# Patient Record
Sex: MALE | Race: WHITE | Employment: OTHER | URBAN - NONMETROPOLITAN AREA
[De-identification: names, ages, dates, MRNs, and addresses within clinical notes are randomized per-mention and may not be internally consistent; named-entity substitution may affect disease eponyms.]

---

## 2017-01-18 ENCOUNTER — OFFICE VISIT (OUTPATIENT)
Dept: PRIMARY CARE CLINIC | Age: 57
End: 2017-01-18
Payer: MEDICAID

## 2017-01-18 VITALS
BODY MASS INDEX: 22.66 KG/M2 | HEART RATE: 68 BPM | DIASTOLIC BLOOD PRESSURE: 80 MMHG | SYSTOLIC BLOOD PRESSURE: 130 MMHG | WEIGHT: 153 LBS | RESPIRATION RATE: 18 BRPM | OXYGEN SATURATION: 98 % | HEIGHT: 69 IN

## 2017-01-18 DIAGNOSIS — Z94.2 STATUS POST LUNG TRANSPLANTATION (HCC): ICD-10-CM

## 2017-01-18 DIAGNOSIS — J43.9 PULMONARY EMPHYSEMA, UNSPECIFIED EMPHYSEMA TYPE (HCC): Primary | ICD-10-CM

## 2017-01-18 DIAGNOSIS — M54.50 CHRONIC LOW BACK PAIN WITHOUT SCIATICA, UNSPECIFIED BACK PAIN LATERALITY: ICD-10-CM

## 2017-01-18 DIAGNOSIS — G89.29 CHRONIC LOW BACK PAIN WITHOUT SCIATICA, UNSPECIFIED BACK PAIN LATERALITY: ICD-10-CM

## 2017-01-18 DIAGNOSIS — E11.8 TYPE 2 DIABETES MELLITUS WITH COMPLICATION, WITHOUT LONG-TERM CURRENT USE OF INSULIN (HCC): ICD-10-CM

## 2017-01-18 DIAGNOSIS — I10 ESSENTIAL HYPERTENSION: ICD-10-CM

## 2017-01-18 LAB
AMPHETAMINE SCREEN, URINE: NORMAL
BARBITURATE SCREEN, URINE: NORMAL
BENZODIAZEPINE SCREEN, URINE: NORMAL
COCAINE METABOLITE SCREEN URINE: NORMAL
MDMA URINE: NORMAL
METHADONE SCREEN, URINE: NORMAL
METHAMPHETAMINE, URINE: NORMAL
OPIATE SCREEN URINE: NORMAL
OXYCODONE SCREEN URINE: NORMAL
PHENCYCLIDINE SCREEN URINE: NORMAL
PROPOXYPHENE SCREEN, URINE: NORMAL
THC: POSITIVE
TRICYCLIC ANTIDEPRESSANTS, UR: NORMAL

## 2017-01-18 PROCEDURE — 80305 DRUG TEST PRSMV DIR OPT OBS: CPT | Performed by: FAMILY MEDICINE

## 2017-01-18 PROCEDURE — 99214 OFFICE O/P EST MOD 30 MIN: CPT | Performed by: FAMILY MEDICINE

## 2017-01-18 RX ORDER — HYDROCODONE BITARTRATE AND ACETAMINOPHEN 5; 325 MG/1; MG/1
TABLET ORAL
Refills: 0 | COMMUNITY
Start: 2017-01-06 | End: 2018-01-22

## 2017-01-18 RX ORDER — DEXTROMETHORPHAN HYDROBROMIDE AND PROMETHAZINE HYDROCHLORIDE 15; 6.25 MG/5ML; MG/5ML
SYRUP ORAL
Qty: 120 ML | Refills: 1 | Status: SHIPPED | OUTPATIENT
Start: 2017-01-18 | End: 2017-04-18

## 2017-01-18 RX ORDER — DEXTROMETHORPHAN HYDROBROMIDE AND PROMETHAZINE HYDROCHLORIDE 15; 6.25 MG/5ML; MG/5ML
SYRUP ORAL
Refills: 1 | COMMUNITY
Start: 2017-01-09 | End: 2017-01-18 | Stop reason: SDUPTHER

## 2017-01-18 ASSESSMENT — ENCOUNTER SYMPTOMS
WHEEZING: 1
COUGH: 0
COLOR CHANGE: 0
SHORTNESS OF BREATH: 1

## 2017-03-22 ENCOUNTER — APPOINTMENT (OUTPATIENT)
Dept: ULTRASOUND IMAGING | Facility: HOSPITAL | Age: 57
End: 2017-03-22

## 2017-03-22 ENCOUNTER — HOSPITAL ENCOUNTER (EMERGENCY)
Facility: HOSPITAL | Age: 57
Discharge: HOME | End: 2017-03-22
Admitting: EMERGENCY MEDICINE

## 2017-03-22 ENCOUNTER — APPOINTMENT (OUTPATIENT)
Dept: CT IMAGING | Facility: HOSPITAL | Age: 57
End: 2017-03-22

## 2017-03-22 ENCOUNTER — APPOINTMENT (OUTPATIENT)
Dept: GENERAL RADIOLOGY | Facility: HOSPITAL | Age: 57
End: 2017-03-22

## 2017-03-22 VITALS
BODY MASS INDEX: 21.33 KG/M2 | WEIGHT: 144 LBS | TEMPERATURE: 99.3 F | SYSTOLIC BLOOD PRESSURE: 117 MMHG | OXYGEN SATURATION: 95 % | RESPIRATION RATE: 20 BRPM | HEIGHT: 69 IN | DIASTOLIC BLOOD PRESSURE: 61 MMHG | HEART RATE: 87 BPM

## 2017-03-22 DIAGNOSIS — J20.8 ACUTE BACTERIAL BRONCHITIS: Primary | ICD-10-CM

## 2017-03-22 DIAGNOSIS — B96.89 ACUTE BACTERIAL BRONCHITIS: Primary | ICD-10-CM

## 2017-03-22 DIAGNOSIS — R93.89 ABNORMAL CT SCAN, CHEST: ICD-10-CM

## 2017-03-22 LAB
ALBUMIN SERPL-MCNC: 4.1 G/DL (ref 3.5–5)
ALBUMIN/GLOB SERPL: 1.5 G/DL (ref 1.1–2.5)
ALP SERPL-CCNC: 80 U/L (ref 24–120)
ALT SERPL W P-5'-P-CCNC: <15 U/L (ref 0–54)
ANION GAP SERPL CALCULATED.3IONS-SCNC: 12 MMOL/L (ref 4–13)
ARTERIAL PATENCY WRIST A: ABNORMAL
AST SERPL-CCNC: 18 U/L (ref 7–45)
ATMOSPHERIC PRESS: ABNORMAL MMHG
BASE EXCESS BLDA CALC-SCNC: 5.8 MMOL/L (ref -2–2)
BASOPHILS # BLD AUTO: 0.03 10*3/MM3 (ref 0–0.2)
BASOPHILS NFR BLD AUTO: 0.2 % (ref 0–2)
BDY SITE: ABNORMAL
BILIRUB SERPL-MCNC: 1.1 MG/DL (ref 0.1–1)
BUN BLD-MCNC: 24 MG/DL (ref 5–21)
BUN/CREAT SERPL: 18.9 (ref 7–25)
CALCIUM SPEC-SCNC: 9.5 MG/DL (ref 8.4–10.4)
CHLORIDE SERPL-SCNC: 97 MMOL/L (ref 98–110)
CO2 SERPL-SCNC: 33 MMOL/L (ref 24–31)
CREAT BLD-MCNC: 1.27 MG/DL (ref 0.5–1.4)
DEPRECATED RDW RBC AUTO: 41.2 FL (ref 40–54)
EOSINOPHIL # BLD AUTO: 0.06 10*3/MM3 (ref 0–0.7)
EOSINOPHIL NFR BLD AUTO: 0.3 % (ref 0–4)
ERYTHROCYTE [DISTWIDTH] IN BLOOD BY AUTOMATED COUNT: 13 % (ref 12–15)
FLUAV AG NPH QL: NEGATIVE
FLUBV AG NPH QL IA: NEGATIVE
GFR SERPL CREATININE-BSD FRML MDRD: 58 ML/MIN/1.73
GLOBULIN UR ELPH-MCNC: 2.8 GM/DL
GLUCOSE BLD-MCNC: 101 MG/DL (ref 70–100)
HCO3 BLDA-SCNC: 30.3 MMOL/L (ref 22–26)
HCT VFR BLD AUTO: 40.6 % (ref 40–52)
HGB BLD-MCNC: 13.6 G/DL (ref 14–18)
IMM GRANULOCYTES # BLD: 0.16 10*3/MM3 (ref 0–0.03)
IMM GRANULOCYTES NFR BLD: 0.9 % (ref 0–5)
LYMPHOCYTES # BLD AUTO: 0.83 10*3/MM3 (ref 0.72–4.86)
LYMPHOCYTES NFR BLD AUTO: 4.6 % (ref 15–45)
MCH RBC QN AUTO: 29.6 PG (ref 28–32)
MCHC RBC AUTO-ENTMCNC: 33.5 G/DL (ref 33–36)
MCV RBC AUTO: 88.5 FL (ref 82–95)
MODALITY: ABNORMAL
MONOCYTES # BLD AUTO: 1.35 10*3/MM3 (ref 0.19–1.3)
MONOCYTES NFR BLD AUTO: 7.5 % (ref 4–12)
NEUTROPHILS # BLD AUTO: 15.68 10*3/MM3 (ref 1.87–8.4)
NEUTROPHILS NFR BLD AUTO: 86.5 % (ref 39–78)
NT-PROBNP SERPL-MCNC: 571 PG/ML (ref 0–900)
PCO2 BLDA: 43.5 MM HG (ref 35–45)
PH BLDA: 7.46 PH UNITS (ref 7.35–7.45)
PLATELET # BLD AUTO: 244 10*3/MM3 (ref 130–400)
PMV BLD AUTO: 11.4 FL (ref 6–12)
PO2 BLDA: 63.7 MM HG (ref 80–100)
POTASSIUM BLD-SCNC: 4.1 MMOL/L (ref 3.5–5.3)
PROT SERPL-MCNC: 6.9 G/DL (ref 6.3–8.7)
RBC # BLD AUTO: 4.59 10*6/MM3 (ref 4.8–5.9)
SAO2 % BLDCOA: 93.4 % (ref 94–100)
SAO2 % BLDCOA: 93.4 % (ref 94–100)
SODIUM BLD-SCNC: 142 MMOL/L (ref 135–145)
TROPONIN I SERPL-MCNC: 0 NG/ML (ref 0–0.07)
WBC NRBC COR # BLD: 18.11 10*3/MM3 (ref 4.8–10.8)

## 2017-03-22 PROCEDURE — 83880 ASSAY OF NATRIURETIC PEPTIDE: CPT | Performed by: PHYSICIAN ASSISTANT

## 2017-03-22 PROCEDURE — 25010000002 ONDANSETRON PER 1 MG: Performed by: EMERGENCY MEDICINE

## 2017-03-22 PROCEDURE — 82803 BLOOD GASES ANY COMBINATION: CPT

## 2017-03-22 PROCEDURE — 0 IOPAMIDOL PER 1 ML: Performed by: PHYSICIAN ASSISTANT

## 2017-03-22 PROCEDURE — 96367 TX/PROPH/DG ADDL SEQ IV INF: CPT

## 2017-03-22 PROCEDURE — 94799 UNLISTED PULMONARY SVC/PX: CPT

## 2017-03-22 PROCEDURE — 71275 CT ANGIOGRAPHY CHEST: CPT

## 2017-03-22 PROCEDURE — 71010 HC CHEST PA OR AP: CPT

## 2017-03-22 PROCEDURE — 36415 COLL VENOUS BLD VENIPUNCTURE: CPT | Performed by: PHYSICIAN ASSISTANT

## 2017-03-22 PROCEDURE — 99284 EMERGENCY DEPT VISIT MOD MDM: CPT

## 2017-03-22 PROCEDURE — 25010000002 MORPHINE PER 10 MG: Performed by: EMERGENCY MEDICINE

## 2017-03-22 PROCEDURE — 36600 WITHDRAWAL OF ARTERIAL BLOOD: CPT

## 2017-03-22 PROCEDURE — 85025 COMPLETE CBC W/AUTO DIFF WBC: CPT | Performed by: PHYSICIAN ASSISTANT

## 2017-03-22 PROCEDURE — 25010000002 PIPERACILLIN-TAZOBACTAM: Performed by: PHYSICIAN ASSISTANT

## 2017-03-22 PROCEDURE — 96365 THER/PROPH/DIAG IV INF INIT: CPT

## 2017-03-22 PROCEDURE — 96375 TX/PRO/DX INJ NEW DRUG ADDON: CPT

## 2017-03-22 PROCEDURE — 80053 COMPREHEN METABOLIC PANEL: CPT | Performed by: PHYSICIAN ASSISTANT

## 2017-03-22 PROCEDURE — 93010 ELECTROCARDIOGRAM REPORT: CPT | Performed by: INTERNAL MEDICINE

## 2017-03-22 PROCEDURE — 25010000002 CEFEPIME: Performed by: PHYSICIAN ASSISTANT

## 2017-03-22 PROCEDURE — 93005 ELECTROCARDIOGRAM TRACING: CPT | Performed by: PHYSICIAN ASSISTANT

## 2017-03-22 PROCEDURE — 87040 BLOOD CULTURE FOR BACTERIA: CPT | Performed by: PHYSICIAN ASSISTANT

## 2017-03-22 PROCEDURE — 84484 ASSAY OF TROPONIN QUANT: CPT

## 2017-03-22 PROCEDURE — 94640 AIRWAY INHALATION TREATMENT: CPT

## 2017-03-22 PROCEDURE — 96361 HYDRATE IV INFUSION ADD-ON: CPT

## 2017-03-22 PROCEDURE — 87804 INFLUENZA ASSAY W/OPTIC: CPT | Performed by: PHYSICIAN ASSISTANT

## 2017-03-22 PROCEDURE — 93970 EXTREMITY STUDY: CPT

## 2017-03-22 PROCEDURE — 93970 EXTREMITY STUDY: CPT | Performed by: SURGERY

## 2017-03-22 RX ORDER — AMLODIPINE BESYLATE 5 MG/1
5 TABLET ORAL EVERY MORNING
COMMUNITY
End: 2022-01-01

## 2017-03-22 RX ORDER — BUDESONIDE AND FORMOTEROL FUMARATE DIHYDRATE 160; 4.5 UG/1; UG/1
2 AEROSOL RESPIRATORY (INHALATION) EVERY MORNING
COMMUNITY
End: 2018-12-13 | Stop reason: SDUPTHER

## 2017-03-22 RX ORDER — TACROLIMUS 1 MG/1
1 CAPSULE ORAL 2 TIMES DAILY
COMMUNITY

## 2017-03-22 RX ORDER — ALBUTEROL SULFATE 90 UG/1
2 AEROSOL, METERED RESPIRATORY (INHALATION) EVERY 4 HOURS PRN
COMMUNITY
End: 2019-10-24 | Stop reason: SDUPTHER

## 2017-03-22 RX ORDER — MORPHINE SULFATE 4 MG/ML
4 INJECTION, SOLUTION INTRAMUSCULAR; INTRAVENOUS ONCE
Status: COMPLETED | OUTPATIENT
Start: 2017-03-22 | End: 2017-03-22

## 2017-03-22 RX ORDER — CHOLECALCIFEROL (VITAMIN D3) 125 MCG
500 CAPSULE ORAL EVERY MORNING
COMMUNITY

## 2017-03-22 RX ORDER — OMEPRAZOLE 20 MG/1
20 CAPSULE, DELAYED RELEASE ORAL 2 TIMES DAILY
COMMUNITY

## 2017-03-22 RX ORDER — METOPROLOL TARTRATE 100 MG/1
50 TABLET ORAL 2 TIMES DAILY
COMMUNITY

## 2017-03-22 RX ORDER — HYDROCODONE BITARTRATE AND ACETAMINOPHEN 7.5; 325 MG/1; MG/1
1 TABLET ORAL EVERY 8 HOURS PRN
COMMUNITY

## 2017-03-22 RX ORDER — PREGABALIN 150 MG/1
150 CAPSULE ORAL 3 TIMES DAILY PRN
COMMUNITY

## 2017-03-22 RX ORDER — ATORVASTATIN CALCIUM 20 MG/1
20 TABLET, FILM COATED ORAL NIGHTLY
COMMUNITY

## 2017-03-22 RX ORDER — SODIUM CHLORIDE 9 MG/ML
125 INJECTION, SOLUTION INTRAVENOUS CONTINUOUS
Status: DISCONTINUED | OUTPATIENT
Start: 2017-03-22 | End: 2017-03-22 | Stop reason: HOSPADM

## 2017-03-22 RX ORDER — TACROLIMUS 0.5 MG/1
0.5 CAPSULE ORAL NIGHTLY
Status: ON HOLD | COMMUNITY
End: 2019-03-11

## 2017-03-22 RX ORDER — FOLIC ACID 1 MG/1
1 TABLET ORAL EVERY MORNING
COMMUNITY

## 2017-03-22 RX ORDER — FUROSEMIDE 20 MG/1
40 TABLET ORAL DAILY
COMMUNITY

## 2017-03-22 RX ORDER — HYDROCHLOROTHIAZIDE 50 MG/1
50 TABLET ORAL EVERY MORNING
COMMUNITY

## 2017-03-22 RX ORDER — ONDANSETRON 2 MG/ML
4 INJECTION INTRAMUSCULAR; INTRAVENOUS ONCE
Status: COMPLETED | OUTPATIENT
Start: 2017-03-22 | End: 2017-03-22

## 2017-03-22 RX ORDER — MAGNESIUM OXIDE 400 MG/1
400 TABLET ORAL 2 TIMES DAILY
COMMUNITY

## 2017-03-22 RX ORDER — CEFDINIR 300 MG/1
300 CAPSULE ORAL 2 TIMES DAILY
Qty: 20 CAPSULE | Refills: 0 | Status: SHIPPED | OUTPATIENT
Start: 2017-03-22 | End: 2017-10-10

## 2017-03-22 RX ORDER — ALBUTEROL SULFATE 2.5 MG/3ML
2.5 SOLUTION RESPIRATORY (INHALATION)
Status: COMPLETED | OUTPATIENT
Start: 2017-03-22 | End: 2017-03-22

## 2017-03-22 RX ADMIN — MORPHINE SULFATE 4 MG: 4 INJECTION, SOLUTION INTRAMUSCULAR; INTRAVENOUS at 14:11

## 2017-03-22 RX ADMIN — ONDANSETRON HYDROCHLORIDE 4 MG: 2 SOLUTION INTRAMUSCULAR; INTRAVENOUS at 14:11

## 2017-03-22 RX ADMIN — ALBUTEROL SULFATE 2.5 MG: 2.5 SOLUTION RESPIRATORY (INHALATION) at 12:01

## 2017-03-22 RX ADMIN — TAZOBACTAM SODIUM AND PIPERACILLIN SODIUM 4.5 G: .5; 4 INJECTION, POWDER, LYOPHILIZED, FOR SOLUTION INTRAVENOUS at 12:36

## 2017-03-22 RX ADMIN — SODIUM CHLORIDE 125 ML/HR: 9 INJECTION, SOLUTION INTRAVENOUS at 12:10

## 2017-03-22 RX ADMIN — CEFEPIME 2 G: 2 INJECTION, POWDER, FOR SOLUTION INTRAVENOUS at 13:45

## 2017-03-22 RX ADMIN — IOPAMIDOL 98 ML: 755 INJECTION, SOLUTION INTRAVENOUS at 13:10

## 2017-03-22 RX ADMIN — ALBUTEROL SULFATE 2.5 MG: 2.5 SOLUTION RESPIRATORY (INHALATION) at 11:55

## 2017-03-22 NOTE — ED PROVIDER NOTES
"Subjective   Patient is a 56 y.o. male presenting with shortness of breath.   Shortness of Breath   Associated symptoms: chest pain, cough and vomiting    Associated symptoms: no wheezing      Patient is a 56-year-old male with chief complaint of shortness of breath and cough.  The patient reports this been occurring approximately 3 days ago where he had a bit of white, yellow, and \"a smidge of green productive phlegm.  He did state at the beginning when he coughed so heavily, he felt \"something ripthrough me and then a bit of blood came out (in my phlegm).\"  The patient does complain of a  fever -temperature maximum 100.4 last night.  He taken Tylenol last night.  He does complain of nausea and vomiting as well where he had been vomiting at least 3 times in past 24-hour period.  He does have a history chronic intermittent diarrhea where he is experiencing diarrhea presently.  He does complain of body aches and chills.  He has been experiencing left-sided chest pain that is chronic after he had his lung transplant surgery back in 2006.  He has had different type of discomfort in the right side of his chest as well.  He believes the last time he had a stress test was back in 2006 prior to his lung transplant clearance.  The patient denies being on any antibiotics presently.    The patient reports a history of left-sided lung transplant back in 2016 secondary to COPD and emphysema.  The patient states he quit smoking.  He is being followed closely by his lung transplant physician at  in [place], [place].  The patient reports that he is showing signs of rejection as of one year ago.  He continues to be on antirejection medications.  However, he states that he is not on the lung transplant list due to \"missing too many appointments.\"    Review of Systems   Constitutional: Positive for activity change, appetite change, chills and fatigue.   HENT: Negative.    Eyes: Negative.    Respiratory: Positive for cough, " chest tightness and shortness of breath. Negative for choking, wheezing and stridor.    Cardiovascular: Positive for chest pain and leg swelling. Negative for palpitations.   Gastrointestinal: Positive for diarrhea, nausea and vomiting. Negative for blood in stool.   Genitourinary: Negative.    Musculoskeletal: Negative.    Neurological: Positive for weakness.       Past Medical History:   Diagnosis Date   • Cancer     Skin   • Chronic back pain    • COPD (chronic obstructive pulmonary disease)    • Diabetes mellitus    • Emphysema lung    • Hypertension    • Lung transplanted        Allergies   Allergen Reactions   • Clindamycin/Lincomycin Diarrhea   • Moxifloxacin Rash     Sweating        Past Surgical History:   Procedure Laterality Date   • APPENDECTOMY     • CHOLECYSTECTOMY     • COLONOSCOPY     • ENDOSCOPY     • LUNG REMOVAL, TOTAL      right upper lobe   • LUNG TRANSPLANT      2006       Family History   Problem Relation Age of Onset   • Heart disease Father    • Heart attack Brother        Social History     Social History   • Marital status:      Spouse name: N/A   • Number of children: N/A   • Years of education: N/A     Social History Main Topics   • Smoking status: Former Smoker   • Smokeless tobacco: None      Comment: quit 9 years ago   • Alcohol use Yes      Comment: 2 per week   • Drug use: Yes     Special: Marijuana      Comment: when in severe pain    • Sexual activity: Defer     Other Topics Concern   • None     Social History Narrative   • None       Prior to Admission medications    Not on File       Medications   sodium chloride 0.9 % infusion (125 mL/hr Intravenous New Bag 3/22/17 1210)   albuterol (PROVENTIL) nebulizer solution 0.083% 2.5 mg/3mL (2.5 mg Nebulization Given 3/22/17 1201)   piperacillin-tazobactam (ZOSYN) 4.5 g/100 mL 0.9% NS IVPB (mbp) (0 g Intravenous Stopped 3/22/17 1343)   cefepime (MAXIPIME) 2 g/100 mL 0.9% NS (mbp) (2 g Intravenous New Bag 3/22/17 1345)   iopamidol  "(ISOVUE-370) 76 % injection 150 mL (98 mL Intravenous Given 3/22/17 1310)       /54  Pulse 87  Temp 99.7 °F (37.6 °C) (Oral)   Resp 13  Ht 69\" (175.3 cm)  Wt 144 lb (65.3 kg)  SpO2 90%  BMI 21.27 kg/m2      Objective   Physical Exam   Constitutional: He is oriented to person, place, and time. He appears well-developed and well-nourished.  Non-toxic appearance. No distress.   HENT:   Head: Normocephalic and atraumatic.   Nose: Nose normal.   Mouth/Throat: Uvula is midline, oropharynx is clear and moist and mucous membranes are normal.   Eyes: Conjunctivae, EOM and lids are normal. Pupils are equal, round, and reactive to light. Lids are everted and swept, no foreign bodies found.   Neck: Trachea normal, normal range of motion, full passive range of motion without pain and phonation normal. Neck supple. Normal carotid pulses and no JVD present. Carotid bruit is not present. No rigidity.   Cardiovascular: Normal rate, regular rhythm, normal heart sounds, intact distal pulses and normal pulses.    Pulmonary/Chest: Effort normal. No stridor. No apnea and no tachypnea. No respiratory distress. He has rales.   Abdominal: Soft. Normal appearance, normal aorta and bowel sounds are normal. There is no hepatosplenomegaly. No hernia.   Musculoskeletal: Normal range of motion. He exhibits edema and tenderness.   1+ pitting edema to BLE with bilateral rizwana's sign.        Vascular Status -  His exam exhibits right foot vasculature normal. His exam exhibits no right foot edema. His exam exhibits left foot vasculature normal. His exam exhibits no left foot edema.  Neurological: He is alert and oriented to person, place, and time. He has normal strength. He displays normal reflexes. No cranial nerve deficit or sensory deficit. Gait normal. GCS eye subscore is 4. GCS verbal subscore is 5. GCS motor subscore is 6.   Skin: Skin is warm, dry and intact. He is not diaphoretic. No pallor.   Psychiatric: He has a normal mood " and affect. His speech is normal and behavior is normal.   Nursing note and vitals reviewed.      Procedures         Lab Results (last 24 hours)     Procedure Component Value Units Date/Time    Blood Gas, Arterial [[id number]]  (Abnormal) Collected:  03/22/17 1150    Specimen:  Arterial Blood Updated:  03/22/17 1152     Site Arterial: right radial     Nikolas's Test --      Documented in Rapid Comm        pH, Arterial 7.461 (H) pH units      pCO2, Arterial 43.5 mm Hg      pO2, Arterial 63.7 (L) mm Hg      HCO3, Arterial 30.3 (H) mmol/L      Base Excess, Arterial 5.8 (H) mmol/L      O2 Saturation, Arterial 93.4 (L) %      O2 Saturation Calculated 93.4 (L) %      Barometric Pressure for Blood Gas -- mmHg       Component not reported at this site.        Modality Room air    Narrative:       Serial Number: [id number]    : [id number]    CBC & Differential [[id number]] Collected:  03/22/17 1159    Specimen:  Blood Updated:  03/22/17 1214    Narrative:       The following orders were created for panel order CBC & Differential.  Procedure                               Abnormality         Status                     ---------                               -----------         ------                     CBC Auto Differential[[id number]]         Abnormal            Final result                 Please view results for these tests on the individual orders.    Comprehensive Metabolic Panel [[id number]]  (Abnormal) Collected:  03/22/17 1159    Specimen:  Blood Updated:  03/22/17 1223     Glucose 101 (H) mg/dL      BUN 24 (H) mg/dL      Creatinine 1.27 mg/dL      Sodium 142 mmol/L      Potassium 4.1 mmol/L      Chloride 97 (L) mmol/L      CO2 33.0 (H) mmol/L      Calcium 9.5 mg/dL      Total Protein 6.9 g/dL      Albumin 4.10 g/dL      ALT (SGPT) <15 U/L      AST (SGOT) 18 U/L      Alkaline Phosphatase 80 U/L      Total Bilirubin 1.1 (H) mg/dL      eGFR Non African Amer 58 (L) mL/min/1.73      Globulin 2.8 gm/dL      A/G Ratio 1.5 g/dL       BUN/Creatinine Ratio 18.9     Anion Gap 12.0 mmol/L     BNP [[id number]]  (Normal) Collected:  03/22/17 1159    Specimen:  Blood Updated:  03/22/17 1232     proBNP 571.0 pg/mL     Blood Culture [[id number]] Collected:  03/22/17 1159    Specimen:  Blood from Wrist, Left Updated:  03/22/17 1226    CBC Auto Differential [[id number]]  (Abnormal) Collected:  03/22/17 1159    Specimen:  Blood Updated:  03/22/17 1214     WBC 18.11 (H) 10*3/mm3      RBC 4.59 (L) 10*6/mm3      Hemoglobin 13.6 (L) g/dL      Hematocrit 40.6 %      MCV 88.5 fL      MCH 29.6 pg      MCHC 33.5 g/dL      RDW 13.0 %      RDW-SD 41.2 fl      MPV 11.4 fL      Platelets 244 10*3/mm3      Neutrophil % 86.5 (H) %      Lymphocyte % 4.6 (L) %      Monocyte % 7.5 %      Eosinophil % 0.3 %      Basophil % 0.2 %      Immature Grans % 0.9 %      Neutrophils, Absolute 15.68 (H) 10*3/mm3      Lymphocytes, Absolute 0.83 10*3/mm3      Monocytes, Absolute 1.35 (H) 10*3/mm3      Eosinophils, Absolute 0.06 10*3/mm3      Basophils, Absolute 0.03 10*3/mm3      Immature Grans, Absolute 0.16 (H) 10*3/mm3     Influenza Antigen [[id number]]  (Normal) Collected:  03/22/17 1159    Specimen:  Swab from Nasopharynx Updated:  03/22/17 1245     Influenza A Ag, EIA Negative     Influenza B Ag, EIA Negative    Narrative:         Recommend confirmation of negative results by viral culture or molecular assay.    POC Troponin, Rapid [[id number]]  (Normal) Collected:  03/22/17 1207    Specimen:  Blood Updated:  03/22/17 1220     Troponin I 0.00 ng/mL       Serial Number: [id number]    : [id number]       Blood Culture [[id number]] Collected:  03/22/17 1210    Specimen:  Blood from Arm, Left Updated:  03/22/17 1226          Xr Chest 1 View    Result Date: 3/22/2017  Narrative: XR CHEST 1 VW- 3/22/2017 11:42 AM CDT  HISTORY: Chest pain and shortness of breath   COMPARISON: 06/12/2015.  FINDINGS: The RIGHT lung remains hyperlucent. No focal consolidation is seen. Increased markings are again noted  in the LEFT lung. These are unchanged since the previous exam. The cardiomediastinal silhouette and pulmonary vascularity are unchanged.  The osseous structures and surrounding soft tissues demonstrate no acute abnormality.      Impression: 1. Hyperexpanded RIGHT lung is unchanged in appearance.   This report was finalized on 03/22/2017 12:33 by Dr. Jim Rascon MD.    Ct Angiogram Chest With Contrast    Result Date: 3/22/2017  Narrative: CT ANGIOGRAM CHEST W CONTRAST- 3/22/2017 1:07 PM CDT  HISTORY: Chest pain and shortness of breath. History of lung transplant.   COMPARISON: 08/01/2014.  DOSE LENGTH PRODUCT: 411 mGy cm. Automated exposure control was also utilized to decrease patient radiation dose.  TECHNIQUE: Helical tomographic images of the chest were obtained after the administration of intravenous contrast following angiogram protocol. Additionally, 3D and multiplanar reformatted images were provided.    FINDINGS:  Pulmonary arteries: There is adequate enhancement of the pulmonary arteries to evaluate for central and segmental pulmonary emboli. There are no filling defects within the main, lobar, segmental or visualized subsegmental pulmonary arteries. The LEFT pulmonary artery is markedly enlarged, consistent with pulmonary arterial hypertension. There is mild stenosis in the proximal segment of the pulmonary artery. This has not changed since the previous exam and is likely due to the anastomosis from transplant.  Aorta and great vessels: The aorta is well opacified and demonstrates no dissection or aneurysm. The great vessels are normal in appearance.  Visualized neck base: The imaged portion of the base of the neck appears unremarkable.  Lungs: The native RIGHT lung is hyperexpanded. Nodules are seen in the anterior medial aspect of the RIGHT pleura. Chronic scarring is noted in the apex. The transplanted lung is significantly smaller in volume than the RIGHT lung. Nodules are seen within the lingula.  The largest is solid and measures 1 cm in diameter (axial image 43). There is no pleural effusion.  Heart: The heart is normal in size. There is no pericardial effusion.  Mediastinum and lymph nodes: A prominent RIGHT hilar lymph node measures approximately 1.7 cm in diameter. Calcified lymph nodes are seen in the mediastinum.  Skeletal and soft tissues: The osseous structures of the thorax and surrounding soft tissues demonstrate no acute process.  Upper abdomen: The imaged portion of the upper abdomen demonstrates no acute process.      Impression: 1. No evidence of pulmonary embolus. 2. No evidence of acute pneumonia. 3. Nodules are now seen in the lingula of the transplanted LEFT lung. The largest measures 1 cm in diameter. These are concerning for a neoplastic process. Post transplant lymphoproliferative disease could also cause this appearance.  This report was finalized on 03/22/2017 13:33 by Dr. Jim Rascon MD.      ED Course  ED Course     Venous ultrasound BLE - no thrombus visualized.    I Reviewed this case with Dr. Gillette who assessed the patient. HE has offered admission or transfer but the patient refuses both. He wishes to be treated as an outpatient basis and followup with his physicians.          MDM    Final diagnoses:   Acute bacterial bronchitis   Abnormal CT scan, chest          ThuDONNIE Rosa  03/22/17 8750

## 2017-03-22 NOTE — DISCHARGE INSTRUCTIONS
Take medications as directed. followup with PCP and lung transplant physician. Wear oxygen continuously. Return to the ED if any acute worsening s/sx.

## 2017-03-27 LAB
BACTERIA SPEC AEROBE CULT: NORMAL
BACTERIA SPEC AEROBE CULT: NORMAL

## 2017-04-18 ENCOUNTER — OFFICE VISIT (OUTPATIENT)
Dept: PRIMARY CARE CLINIC | Age: 57
End: 2017-04-18
Payer: MEDICAID

## 2017-04-18 VITALS
SYSTOLIC BLOOD PRESSURE: 124 MMHG | HEIGHT: 69 IN | DIASTOLIC BLOOD PRESSURE: 84 MMHG | WEIGHT: 155.6 LBS | BODY MASS INDEX: 23.05 KG/M2 | HEART RATE: 68 BPM | OXYGEN SATURATION: 97 %

## 2017-04-18 DIAGNOSIS — J43.9 PULMONARY EMPHYSEMA, UNSPECIFIED EMPHYSEMA TYPE (HCC): ICD-10-CM

## 2017-04-18 DIAGNOSIS — Z94.2 STATUS POST LUNG TRANSPLANTATION (HCC): ICD-10-CM

## 2017-04-18 DIAGNOSIS — M54.16 LUMBAR RADICULOPATHY: ICD-10-CM

## 2017-04-18 DIAGNOSIS — I10 ESSENTIAL HYPERTENSION: Primary | ICD-10-CM

## 2017-04-18 PROCEDURE — 99214 OFFICE O/P EST MOD 30 MIN: CPT | Performed by: FAMILY MEDICINE

## 2017-04-18 RX ORDER — PREGABALIN 150 MG/1
CAPSULE ORAL
Qty: 60 CAPSULE | Refills: 2 | Status: SHIPPED | OUTPATIENT
Start: 2017-04-18 | End: 2017-06-08 | Stop reason: SDUPTHER

## 2017-04-18 ASSESSMENT — ENCOUNTER SYMPTOMS
SHORTNESS OF BREATH: 1
COLOR CHANGE: 0
COUGH: 0
WHEEZING: 1

## 2017-05-17 ENCOUNTER — HOSPITAL ENCOUNTER (OUTPATIENT)
Dept: GENERAL RADIOLOGY | Facility: HOSPITAL | Age: 57
Discharge: HOME | End: 2017-05-17
Admitting: NURSE PRACTITIONER

## 2017-05-17 ENCOUNTER — TRANSCRIBE ORDERS (OUTPATIENT)
Dept: ADMINISTRATIVE | Facility: HOSPITAL | Age: 57
End: 2017-05-17

## 2017-05-17 DIAGNOSIS — Z94.2 LUNG REPLACED BY TRANSPLANT (HCC): ICD-10-CM

## 2017-05-17 DIAGNOSIS — Z94.2 LUNG REPLACED BY TRANSPLANT (HCC): Primary | ICD-10-CM

## 2017-05-17 PROCEDURE — 71020 HC CHEST PA AND LATERAL: CPT

## 2017-06-08 DIAGNOSIS — M54.16 LUMBAR RADICULOPATHY: ICD-10-CM

## 2017-07-07 RX ORDER — ATORVASTATIN CALCIUM 20 MG/1
TABLET, FILM COATED ORAL
Qty: 30 TABLET | Refills: 10 | Status: SHIPPED | OUTPATIENT
Start: 2017-07-07 | End: 2017-07-10 | Stop reason: SDUPTHER

## 2017-07-10 ENCOUNTER — OFFICE VISIT (OUTPATIENT)
Dept: PRIMARY CARE CLINIC | Age: 57
End: 2017-07-10
Payer: MEDICAID

## 2017-07-10 VITALS
HEIGHT: 69 IN | BODY MASS INDEX: 22.45 KG/M2 | SYSTOLIC BLOOD PRESSURE: 122 MMHG | OXYGEN SATURATION: 94 % | WEIGHT: 151.6 LBS | DIASTOLIC BLOOD PRESSURE: 70 MMHG | TEMPERATURE: 97.8 F | HEART RATE: 56 BPM

## 2017-07-10 DIAGNOSIS — I10 ESSENTIAL HYPERTENSION: Primary | ICD-10-CM

## 2017-07-10 DIAGNOSIS — J43.9 PULMONARY EMPHYSEMA, UNSPECIFIED EMPHYSEMA TYPE (HCC): ICD-10-CM

## 2017-07-10 DIAGNOSIS — K64.9 HEMORRHOIDS, UNSPECIFIED HEMORRHOID TYPE: ICD-10-CM

## 2017-07-10 DIAGNOSIS — Z94.2 STATUS POST LUNG TRANSPLANTATION (HCC): ICD-10-CM

## 2017-07-10 DIAGNOSIS — E78.5 HYPERLIPIDEMIA, UNSPECIFIED HYPERLIPIDEMIA TYPE: ICD-10-CM

## 2017-07-10 PROCEDURE — 99214 OFFICE O/P EST MOD 30 MIN: CPT | Performed by: FAMILY MEDICINE

## 2017-07-10 RX ORDER — HYDROCORTISONE ACETATE 25 MG/1
25 SUPPOSITORY RECTAL 2 TIMES DAILY
Qty: 12 SUPPOSITORY | Refills: 0 | Status: SHIPPED | OUTPATIENT
Start: 2017-07-10 | End: 2018-01-22

## 2017-07-10 RX ORDER — PREGABALIN 150 MG/1
150 CAPSULE ORAL
COMMUNITY
End: 2017-09-18 | Stop reason: SDUPTHER

## 2017-07-10 RX ORDER — ATORVASTATIN CALCIUM 20 MG/1
TABLET, FILM COATED ORAL
Qty: 30 TABLET | Refills: 10 | Status: SHIPPED | OUTPATIENT
Start: 2017-07-10 | End: 2018-07-21 | Stop reason: SDUPTHER

## 2017-07-10 ASSESSMENT — ENCOUNTER SYMPTOMS
COUGH: 0
SHORTNESS OF BREATH: 0
COLOR CHANGE: 0

## 2017-08-02 ENCOUNTER — TRANSCRIBE ORDERS (OUTPATIENT)
Dept: ADMINISTRATIVE | Facility: HOSPITAL | Age: 57
End: 2017-08-02

## 2017-08-02 DIAGNOSIS — N18.30 CHRONIC KIDNEY DISEASE, STAGE III (MODERATE) (HCC): Primary | ICD-10-CM

## 2017-08-03 ENCOUNTER — LAB (OUTPATIENT)
Dept: LAB | Facility: HOSPITAL | Age: 57
End: 2017-08-03
Attending: INTERNAL MEDICINE

## 2017-08-03 DIAGNOSIS — N18.30 CHRONIC KIDNEY DISEASE, STAGE III (MODERATE) (HCC): ICD-10-CM

## 2017-08-03 LAB
ANION GAP SERPL CALCULATED.3IONS-SCNC: 11 MMOL/L (ref 4–13)
BUN BLD-MCNC: 26 MG/DL (ref 5–21)
BUN/CREAT SERPL: 16.6 (ref 7–25)
CALCIUM SPEC-SCNC: 9.2 MG/DL (ref 8.4–10.4)
CHLORIDE SERPL-SCNC: 99 MMOL/L (ref 98–110)
CO2 SERPL-SCNC: 31 MMOL/L (ref 24–31)
CREAT BLD-MCNC: 1.57 MG/DL (ref 0.5–1.4)
DEPRECATED RDW RBC AUTO: 42.4 FL (ref 40–54)
ERYTHROCYTE [DISTWIDTH] IN BLOOD BY AUTOMATED COUNT: 13.4 % (ref 12–15)
GFR SERPL CREATININE-BSD FRML MDRD: 46 ML/MIN/1.73
GLUCOSE BLD-MCNC: 102 MG/DL (ref 70–100)
HCT VFR BLD AUTO: 41.7 % (ref 40–52)
HGB BLD-MCNC: 13.8 G/DL (ref 14–18)
MCH RBC QN AUTO: 28.9 PG (ref 28–32)
MCHC RBC AUTO-ENTMCNC: 33.1 G/DL (ref 33–36)
MCV RBC AUTO: 87.2 FL (ref 82–95)
PLATELET # BLD AUTO: 231 10*3/MM3 (ref 130–400)
PMV BLD AUTO: 11.8 FL (ref 6–12)
POTASSIUM BLD-SCNC: 3.8 MMOL/L (ref 3.5–5.3)
RBC # BLD AUTO: 4.78 10*6/MM3 (ref 4.8–5.9)
SODIUM BLD-SCNC: 141 MMOL/L (ref 135–145)
WBC NRBC COR # BLD: 9.9 10*3/MM3 (ref 4.8–10.8)

## 2017-08-03 PROCEDURE — 85027 COMPLETE CBC AUTOMATED: CPT | Performed by: INTERNAL MEDICINE

## 2017-08-03 PROCEDURE — 82043 UR ALBUMIN QUANTITATIVE: CPT | Performed by: INTERNAL MEDICINE

## 2017-08-03 PROCEDURE — 82570 ASSAY OF URINE CREATININE: CPT | Performed by: INTERNAL MEDICINE

## 2017-08-03 PROCEDURE — 36415 COLL VENOUS BLD VENIPUNCTURE: CPT

## 2017-08-03 PROCEDURE — 80048 BASIC METABOLIC PNL TOTAL CA: CPT | Performed by: INTERNAL MEDICINE

## 2017-08-04 LAB
CREAT 24H UR-MCNC: 56.7 MG/DL
MICROALB/CRT. RATIO UR: 15.9 MG/G CREAT (ref 0–30)
MICROALBUMIN UR-MCNC: 9 UG/ML

## 2017-08-27 RX ORDER — FOLIC ACID 1 MG/1
TABLET ORAL
Qty: 30 TABLET | Refills: 11 | Status: SHIPPED | OUTPATIENT
Start: 2017-08-27 | End: 2018-07-26 | Stop reason: SDUPTHER

## 2017-09-19 RX ORDER — TIOTROPIUM BROMIDE 18 UG/1
CAPSULE ORAL; RESPIRATORY (INHALATION)
Qty: 30 CAPSULE | Refills: 11 | Status: SHIPPED | OUTPATIENT
Start: 2017-09-19 | End: 2018-10-26 | Stop reason: SDUPTHER

## 2017-09-22 ENCOUNTER — TELEPHONE (OUTPATIENT)
Dept: PRIMARY CARE CLINIC | Age: 57
End: 2017-09-22

## 2017-09-22 DIAGNOSIS — N28.89 RENAL MASS: Primary | ICD-10-CM

## 2017-09-26 ENCOUNTER — TELEPHONE (OUTPATIENT)
Dept: PRIMARY CARE CLINIC | Age: 57
End: 2017-09-26

## 2017-10-03 ENCOUNTER — OFFICE VISIT (OUTPATIENT)
Dept: UROLOGY | Facility: CLINIC | Age: 57
End: 2017-10-03

## 2017-10-03 VITALS — WEIGHT: 145 LBS | HEIGHT: 69 IN | TEMPERATURE: 98.4 F | BODY MASS INDEX: 21.48 KG/M2

## 2017-10-03 DIAGNOSIS — N28.89 RENAL MASS: Primary | ICD-10-CM

## 2017-10-03 LAB
BILIRUB BLD-MCNC: NEGATIVE MG/DL
CLARITY, POC: CLEAR
COLOR UR: YELLOW
GLUCOSE UR STRIP-MCNC: NEGATIVE MG/DL
KETONES UR QL: NEGATIVE
LEUKOCYTE EST, POC: NEGATIVE
NITRITE UR-MCNC: NEGATIVE MG/ML
PH UR: 5.5 [PH] (ref 5–8)
PROT UR STRIP-MCNC: NEGATIVE MG/DL
RBC # UR STRIP: NEGATIVE /UL
SP GR UR: 1.02 (ref 1–1.03)
UROBILINOGEN UR QL: NORMAL

## 2017-10-03 PROCEDURE — 99214 OFFICE O/P EST MOD 30 MIN: CPT | Performed by: UROLOGY

## 2017-10-03 NOTE — PROGRESS NOTES
Mr. Rodriguez is 56 y.o. male    Chief Complaint   Patient presents with   • renal mass       History of Present Illness  Renal Mass  Patient here for evaluation of renal mass.  The renal mass was found incidentally on CT scan.  The location of the mass is the left kidney.  The mass has been present forUnknown months.  The mass is described as solid.  The size of the mass is 4 cm. The mass was found on evaluation of rejection of lung transplant.  Associated symptoms include none.    The following portions of the patient's history were reviewed and updated as appropriate: allergies, current medications, past family history, past medical history, past social history, past surgical history and problem list.    Review of Systems   Constitutional: Negative for chills and fever.   Gastrointestinal: Negative for abdominal pain and blood in stool.   Genitourinary: Positive for flank pain. Negative for hematuria.         Current Outpatient Prescriptions:   •  albuterol (PROVENTIL HFA;VENTOLIN HFA) 108 (90 BASE) MCG/ACT inhaler, Inhale 2 puffs Every 4 (Four) Hours As Needed for Wheezing or Shortness of Air., Disp: , Rfl:   •  amLODIPine (NORVASC) 5 MG tablet, Take 5 mg by mouth Every Morning., Disp: , Rfl:   •  atorvastatin (LIPITOR) 20 MG tablet, Take 20 mg by mouth Every Night., Disp: , Rfl:   •  budesonide-formoterol (SYMBICORT) 160-4.5 MCG/ACT inhaler, Inhale 2 puffs Every Morning., Disp: , Rfl:   •  cefdinir (OMNICEF) 300 MG capsule, Take 1 capsule by mouth 2 (Two) Times a Day., Disp: 20 capsule, Rfl: 0  •  folic acid (FOLVITE) 1 MG tablet, Take 1 mg by mouth Every Morning., Disp: , Rfl:   •  furosemide (LASIX) 20 MG tablet, Take 20 mg by mouth Every Morning., Disp: , Rfl:   •  hydrochlorothiazide (HYDRODIURIL) 50 MG tablet, Take 50 mg by mouth Every Morning., Disp: , Rfl:   •  HYDROcodone-acetaminophen (NORCO) 7.5-325 MG per tablet, Take 1 tablet by mouth Every 4 (Four) Hours As Needed for Moderate Pain (4-6)., Disp: ,  Rfl:   •  magnesium oxide (MAG-OX) 400 MG tablet, Take 400 mg by mouth 2 (Two) Times a Day., Disp: , Rfl:   •  metoprolol tartrate (LOPRESSOR) 100 MG tablet, Take 100 mg by mouth 2 (Two) Times a Day., Disp: , Rfl:   •  Mycophenolate Sodium (MYCOPHENOLIC ACID PO), Take 540 mg by mouth 2 (Two) Times a Day., Disp: , Rfl:   •  omeprazole (priLOSEC) 20 MG capsule, Take 20 mg by mouth 2 (Two) Times a Day., Disp: , Rfl:   •  pregabalin (LYRICA) 150 MG capsule, Take 150 mg by mouth 2 (Two) Times a Day., Disp: , Rfl:   •  tacrolimus (PROGRAF) 0.5 MG capsule, Take 0.5 mg by mouth Every Night., Disp: , Rfl:   •  tacrolimus (PROGRAF) 1 MG capsule, Take 1 mg by mouth Every Morning., Disp: , Rfl:   •  tiotropium (SPIRIVA) 18 MCG per inhalation capsule, Place 1 capsule into inhaler and inhale Every Morning., Disp: , Rfl:   •  vitamin B-12 (CYANOCOBALAMIN) 500 MCG tablet, Take 500 mcg by mouth Every Morning., Disp: , Rfl:     Past Medical History:   Diagnosis Date   • Cancer     Skin   • Chronic back pain    • COPD (chronic obstructive pulmonary disease)    • Diabetes mellitus    • Emphysema lung    • Hypertension    • Lung transplanted        Past Surgical History:   Procedure Laterality Date   • APPENDECTOMY     • CHOLECYSTECTOMY     • COLONOSCOPY     • ENDOSCOPY     • LUNG REMOVAL, TOTAL      right upper lobe   • LUNG TRANSPLANT      2006       Social History     Social History   • Marital status:      Spouse name: N/A   • Number of children: N/A   • Years of education: N/A     Social History Main Topics   • Smoking status: Former Smoker   • Smokeless tobacco: None      Comment: quit 9 years ago   • Alcohol use Yes      Comment: 2 per week   • Drug use: Yes     Special: Marijuana      Comment: when in severe pain    • Sexual activity: Defer     Other Topics Concern   • None     Social History Narrative       Family History   Problem Relation Age of Onset   • Heart disease Father    • Heart attack Brother   "      Objective    Temp 98.4 °F (36.9 °C)  Ht 69\" (175.3 cm)  Wt 145 lb (65.8 kg)  BMI 21.41 kg/m2    Physical Exam    Lab on 08/03/2017   Component Date Value Ref Range Status   • Glucose 08/03/2017 102* 70 - 100 mg/dL Final   • BUN 08/03/2017 26* 5 - 21 mg/dL Final   • Creatinine 08/03/2017 1.57* 0.50 - 1.40 mg/dL Final   • Sodium 08/03/2017 141  135 - 145 mmol/L Final   • Potassium 08/03/2017 3.8  3.5 - 5.3 mmol/L Final   • Chloride 08/03/2017 99  98 - 110 mmol/L Final   • CO2 08/03/2017 31.0  24.0 - 31.0 mmol/L Final   • Calcium 08/03/2017 9.2  8.4 - 10.4 mg/dL Final   • eGFR Non  Amer 08/03/2017 46* >60 mL/min/1.73 Final   • BUN/Creatinine Ratio 08/03/2017 16.6  7.0 - 25.0 Final   • Anion Gap 08/03/2017 11.0  4.0 - 13.0 mmol/L Final       Results for orders placed or performed in visit on 10/03/17   POC Urinalysis Dipstick, Automated   Result Value Ref Range    Color Yellow Yellow, Straw, Dark Yellow, Daylin    Clarity, UA Clear Clear    Glucose, UA Negative Negative, 1000 mg/dL (3+) mg/dL    Bilirubin Negative Negative    Ketones, UA Negative Negative    Specific Gravity  1.020 1.005 - 1.030    Blood, UA Negative Negative    pH, Urine 5.5 5.0 - 8.0    Protein, POC Negative Negative mg/dL    Urobilinogen, UA Normal Normal    Leukocytes Negative Negative    Nitrite, UA Negative Negative     Assessment and Plan    Bayron was seen today for renal mass.    Diagnoses and all orders for this visit:    Renal mass  -     POC Urinalysis Dipstick, Automated  -     MRI abdomen w contrast; Future    I reviewed the CT scan of the chest done at the [facility].  On this CT scan, it did have a few views of the kidney.  It does appear that there may be a solid mass in the left kidney.  Unfortunately this was not a renal mass protocol.  He does need further evaluation of this.  His creatinine is 1.57 with a GFR of 46.  This will preclude him from having a contrasted CT scan.  I will get an MRI with contrast " of the abdomen to further evaluate this renal mass.  I did have a quite gemini discussion with him today stating that if this is a solid appearing renal mass, my concern would be for a cancer.  He expressed an understanding of this.  I also discussed with him that if this is a kidney cancer, he is much too complex for make medical standpoint for me to be able to treat him appropriately here.  He does have a history of a long transplant, and this is apparently per patient is in the midst of rejection.  He will need just be seen back at the [facility] for possible renal biopsy and intervention if this is even reasonable from a pulmonary standpoint.

## 2017-10-09 ENCOUNTER — HOSPITAL ENCOUNTER (OUTPATIENT)
Dept: MRI IMAGING | Facility: HOSPITAL | Age: 57
Discharge: HOME | End: 2017-10-09
Attending: UROLOGY | Admitting: UROLOGY

## 2017-10-09 DIAGNOSIS — N28.89 RENAL MASS: ICD-10-CM

## 2017-10-09 LAB — CREAT BLDA-MCNC: 1.6 MG/DL (ref 0.6–1.3)

## 2017-10-09 PROCEDURE — 0 GADOBENATE DIMEGLUMINE 529 MG/ML SOLUTION: Performed by: UROLOGY

## 2017-10-09 PROCEDURE — A9577 INJ MULTIHANCE: HCPCS | Performed by: UROLOGY

## 2017-10-09 PROCEDURE — 82565 ASSAY OF CREATININE: CPT

## 2017-10-09 PROCEDURE — 74183 MRI ABD W/O CNTR FLWD CNTR: CPT

## 2017-10-09 RX ADMIN — GADOBENATE DIMEGLUMINE 10 ML: 529 INJECTION, SOLUTION INTRAVENOUS at 09:45

## 2017-10-10 ENCOUNTER — OFFICE VISIT (OUTPATIENT)
Dept: UROLOGY | Facility: CLINIC | Age: 57
End: 2017-10-10

## 2017-10-10 VITALS — HEIGHT: 69 IN | BODY MASS INDEX: 21.45 KG/M2 | TEMPERATURE: 98.4 F | WEIGHT: 144.8 LBS

## 2017-10-10 DIAGNOSIS — N28.89 RENAL MASS: Primary | ICD-10-CM

## 2017-10-10 LAB
BILIRUB BLD-MCNC: NEGATIVE MG/DL
CLARITY, POC: CLEAR
COLOR UR: YELLOW
GLUCOSE UR STRIP-MCNC: NEGATIVE MG/DL
KETONES UR QL: NEGATIVE
LEUKOCYTE EST, POC: NEGATIVE
NITRITE UR-MCNC: NEGATIVE MG/ML
PH UR: 5.5 [PH] (ref 5–8)
PROT UR STRIP-MCNC: NEGATIVE MG/DL
RBC # UR STRIP: NEGATIVE /UL
SP GR UR: 1.01 (ref 1–1.03)
UROBILINOGEN UR QL: NORMAL

## 2017-10-10 PROCEDURE — 99213 OFFICE O/P EST LOW 20 MIN: CPT | Performed by: UROLOGY

## 2017-10-10 NOTE — PROGRESS NOTES
Mr. Rodriguez is 56 y.o. male    Chief Complaint   Patient presents with   • renal mass       History of Present Illness  Renal Mass  Patient here for evaluation of renal mass.  The renal mass was found on CT.  The location of the mass is the left kidney.  The mass has been present forunknown months.  The mass is described as cystic.  The size of the mass is  4 cm. The mass was found on evaluation of failure of lung transplant.  Associated symptoms include none.    The following portions of the patient's history were reviewed and updated as appropriate: allergies, current medications, past family history, past medical history, past social history, past surgical history and problem list.    Review of Systems   Constitutional: Negative for chills and fever.   Gastrointestinal: Negative for abdominal pain and blood in stool.   Genitourinary: Negative for flank pain and hematuria.         Current Outpatient Prescriptions:   •  albuterol (PROVENTIL HFA;VENTOLIN HFA) 108 (90 BASE) MCG/ACT inhaler, Inhale 2 puffs Every 4 (Four) Hours As Needed for Wheezing or Shortness of Air., Disp: , Rfl:   •  amLODIPine (NORVASC) 5 MG tablet, Take 5 mg by mouth Every Morning., Disp: , Rfl:   •  atorvastatin (LIPITOR) 20 MG tablet, Take 20 mg by mouth Every Night., Disp: , Rfl:   •  budesonide-formoterol (SYMBICORT) 160-4.5 MCG/ACT inhaler, Inhale 2 puffs Every Morning., Disp: , Rfl:   •  folic acid (FOLVITE) 1 MG tablet, Take 1 mg by mouth Every Morning., Disp: , Rfl:   •  furosemide (LASIX) 20 MG tablet, Take 20 mg by mouth Every Morning., Disp: , Rfl:   •  hydrochlorothiazide (HYDRODIURIL) 50 MG tablet, Take 50 mg by mouth Every Morning., Disp: , Rfl:   •  HYDROcodone-acetaminophen (NORCO) 7.5-325 MG per tablet, Take 1 tablet by mouth Every 4 (Four) Hours As Needed for Moderate Pain (4-6)., Disp: , Rfl:   •  magnesium oxide (MAG-OX) 400 MG tablet, Take 400 mg by mouth 2 (Two) Times a Day., Disp: , Rfl:   •  metoprolol tartrate  "(LOPRESSOR) 100 MG tablet, Take 100 mg by mouth 2 (Two) Times a Day., Disp: , Rfl:   •  Mycophenolate Sodium (MYCOPHENOLIC ACID PO), Take 540 mg by mouth 2 (Two) Times a Day., Disp: , Rfl:   •  omeprazole (priLOSEC) 20 MG capsule, Take 20 mg by mouth 2 (Two) Times a Day., Disp: , Rfl:   •  pregabalin (LYRICA) 150 MG capsule, Take 150 mg by mouth 2 (Two) Times a Day., Disp: , Rfl:   •  tacrolimus (PROGRAF) 0.5 MG capsule, Take 0.5 mg by mouth Every Night., Disp: , Rfl:   •  tacrolimus (PROGRAF) 1 MG capsule, Take 1 mg by mouth Every Morning., Disp: , Rfl:   •  tiotropium (SPIRIVA) 18 MCG per inhalation capsule, Place 1 capsule into inhaler and inhale Every Morning., Disp: , Rfl:   •  vitamin B-12 (CYANOCOBALAMIN) 500 MCG tablet, Take 500 mcg by mouth Every Morning., Disp: , Rfl:     Past Medical History:   Diagnosis Date   • Cancer     Skin   • Chronic back pain    • COPD (chronic obstructive pulmonary disease)    • Diabetes mellitus    • Emphysema lung    • Hypertension    • Lung transplanted        Past Surgical History:   Procedure Laterality Date   • APPENDECTOMY     • CHOLECYSTECTOMY     • COLONOSCOPY     • ENDOSCOPY     • LUNG REMOVAL, TOTAL      right upper lobe   • LUNG TRANSPLANT      2006       Social History     Social History   • Marital status:      Spouse name: N/A   • Number of children: N/A   • Years of education: N/A     Social History Main Topics   • Smoking status: Former Smoker   • Smokeless tobacco: None      Comment: quit 9 years ago   • Alcohol use Yes      Comment: 2 per week   • Drug use: Yes     Special: Marijuana      Comment: when in severe pain    • Sexual activity: Defer     Other Topics Concern   • None     Social History Narrative       Family History   Problem Relation Age of Onset   • Heart disease Father    • Heart attack Brother        Objective    Temp 98.4 °F (36.9 °C)  Ht 69\" (175.3 cm)  Wt 144 lb 12.8 oz (65.7 kg)  BMI 21.38 kg/m2    Physical Exam    Hospital " Outpatient Visit on 10/09/2017   Component Date Value Ref Range Status   • Creatinine 10/09/2017 1.60* 0.60 - 1.30 mg/dL Final    Serial Number: [id number]:  [id number]       Results for orders placed or performed in visit on 10/10/17   POC Urinalysis Dipstick, Automated   Result Value Ref Range    Color Yellow Yellow, Straw, Dark Yellow, Daylin    Clarity, UA Clear Clear    Glucose, UA Negative Negative, 1000 mg/dL (3+) mg/dL    Bilirubin Negative Negative    Ketones, UA Negative Negative    Specific Gravity  1.010 1.005 - 1.030    Blood, UA Negative Negative    pH, Urine 5.5 5.0 - 8.0    Protein, POC Negative Negative mg/dL    Urobilinogen, UA Normal Normal    Leukocytes Negative Negative    Nitrite, UA Negative Negative     Assessment and Plan    Bayron was seen today for renal mass.    Diagnoses and all orders for this visit:    Renal mass  -     POC Urinalysis Dipstick, Automated    I did independently reviewed the MRI today.  There is a lesion in the left pole of the kidney that on MRI, does not appear to be solid.  This appears to be cystic and differential includes a hemorrhagic or proteinaceous cyst versus a cystic renal cell carcinoma.  I do believe on MRI this appears more likely a benign hemorrhagic or proteinaceous cyst, but based on imaging alone and I could not 100% guarantee that this is not a neoplasm.  He does see his transplant team next week.  If a more definitive diagnosis is needed, I do think he needs a referral to a tertiary urology center for further evaluation.

## 2017-10-12 RX ORDER — FUROSEMIDE 20 MG/1
TABLET ORAL
Qty: 30 TABLET | Refills: 10 | Status: SHIPPED | OUTPATIENT
Start: 2017-10-12 | End: 2018-08-13 | Stop reason: SDUPTHER

## 2017-12-04 ENCOUNTER — TRANSCRIBE ORDERS (OUTPATIENT)
Dept: ADMINISTRATIVE | Facility: HOSPITAL | Age: 57
End: 2017-12-04

## 2017-12-04 ENCOUNTER — HOSPITAL ENCOUNTER (OUTPATIENT)
Dept: GENERAL RADIOLOGY | Facility: HOSPITAL | Age: 57
Discharge: HOME | End: 2017-12-04
Admitting: NURSE PRACTITIONER

## 2017-12-04 DIAGNOSIS — J44.9 OBSTRUCTIVE CHRONIC BRONCHITIS WITHOUT EXACERBATION (HCC): Primary | ICD-10-CM

## 2017-12-04 DIAGNOSIS — Z94.2 LUNG REPLACED BY TRANSPLANT (HCC): ICD-10-CM

## 2017-12-04 PROCEDURE — 71020 HC CHEST PA AND LATERAL: CPT

## 2017-12-13 RX ORDER — METOPROLOL TARTRATE 100 MG/1
TABLET ORAL
Qty: 60 TABLET | Refills: 4 | Status: SHIPPED | OUTPATIENT
Start: 2017-12-13 | End: 2018-04-26 | Stop reason: SDUPTHER

## 2017-12-22 RX ORDER — PREGABALIN 150 MG/1
CAPSULE ORAL
Qty: 60 CAPSULE | Refills: 2 | Status: SHIPPED | OUTPATIENT
Start: 2017-12-22 | End: 2018-03-27 | Stop reason: SDUPTHER

## 2017-12-22 NOTE — TELEPHONE ENCOUNTER
LIZZY was reviewed today per office protocol. Report shows No discrepancies. Fill pattern is consistent from single provider(s) at single pharmacy(s). Prescription escribed.  Patient is aware to check within the pharmacy

## 2017-12-22 NOTE — TELEPHONE ENCOUNTER
patient called left message with request for refill on lyrica. Last office visit 7-10 with next scheduled appointment 1-22  Dose verified.    Last UDS  1-2017    Last fill per fill 9-20 with 2

## 2018-01-22 ENCOUNTER — HOSPITAL ENCOUNTER (OUTPATIENT)
Dept: GENERAL RADIOLOGY | Age: 58
Discharge: HOME | End: 2018-01-22
Payer: MEDICAID

## 2018-01-22 ENCOUNTER — TELEPHONE (OUTPATIENT)
Dept: PRIMARY CARE CLINIC | Age: 58
End: 2018-01-22

## 2018-01-22 ENCOUNTER — OFFICE VISIT (OUTPATIENT)
Dept: PRIMARY CARE CLINIC | Age: 58
End: 2018-01-22
Payer: MEDICAID

## 2018-01-22 VITALS
TEMPERATURE: 98.9 F | HEART RATE: 78 BPM | DIASTOLIC BLOOD PRESSURE: 68 MMHG | WEIGHT: 138.8 LBS | SYSTOLIC BLOOD PRESSURE: 122 MMHG | HEIGHT: 69 IN | BODY MASS INDEX: 20.56 KG/M2 | OXYGEN SATURATION: 95 %

## 2018-01-22 DIAGNOSIS — M25.552 LEFT HIP PAIN: Primary | ICD-10-CM

## 2018-01-22 DIAGNOSIS — I10 ESSENTIAL HYPERTENSION: ICD-10-CM

## 2018-01-22 DIAGNOSIS — M54.50 CHRONIC LOW BACK PAIN WITHOUT SCIATICA, UNSPECIFIED BACK PAIN LATERALITY: ICD-10-CM

## 2018-01-22 DIAGNOSIS — Z94.2 STATUS POST LUNG TRANSPLANTATION (HCC): ICD-10-CM

## 2018-01-22 DIAGNOSIS — G89.29 CHRONIC LOW BACK PAIN WITHOUT SCIATICA, UNSPECIFIED BACK PAIN LATERALITY: ICD-10-CM

## 2018-01-22 DIAGNOSIS — M25.552 LEFT HIP PAIN: ICD-10-CM

## 2018-01-22 DIAGNOSIS — J44.9 CHRONIC OBSTRUCTIVE PULMONARY DISEASE, UNSPECIFIED COPD TYPE (HCC): ICD-10-CM

## 2018-01-22 PROCEDURE — G8427 DOCREV CUR MEDS BY ELIG CLIN: HCPCS | Performed by: FAMILY MEDICINE

## 2018-01-22 PROCEDURE — G8484 FLU IMMUNIZE NO ADMIN: HCPCS | Performed by: FAMILY MEDICINE

## 2018-01-22 PROCEDURE — 3023F SPIROM DOC REV: CPT | Performed by: FAMILY MEDICINE

## 2018-01-22 PROCEDURE — G8420 CALC BMI NORM PARAMETERS: HCPCS | Performed by: FAMILY MEDICINE

## 2018-01-22 PROCEDURE — 73502 X-RAY EXAM HIP UNI 2-3 VIEWS: CPT

## 2018-01-22 PROCEDURE — 3017F COLORECTAL CA SCREEN DOC REV: CPT | Performed by: FAMILY MEDICINE

## 2018-01-22 PROCEDURE — 99214 OFFICE O/P EST MOD 30 MIN: CPT | Performed by: FAMILY MEDICINE

## 2018-01-22 PROCEDURE — 1036F TOBACCO NON-USER: CPT | Performed by: FAMILY MEDICINE

## 2018-01-22 PROCEDURE — G8926 SPIRO NO PERF OR DOC: HCPCS | Performed by: FAMILY MEDICINE

## 2018-01-22 RX ORDER — HYDROCODONE BITARTRATE AND ACETAMINOPHEN 7.5; 325 MG/1; MG/1
1 TABLET ORAL
COMMUNITY

## 2018-01-22 ASSESSMENT — PATIENT HEALTH QUESTIONNAIRE - PHQ9
2. FEELING DOWN, DEPRESSED OR HOPELESS: 0
1. LITTLE INTEREST OR PLEASURE IN DOING THINGS: 0
SUM OF ALL RESPONSES TO PHQ9 QUESTIONS 1 & 2: 0
SUM OF ALL RESPONSES TO PHQ QUESTIONS 1-9: 0

## 2018-01-22 ASSESSMENT — ENCOUNTER SYMPTOMS
SHORTNESS OF BREATH: 0
COUGH: 0
COLOR CHANGE: 0

## 2018-01-22 NOTE — PROGRESS NOTES
Cristina De Oliveira is a 59 y.o. male    Chief Complaint   Patient presents with    Follow-up     6 month    Hip Pain       HPI  Cristina De Oliveira presents to the office for follow up of multiple medical problems. Patient has history of emphysema. Patient has previously underwent pneumonectomy and lung transplant in the past. Patient states his lung function is decreasing. Patient is currently having problems with rejection. Patient was recently taken off the lung transplant list while his primary transplant specialist was out for back surgery. Hypertension:  Home blood pressure monitoring: No.  He is adherent to a low sodium diet. Patient denies chest pain and headache. Antihypertensive medication side effects: no medication side effects noted. Use of agents associated with hypertension: none. Sodium (mmol/L)   Date Value   12/02/2016 140    BUN (mg/dL)   Date Value   12/02/2016 23 (H)    Glucose (mg/dL)   Date Value   12/02/2016 159 (H)      Potassium (mmol/L)   Date Value   12/02/2016 4.5    CREATININE (mg/dL)   Date Value   12/02/2016 1.6 (H)           Patient complains of left hip pain. Patient states this started several months ago and has been progressing. Patient denies any recent injury. Patient states bearing weight makes it worse, but he is able to walk on it. Review of Systems   Constitutional: Negative for chills and fever. Respiratory: Negative for cough and shortness of breath. Cardiovascular: Negative for chest pain and leg swelling. Skin: Negative for color change and rash. Psychiatric/Behavioral: Negative for dysphoric mood. Prior to Admission medications    Medication Sig Start Date End Date Taking? Authorizing Provider   HYDROcodone-acetaminophen (NORCO) 7.5-325 MG per tablet Take 1 tablet by mouth. Yes Historical Provider, MD   pregabalin (LYRICA) 150 MG capsule TAKE ONE CAPSULE BY MOUTH TWICE A DAY.  12/22/17  Yes Ralph Hicks MD 12) 250 MCG LOZG Take  by mouth. Yes Historical Provider, MD   hydrochlorothiazide (HYDRODIURIL) 50 MG tablet Take 50 mg by mouth daily. Yes Historical Provider, MD   budesonide-formoterol (SYMBICORT) 160-4.5 MCG/ACT AERO Inhale 2 puffs into the lungs 2 times daily. Yes Historical Provider, MD       Past Medical History:   Diagnosis Date    [facility] obstructive [facility]) [facility] [address] [facility]     Emphysema of lung ([facility] [address]     Hypertension     Pneumonia     in left lung    Skin cancer     Wears glasses        Past Surgical History:   Procedure Laterality Date    APPENDECTOMY      CHOLECYSTECTOMY  9/26/14    COLONOSCOPY  12/29/2008    Dr. Sneha Love, PARTIAL  2002    right upper    LUNG TRANSPLANT, SINGLE  2006    left    SKIN CANCER DESTRUCTION      UPPER GASTROINTESTINAL ENDOSCOPY  12/30/2008     Dr. Isael Dixon       Social History     Social History    Marital status: Single     Spouse name: N/A    Number of children: N/A    Years of education: N/A     Social History Main Topics    Smoking status: Former Smoker     Quit date: 1/10/2008    Smokeless tobacco: Never Used      Comment: Pt used to smoke quit 9.5 years ago    Alcohol use No    Drug use: Yes     Frequency: 2.0 times per week     Types: Marijuana      Comment: marijuana in brownies when in extreme pain    Sexual activity: Not Asked     Other Topics Concern    None     Social History Narrative    None       Physical Exam   Constitutional: He is oriented to person, place, and time. He appears well-developed and well-nourished. No distress. HENT:   Head: Normocephalic and atraumatic. Right Ear: Hearing normal.   Left Ear: Hearing normal.   Nose: Right sinus exhibits no maxillary sinus tenderness and no frontal sinus tenderness. Left sinus exhibits no maxillary sinus tenderness and no frontal sinus tenderness. Eyes: Conjunctivae are normal. No scleral icterus. Neck: Normal range of motion. Cardiovascular: Normal rate, regular rhythm, S1 normal and S2 normal.  Exam reveals distant heart sounds. Pulses:       Radial pulses are 2+ on the right side, and 2+ on the left side. Pulmonary/Chest: Effort normal. No respiratory distress. He has no decreased breath sounds. He has no wheezes. He has rhonchi. Musculoskeletal: He exhibits no edema. Left hip: He exhibits decreased range of motion, tenderness and bony tenderness. He exhibits normal strength. Neurological: He is alert and oriented to person, place, and time. Skin: Skin is warm and dry. No rash noted. Psychiatric: He has a normal mood and affect. His behavior is normal.   Nursing note and vitals reviewed. Assessment    ICD-10-CM ICD-9-CM    1. Left hip pain M25.552 719.45 Will obtain left hip xray. 2. Essential hypertension I10 401.9 The current medical regimen is effective;  continue present plan and medications. BP Readings from Last 3 Encounters:   01/22/18 122/68   07/10/17 122/70   04/18/17 124/84       3. Chronic obstructive pulmonary disease, unspecified COPD type ([facility] [address] J44.9 496 Stable. Patient has previous history of lung transplant. Sees Transplant center at [facility]. 4. Status post lung transplantation ([facility] Z94.2 V42.6    5. Chronic low back pain without sciatica, unspecified back pain laterality M54.5 724.2 Chronic in nature He is currently on chronic opiates. G89.29 338.29        Plan    No orders of the defined types were placed in this encounter. No orders of the defined types were placed in this encounter. No Follow-up on file. There are no Patient Instructions on file for this visit.

## 2018-01-22 NOTE — TELEPHONE ENCOUNTER
----- Message from Andrés Dunn MD sent at 1/22/2018  4:49 PM CST -----  Please notify patient of normal results.   Normal left hip xray

## 2018-01-29 ENCOUNTER — APPOINTMENT (OUTPATIENT)
Dept: LAB | Facility: HOSPITAL | Age: 58
End: 2018-01-29
Attending: INTERNAL MEDICINE

## 2018-01-29 ENCOUNTER — TRANSCRIBE ORDERS (OUTPATIENT)
Dept: ADMINISTRATIVE | Facility: HOSPITAL | Age: 58
End: 2018-01-29

## 2018-01-29 DIAGNOSIS — N18.30 CHRONIC KIDNEY DISEASE, STAGE III (MODERATE) (HCC): Primary | ICD-10-CM

## 2018-01-29 DIAGNOSIS — E55.9 VITAMIN D DEFICIENCY: ICD-10-CM

## 2018-01-29 LAB
25(OH)D3 SERPL-MCNC: 47.6 NG/ML (ref 30–100)
ALBUMIN SERPL-MCNC: 4.5 G/DL (ref 3.5–5)
ALBUMIN/GLOB SERPL: 1.7 G/DL (ref 1.1–2.5)
ALP SERPL-CCNC: 45 U/L (ref 24–120)
ALT SERPL W P-5'-P-CCNC: 30 U/L (ref 0–54)
ANION GAP SERPL CALCULATED.3IONS-SCNC: 11 MMOL/L (ref 4–13)
AST SERPL-CCNC: 18 U/L (ref 7–45)
BILIRUB SERPL-MCNC: 0.8 MG/DL (ref 0.1–1)
BILIRUB UR QL STRIP: NEGATIVE
BUN BLD-MCNC: 28 MG/DL (ref 5–21)
BUN/CREAT SERPL: 20.1 (ref 7–25)
CALCIUM SPEC-SCNC: 10 MG/DL (ref 8.4–10.4)
CHLORIDE SERPL-SCNC: 95 MMOL/L (ref 98–110)
CLARITY UR: CLEAR
CO2 SERPL-SCNC: 36 MMOL/L (ref 24–31)
COLOR UR: YELLOW
CREAT BLD-MCNC: 1.39 MG/DL (ref 0.5–1.4)
DEPRECATED RDW RBC AUTO: 40.2 FL (ref 40–54)
ERYTHROCYTE [DISTWIDTH] IN BLOOD BY AUTOMATED COUNT: 12.8 % (ref 12–15)
GFR SERPL CREATININE-BSD FRML MDRD: 52 ML/MIN/1.73
GLOBULIN UR ELPH-MCNC: 2.6 GM/DL
GLUCOSE BLD-MCNC: 98 MG/DL (ref 70–100)
GLUCOSE UR STRIP-MCNC: NEGATIVE MG/DL
HCT VFR BLD AUTO: 39.4 % (ref 40–52)
HGB BLD-MCNC: 13 G/DL (ref 14–18)
HGB UR QL STRIP.AUTO: NEGATIVE
KETONES UR QL STRIP: NEGATIVE
LEUKOCYTE ESTERASE UR QL STRIP.AUTO: NEGATIVE
MCH RBC QN AUTO: 28.8 PG (ref 28–32)
MCHC RBC AUTO-ENTMCNC: 33 G/DL (ref 33–36)
MCV RBC AUTO: 87.2 FL (ref 82–95)
NITRITE UR QL STRIP: NEGATIVE
PH UR STRIP.AUTO: 6 [PH] (ref 5–8)
PHOSPHATE SERPL-MCNC: 3.2 MG/DL (ref 2.5–4.5)
PLATELET # BLD AUTO: 256 10*3/MM3 (ref 130–400)
PMV BLD AUTO: 11.7 FL (ref 6–12)
POTASSIUM BLD-SCNC: 3.9 MMOL/L (ref 3.5–5.3)
PROT SERPL-MCNC: 7.1 G/DL (ref 6.3–8.7)
PROT UR QL STRIP: NEGATIVE
PTH-INTACT SERPL-MCNC: 75.5 PG/ML (ref 7.5–53.5)
RBC # BLD AUTO: 4.52 10*6/MM3 (ref 4.8–5.9)
SODIUM BLD-SCNC: 142 MMOL/L (ref 135–145)
SP GR UR STRIP: 1.01 (ref 1–1.03)
URATE SERPL-MCNC: 5.9 MG/DL (ref 3.5–8.5)
UROBILINOGEN UR QL STRIP: NORMAL
WBC NRBC COR # BLD: 9.17 10*3/MM3 (ref 4.8–10.8)

## 2018-01-29 PROCEDURE — 36415 COLL VENOUS BLD VENIPUNCTURE: CPT

## 2018-01-29 PROCEDURE — 83970 ASSAY OF PARATHORMONE: CPT | Performed by: INTERNAL MEDICINE

## 2018-01-29 PROCEDURE — 80053 COMPREHEN METABOLIC PANEL: CPT | Performed by: INTERNAL MEDICINE

## 2018-01-29 PROCEDURE — 82306 VITAMIN D 25 HYDROXY: CPT | Performed by: INTERNAL MEDICINE

## 2018-01-29 PROCEDURE — 85027 COMPLETE CBC AUTOMATED: CPT | Performed by: INTERNAL MEDICINE

## 2018-01-29 PROCEDURE — 82570 ASSAY OF URINE CREATININE: CPT | Performed by: INTERNAL MEDICINE

## 2018-01-29 PROCEDURE — 84100 ASSAY OF PHOSPHORUS: CPT | Performed by: INTERNAL MEDICINE

## 2018-01-29 PROCEDURE — 84550 ASSAY OF BLOOD/URIC ACID: CPT | Performed by: INTERNAL MEDICINE

## 2018-01-29 PROCEDURE — 81003 URINALYSIS AUTO W/O SCOPE: CPT | Performed by: INTERNAL MEDICINE

## 2018-01-29 PROCEDURE — 82043 UR ALBUMIN QUANTITATIVE: CPT | Performed by: INTERNAL MEDICINE

## 2018-01-30 LAB
CREAT 24H UR-MCNC: 25.1 MG/DL
MICROALBUMIN UR-MCNC: <3 UG/ML
MICROALBUMIN/CREAT UR: <12 MG/G CREAT (ref 0–30)

## 2018-04-26 RX ORDER — METOPROLOL TARTRATE 100 MG/1
TABLET ORAL
Qty: 60 TABLET | Refills: 4 | Status: SHIPPED | OUTPATIENT
Start: 2018-04-26 | End: 2018-09-09 | Stop reason: SDUPTHER

## 2018-05-18 ENCOUNTER — HOSPITAL ENCOUNTER (OUTPATIENT)
Dept: GENERAL RADIOLOGY | Facility: HOSPITAL | Age: 58
Discharge: HOME | End: 2018-05-18
Admitting: NURSE PRACTITIONER

## 2018-05-18 ENCOUNTER — TRANSCRIBE ORDERS (OUTPATIENT)
Dept: ADMINISTRATIVE | Facility: HOSPITAL | Age: 58
End: 2018-05-18

## 2018-05-18 DIAGNOSIS — J44.9 OBSTRUCTIVE CHRONIC BRONCHITIS WITHOUT EXACERBATION (HCC): Primary | ICD-10-CM

## 2018-05-18 PROCEDURE — 71046 X-RAY EXAM CHEST 2 VIEWS: CPT

## 2018-06-08 ENCOUNTER — OFFICE VISIT (OUTPATIENT)
Dept: PRIMARY CARE CLINIC | Age: 58
End: 2018-06-08
Payer: MEDICAID

## 2018-06-08 VITALS
WEIGHT: 140 LBS | TEMPERATURE: 97.3 F | OXYGEN SATURATION: 98 % | BODY MASS INDEX: 20.73 KG/M2 | HEIGHT: 69 IN | SYSTOLIC BLOOD PRESSURE: 134 MMHG | RESPIRATION RATE: 22 BRPM | HEART RATE: 78 BPM | DIASTOLIC BLOOD PRESSURE: 70 MMHG

## 2018-06-08 DIAGNOSIS — J96.11 CHRONIC RESPIRATORY FAILURE WITH HYPOXIA, ON HOME O2 THERAPY (HCC): ICD-10-CM

## 2018-06-08 DIAGNOSIS — J43.9 PULMONARY EMPHYSEMA, UNSPECIFIED EMPHYSEMA TYPE (HCC): ICD-10-CM

## 2018-06-08 DIAGNOSIS — Z94.2 STATUS POST LUNG TRANSPLANTATION (HCC): Primary | ICD-10-CM

## 2018-06-08 DIAGNOSIS — Z99.81 CHRONIC RESPIRATORY FAILURE WITH HYPOXIA, ON HOME O2 THERAPY (HCC): ICD-10-CM

## 2018-06-08 PROCEDURE — 1036F TOBACCO NON-USER: CPT | Performed by: FAMILY MEDICINE

## 2018-06-08 PROCEDURE — 3017F COLORECTAL CA SCREEN DOC REV: CPT | Performed by: FAMILY MEDICINE

## 2018-06-08 PROCEDURE — 94618 PULMONARY STRESS TESTING: CPT | Performed by: FAMILY MEDICINE

## 2018-06-08 PROCEDURE — G8926 SPIRO NO PERF OR DOC: HCPCS | Performed by: FAMILY MEDICINE

## 2018-06-08 PROCEDURE — 3023F SPIROM DOC REV: CPT | Performed by: FAMILY MEDICINE

## 2018-06-08 PROCEDURE — G8420 CALC BMI NORM PARAMETERS: HCPCS | Performed by: FAMILY MEDICINE

## 2018-06-08 PROCEDURE — 99214 OFFICE O/P EST MOD 30 MIN: CPT | Performed by: FAMILY MEDICINE

## 2018-06-08 PROCEDURE — G8427 DOCREV CUR MEDS BY ELIG CLIN: HCPCS | Performed by: FAMILY MEDICINE

## 2018-06-08 RX ORDER — PREDNISONE 20 MG/1
40 TABLET ORAL DAILY
Qty: 10 TABLET | Refills: 0 | Status: SHIPPED | OUTPATIENT
Start: 2018-06-08 | End: 2018-06-13

## 2018-06-08 RX ORDER — LEVOFLOXACIN 750 MG/1
750 TABLET ORAL DAILY
Qty: 5 TABLET | Refills: 0 | Status: SHIPPED | OUTPATIENT
Start: 2018-06-08 | End: 2018-06-13

## 2018-06-08 ASSESSMENT — ENCOUNTER SYMPTOMS
CHEST TIGHTNESS: 0
CONSTIPATION: 0
WHEEZING: 0
NAUSEA: 0
SHORTNESS OF BREATH: 1
ABDOMINAL PAIN: 0
COUGH: 1
VOMITING: 0
DIARRHEA: 0

## 2018-06-14 ENCOUNTER — TELEPHONE (OUTPATIENT)
Dept: PRIMARY CARE CLINIC | Age: 58
End: 2018-06-14

## 2018-06-20 ENCOUNTER — TELEPHONE (OUTPATIENT)
Dept: PRIMARY CARE CLINIC | Age: 58
End: 2018-06-20

## 2018-06-29 DIAGNOSIS — G62.9 NEUROPATHY: Primary | ICD-10-CM

## 2018-07-01 ENCOUNTER — HOSPITAL ENCOUNTER (EMERGENCY)
Facility: HOSPITAL | Age: 58
Discharge: HOME | End: 2018-07-01
Admitting: EMERGENCY MEDICINE

## 2018-07-01 VITALS
WEIGHT: 142 LBS | HEIGHT: 69 IN | BODY MASS INDEX: 21.03 KG/M2 | DIASTOLIC BLOOD PRESSURE: 75 MMHG | OXYGEN SATURATION: 100 % | SYSTOLIC BLOOD PRESSURE: 138 MMHG | TEMPERATURE: 98.2 F | RESPIRATION RATE: 18 BRPM | HEART RATE: 72 BPM

## 2018-07-01 DIAGNOSIS — S05.01XA ABRASION OF RIGHT CORNEA, INITIAL ENCOUNTER: ICD-10-CM

## 2018-07-01 DIAGNOSIS — T15.91XA ACUTE FOREIGN BODY OF RIGHT EYE, INITIAL ENCOUNTER: Primary | ICD-10-CM

## 2018-07-01 PROCEDURE — 99283 EMERGENCY DEPT VISIT LOW MDM: CPT

## 2018-07-01 RX ORDER — OXYCODONE HYDROCHLORIDE AND ACETAMINOPHEN 5; 325 MG/1; MG/1
1 TABLET ORAL EVERY 4 HOURS PRN
Qty: 12 TABLET | Refills: 0 | Status: ON HOLD | OUTPATIENT
Start: 2018-07-01 | End: 2019-03-11

## 2018-07-01 RX ORDER — TOBRAMYCIN 3 MG/ML
2 SOLUTION/ DROPS OPHTHALMIC
Qty: 5 ML | Refills: 0 | Status: ON HOLD | OUTPATIENT
Start: 2018-07-01 | End: 2019-03-11

## 2018-07-01 RX ORDER — AZITHROMYCIN 250 MG/1
250 TABLET, FILM COATED ORAL
COMMUNITY
End: 2020-10-14

## 2018-07-01 RX ORDER — SULFAMETHOXAZOLE AND TRIMETHOPRIM 400; 80 MG/1; MG/1
1 TABLET ORAL
COMMUNITY
End: 2019-03-13 | Stop reason: HOSPADM

## 2018-07-01 RX ORDER — OXYCODONE AND ACETAMINOPHEN 7.5; 325 MG/1; MG/1
1 TABLET ORAL ONCE
Status: COMPLETED | OUTPATIENT
Start: 2018-07-01 | End: 2018-07-01

## 2018-07-01 RX ORDER — OXYCODONE HYDROCHLORIDE AND ACETAMINOPHEN 5; 325 MG/1; MG/1
1 TABLET ORAL EVERY 8 HOURS PRN
Status: ON HOLD | COMMUNITY
End: 2019-03-11

## 2018-07-01 RX ADMIN — Medication 1 DROP: at 08:24

## 2018-07-01 RX ADMIN — OXYCODONE HYDROCHLORIDE AND ACETAMINOPHEN 1 TABLET: 7.5; 325 TABLET ORAL at 09:20

## 2018-07-01 NOTE — ED PROVIDER NOTES
Subjective   Patient is a 57-year-old white male presents with right eye pain for the past 4 days.  He states that he was grinding on a lawnmower blade and felt something go into his eye at that time.  He states the pain is much worse today.  He has been having some clear drainage from the eyes well.  He denies any other injury.he states that he has been using otc visine drops without relief of symptoms.         History provided by:  Patient   used: No        Review of Systems   Constitutional: Negative.    HENT: Negative.    Eyes:        Patient is a 57-year-old white male presents with right eye pain for the past 4 days.  He states that he was grinding on a lawnmower blade and felt something go into his eye at that time.  He states the pain is much worse today.  He has been having some clear drainage from the eyes well.  He denies any other injury.he states that he has been using otc visine drops without relief of symptoms.      Respiratory: Negative.    Cardiovascular: Negative.    Gastrointestinal: Negative.    Endocrine: Negative.    Genitourinary: Negative.    Musculoskeletal: Negative.    Skin: Negative.    Allergic/Immunologic: Negative.    Neurological: Negative.    Hematological: Negative.    Psychiatric/Behavioral: Negative.    All other systems reviewed and are negative.      Past Medical History:   Diagnosis Date   • Cancer     Skin   • Chronic back pain    • COPD (chronic obstructive pulmonary disease)    • Diabetes mellitus    • Emphysema lung    • Hypertension    • Lung transplanted        Allergies   Allergen Reactions   • Clindamycin/Lincomycin Diarrhea   • Moxifloxacin Rash     Sweating        Past Surgical History:   Procedure Laterality Date   • APPENDECTOMY     • CHOLECYSTECTOMY     • COLONOSCOPY     • ENDOSCOPY     • LUNG REMOVAL, TOTAL      right upper lobe   • LUNG TRANSPLANT      2006       Family History   Problem Relation Age of Onset   • Heart disease Father    •  Heart attack Brother        Social History     Social History   • Marital status:      Social History Main Topics   • Smoking status: Former Smoker      Comment: quit 9 years ago   • Alcohol use No   • Drug use: No      Comment: when in severe pain    • Sexual activity: Defer     Other Topics Concern   • Not on file       Prior to Admission medications    Medication Sig Start Date End Date Taking? Authorizing Provider   albuterol (PROVENTIL HFA;VENTOLIN HFA) 108 (90 BASE) MCG/ACT inhaler Inhale 2 puffs Every 4 (Four) Hours As Needed for Wheezing or Shortness of Air.   Yes Historical Provider, MD   amLODIPine (NORVASC) 5 MG tablet Take 5 mg by mouth Every Morning.   Yes Historical Provider, MD   atorvastatin (LIPITOR) 20 MG tablet Take 20 mg by mouth Every Night.   Yes Historical Provider, MD   azithromycin (ZITHROMAX) 250 MG tablet Take 250 mg by mouth. Monday, Wednesday and Friday.   Yes Historical Provider, MD   budesonide-formoterol (SYMBICORT) 160-4.5 MCG/ACT inhaler Inhale 2 puffs Every Morning.   Yes Historical Provider, MD   folic acid (FOLVITE) 1 MG tablet Take 1 mg by mouth Every Morning.   Yes Historical Provider, MD   furosemide (LASIX) 20 MG tablet Take 20 mg by mouth Every Morning.   Yes Historical Provider, MD   hydrochlorothiazide (HYDRODIURIL) 50 MG tablet Take 50 mg by mouth Every Morning.   Yes Historical Provider, MD   magnesium oxide (MAG-OX) 400 MG tablet Take 400 mg by mouth 2 (Two) Times a Day.   Yes Historical Provider, MD   metoprolol tartrate (LOPRESSOR) 100 MG tablet Take 100 mg by mouth 2 (Two) Times a Day.   Yes Historical Provider, MD   Mycophenolate Sodium (MYCOPHENOLIC ACID PO) Take 540 mg by mouth 2 (Two) Times a Day.   Yes Historical Provider, MD   omeprazole (priLOSEC) 20 MG capsule Take 20 mg by mouth 2 (Two) Times a Day.   Yes Historical Provider, MD   oxyCODONE-acetaminophen (PERCOCET) 5-325 MG per tablet Take 1 tablet by mouth Every 8 (Eight) Hours As Needed.   Yes  "Historical Provider, MD   pregabalin (LYRICA) 150 MG capsule Take 150 mg by mouth 2 (Two) Times a Day.   Yes Historical Provider, MD   sulfamethoxazole-trimethoprim (BACTRIM,SEPTRA) 400-80 MG tablet Take 1 tablet by mouth. Monday, Wednesday and friday   Yes Historical Provider, MD   tacrolimus (PROGRAF) 1 MG capsule Take 1 mg by mouth Every Morning.   Yes Historical Provider, MD   tiotropium (SPIRIVA) 18 MCG per inhalation capsule Place 1 capsule into inhaler and inhale Every Morning.   Yes Historical Provider, MD   vitamin B-12 (CYANOCOBALAMIN) 500 MCG tablet Take 500 mcg by mouth Every Morning.   Yes Historical Provider, MD   HYDROcodone-acetaminophen (NORCO) 7.5-325 MG per tablet Take 1 tablet by mouth Every 4 (Four) Hours As Needed for Moderate Pain (4-6).    Historical Provider, MD   tacrolimus (PROGRAF) 0.5 MG capsule Take 0.5 mg by mouth Every Night.    Historical Provider, MD       /75   Pulse 72   Temp 98.2 °F (36.8 °C) (Temporal Artery )   Resp 18   Ht 175.3 cm (69\")   Wt 64.4 kg (142 lb)   SpO2 100%   BMI 20.97 kg/m²     Objective   Physical Exam   Constitutional: He is oriented to person, place, and time. He appears well-developed and well-nourished.   Appears to be in pain    HENT:   Head: Normocephalic and atraumatic.   Eyes: EOM are normal. Pupils are equal, round, and reactive to light. Right conjunctiva is injected.   Slit lamp exam:       The right eye shows corneal abrasion, foreign body and fluorescein uptake.       Neck: Normal range of motion. Neck supple.   Cardiovascular: Normal rate, regular rhythm, normal heart sounds and intact distal pulses.    Pulmonary/Chest: Effort normal and breath sounds normal.   Abdominal: Soft. Bowel sounds are normal.   Musculoskeletal: Normal range of motion.   Neurological: He is alert and oriented to person, place, and time. He has normal reflexes.   Skin: Skin is warm and dry.   Psychiatric: He has a normal mood and affect. His behavior is " normal. Judgment and thought content normal.   Nursing note and vitals reviewed.      Foreign Body Removal - Ocular  Date/Time: 7/1/2018 8:35 AM  Performed by: SANDY RAE  Authorized by: SANDY RAE     Consent:     Consent obtained:  Verbal and written    Consent given by:  Patient    Risks discussed:  Corneal damage, damage to surrounding structures, bleeding, globe perforation, incomplete removal, infection, pain, visual impairment and worsening of condition    Alternatives discussed:  No treatment, delayed treatment and referral  Location:     Location:  R corneal  Pre-procedure details:     Imaging:  None    Fluorescein exam: yes      Fluorescein uptake: yes      Corneal abrasion description:  Stellate superficial corneal abrasion noted across pupil    Corneal abrasion location:  Central  Anesthesia (see MAR for exact dosages):     Local anesthetic:  Proparacaine drops  Procedure details:     Localization method:  Direct visualization    Removal mechanism:  25 G needle    Foreign bodies recovered: part. still has small piece of metal left     Intact foreign body removal: no      Residual rust ring observed: yes      Residual rust ring removed: no    Post-procedure details:     Confirmation:  No additional foreign bodies on visualization    Patient tolerance of procedure:  Tolerated well, no immediate complications             Lab Results (last 24 hours)     ** No results found for the last 24 hours. **          No orders to display       ED Course  ED Course as of Jul 01 1507   Sun Jul 01, 2018   0[facility] report completed #[id number]. No signs of suspicious activity noted. Pt does see pain management monthly but felt that due to acute injury, would prescribe small amt of acute pain   [CW]      ED Course User Index  [CW] ENRICO Barros          MDM  Number of Diagnoses or Management Options  Abrasion of right cornea, initial encounter: minor  Acute foreign body of right eye,  initial encounter: minor  Patient Progress  Patient progress: stable      Final diagnoses:   Acute foreign body of right eye, initial encounter   Abrasion of right cornea, initial encounter          Radha Castanon, APRN  07/01/18 1503

## 2018-07-01 NOTE — DISCHARGE INSTRUCTIONS
Corneal Abrasion  A corneal abrasion is a scratch or injury to the clear covering over the front of your eye (cornea). This can be painful. It is important to get treatment for a corneal abrasion. If this problem is not treated, it can affect your eyesight (vision).  Follow these instructions at home:  Medicines  · Use eye drops or ointments as told by your doctor.  · If you were prescribed antibiotic drops or ointment, use them as told by your doctor. Do not stop using the antibiotic even if you start to feel better.  · Take over-the-counter and prescription medicines only as told by your doctor.  · Do not drive or use heavy machinery while taking prescription pain medicine.  General instructions  · If you have an eye patch, wear it as told by your doctor.  ? Do not drive or use machinery while wearing an eye patch.  ? Follow instructions from your doctor about when to take off the patch.  · Ask your doctor if you can use a cold, wet cloth (compress) on your eye to help with pain.  · Do not rub or touch your eye. Do not wash out your eye.  · Do not wear contact lenses until your doctor says that this is okay.  · Avoid bright light.  · Avoid straining your eyes.  · Keep all follow-up visits as told by your doctor. Doing this can help to prevent infection and loss of eyesight.  Contact a doctor if:  · You continue to have eye pain and other symptoms for more than 2 days.  · You get new symptoms, such as:  ? Redness.  ? Watery eyes (tearing).  ? Discharge.  · You have discharge that makes your eyelids stick together in the morning.  · Symptoms come back after your eye heals.  Get help right away if:  · You have very bad eye pain that does not get better with medicine.  · You lose eyesight.  Summary  · A corneal abrasion is a scratch or injury to the clear covering over the front of your eye (cornea).  · It is important to get treatment for a corneal abrasion. If this problem is not treated, it can affect your eyesight  "(vision).  · Use eye drops or ointments as told by your doctor.  · If you have an eye patch, do not drive or use machinery while wearing it.  This information is not intended to replace advice given to you by your health care provider. Make sure you discuss any questions you have with your health care provider.  Document Released: 06/05/2009 Document Revised: 12/02/2017 Document Reviewed: 12/02/2017  [employer] Interactive Patient Education © 2017 [employer]    Eye Foreign Body  A foreign body refers to any object on the surface of the eye or in the eyeball that should not be there. A foreign body may be a small speck of dirt or dust, a hair or eyelash, a splinter, or any other object.  What are the signs or symptoms?  Symptoms depend on what the foreign body is and where it is in the eye. The most common locations are:  · On the inner surface of the upper or lower eyelids or on the covering of the white part of the eye (conjunctiva). Symptoms in this location are:  ? Pain and irritation, especially when blinking.  ? The feeling that something is in the eye.  · On the surface of the clear covering on the front of the eye (cornea). Symptoms in this location include:  ? Pain and irritation.  ? Small \"rust rings\" around a metallic foreign body.  ? The feeling that something is in the eye.  · Inside the eyeball. Foreign bodies inside the eye may cause:  ? Great pain.  ? Immediate loss of vision.  ? Distortion of the pupil.    How is this diagnosed?  Foreign bodies are found during an exam by an eye specialist. Those on the eyelids, conjunctiva, or cornea are usually (but not always) easily found. When a foreign body is inside the eyeball, a cloudiness of the lens (cataract) may form almost right away. This makes it hard for an eye specialist to find the foreign body. Tests may be needed, including ultrasound testing, X-rays, and CT scans.  How is this treated?  · Foreign bodies on the eyelids, conjunctiva, or cornea " are often removed easily and painlessly.  · Rust in the cornea may require the use of a drill-like instrument to remove the rust.  · If the foreign body has caused a scratch or a rubbing or scraping (abrasion) of the cornea, this may be treated with antibiotic drops or ointment. A pressure patch may be put over your eye.  · If the foreign body is inside your eyeball, surgery is needed right away. This is a medical emergency. Foreign bodies inside the eye threaten vision. A person may even lose his or her eye.  Follow these instructions at home:  · Take medicines only as directed by your health care provider. Use eye drops or ointment as directed.  · If no eye patch was applied:  ? Keep your eye closed as much as possible.  ? Do not rub your eye.  ? Wear dark glasses as needed to protect your eyes from bright light.  ? Do not wear contact lenses until your eye feels normal again, or as instructed by your health care provider.  ? Wear a protective eye covering if there is a risk of eye injury. This is important when working with high-speed tools.  · If your eye is patched:  ? Follow your health care provider's instructions for when to remove the patch.  ? Do not drive or operate machinery if your eye is patched. Your ability to  distances is impaired.  · Keep all follow-up visits as directed by your health care provider. This is important.  Contact a health care provider if:  · You have increased pain in your eye.  · Your vision gets worse.  · You have problems with your eye patch.  · You have fluid (discharge) coming from your injured eye.  · You have redness and swelling around your affected eye.  This information is not intended to replace advice given to you by your health care provider. Make sure you discuss any questions you have with your health care provider.  Document Released: 12/18/2006 Document Revised: 05/25/2017 Document Reviewed: 05/15/2014  [employer] Interactive Patient Education © 2017 [employer]

## 2018-07-02 NOTE — TELEPHONE ENCOUNTER
Luis sent request for refill on lyrica-.  Last office visit 6-8 with next scheduled appointment 7-10    Last UDS 1-2018  Last fill 6-3  [facility] Saint scanned to [facility]    Amphetamine Screen, Urine 01/18/2017 12:00 AM Unknown   neg    Comment:   MOP, positive   Barbiturate Screen, Urine 01/18/2017 12:00 AM Unknown   neg    Benzodiazepine Screen, Urine 01/18/2017 12:00 AM Unknown   neg    COCAINE METABOLITE SCREEN URINE 01/18/2017 12:00 AM Unknown   neg    THC 01/18/2017 12:00 AM Unknown   positive    MDMA URINE 01/18/2017 12:00 AM Unknown   neg    Methadone Screen, Urine 01/18/2017 12:00 AM Unknown   neg    Opiate Scrn, Ur 01/18/2017 12:00 AM Unknown   neg    Oxycodone Screen, Ur 01/18/2017 12:00 AM Unknown   neg    PCP Scrn, Ur 01/18/2017 12:00 AM Unknown   neg    Propoxyphene Screen, Urine 01/18/2017 12:00 AM Unknown   neg    Tricyclic Antidepressants, Ur 01/18/2017 12:00 AM Unknown   neg    Methamphetamine, Urine 01/18/2017 12:00 AM Unknown   neg    Lab and Collection     POCT Rapid Drug Screen on 1/18/2017

## 2018-07-26 RX ORDER — OMEPRAZOLE 20 MG/1
CAPSULE, DELAYED RELEASE ORAL
Qty: 60 CAPSULE | Refills: 11 | Status: SHIPPED | OUTPATIENT
Start: 2018-07-26 | End: 2019-06-25 | Stop reason: SDUPTHER

## 2018-07-26 RX ORDER — FOLIC ACID 1 MG/1
TABLET ORAL
Qty: 30 TABLET | Refills: 11 | Status: SHIPPED | OUTPATIENT
Start: 2018-07-26 | End: 2019-06-25 | Stop reason: SDUPTHER

## 2018-08-01 ENCOUNTER — TRANSCRIBE ORDERS (OUTPATIENT)
Dept: ADMINISTRATIVE | Facility: HOSPITAL | Age: 58
End: 2018-08-01

## 2018-08-01 ENCOUNTER — APPOINTMENT (OUTPATIENT)
Dept: LAB | Facility: HOSPITAL | Age: 58
End: 2018-08-01
Attending: INTERNAL MEDICINE

## 2018-08-01 DIAGNOSIS — N18.30 CHRONIC KIDNEY DISEASE, STAGE III (MODERATE) (HCC): Primary | ICD-10-CM

## 2018-08-01 LAB
ALBUMIN SERPL-MCNC: 4.4 G/DL (ref 3.5–5)
ALBUMIN/GLOB SERPL: 1.8 G/DL (ref 1.1–2.5)
ALP SERPL-CCNC: 41 U/L (ref 24–120)
ALT SERPL W P-5'-P-CCNC: 22 U/L (ref 0–54)
ANION GAP SERPL CALCULATED.3IONS-SCNC: 10 MMOL/L (ref 4–13)
AST SERPL-CCNC: 17 U/L (ref 7–45)
BILIRUB SERPL-MCNC: 0.7 MG/DL (ref 0.1–1)
BUN BLD-MCNC: 24 MG/DL (ref 5–21)
BUN/CREAT SERPL: 18.8 (ref 7–25)
CALCIUM SPEC-SCNC: 9.5 MG/DL (ref 8.4–10.4)
CHLORIDE SERPL-SCNC: 97 MMOL/L (ref 98–110)
CO2 SERPL-SCNC: 36 MMOL/L (ref 24–31)
CREAT BLD-MCNC: 1.28 MG/DL (ref 0.5–1.4)
CREATININE URINE: NORMAL MG/DL
DEPRECATED RDW RBC AUTO: 40.5 FL (ref 40–54)
ERYTHROCYTE [DISTWIDTH] IN BLOOD BY AUTOMATED COUNT: 12.9 % (ref 12–15)
GFR SERPL CREATININE-BSD FRML MDRD: 58 ML/MIN/1.73
GLOBULIN UR ELPH-MCNC: 2.4 GM/DL
GLUCOSE BLD-MCNC: 115 MG/DL (ref 70–100)
HCT VFR BLD AUTO: 39.3 % (ref 40–52)
HGB BLD-MCNC: 12.8 G/DL (ref 14–18)
MCH RBC QN AUTO: 28.4 PG (ref 28–32)
MCHC RBC AUTO-ENTMCNC: 32.6 G/DL (ref 33–36)
MCV RBC AUTO: 87.3 FL (ref 82–95)
MICROALBUMIN/CREAT 24H UR: 27.6 MG/G{CREAT}
PLATELET # BLD AUTO: 202 10*3/MM3 (ref 130–400)
PMV BLD AUTO: 11.4 FL (ref 6–12)
POTASSIUM BLD-SCNC: 3.8 MMOL/L (ref 3.5–5.3)
PROT SERPL-MCNC: 6.8 G/DL (ref 6.3–8.7)
RBC # BLD AUTO: 4.5 10*6/MM3 (ref 4.8–5.9)
SODIUM BLD-SCNC: 143 MMOL/L (ref 135–145)
WBC NRBC COR # BLD: 9.23 10*3/MM3 (ref 4.8–10.8)

## 2018-08-01 PROCEDURE — 82570 ASSAY OF URINE CREATININE: CPT | Performed by: INTERNAL MEDICINE

## 2018-08-01 PROCEDURE — 82043 UR ALBUMIN QUANTITATIVE: CPT | Performed by: INTERNAL MEDICINE

## 2018-08-01 PROCEDURE — 85027 COMPLETE CBC AUTOMATED: CPT | Performed by: INTERNAL MEDICINE

## 2018-08-01 PROCEDURE — 80053 COMPREHEN METABOLIC PANEL: CPT | Performed by: INTERNAL MEDICINE

## 2018-08-01 PROCEDURE — 36415 COLL VENOUS BLD VENIPUNCTURE: CPT

## 2018-08-02 LAB
CREAT 24H UR-MCNC: 81.8 MG/DL
MICROALBUMIN UR-MCNC: 27.6 UG/ML
MICROALBUMIN/CREAT UR: 33.7 MG/G CREAT (ref 0–30)

## 2018-08-13 ENCOUNTER — TRANSCRIBE ORDERS (OUTPATIENT)
Dept: LAB | Facility: HOSPITAL | Age: 58
End: 2018-08-13

## 2018-08-13 ENCOUNTER — APPOINTMENT (OUTPATIENT)
Dept: LAB | Facility: HOSPITAL | Age: 58
End: 2018-08-13

## 2018-08-13 DIAGNOSIS — N28.9 ABNORMAL RENAL FUNCTION: Primary | ICD-10-CM

## 2018-08-13 LAB
ALBUMIN SERPL-MCNC: 4.2 G/DL (ref 3.5–5)
ANION GAP SERPL CALCULATED.3IONS-SCNC: 10 MMOL/L (ref 4–13)
BILIRUB UR QL STRIP: NEGATIVE
BUN BLD-MCNC: 30 MG/DL (ref 5–21)
BUN/CREAT SERPL: 22.4 (ref 7–25)
CALCIUM SPEC-SCNC: 9.3 MG/DL (ref 8.4–10.4)
CHLORIDE SERPL-SCNC: 96 MMOL/L (ref 98–110)
CLARITY UR: CLEAR
CO2 SERPL-SCNC: 33 MMOL/L (ref 24–31)
COLOR UR: YELLOW
CREAT BLD-MCNC: 1.34 MG/DL (ref 0.5–1.4)
CREAT UR-MCNC: 48.6 MG/DL
DEPRECATED RDW RBC AUTO: 42 FL (ref 40–54)
ERYTHROCYTE [DISTWIDTH] IN BLOOD BY AUTOMATED COUNT: 13.1 % (ref 12–15)
GFR SERPL CREATININE-BSD FRML MDRD: 55 ML/MIN/1.73
GLUCOSE BLD-MCNC: 109 MG/DL (ref 70–100)
GLUCOSE UR STRIP-MCNC: NEGATIVE MG/DL
HCT VFR BLD AUTO: 41.7 % (ref 40–52)
HGB BLD-MCNC: 13.6 G/DL (ref 14–18)
HGB UR QL STRIP.AUTO: NEGATIVE
KETONES UR QL STRIP: NEGATIVE
LEUKOCYTE ESTERASE UR QL STRIP.AUTO: NEGATIVE
MCH RBC QN AUTO: 28.8 PG (ref 28–32)
MCHC RBC AUTO-ENTMCNC: 32.6 G/DL (ref 33–36)
MCV RBC AUTO: 88.2 FL (ref 82–95)
NITRITE UR QL STRIP: NEGATIVE
PH UR STRIP.AUTO: 5.5 [PH] (ref 5–8)
PHOSPHATE SERPL-MCNC: 3.3 MG/DL (ref 2.5–4.5)
PLATELET # BLD AUTO: 237 10*3/MM3 (ref 130–400)
PMV BLD AUTO: 11.3 FL (ref 6–12)
POTASSIUM BLD-SCNC: 4.1 MMOL/L (ref 3.5–5.3)
PROT UR QL STRIP: NEGATIVE
PROT UR-MCNC: 9 MG/DL (ref 0–13.5)
RBC # BLD AUTO: 4.73 10*6/MM3 (ref 4.8–5.9)
SODIUM BLD-SCNC: 139 MMOL/L (ref 135–145)
SP GR UR STRIP: 1.01 (ref 1–1.03)
UROBILINOGEN UR QL STRIP: NORMAL
WBC NRBC COR # BLD: 11.6 10*3/MM3 (ref 4.8–10.8)

## 2018-08-13 PROCEDURE — 84156 ASSAY OF PROTEIN URINE: CPT | Performed by: INTERNAL MEDICINE

## 2018-08-13 PROCEDURE — 82570 ASSAY OF URINE CREATININE: CPT | Performed by: INTERNAL MEDICINE

## 2018-08-13 PROCEDURE — 36415 COLL VENOUS BLD VENIPUNCTURE: CPT

## 2018-08-13 PROCEDURE — 80069 RENAL FUNCTION PANEL: CPT | Performed by: INTERNAL MEDICINE

## 2018-08-13 PROCEDURE — 85027 COMPLETE CBC AUTOMATED: CPT | Performed by: INTERNAL MEDICINE

## 2018-08-13 PROCEDURE — 81003 URINALYSIS AUTO W/O SCOPE: CPT | Performed by: INTERNAL MEDICINE

## 2018-08-13 RX ORDER — FUROSEMIDE 20 MG/1
TABLET ORAL
Qty: 30 TABLET | Refills: 10 | Status: SHIPPED | OUTPATIENT
Start: 2018-08-13

## 2018-08-31 ENCOUNTER — HOSPITAL ENCOUNTER (OUTPATIENT)
Dept: MRI IMAGING | Age: 58
Discharge: HOME | End: 2018-08-31
Payer: MEDICAID

## 2018-08-31 DIAGNOSIS — N28.89 KIDNEY MASS: ICD-10-CM

## 2018-08-31 PROCEDURE — 74183 MRI ABD W/O CNTR FLWD CNTR: CPT

## 2018-08-31 PROCEDURE — 6360000004 HC RX CONTRAST MEDICATION: Performed by: INTERNAL MEDICINE

## 2018-08-31 PROCEDURE — A9577 INJ MULTIHANCE: HCPCS | Performed by: INTERNAL MEDICINE

## 2018-08-31 RX ADMIN — GADOBENATE DIMEGLUMINE 15 ML: 529 INJECTION, SOLUTION INTRAVENOUS at 08:42

## 2018-09-10 ENCOUNTER — OFFICE VISIT (OUTPATIENT)
Dept: PRIMARY CARE CLINIC | Age: 58
End: 2018-09-10
Payer: MEDICAID

## 2018-09-10 VITALS
OXYGEN SATURATION: 97 % | DIASTOLIC BLOOD PRESSURE: 82 MMHG | SYSTOLIC BLOOD PRESSURE: 138 MMHG | BODY MASS INDEX: 21.03 KG/M2 | HEART RATE: 68 BPM | HEIGHT: 69 IN | TEMPERATURE: 98 F | WEIGHT: 142 LBS | RESPIRATION RATE: 20 BRPM

## 2018-09-10 DIAGNOSIS — J44.9 CHRONIC OBSTRUCTIVE PULMONARY DISEASE, UNSPECIFIED COPD TYPE (HCC): ICD-10-CM

## 2018-09-10 DIAGNOSIS — J01.00 ACUTE NON-RECURRENT MAXILLARY SINUSITIS: ICD-10-CM

## 2018-09-10 DIAGNOSIS — Z00.00 ROUTINE GENERAL MEDICAL EXAMINATION AT A HEALTH CARE FACILITY: Primary | ICD-10-CM

## 2018-09-10 DIAGNOSIS — N18.30 CKD (CHRONIC KIDNEY DISEASE) STAGE 3, GFR 30-59 ML/MIN (HCC): ICD-10-CM

## 2018-09-10 DIAGNOSIS — Z23 NEED FOR PROPHYLACTIC VACCINATION AND INOCULATION AGAINST VARICELLA: ICD-10-CM

## 2018-09-10 DIAGNOSIS — J43.9 PULMONARY EMPHYSEMA, UNSPECIFIED EMPHYSEMA TYPE (HCC): ICD-10-CM

## 2018-09-10 DIAGNOSIS — Z94.2 STATUS POST LUNG TRANSPLANTATION (HCC): ICD-10-CM

## 2018-09-10 DIAGNOSIS — Z23 NEED FOR PROPHYLACTIC VACCINATION AGAINST STREPTOCOCCUS PNEUMONIAE (PNEUMOCOCCUS): ICD-10-CM

## 2018-09-10 PROCEDURE — 90670 PCV13 VACCINE IM: CPT | Performed by: FAMILY MEDICINE

## 2018-09-10 PROCEDURE — G8427 DOCREV CUR MEDS BY ELIG CLIN: HCPCS | Performed by: FAMILY MEDICINE

## 2018-09-10 PROCEDURE — 1036F TOBACCO NON-USER: CPT | Performed by: FAMILY MEDICINE

## 2018-09-10 PROCEDURE — 90662 IIV NO PRSV INCREASED AG IM: CPT | Performed by: FAMILY MEDICINE

## 2018-09-10 PROCEDURE — G8420 CALC BMI NORM PARAMETERS: HCPCS | Performed by: FAMILY MEDICINE

## 2018-09-10 PROCEDURE — 3023F SPIROM DOC REV: CPT | Performed by: FAMILY MEDICINE

## 2018-09-10 PROCEDURE — 90472 IMMUNIZATION ADMIN EACH ADD: CPT | Performed by: FAMILY MEDICINE

## 2018-09-10 PROCEDURE — 90471 IMMUNIZATION ADMIN: CPT | Performed by: FAMILY MEDICINE

## 2018-09-10 PROCEDURE — 99214 OFFICE O/P EST MOD 30 MIN: CPT | Performed by: FAMILY MEDICINE

## 2018-09-10 PROCEDURE — 99396 PREV VISIT EST AGE 40-64: CPT | Performed by: FAMILY MEDICINE

## 2018-09-10 PROCEDURE — G8926 SPIRO NO PERF OR DOC: HCPCS | Performed by: FAMILY MEDICINE

## 2018-09-10 PROCEDURE — 3017F COLORECTAL CA SCREEN DOC REV: CPT | Performed by: FAMILY MEDICINE

## 2018-09-10 RX ORDER — DOXYCYCLINE 100 MG/1
100 CAPSULE ORAL 2 TIMES DAILY
Qty: 20 CAPSULE | Refills: 0 | Status: SHIPPED | OUTPATIENT
Start: 2018-09-10 | End: 2018-09-20

## 2018-09-10 RX ORDER — METOPROLOL TARTRATE 100 MG/1
TABLET ORAL
Qty: 60 TABLET | Refills: 4 | Status: SHIPPED | OUTPATIENT
Start: 2018-09-10 | End: 2019-01-23 | Stop reason: SDUPTHER

## 2018-09-10 RX ORDER — LEVOCETIRIZINE DIHYDROCHLORIDE 5 MG/1
5 TABLET, FILM COATED ORAL NIGHTLY
Qty: 90 TABLET | Refills: 3 | Status: SHIPPED | OUTPATIENT
Start: 2018-09-10 | End: 2019-07-26 | Stop reason: SDUPTHER

## 2018-09-10 ASSESSMENT — ENCOUNTER SYMPTOMS
SORE THROAT: 1
VOMITING: 0
RHINORRHEA: 1
NAUSEA: 0
WHEEZING: 0
SHORTNESS OF BREATH: 0
CHEST TIGHTNESS: 0
DIARRHEA: 0
ABDOMINAL PAIN: 0
COUGH: 1
CONSTIPATION: 0

## 2018-09-10 NOTE — PROGRESS NOTES
After obtaining consent, and per orders of Dr. Chinyere Gerber , injection of CV 13  given Right deltoid by Jennifer Natarajan. After obtaining consent, and per orders of Dr. Chinyere Gerber , injection of High dose Flu given in Left deltoid by Jennifer Natarajan. Patient instructed to remain in clinic for 20 minutes afterwards, and to report any adverse reaction to me immediately.

## 2018-09-10 NOTE — PROGRESS NOTES
Norma Mariano is a 59 y.o. male who presents today for   Chief Complaint   Patient presents with    3 Month Follow-Up    COPD       HPI  Patient is here for 2 separate visits: annual wellness visit as well as acute and chronic issues. The below review of systems and physical exam applies to both encounters. Annual HPI:  Here for annual exam.  He is due for flu and pneumo shots today. He is due for colon cancer screening. He has a lung transplant and continues to see his transplant team.      Acute/Chronic HPI:  Pt also has acute and chronic issues including postnasal drip w/ low fever. Has h/o lung transplant. He has ckd and sees dr Leola Sanchez and is due for labs. He also notes that he continues to see his transplant team and is breathing well other than allergies. He has tried and failed multiple allergy meds otc including decongestants. No change in PMH, family, social, or surgical history unless mentioned above. Review of Systems   Constitutional: Positive for fatigue. Negative for chills and fever. HENT: Positive for congestion, rhinorrhea, sneezing and sore throat. Respiratory: Positive for cough. Negative for chest tightness, shortness of breath and wheezing. Cardiovascular: Negative for chest pain, palpitations and leg swelling. Gastrointestinal: Negative for abdominal pain, constipation, diarrhea, nausea and vomiting. Genitourinary: Negative for difficulty urinating, dysuria and frequency. Past Medical History:   Diagnosis Date    [facility] obstructive pulmonary disease) [facility] [address]     Emphysema of lung ([facility] [address]     Hypertension     Pneumonia     in left lung    Skin cancer     Wears glasses        Current Outpatient Prescriptions   Medication Sig Dispense Refill    zoster recombinant adjuvanted vaccine (SHINGRIX) 50 MCG SUSR injection Inject 0.5 mLs into the muscle once for 1 dose 50 MCG IM then repeat 2-6 months.  1 each 1    levocetirizine (XYZAL) 5 MG tablet Take 1 conditions  Start doxy due to transplant status and low grade fever w/ sinusitis. Start xyzal due to high risk for allergies impacting lung health. PA may be needed due to failing otc zyrtec, claritin, and allegra. Will f/u kidney labs for adjusting meds for ckd. Orders Placed This Encounter   Procedures    Pneumococcal conjugate vaccine 13-valent PCV13    INFLUENZA, HIGH DOSE, 65 YRS +, IM, PF, PREFILL SYR, 0.5ML (FLUZONE HD)    Hemoglobin A1c     Standing Status:   Future     Standing Expiration Date:   9/10/2019    Comprehensive Metabolic Panel     Every 11 months     Standing Status:   Standing     Number of Occurrences:   15     Standing Expiration Date:   8/23/2029    CBC     Standing Status:   Standing     Number of Occurrences:   15     Standing Expiration Date:   8/23/2029    Lipid Panel     Standing Status:   Standing     Number of Occurrences:   15     Standing Expiration Date:   8/23/2029     Order Specific Question:   Is Patient Fasting?/# of Hours     Answer:   yes/8 hrs    TSH 3RD GENERATION     Standing Status:   Standing     Number of Occurrences:   15     Standing Expiration Date:   8/23/2029     Orders Placed This Encounter   Medications    zoster recombinant adjuvanted vaccine (SHINGRIX) 50 MCG SUSR injection     Sig: Inject 0.5 mLs into the muscle once for 1 dose 50 MCG IM then repeat 2-6 months. Dispense:  1 each     Refill:  1    levocetirizine (XYZAL) 5 MG tablet     Sig: Take 1 tablet by mouth nightly     Dispense:  90 tablet     Refill:  3    doxycycline monohydrate (MONODOX) 100 MG capsule     Sig: Take 1 capsule by mouth 2 times daily for 10 days     Dispense:  20 capsule     Refill:  0     There are no discontinued medications. There are no Patient Instructions on file for this visit. Patient given educational handouts and has had all questions answered. Patient voices understanding and agrees to plans along with risks and benefits of plan.  Patient is

## 2018-10-28 RX ORDER — TIOTROPIUM BROMIDE 18 UG/1
CAPSULE ORAL; RESPIRATORY (INHALATION)
Qty: 30 CAPSULE | Refills: 5 | Status: SHIPPED | OUTPATIENT
Start: 2018-10-28

## 2018-11-29 PROBLEM — J44.9 CHRONIC OBSTRUCTIVE PULMONARY DISEASE (HCC): Status: ACTIVE | Noted: 2018-11-29

## 2018-12-04 PROBLEM — J96.11 CHRONIC RESPIRATORY FAILURE WITH HYPOXIA (HCC): Status: ACTIVE | Noted: 2018-12-04

## 2018-12-04 PROBLEM — K21.9 GASTROESOPHAGEAL REFLUX DISEASE WITHOUT ESOPHAGITIS: Status: ACTIVE | Noted: 2018-12-04

## 2018-12-04 PROBLEM — Z94.2 LUNG TRANSPLANT STATUS (HCC): Status: ACTIVE | Noted: 2018-12-04

## 2018-12-04 PROBLEM — G89.4 CHRONIC PAIN SYNDROME: Status: ACTIVE | Noted: 2018-12-04

## 2018-12-04 PROBLEM — Z87.891 PERSONAL HISTORY OF NICOTINE DEPENDENCE: Status: ACTIVE | Noted: 2018-12-04

## 2018-12-04 PROBLEM — I10 ESSENTIAL HYPERTENSION: Status: ACTIVE | Noted: 2018-12-04
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
## 2018-12-05 ENCOUNTER — OFFICE VISIT (OUTPATIENT)
Dept: PULMONOLOGY | Facility: CLINIC | Age: 58
End: 2018-12-05

## 2018-12-05 VITALS
HEIGHT: 69 IN | BODY MASS INDEX: 22.22 KG/M2 | SYSTOLIC BLOOD PRESSURE: 122 MMHG | WEIGHT: 150 LBS | DIASTOLIC BLOOD PRESSURE: 80 MMHG | OXYGEN SATURATION: 98 % | HEART RATE: 74 BPM

## 2018-12-05 DIAGNOSIS — Z94.2 LUNG TRANSPLANT STATUS (HCC): ICD-10-CM

## 2018-12-05 DIAGNOSIS — I10 ESSENTIAL HYPERTENSION: ICD-10-CM

## 2018-12-05 DIAGNOSIS — Z87.891 PERSONAL HISTORY OF NICOTINE DEPENDENCE: ICD-10-CM

## 2018-12-05 DIAGNOSIS — J96.11 CHRONIC RESPIRATORY FAILURE WITH HYPOXIA (HCC): ICD-10-CM

## 2018-12-05 DIAGNOSIS — J44.9 COPD, GROUP D, BY GOLD 2017 CLASSIFICATION (HCC): Primary | ICD-10-CM

## 2018-12-05 DIAGNOSIS — G89.4 CHRONIC PAIN SYNDROME: ICD-10-CM

## 2018-12-05 DIAGNOSIS — K21.9 GASTROESOPHAGEAL REFLUX DISEASE WITHOUT ESOPHAGITIS: ICD-10-CM

## 2018-12-05 LAB
FEV1/FVC: NORMAL %
FEV1: NORMAL LITERS
FVC VOL RESPIRATORY: NORMAL LITERS

## 2018-12-05 PROCEDURE — 94010 BREATHING CAPACITY TEST: CPT | Performed by: NURSE PRACTITIONER

## 2018-12-05 PROCEDURE — 99214 OFFICE O/P EST MOD 30 MIN: CPT | Performed by: NURSE PRACTITIONER

## 2018-12-05 RX ORDER — PREDNISONE 10 MG/1
TABLET ORAL
Qty: 31 TABLET | Refills: 0 | Status: ON HOLD | OUTPATIENT
Start: 2018-12-05 | End: 2019-03-11

## 2018-12-05 RX ORDER — CEFDINIR 300 MG/1
300 CAPSULE ORAL 2 TIMES DAILY
Qty: 20 CAPSULE | Refills: 0 | Status: SHIPPED | OUTPATIENT
Start: 2018-12-05 | End: 2018-12-15

## 2018-12-05 RX ORDER — PREDNISONE 10 MG/1
10 TABLET ORAL DAILY
Qty: 90 TABLET | Refills: 3 | Status: SHIPPED | OUTPATIENT
Start: 2018-12-05 | End: 2019-03-05

## 2018-12-05 RX ORDER — TIZANIDINE 4 MG/1
TABLET ORAL
Refills: 2 | Status: ON HOLD | COMMUNITY
Start: 2018-11-26 | End: 2019-03-11

## 2018-12-12 ENCOUNTER — OFFICE VISIT (OUTPATIENT)
Dept: PRIMARY CARE CLINIC | Age: 58
End: 2018-12-12
Payer: MEDICAID

## 2018-12-12 VITALS
TEMPERATURE: 98.4 F | DIASTOLIC BLOOD PRESSURE: 62 MMHG | HEART RATE: 68 BPM | OXYGEN SATURATION: 96 % | WEIGHT: 151.8 LBS | BODY MASS INDEX: 22.48 KG/M2 | HEIGHT: 69 IN | SYSTOLIC BLOOD PRESSURE: 118 MMHG

## 2018-12-12 DIAGNOSIS — Z99.81 CHRONIC RESPIRATORY FAILURE WITH HYPOXIA, ON HOME O2 THERAPY (HCC): ICD-10-CM

## 2018-12-12 DIAGNOSIS — J44.9 CHRONIC OBSTRUCTIVE PULMONARY DISEASE, UNSPECIFIED COPD TYPE (HCC): ICD-10-CM

## 2018-12-12 DIAGNOSIS — J96.11 CHRONIC RESPIRATORY FAILURE WITH HYPOXIA, ON HOME O2 THERAPY (HCC): ICD-10-CM

## 2018-12-12 DIAGNOSIS — Z94.2 STATUS POST LUNG TRANSPLANTATION (HCC): Primary | ICD-10-CM

## 2018-12-12 PROCEDURE — 1036F TOBACCO NON-USER: CPT | Performed by: FAMILY MEDICINE

## 2018-12-12 PROCEDURE — G8427 DOCREV CUR MEDS BY ELIG CLIN: HCPCS | Performed by: FAMILY MEDICINE

## 2018-12-12 PROCEDURE — 3023F SPIROM DOC REV: CPT | Performed by: FAMILY MEDICINE

## 2018-12-12 PROCEDURE — G8926 SPIRO NO PERF OR DOC: HCPCS | Performed by: FAMILY MEDICINE

## 2018-12-12 PROCEDURE — G8482 FLU IMMUNIZE ORDER/ADMIN: HCPCS | Performed by: FAMILY MEDICINE

## 2018-12-12 PROCEDURE — G8420 CALC BMI NORM PARAMETERS: HCPCS | Performed by: FAMILY MEDICINE

## 2018-12-12 PROCEDURE — 3017F COLORECTAL CA SCREEN DOC REV: CPT | Performed by: FAMILY MEDICINE

## 2018-12-12 PROCEDURE — 99214 OFFICE O/P EST MOD 30 MIN: CPT | Performed by: FAMILY MEDICINE

## 2018-12-12 RX ORDER — AMOXICILLIN AND CLAVULANATE POTASSIUM 875; 125 MG/1; MG/1
1 TABLET, FILM COATED ORAL 2 TIMES DAILY
Qty: 20 TABLET | Refills: 0 | Status: SHIPPED | OUTPATIENT
Start: 2018-12-12 | End: 2018-12-22

## 2018-12-12 RX ORDER — LEVOFLOXACIN 750 MG/1
750 TABLET ORAL DAILY
Qty: 5 TABLET | Refills: 0 | Status: SHIPPED | OUTPATIENT
Start: 2018-12-12 | End: 2018-12-17

## 2018-12-12 RX ORDER — PREDNISONE 20 MG/1
40 TABLET ORAL DAILY
Qty: 10 TABLET | Refills: 0 | Status: SHIPPED | OUTPATIENT
Start: 2018-12-12 | End: 2018-12-17

## 2018-12-12 RX ORDER — ALBUTEROL SULFATE 90 UG/1
AEROSOL, METERED RESPIRATORY (INHALATION)
Qty: 18 INHALER | Refills: 12 | Status: SHIPPED | OUTPATIENT
Start: 2018-12-12

## 2018-12-12 RX ORDER — PREDNISONE 20 MG/1
TABLET ORAL
Qty: 20 TABLET | Refills: 0 | Status: SHIPPED | OUTPATIENT
Start: 2018-12-12 | End: 2019-04-03

## 2018-12-12 ASSESSMENT — ENCOUNTER SYMPTOMS
CHEST TIGHTNESS: 0
CONSTIPATION: 0
VOMITING: 0
ABDOMINAL PAIN: 0
NAUSEA: 0
SHORTNESS OF BREATH: 1
WHEEZING: 1
DIARRHEA: 0
COUGH: 0

## 2018-12-12 NOTE — PROGRESS NOTES
itchy, and pass out.  Clindamycin/Lincomycin Diarrhea     Told by Pulmonologist at [facility] not to take       Past Surgical History:   Procedure Laterality Date    APPENDECTOMY      CHOLECYSTECTOMY  9/26/14    COLONOSCOPY  12/29/2008    Dr. Dominique Parsons, PARTIAL  2002    right upper    LUNG TRANSPLANT, SINGLE  2006    left    SKIN CANCER DESTRUCTION      UPPER GASTROINTESTINAL ENDOSCOPY  12/30/2008     Dr. Poonam Valadez       Social History   Substance Use Topics    Smoking status: Former Smoker     Packs/day: 3.00     Years: 15.00     Types: Cigarettes     Start date: 1/1/1972     Quit date: 1/10/2002    Smokeless tobacco: Never Used      Comment: Pt used to smoke quit 9.5 years ago    Alcohol use No       Family History   Problem Relation Age of Onset    Cancer Mother     Heart Disease Father        /62   Pulse 68   Temp 98.4 °F (36.9 °C) (Temporal)   Ht 5' 9\" (1.753 m)   Wt 151 lb 12.8 oz (68.9 kg)   SpO2 96%   BMI 22.42 kg/m²     Physical Exam   Constitutional: He is oriented to person, place, and time. He appears well-developed and well-nourished. Non-toxic appearance. He appears ill. No distress. HENT:   Head: Normocephalic and atraumatic. Neck: No tracheal deviation present. Cardiovascular: Normal rate, regular rhythm, normal heart sounds and intact distal pulses. Exam reveals no gallop and no friction rub. No murmur heard. Pulmonary/Chest: Effort normal. No stridor. No respiratory distress. He has wheezes. He has no rales. He exhibits no tenderness. Abdominal: Soft. Bowel sounds are normal. He exhibits no distension and no mass. There is no tenderness. There is no rebound and no guarding. Musculoskeletal:        Right lower leg: He exhibits no edema. Left lower leg: He exhibits no edema. Neurological: He is alert and oriented to person, place, and time. Coordination and gait normal.   Skin: Skin is warm and dry. He is not diaphoretic. No cyanosis. Call office   5. Oxygen change (use it continuous until seen)              1. Seek medical attention   2. Continue your yellow zone treatment           until seeing a provider. Patient given educational handouts and has had all questions answered. Patient voices understanding and agrees to plans along with risks and benefits of plan. Patient isinstructed to continue prior meds, diet, and exercise plans unless instructed otherwise. Patient agrees to follow up as instructed and sooner if needed. Patient agrees to go to ER if condition becomes emergent. Notesmay be completed with dictation device and spelling errors may occur. Return for f/u copd exacerbation in lung transplant.

## 2018-12-13 DIAGNOSIS — J44.9 COPD, GROUP D, BY GOLD 2017 CLASSIFICATION (HCC): Primary | ICD-10-CM

## 2018-12-13 RX ORDER — BUDESONIDE AND FORMOTEROL FUMARATE DIHYDRATE 160; 4.5 UG/1; UG/1
2 AEROSOL RESPIRATORY (INHALATION)
Qty: 1 INHALER | Refills: 11 | Status: SHIPPED | OUTPATIENT
Start: 2018-12-13 | End: 2019-09-11 | Stop reason: ALTCHOICE

## 2018-12-17 ENCOUNTER — OFFICE VISIT (OUTPATIENT)
Dept: PRIMARY CARE CLINIC | Age: 58
End: 2018-12-17
Payer: MEDICAID

## 2018-12-17 VITALS
DIASTOLIC BLOOD PRESSURE: 76 MMHG | SYSTOLIC BLOOD PRESSURE: 136 MMHG | WEIGHT: 154 LBS | BODY MASS INDEX: 22.81 KG/M2 | HEIGHT: 69 IN | HEART RATE: 88 BPM | TEMPERATURE: 98 F | RESPIRATION RATE: 20 BRPM

## 2018-12-17 DIAGNOSIS — F51.01 PRIMARY INSOMNIA: ICD-10-CM

## 2018-12-17 DIAGNOSIS — J44.1 COPD EXACERBATION (HCC): Primary | ICD-10-CM

## 2018-12-17 DIAGNOSIS — Z23 NEED FOR PROPHYLACTIC VACCINATION AGAINST STREPTOCOCCUS PNEUMONIAE (PNEUMOCOCCUS): ICD-10-CM

## 2018-12-17 DIAGNOSIS — Z23 NEED FOR PROPHYLACTIC VACCINATION AND INOCULATION AGAINST VARICELLA: ICD-10-CM

## 2018-12-17 PROCEDURE — G8427 DOCREV CUR MEDS BY ELIG CLIN: HCPCS | Performed by: FAMILY MEDICINE

## 2018-12-17 PROCEDURE — G8482 FLU IMMUNIZE ORDER/ADMIN: HCPCS | Performed by: FAMILY MEDICINE

## 2018-12-17 PROCEDURE — 90732 PPSV23 VACC 2 YRS+ SUBQ/IM: CPT | Performed by: FAMILY MEDICINE

## 2018-12-17 PROCEDURE — G8420 CALC BMI NORM PARAMETERS: HCPCS | Performed by: FAMILY MEDICINE

## 2018-12-17 PROCEDURE — 90471 IMMUNIZATION ADMIN: CPT | Performed by: FAMILY MEDICINE

## 2018-12-17 PROCEDURE — 99214 OFFICE O/P EST MOD 30 MIN: CPT | Performed by: FAMILY MEDICINE

## 2018-12-17 PROCEDURE — 3017F COLORECTAL CA SCREEN DOC REV: CPT | Performed by: FAMILY MEDICINE

## 2018-12-17 PROCEDURE — 3023F SPIROM DOC REV: CPT | Performed by: FAMILY MEDICINE

## 2018-12-17 PROCEDURE — G8926 SPIRO NO PERF OR DOC: HCPCS | Performed by: FAMILY MEDICINE

## 2018-12-17 PROCEDURE — 1036F TOBACCO NON-USER: CPT | Performed by: FAMILY MEDICINE

## 2018-12-17 RX ORDER — TRAZODONE HYDROCHLORIDE 50 MG/1
TABLET ORAL
Qty: 60 TABLET | Refills: 1 | Status: SHIPPED | OUTPATIENT
Start: 2018-12-17 | End: 2019-02-09 | Stop reason: SDUPTHER

## 2018-12-17 ASSESSMENT — ENCOUNTER SYMPTOMS
SHORTNESS OF BREATH: 1
ABDOMINAL PAIN: 0
CHEST TIGHTNESS: 0
VOMITING: 0
CONSTIPATION: 0
COUGH: 0
DIARRHEA: 0
NAUSEA: 0
WHEEZING: 1

## 2018-12-17 NOTE — PROGRESS NOTES
tablet 0    levofloxacin (LEVAQUIN) 750 MG tablet Take 1 tablet by mouth daily for 5 days (YELLOW ZONE) 5 tablet 0    predniSONE (DELTASONE) 20 MG tablet Take 2 tablets by mouth daily for 5 days Yellow zone COPD 10 tablet 0    SPIRIVA HANDIHALER 18 MCG inhalation capsule INHALE THE CONTENTS OF 1 CAPSULE BY MOUTH EVERYD AY 30 capsule 5    metoprolol (LOPRESSOR) 100 MG tablet TAKE 1 TABLET BY MOUTH TWICE A DAY 60 tablet 4    levocetirizine (XYZAL) 5 MG tablet Take 1 tablet by mouth nightly 90 tablet 3    furosemide (LASIX) 20 MG tablet TAKE 1 TABLET BY MOUTH DAILY 30 tablet 10    omeprazole (PRILOSEC) 20 MG delayed release capsule TAKE ONE CAPSULE BY MOUTH TWICE A DAY 60 capsule 11    folic acid (FOLVITE) 1 MG tablet TAKE 1 TABLET BY MOUTH EVERY DAY 30 tablet 11    atorvastatin (LIPITOR) 20 MG tablet TAKE 1 TABLET BY MOUTH AT BEDTIME 30 tablet 11    HYDROcodone-acetaminophen (NORCO) 7.5-325 MG per tablet Take 1 tablet by mouth.  OXYGEN Inhale into the lungs 3-4 L      Mycophenolate Sodium 360 MG TBEC Take 360 mg by mouth daily      Mycophenolate Sodium (MYCOPHENOLIC ACID) 759 MG TBEC 180 mg 2 times daily       Fexofenadine HCl (MUCINEX ALLERGY PO) Take by mouth      Blood Glucose Monitoring Suppl (ACCU-CHEK JOANNE PLUS) W/DEVICE KIT   0    ACCU-CHEK JOANNE PLUS strip   3    Accu-Chek Softclix Lancets MISC   3    albuterol (PROVENTIL) (2.5 MG/3ML) 0.083% nebulizer solution       amLODIPine (NORVASC) 5 MG tablet Take 5 mg by mouth daily   11    magnesium oxide (MAG-OX) 400 (241.3 MG) MG TABS tablet 400 mg 2 times daily   11    tacrolimus (PROGRAF) 1 MG capsule Take 1 mg by mouth 2 times daily Take one tablet in the am and 1/2 tablet in the afternoon      Cyanocobalamin (VITAMIN B 12) 250 MCG LOZG Take  by mouth.  hydrochlorothiazide (HYDRODIURIL) 50 MG tablet Take 50 mg by mouth daily.         budesonide-formoterol (SYMBICORT) 160-4.5 MCG/ACT AERO Inhale 2 puffs into the lungs 2 times daily.        LYRICA 150 MG capsule TAKE ONE CAPSULE BY MOUTH TWICE A DAY. 60 capsule 5     No current facility-administered medications for this visit. Allergies   Allergen Reactions    Avelox [Moxifloxacin Hydrochloride] Other (See Comments)     Pt states this medicine will make him sweat, turn red, itchy, and pass out.  Clindamycin/Lincomycin Diarrhea     Told by Pulmonologist at [facility] not to take       Past Surgical History:   Procedure Laterality Date    APPENDECTOMY      CHOLECYSTECTOMY  9/26/14    COLONOSCOPY  12/29/2008    Dr. Claudia Castellanos, PARTIAL  2002    right upper    LUNG TRANSPLANT, SINGLE  2006    left    SKIN CANCER DESTRUCTION      UPPER GASTROINTESTINAL ENDOSCOPY  12/30/2008     Dr. Lindajo Heimlich       Social History   Substance Use Topics    Smoking status: Former Smoker     Packs/day: 3.00     Years: 15.00     Types: Cigarettes     Start date: 1/1/1972     Quit date: 1/10/2002    Smokeless tobacco: Never Used      Comment: Pt used to smoke quit 9.5 years ago    Alcohol use No       Family History   Problem Relation Age of Onset    Cancer Mother     Heart Disease Father        /76   Pulse 88   Temp 98 °F (36.7 °C)   Resp 20   Ht 5' 9\" (1.753 m)   Wt 154 lb (69.9 kg)   BMI 22.74 kg/m²     Physical Exam   Constitutional: He is oriented to person, place, and time. He appears well-developed and well-nourished. Non-toxic appearance. No distress. HENT:   Head: Normocephalic and atraumatic. Cardiovascular: Normal rate, regular rhythm, normal heart sounds and intact distal pulses. Exam reveals no gallop and no friction rub. No murmur heard. Pulmonary/Chest: Effort normal. No respiratory distress. He has decreased breath sounds. He has no wheezes. He has no rhonchi. He has rales (significantly improved) in the left lower field. He exhibits no tenderness. Abdominal: Soft. Bowel sounds are normal. He exhibits no distension and no mass.  There is no

## 2019-01-23 RX ORDER — METOPROLOL TARTRATE 100 MG/1
TABLET ORAL
Qty: 60 TABLET | Refills: 4 | Status: SHIPPED | OUTPATIENT
Start: 2019-01-23 | End: 2019-06-04 | Stop reason: SDUPTHER

## 2019-02-08 ENCOUNTER — APPOINTMENT (OUTPATIENT)
Dept: LAB | Facility: HOSPITAL | Age: 59
End: 2019-02-08

## 2019-02-08 ENCOUNTER — TRANSCRIBE ORDERS (OUTPATIENT)
Dept: ADMINISTRATIVE | Facility: HOSPITAL | Age: 59
End: 2019-02-08

## 2019-02-08 DIAGNOSIS — N18.30 CHRONIC KIDNEY DISEASE, STAGE III (MODERATE) (HCC): Primary | ICD-10-CM

## 2019-02-08 LAB
ALBUMIN SERPL-MCNC: 4.4 G/DL (ref 3.5–5)
ALBUMIN/GLOB SERPL: 1.8 G/DL (ref 1.1–2.5)
ALP SERPL-CCNC: 50 U/L (ref 24–120)
ALT SERPL W P-5'-P-CCNC: <15 U/L (ref 0–54)
ANION GAP SERPL CALCULATED.3IONS-SCNC: 8 MMOL/L (ref 4–13)
AST SERPL-CCNC: 18 U/L (ref 7–45)
BILIRUB SERPL-MCNC: 0.6 MG/DL (ref 0.1–1)
BUN BLD-MCNC: 32 MG/DL (ref 5–21)
BUN/CREAT SERPL: 21.3 (ref 7–25)
CALCIUM SPEC-SCNC: 9.1 MG/DL (ref 8.4–10.4)
CHLORIDE SERPL-SCNC: 98 MMOL/L (ref 98–110)
CO2 SERPL-SCNC: 34 MMOL/L (ref 24–31)
CREAT BLD-MCNC: 1.5 MG/DL (ref 0.5–1.4)
DEPRECATED RDW RBC AUTO: 39.8 FL (ref 40–54)
ERYTHROCYTE [DISTWIDTH] IN BLOOD BY AUTOMATED COUNT: 12.7 % (ref 12–15)
GFR SERPL CREATININE-BSD FRML MDRD: 48 ML/MIN/1.73
GLOBULIN UR ELPH-MCNC: 2.4 GM/DL
GLUCOSE BLD-MCNC: 94 MG/DL (ref 70–100)
HCT VFR BLD AUTO: 39.7 % (ref 40–52)
HGB BLD-MCNC: 13.2 G/DL (ref 14–18)
MCH RBC QN AUTO: 28.8 PG (ref 28–32)
MCHC RBC AUTO-ENTMCNC: 33.2 G/DL (ref 33–36)
MCV RBC AUTO: 86.7 FL (ref 82–95)
PLATELET # BLD AUTO: 228 10*3/MM3 (ref 130–400)
PMV BLD AUTO: 11.4 FL (ref 6–12)
POTASSIUM BLD-SCNC: 3.8 MMOL/L (ref 3.5–5.3)
PROT SERPL-MCNC: 6.8 G/DL (ref 6.3–8.7)
PSA SERPL-MCNC: 1.16 NG/ML (ref 0–4)
RBC # BLD AUTO: 4.58 10*6/MM3 (ref 4.8–5.9)
SODIUM BLD-SCNC: 140 MMOL/L (ref 135–145)
WBC NRBC COR # BLD: 7.95 10*3/MM3 (ref 4.8–10.8)

## 2019-02-08 PROCEDURE — 82043 UR ALBUMIN QUANTITATIVE: CPT | Performed by: INTERNAL MEDICINE

## 2019-02-08 PROCEDURE — 36415 COLL VENOUS BLD VENIPUNCTURE: CPT

## 2019-02-08 PROCEDURE — 82570 ASSAY OF URINE CREATININE: CPT | Performed by: INTERNAL MEDICINE

## 2019-02-08 PROCEDURE — G0103 PSA SCREENING: HCPCS | Performed by: INTERNAL MEDICINE

## 2019-02-08 PROCEDURE — 85027 COMPLETE CBC AUTOMATED: CPT | Performed by: INTERNAL MEDICINE

## 2019-02-08 PROCEDURE — 80053 COMPREHEN METABOLIC PANEL: CPT | Performed by: INTERNAL MEDICINE

## 2019-02-09 LAB
CREAT 24H UR-MCNC: 66 MG/DL
MICROALBUMIN UR-MCNC: 15.2 UG/ML
MICROALBUMIN/CREAT UR: 23 MG/G CREAT (ref 0–30)

## 2019-03-04 ENCOUNTER — OFFICE VISIT (OUTPATIENT)
Dept: PULMONOLOGY | Facility: CLINIC | Age: 59
End: 2019-03-04

## 2019-03-04 VITALS
DIASTOLIC BLOOD PRESSURE: 70 MMHG | WEIGHT: 160 LBS | SYSTOLIC BLOOD PRESSURE: 122 MMHG | BODY MASS INDEX: 23.7 KG/M2 | OXYGEN SATURATION: 96 % | HEIGHT: 69 IN | HEART RATE: 72 BPM

## 2019-03-04 DIAGNOSIS — G89.4 CHRONIC PAIN SYNDROME: ICD-10-CM

## 2019-03-04 DIAGNOSIS — Z94.2 LUNG TRANSPLANT STATUS (HCC): ICD-10-CM

## 2019-03-04 DIAGNOSIS — J96.11 CHRONIC RESPIRATORY FAILURE WITH HYPOXIA (HCC): ICD-10-CM

## 2019-03-04 DIAGNOSIS — K21.9 GASTROESOPHAGEAL REFLUX DISEASE WITHOUT ESOPHAGITIS: ICD-10-CM

## 2019-03-04 DIAGNOSIS — J44.9 COPD, GROUP D, BY GOLD 2017 CLASSIFICATION (HCC): Primary | ICD-10-CM

## 2019-03-04 DIAGNOSIS — Z87.891 PERSONAL HISTORY OF NICOTINE DEPENDENCE: ICD-10-CM

## 2019-03-04 DIAGNOSIS — I10 ESSENTIAL HYPERTENSION: ICD-10-CM

## 2019-03-04 LAB
FEV1/FVC: NORMAL %
FEV1: NORMAL LITERS
FVC VOL RESPIRATORY: NORMAL LITERS

## 2019-03-04 PROCEDURE — 99214 OFFICE O/P EST MOD 30 MIN: CPT | Performed by: NURSE PRACTITIONER

## 2019-03-04 PROCEDURE — 94010 BREATHING CAPACITY TEST: CPT | Performed by: NURSE PRACTITIONER

## 2019-03-04 RX ORDER — ALBUTEROL SULFATE 2.5 MG/3ML
2.5 SOLUTION RESPIRATORY (INHALATION) 4 TIMES DAILY PRN
Qty: 25 VIAL | Refills: 5 | Status: SHIPPED | OUTPATIENT
Start: 2019-03-04 | End: 2020-01-08 | Stop reason: SDUPTHER

## 2019-03-04 RX ORDER — PREDNISONE 10 MG/1
TABLET ORAL
Qty: 31 TABLET | Refills: 0 | Status: ON HOLD | OUTPATIENT
Start: 2019-03-04 | End: 2019-03-11

## 2019-03-04 RX ORDER — CEFDINIR 300 MG/1
300 CAPSULE ORAL 2 TIMES DAILY
Qty: 20 CAPSULE | Refills: 0 | Status: SHIPPED | OUTPATIENT
Start: 2019-03-04 | End: 2019-03-13 | Stop reason: HOSPADM

## 2019-03-05 DIAGNOSIS — Z12.11 SCREENING FOR COLON CANCER: Primary | ICD-10-CM

## 2019-03-10 ENCOUNTER — HOSPITAL ENCOUNTER (INPATIENT)
Facility: HOSPITAL | Age: 59
LOS: 2 days | Discharge: HOME | End: 2019-03-13
Attending: EMERGENCY MEDICINE | Admitting: INTERNAL MEDICINE

## 2019-03-10 ENCOUNTER — APPOINTMENT (OUTPATIENT)
Dept: GENERAL RADIOLOGY | Facility: HOSPITAL | Age: 59
End: 2019-03-10

## 2019-03-10 DIAGNOSIS — J44.1 COPD EXACERBATION (HCC): Primary | ICD-10-CM

## 2019-03-10 LAB
ALBUMIN SERPL-MCNC: 4.5 G/DL (ref 3.5–5)
ALBUMIN/GLOB SERPL: 1.9 G/DL (ref 1.1–2.5)
ALP SERPL-CCNC: 68 U/L (ref 24–120)
ALT SERPL W P-5'-P-CCNC: 23 U/L (ref 0–54)
ANION GAP SERPL CALCULATED.3IONS-SCNC: 14 MMOL/L (ref 4–13)
ARTERIAL PATENCY WRIST A: POSITIVE
AST SERPL-CCNC: 20 U/L (ref 7–45)
ATMOSPHERIC PRESS: 757 MMHG
BACTERIA UR QL AUTO: ABNORMAL /HPF
BASE EXCESS BLDA CALC-SCNC: 5.4 MMOL/L (ref 0–2)
BASOPHILS # BLD AUTO: 0.07 10*3/MM3 (ref 0–0.2)
BASOPHILS NFR BLD AUTO: 0.5 % (ref 0–2)
BDY SITE: ABNORMAL
BILIRUB SERPL-MCNC: 0.9 MG/DL (ref 0.1–1)
BILIRUB UR QL STRIP: NEGATIVE
BODY TEMPERATURE: 37 C
BUN BLD-MCNC: 33 MG/DL (ref 5–21)
BUN/CREAT SERPL: 19.9 (ref 7–25)
CALCIUM SPEC-SCNC: 9.3 MG/DL (ref 8.4–10.4)
CHLORIDE SERPL-SCNC: 93 MMOL/L (ref 98–110)
CLARITY UR: CLEAR
CO2 SERPL-SCNC: 32 MMOL/L (ref 24–31)
COLOR UR: YELLOW
CREAT BLD-MCNC: 1.66 MG/DL (ref 0.5–1.4)
D-LACTATE SERPL-SCNC: 1.2 MMOL/L (ref 0.5–2)
DEPRECATED RDW RBC AUTO: 39.9 FL (ref 40–54)
EOSINOPHIL # BLD AUTO: 0.07 10*3/MM3 (ref 0–0.7)
EOSINOPHIL NFR BLD AUTO: 0.5 % (ref 0–4)
ERYTHROCYTE [DISTWIDTH] IN BLOOD BY AUTOMATED COUNT: 13.1 % (ref 12–15)
FLUAV AG NPH QL: NEGATIVE
FLUBV AG NPH QL IA: NEGATIVE
GFR SERPL CREATININE-BSD FRML MDRD: 42 ML/MIN/1.73
GLOBULIN UR ELPH-MCNC: 2.4 GM/DL
GLUCOSE BLD-MCNC: 139 MG/DL (ref 70–100)
GLUCOSE UR STRIP-MCNC: NEGATIVE MG/DL
HCO3 BLDA-SCNC: 30.1 MMOL/L (ref 20–26)
HCT VFR BLD AUTO: 44 % (ref 40–52)
HGB BLD-MCNC: 14.9 G/DL (ref 14–18)
HGB UR QL STRIP.AUTO: ABNORMAL
HOLD SPECIMEN: NORMAL
HOLD SPECIMEN: NORMAL
HYALINE CASTS UR QL AUTO: ABNORMAL /LPF
IMM GRANULOCYTES # BLD AUTO: 0.21 10*3/MM3 (ref 0–0.05)
IMM GRANULOCYTES NFR BLD AUTO: 1.6 % (ref 0–5)
KETONES UR QL STRIP: NEGATIVE
LEUKOCYTE ESTERASE UR QL STRIP.AUTO: NEGATIVE
LYMPHOCYTES # BLD AUTO: 1.34 10*3/MM3 (ref 0.72–4.86)
LYMPHOCYTES NFR BLD AUTO: 10.2 % (ref 15–45)
Lab: ABNORMAL
MCH RBC QN AUTO: 28.8 PG (ref 28–32)
MCHC RBC AUTO-ENTMCNC: 33.9 G/DL (ref 33–36)
MCV RBC AUTO: 85.1 FL (ref 82–95)
MODALITY: ABNORMAL
MONOCYTES # BLD AUTO: 1.29 10*3/MM3 (ref 0.19–1.3)
MONOCYTES NFR BLD AUTO: 9.8 % (ref 4–12)
NEUTROPHILS # BLD AUTO: 10.13 10*3/MM3 (ref 1.87–8.4)
NEUTROPHILS NFR BLD AUTO: 77.4 % (ref 39–78)
NITRITE UR QL STRIP: NEGATIVE
NRBC BLD AUTO-RTO: 0 /100 WBC (ref 0–0)
NT-PROBNP SERPL-MCNC: 276 PG/ML (ref 0–900)
PCO2 BLDA: 43 MM HG (ref 35–45)
PH BLDA: 7.45 PH UNITS (ref 7.35–7.45)
PH UR STRIP.AUTO: 6 [PH] (ref 5–8)
PLATELET # BLD AUTO: 214 10*3/MM3 (ref 130–400)
PMV BLD AUTO: 11.4 FL (ref 6–12)
PO2 BLDA: 60.8 MM HG (ref 83–108)
POTASSIUM BLD-SCNC: 3.3 MMOL/L (ref 3.5–5.3)
PROT SERPL-MCNC: 6.9 G/DL (ref 6.3–8.7)
PROT UR QL STRIP: ABNORMAL
RBC # BLD AUTO: 5.17 10*6/MM3 (ref 4.8–5.9)
RBC # UR: ABNORMAL /HPF
REF LAB TEST METHOD: ABNORMAL
SAO2 % BLDCOA: 90.8 % (ref 94–99)
SODIUM BLD-SCNC: 139 MMOL/L (ref 135–145)
SP GR UR STRIP: 1.01 (ref 1–1.03)
SQUAMOUS #/AREA URNS HPF: ABNORMAL /HPF
TROPONIN I SERPL-MCNC: <0.012 NG/ML (ref 0–0.03)
UROBILINOGEN UR QL STRIP: ABNORMAL
VENTILATOR MODE: ABNORMAL
WBC NRBC COR # BLD: 13.11 10*3/MM3 (ref 4.8–10.8)
WBC UR QL AUTO: ABNORMAL /HPF
WHOLE BLOOD HOLD SPECIMEN: NORMAL
WHOLE BLOOD HOLD SPECIMEN: NORMAL

## 2019-03-10 PROCEDURE — 71045 X-RAY EXAM CHEST 1 VIEW: CPT

## 2019-03-10 PROCEDURE — 94799 UNLISTED PULMONARY SVC/PX: CPT

## 2019-03-10 PROCEDURE — 81001 URINALYSIS AUTO W/SCOPE: CPT | Performed by: EMERGENCY MEDICINE

## 2019-03-10 PROCEDURE — 99285 EMERGENCY DEPT VISIT HI MDM: CPT

## 2019-03-10 PROCEDURE — 25010000002 CEFTRIAXONE PER 250 MG: Performed by: EMERGENCY MEDICINE

## 2019-03-10 PROCEDURE — 83605 ASSAY OF LACTIC ACID: CPT | Performed by: EMERGENCY MEDICINE

## 2019-03-10 PROCEDURE — 93005 ELECTROCARDIOGRAM TRACING: CPT | Performed by: EMERGENCY MEDICINE

## 2019-03-10 PROCEDURE — 87205 SMEAR GRAM STAIN: CPT | Performed by: EMERGENCY MEDICINE

## 2019-03-10 PROCEDURE — 80053 COMPREHEN METABOLIC PANEL: CPT | Performed by: EMERGENCY MEDICINE

## 2019-03-10 PROCEDURE — 80197 ASSAY OF TACROLIMUS: CPT | Performed by: EMERGENCY MEDICINE

## 2019-03-10 PROCEDURE — 94640 AIRWAY INHALATION TREATMENT: CPT

## 2019-03-10 PROCEDURE — 94660 CPAP INITIATION&MGMT: CPT

## 2019-03-10 PROCEDURE — 25010000002 LORAZEPAM PER 2 MG: Performed by: EMERGENCY MEDICINE

## 2019-03-10 PROCEDURE — 87186 SC STD MICRODIL/AGAR DIL: CPT | Performed by: EMERGENCY MEDICINE

## 2019-03-10 PROCEDURE — 82803 BLOOD GASES ANY COMBINATION: CPT

## 2019-03-10 PROCEDURE — 87077 CULTURE AEROBIC IDENTIFY: CPT | Performed by: EMERGENCY MEDICINE

## 2019-03-10 PROCEDURE — 93010 ELECTROCARDIOGRAM REPORT: CPT | Performed by: INTERNAL MEDICINE

## 2019-03-10 PROCEDURE — 85025 COMPLETE CBC W/AUTO DIFF WBC: CPT | Performed by: EMERGENCY MEDICINE

## 2019-03-10 PROCEDURE — 83880 ASSAY OF NATRIURETIC PEPTIDE: CPT | Performed by: EMERGENCY MEDICINE

## 2019-03-10 PROCEDURE — 87040 BLOOD CULTURE FOR BACTERIA: CPT | Performed by: EMERGENCY MEDICINE

## 2019-03-10 PROCEDURE — 25010000002 METHYLPREDNISOLONE PER 125 MG: Performed by: EMERGENCY MEDICINE

## 2019-03-10 PROCEDURE — 36600 WITHDRAWAL OF ARTERIAL BLOOD: CPT

## 2019-03-10 PROCEDURE — 36415 COLL VENOUS BLD VENIPUNCTURE: CPT | Performed by: EMERGENCY MEDICINE

## 2019-03-10 PROCEDURE — 87804 INFLUENZA ASSAY W/OPTIC: CPT | Performed by: EMERGENCY MEDICINE

## 2019-03-10 PROCEDURE — 87070 CULTURE OTHR SPECIMN AEROBIC: CPT | Performed by: EMERGENCY MEDICINE

## 2019-03-10 PROCEDURE — 84484 ASSAY OF TROPONIN QUANT: CPT | Performed by: EMERGENCY MEDICINE

## 2019-03-10 RX ORDER — SODIUM CHLORIDE 0.9 % (FLUSH) 0.9 %
10 SYRINGE (ML) INJECTION AS NEEDED
Status: DISCONTINUED | OUTPATIENT
Start: 2019-03-10 | End: 2019-03-13 | Stop reason: HOSPADM

## 2019-03-10 RX ORDER — ALBUTEROL SULFATE 2.5 MG/3ML
2.5 SOLUTION RESPIRATORY (INHALATION)
Status: COMPLETED | OUTPATIENT
Start: 2019-03-10 | End: 2019-03-10

## 2019-03-10 RX ORDER — METHYLPREDNISOLONE SODIUM SUCCINATE 125 MG/2ML
125 INJECTION, POWDER, LYOPHILIZED, FOR SOLUTION INTRAMUSCULAR; INTRAVENOUS ONCE
Status: COMPLETED | OUTPATIENT
Start: 2019-03-10 | End: 2019-03-10

## 2019-03-10 RX ORDER — LORAZEPAM 2 MG/ML
0.5 INJECTION INTRAMUSCULAR ONCE
Status: COMPLETED | OUTPATIENT
Start: 2019-03-10 | End: 2019-03-10

## 2019-03-10 RX ORDER — IPRATROPIUM BROMIDE AND ALBUTEROL SULFATE 2.5; .5 MG/3ML; MG/3ML
3 SOLUTION RESPIRATORY (INHALATION) ONCE
Status: COMPLETED | OUTPATIENT
Start: 2019-03-10 | End: 2019-03-10

## 2019-03-10 RX ADMIN — ALBUTEROL SULFATE 2.5 MG: 2.5 SOLUTION RESPIRATORY (INHALATION) at 19:43

## 2019-03-10 RX ADMIN — IPRATROPIUM BROMIDE AND ALBUTEROL SULFATE 3 ML: 2.5; .5 SOLUTION RESPIRATORY (INHALATION) at 19:43

## 2019-03-10 RX ADMIN — ALBUTEROL SULFATE 2.5 MG: 2.5 SOLUTION RESPIRATORY (INHALATION) at 19:42

## 2019-03-10 RX ADMIN — METHYLPREDNISOLONE SODIUM SUCCINATE 125 MG: 125 INJECTION, POWDER, FOR SOLUTION INTRAMUSCULAR; INTRAVENOUS at 19:37

## 2019-03-10 RX ADMIN — CEFTRIAXONE SODIUM 1 G: 1 INJECTION, POWDER, FOR SOLUTION INTRAMUSCULAR; INTRAVENOUS at 23:26

## 2019-03-10 RX ADMIN — LORAZEPAM 0.5 MG: 2 INJECTION INTRAMUSCULAR; INTRAVENOUS at 20:17

## 2019-03-10 RX ADMIN — SODIUM CHLORIDE 500 ML: 9 INJECTION, SOLUTION INTRAVENOUS at 19:42

## 2019-03-11 PROBLEM — J44.1 COPD EXACERBATION (HCC): Status: ACTIVE | Noted: 2019-03-11

## 2019-03-11 LAB
ALBUMIN SERPL-MCNC: 4 G/DL (ref 3.5–5)
ALBUMIN/GLOB SERPL: 2 G/DL (ref 1.1–2.5)
ALP SERPL-CCNC: 56 U/L (ref 24–120)
ALT SERPL W P-5'-P-CCNC: 27 U/L (ref 0–54)
ANION GAP SERPL CALCULATED.3IONS-SCNC: 16 MMOL/L (ref 4–13)
ARTERIAL PATENCY WRIST A: POSITIVE
AST SERPL-CCNC: 18 U/L (ref 7–45)
ATMOSPHERIC PRESS: 758 MMHG
BASE EXCESS BLDA CALC-SCNC: 3.6 MMOL/L (ref 0–2)
BASOPHILS # BLD AUTO: 0.03 10*3/MM3 (ref 0–0.2)
BASOPHILS NFR BLD AUTO: 0.2 % (ref 0–2)
BDY SITE: ABNORMAL
BILIRUB SERPL-MCNC: 0.9 MG/DL (ref 0.1–1)
BODY TEMPERATURE: 37 C
BUN BLD-MCNC: 31 MG/DL (ref 5–21)
BUN/CREAT SERPL: 24 (ref 7–25)
CALCIUM SPEC-SCNC: 8.9 MG/DL (ref 8.4–10.4)
CHLORIDE SERPL-SCNC: 94 MMOL/L (ref 98–110)
CO2 SERPL-SCNC: 26 MMOL/L (ref 24–31)
CREAT BLD-MCNC: 1.29 MG/DL (ref 0.5–1.4)
DEPRECATED RDW RBC AUTO: 40.7 FL (ref 40–54)
EOSINOPHIL # BLD AUTO: 0 10*3/MM3 (ref 0–0.7)
EOSINOPHIL NFR BLD AUTO: 0 % (ref 0–4)
ERYTHROCYTE [DISTWIDTH] IN BLOOD BY AUTOMATED COUNT: 13.1 % (ref 12–15)
GFR SERPL CREATININE-BSD FRML MDRD: 57 ML/MIN/1.73
GLOBULIN UR ELPH-MCNC: 2 GM/DL
GLUCOSE BLD-MCNC: 258 MG/DL (ref 70–100)
GLUCOSE BLDC GLUCOMTR-MCNC: 168 MG/DL (ref 70–130)
GLUCOSE BLDC GLUCOMTR-MCNC: 219 MG/DL (ref 70–130)
GLUCOSE BLDC GLUCOMTR-MCNC: 341 MG/DL (ref 70–130)
GLUCOSE BLDC GLUCOMTR-MCNC: 87 MG/DL (ref 70–130)
HCO3 BLDA-SCNC: 28.7 MMOL/L (ref 20–26)
HCT VFR BLD AUTO: 39.7 % (ref 40–52)
HGB BLD-MCNC: 13.3 G/DL (ref 14–18)
IMM GRANULOCYTES # BLD AUTO: 0.15 10*3/MM3 (ref 0–0.05)
IMM GRANULOCYTES NFR BLD AUTO: 1.1 % (ref 0–5)
L PNEUMO1 AG UR QL IA: NEGATIVE
LYMPHOCYTES # BLD AUTO: 0.18 10*3/MM3 (ref 0.72–4.86)
LYMPHOCYTES NFR BLD AUTO: 1.4 % (ref 15–45)
Lab: ABNORMAL
MCH RBC QN AUTO: 29 PG (ref 28–32)
MCHC RBC AUTO-ENTMCNC: 33.5 G/DL (ref 33–36)
MCV RBC AUTO: 86.5 FL (ref 82–95)
MODALITY: ABNORMAL
MONOCYTES # BLD AUTO: 0.23 10*3/MM3 (ref 0.19–1.3)
MONOCYTES NFR BLD AUTO: 1.8 % (ref 4–12)
NEUTROPHILS # BLD AUTO: 12.47 10*3/MM3 (ref 1.87–8.4)
NEUTROPHILS NFR BLD AUTO: 95.5 % (ref 39–78)
NRBC BLD AUTO-RTO: 0 /100 WBC (ref 0–0)
PCO2 BLDA: 44.3 MM HG (ref 35–45)
PH BLDA: 7.42 PH UNITS (ref 7.35–7.45)
PLATELET # BLD AUTO: 187 10*3/MM3 (ref 130–400)
PMV BLD AUTO: 11.6 FL (ref 6–12)
PO2 BLDA: 56.4 MM HG (ref 83–108)
POTASSIUM BLD-SCNC: 3.8 MMOL/L (ref 3.5–5.3)
PROT SERPL-MCNC: 6 G/DL (ref 6.3–8.7)
RBC # BLD AUTO: 4.59 10*6/MM3 (ref 4.8–5.9)
S PNEUM AG SPEC QL LA: NEGATIVE
SAO2 % BLDCOA: 88.8 % (ref 94–99)
SODIUM BLD-SCNC: 136 MMOL/L (ref 135–145)
VENTILATOR MODE: ABNORMAL
WBC NRBC COR # BLD: 13.06 10*3/MM3 (ref 4.8–10.8)

## 2019-03-11 PROCEDURE — 36600 WITHDRAWAL OF ARTERIAL BLOOD: CPT

## 2019-03-11 PROCEDURE — 94799 UNLISTED PULMONARY SVC/PX: CPT

## 2019-03-11 PROCEDURE — 87102 FUNGUS ISOLATION CULTURE: CPT | Performed by: NURSE PRACTITIONER

## 2019-03-11 PROCEDURE — 25010000002 METHYLPREDNISOLONE PER 40 MG: Performed by: INTERNAL MEDICINE

## 2019-03-11 PROCEDURE — 87385 HISTOPLASMA CAPSUL AG IA: CPT | Performed by: NURSE PRACTITIONER

## 2019-03-11 PROCEDURE — 80053 COMPREHEN METABOLIC PANEL: CPT | Performed by: INTERNAL MEDICINE

## 2019-03-11 PROCEDURE — 25010000002 PIPERACILLIN SOD-TAZOBACTAM PER 1 G: Performed by: INTERNAL MEDICINE

## 2019-03-11 PROCEDURE — 63710000001 TACROLIMUS PER 1 MG: Performed by: INTERNAL MEDICINE

## 2019-03-11 PROCEDURE — 82962 GLUCOSE BLOOD TEST: CPT

## 2019-03-11 PROCEDURE — 25010000002 AZITHROMYCIN PER 500 MG: Performed by: EMERGENCY MEDICINE

## 2019-03-11 PROCEDURE — 63710000001 INSULIN LISPRO (HUMAN) PER 5 UNITS: Performed by: INTERNAL MEDICINE

## 2019-03-11 PROCEDURE — 63710000001 MYCOPHENOLATE 360 MG TABLET DELAYED-RELEASE: Performed by: INTERNAL MEDICINE

## 2019-03-11 PROCEDURE — 87899 AGENT NOS ASSAY W/OPTIC: CPT | Performed by: INTERNAL MEDICINE

## 2019-03-11 PROCEDURE — 87449 NOS EACH ORGANISM AG IA: CPT | Performed by: NURSE PRACTITIONER

## 2019-03-11 PROCEDURE — 99255 IP/OBS CONSLTJ NEW/EST HI 80: CPT | Performed by: INTERNAL MEDICINE

## 2019-03-11 PROCEDURE — 25010000002 METHYLPREDNISOLONE PER 40 MG: Performed by: NURSE PRACTITIONER

## 2019-03-11 PROCEDURE — 94760 N-INVAS EAR/PLS OXIMETRY 1: CPT

## 2019-03-11 PROCEDURE — 87305 ASPERGILLUS AG IA: CPT | Performed by: NURSE PRACTITIONER

## 2019-03-11 PROCEDURE — 63710000001 MYCOPHENOLATE PER 180 MG: Performed by: INTERNAL MEDICINE

## 2019-03-11 PROCEDURE — 87070 CULTURE OTHR SPECIMN AEROBIC: CPT | Performed by: INTERNAL MEDICINE

## 2019-03-11 PROCEDURE — 85025 COMPLETE CBC W/AUTO DIFF WBC: CPT | Performed by: INTERNAL MEDICINE

## 2019-03-11 PROCEDURE — 25010000002 MORPHINE PER 10 MG: Performed by: EMERGENCY MEDICINE

## 2019-03-11 PROCEDURE — 82803 BLOOD GASES ANY COMBINATION: CPT

## 2019-03-11 RX ORDER — SODIUM CHLORIDE 0.9 % (FLUSH) 0.9 %
3 SYRINGE (ML) INJECTION EVERY 12 HOURS SCHEDULED
Status: DISCONTINUED | OUTPATIENT
Start: 2019-03-11 | End: 2019-03-13 | Stop reason: HOSPADM

## 2019-03-11 RX ORDER — TRAZODONE HYDROCHLORIDE 50 MG/1
50-100 TABLET ORAL NIGHTLY
COMMUNITY

## 2019-03-11 RX ORDER — PANTOPRAZOLE SODIUM 40 MG/1
40 TABLET, DELAYED RELEASE ORAL EVERY MORNING
Status: DISCONTINUED | OUTPATIENT
Start: 2019-03-11 | End: 2019-03-13 | Stop reason: HOSPADM

## 2019-03-11 RX ORDER — METHYLPREDNISOLONE SODIUM SUCCINATE 40 MG/ML
40 INJECTION, POWDER, LYOPHILIZED, FOR SOLUTION INTRAMUSCULAR; INTRAVENOUS EVERY 6 HOURS
Status: DISCONTINUED | OUTPATIENT
Start: 2019-03-11 | End: 2019-03-13

## 2019-03-11 RX ORDER — IPRATROPIUM BROMIDE AND ALBUTEROL SULFATE 2.5; .5 MG/3ML; MG/3ML
3 SOLUTION RESPIRATORY (INHALATION)
Status: DISCONTINUED | OUTPATIENT
Start: 2019-03-11 | End: 2019-03-11

## 2019-03-11 RX ORDER — ACETAMINOPHEN 325 MG/1
650 TABLET ORAL EVERY 4 HOURS PRN
Status: DISCONTINUED | OUTPATIENT
Start: 2019-03-11 | End: 2019-03-13 | Stop reason: HOSPADM

## 2019-03-11 RX ORDER — PREGABALIN 75 MG/1
150 CAPSULE ORAL EVERY 12 HOURS SCHEDULED
Status: DISCONTINUED | OUTPATIENT
Start: 2019-03-11 | End: 2019-03-13 | Stop reason: HOSPADM

## 2019-03-11 RX ORDER — LANOLIN ALCOHOL/MO/W.PET/CERES
3 CREAM (GRAM) TOPICAL NIGHTLY
Status: DISCONTINUED | OUTPATIENT
Start: 2019-03-11 | End: 2019-03-13 | Stop reason: HOSPADM

## 2019-03-11 RX ORDER — HYDROCODONE BITARTRATE AND ACETAMINOPHEN 10; 325 MG/1; MG/1
1 TABLET ORAL EVERY 6 HOURS PRN
Status: DISCONTINUED | OUTPATIENT
Start: 2019-03-11 | End: 2019-03-13 | Stop reason: HOSPADM

## 2019-03-11 RX ORDER — ATORVASTATIN CALCIUM 10 MG/1
20 TABLET, FILM COATED ORAL NIGHTLY
Status: DISCONTINUED | OUTPATIENT
Start: 2019-03-11 | End: 2019-03-13 | Stop reason: HOSPADM

## 2019-03-11 RX ORDER — TACROLIMUS 0.5 MG/1
0.5 CAPSULE ORAL NIGHTLY
Status: DISCONTINUED | OUTPATIENT
Start: 2019-03-11 | End: 2019-03-13 | Stop reason: HOSPADM

## 2019-03-11 RX ORDER — ACETYLCYSTEINE 200 MG/ML
1.5 SOLUTION ORAL; RESPIRATORY (INHALATION)
Status: DISCONTINUED | OUTPATIENT
Start: 2019-03-11 | End: 2019-03-13 | Stop reason: HOSPADM

## 2019-03-11 RX ORDER — SODIUM CHLORIDE 0.9 % (FLUSH) 0.9 %
3-10 SYRINGE (ML) INJECTION AS NEEDED
Status: DISCONTINUED | OUTPATIENT
Start: 2019-03-11 | End: 2019-03-13 | Stop reason: HOSPADM

## 2019-03-11 RX ORDER — SODIUM CHLORIDE 9 MG/ML
100 INJECTION, SOLUTION INTRAVENOUS CONTINUOUS
Status: DISCONTINUED | OUTPATIENT
Start: 2019-03-11 | End: 2019-03-11

## 2019-03-11 RX ORDER — AMLODIPINE BESYLATE 5 MG/1
5 TABLET ORAL EVERY MORNING
Status: DISCONTINUED | OUTPATIENT
Start: 2019-03-11 | End: 2019-03-13 | Stop reason: HOSPADM

## 2019-03-11 RX ORDER — TIZANIDINE 4 MG/1
4 TABLET ORAL EVERY 6 HOURS PRN
Status: DISCONTINUED | OUTPATIENT
Start: 2019-03-11 | End: 2019-03-13 | Stop reason: HOSPADM

## 2019-03-11 RX ORDER — GUAIFENESIN 600 MG/1
1200 TABLET, EXTENDED RELEASE ORAL EVERY 12 HOURS SCHEDULED
Status: DISCONTINUED | OUTPATIENT
Start: 2019-03-11 | End: 2019-03-13 | Stop reason: HOSPADM

## 2019-03-11 RX ORDER — IPRATROPIUM BROMIDE AND ALBUTEROL SULFATE 2.5; .5 MG/3ML; MG/3ML
3 SOLUTION RESPIRATORY (INHALATION)
Status: DISCONTINUED | OUTPATIENT
Start: 2019-03-11 | End: 2019-03-13 | Stop reason: HOSPADM

## 2019-03-11 RX ORDER — LEVOCETIRIZINE DIHYDROCHLORIDE 5 MG/1
5 TABLET, FILM COATED ORAL NIGHTLY
COMMUNITY

## 2019-03-11 RX ORDER — HYDROCODONE BITARTRATE AND ACETAMINOPHEN 7.5; 325 MG/1; MG/1
1 TABLET ORAL EVERY 4 HOURS PRN
Status: DISCONTINUED | OUTPATIENT
Start: 2019-03-11 | End: 2019-03-11

## 2019-03-11 RX ORDER — TIZANIDINE 4 MG/1
4 TABLET ORAL EVERY 8 HOURS PRN
COMMUNITY

## 2019-03-11 RX ORDER — FOLIC ACID 1 MG/1
1 TABLET ORAL EVERY MORNING
Status: DISCONTINUED | OUTPATIENT
Start: 2019-03-11 | End: 2019-03-13 | Stop reason: HOSPADM

## 2019-03-11 RX ORDER — CHOLECALCIFEROL (VITAMIN D3) 125 MCG
500 CAPSULE ORAL EVERY MORNING
Status: DISCONTINUED | OUTPATIENT
Start: 2019-03-11 | End: 2019-03-13 | Stop reason: HOSPADM

## 2019-03-11 RX ORDER — TACROLIMUS 1 MG/1
1 CAPSULE ORAL EVERY MORNING
Status: DISCONTINUED | OUTPATIENT
Start: 2019-03-11 | End: 2019-03-13 | Stop reason: HOSPADM

## 2019-03-11 RX ORDER — TOBRAMYCIN 3 MG/ML
2 SOLUTION/ DROPS OPHTHALMIC
Status: DISCONTINUED | OUTPATIENT
Start: 2019-03-11 | End: 2019-03-11

## 2019-03-11 RX ORDER — ACETYLCYSTEINE 200 MG/ML
1.5 SOLUTION ORAL; RESPIRATORY (INHALATION)
Status: DISCONTINUED | OUTPATIENT
Start: 2019-03-11 | End: 2019-03-11

## 2019-03-11 RX ORDER — METHYLPREDNISOLONE SODIUM SUCCINATE 40 MG/ML
20 INJECTION, POWDER, LYOPHILIZED, FOR SOLUTION INTRAMUSCULAR; INTRAVENOUS EVERY 12 HOURS
Status: DISCONTINUED | OUTPATIENT
Start: 2019-03-11 | End: 2019-03-11

## 2019-03-11 RX ORDER — ONDANSETRON 2 MG/ML
4 INJECTION INTRAMUSCULAR; INTRAVENOUS EVERY 6 HOURS PRN
Status: DISCONTINUED | OUTPATIENT
Start: 2019-03-11 | End: 2019-03-13 | Stop reason: HOSPADM

## 2019-03-11 RX ORDER — METOPROLOL TARTRATE 100 MG/1
100 TABLET ORAL EVERY 12 HOURS SCHEDULED
Status: DISCONTINUED | OUTPATIENT
Start: 2019-03-11 | End: 2019-03-13 | Stop reason: HOSPADM

## 2019-03-11 RX ADMIN — IPRATROPIUM BROMIDE AND ALBUTEROL SULFATE 3 ML: 2.5; .5 SOLUTION RESPIRATORY (INHALATION) at 10:47

## 2019-03-11 RX ADMIN — INSULIN LISPRO 3 UNITS: 100 INJECTION, SOLUTION INTRAVENOUS; SUBCUTANEOUS at 08:49

## 2019-03-11 RX ADMIN — ACETYLCYSTEINE 1.5 ML: 200 SOLUTION ORAL; RESPIRATORY (INHALATION) at 23:58

## 2019-03-11 RX ADMIN — IPRATROPIUM BROMIDE AND ALBUTEROL SULFATE 3 ML: 2.5; .5 SOLUTION RESPIRATORY (INHALATION) at 23:57

## 2019-03-11 RX ADMIN — INSULIN LISPRO 5 UNITS: 100 INJECTION, SOLUTION INTRAVENOUS; SUBCUTANEOUS at 17:03

## 2019-03-11 RX ADMIN — TAZOBACTAM SODIUM AND PIPERACILLIN SODIUM 3.38 G: 375; 3 INJECTION, SOLUTION INTRAVENOUS at 01:41

## 2019-03-11 RX ADMIN — HYDROCODONE BITARTRATE AND ACETAMINOPHEN 1 TABLET: 7.5; 325 TABLET ORAL at 05:55

## 2019-03-11 RX ADMIN — GUAIFENESIN 1200 MG: 600 TABLET, EXTENDED RELEASE ORAL at 20:19

## 2019-03-11 RX ADMIN — TACROLIMUS 0.5 MG: 0.5 CAPSULE, GELATIN COATED ORAL at 01:42

## 2019-03-11 RX ADMIN — Medication 3 MG: at 20:21

## 2019-03-11 RX ADMIN — MORPHINE SULFATE 4 MG: 4 INJECTION INTRAVENOUS at 00:00

## 2019-03-11 RX ADMIN — IPRATROPIUM BROMIDE AND ALBUTEROL SULFATE 3 ML: 2.5; .5 SOLUTION RESPIRATORY (INHALATION) at 19:04

## 2019-03-11 RX ADMIN — IPRATROPIUM BROMIDE AND ALBUTEROL SULFATE 3 ML: 2.5; .5 SOLUTION RESPIRATORY (INHALATION) at 09:08

## 2019-03-11 RX ADMIN — TAZOBACTAM SODIUM AND PIPERACILLIN SODIUM 3.38 G: 375; 3 INJECTION, SOLUTION INTRAVENOUS at 23:14

## 2019-03-11 RX ADMIN — Medication 500 MCG: at 08:06

## 2019-03-11 RX ADMIN — GUAIFENESIN 1200 MG: 600 TABLET, EXTENDED RELEASE ORAL at 08:06

## 2019-03-11 RX ADMIN — HYDROCODONE BITARTRATE AND ACETAMINOPHEN 1 TABLET: 7.5; 325 TABLET ORAL at 01:41

## 2019-03-11 RX ADMIN — ATORVASTATIN CALCIUM 20 MG: 10 TABLET, FILM COATED ORAL at 20:19

## 2019-03-11 RX ADMIN — AMLODIPINE BESYLATE 5 MG: 5 TABLET ORAL at 08:05

## 2019-03-11 RX ADMIN — HYDROCODONE BITARTRATE AND ACETAMINOPHEN 1 TABLET: 10; 325 TABLET ORAL at 20:30

## 2019-03-11 RX ADMIN — TAZOBACTAM SODIUM AND PIPERACILLIN SODIUM 3.38 G: 375; 3 INJECTION, SOLUTION INTRAVENOUS at 08:05

## 2019-03-11 RX ADMIN — TACROLIMUS 0.5 MG: 0.5 CAPSULE, GELATIN COATED ORAL at 20:20

## 2019-03-11 RX ADMIN — MYCOPHENOLIC ACID 540 MG: 360 TABLET, DELAYED RELEASE ORAL at 08:06

## 2019-03-11 RX ADMIN — DOXYCYCLINE 100 MG: 100 INJECTION, POWDER, LYOPHILIZED, FOR SOLUTION INTRAVENOUS at 01:41

## 2019-03-11 RX ADMIN — HYDROCODONE BITARTRATE AND ACETAMINOPHEN 1 TABLET: 10; 325 TABLET ORAL at 12:55

## 2019-03-11 RX ADMIN — PREGABALIN 150 MG: 75 CAPSULE ORAL at 01:41

## 2019-03-11 RX ADMIN — IPRATROPIUM BROMIDE AND ALBUTEROL SULFATE 3 ML: 2.5; .5 SOLUTION RESPIRATORY (INHALATION) at 14:57

## 2019-03-11 RX ADMIN — TACROLIMUS 1 MG: 1 CAPSULE, GELATIN COATED ORAL at 08:06

## 2019-03-11 RX ADMIN — SODIUM CHLORIDE 100 ML/HR: 9 INJECTION, SOLUTION INTRAVENOUS at 01:17

## 2019-03-11 RX ADMIN — METHYLPREDNISOLONE SODIUM SUCCINATE 40 MG: 40 INJECTION, POWDER, FOR SOLUTION INTRAMUSCULAR; INTRAVENOUS at 14:08

## 2019-03-11 RX ADMIN — PANTOPRAZOLE SODIUM 40 MG: 40 TABLET, DELAYED RELEASE ORAL at 08:06

## 2019-03-11 RX ADMIN — MYCOPHENOLIC ACID 540 MG: 360 TABLET, DELAYED RELEASE ORAL at 20:20

## 2019-03-11 RX ADMIN — PREGABALIN 150 MG: 75 CAPSULE ORAL at 08:05

## 2019-03-11 RX ADMIN — METOPROLOL TARTRATE 100 MG: 100 TABLET, FILM COATED ORAL at 01:41

## 2019-03-11 RX ADMIN — TAZOBACTAM SODIUM AND PIPERACILLIN SODIUM 3.38 G: 375; 3 INJECTION, SOLUTION INTRAVENOUS at 16:18

## 2019-03-11 RX ADMIN — DOXYCYCLINE 100 MG: 100 INJECTION, POWDER, LYOPHILIZED, FOR SOLUTION INTRAVENOUS at 12:55

## 2019-03-11 RX ADMIN — METOPROLOL TARTRATE 100 MG: 100 TABLET, FILM COATED ORAL at 08:05

## 2019-03-11 RX ADMIN — PREGABALIN 150 MG: 75 CAPSULE ORAL at 20:20

## 2019-03-11 RX ADMIN — METOPROLOL TARTRATE 100 MG: 100 TABLET, FILM COATED ORAL at 20:19

## 2019-03-11 RX ADMIN — METHYLPREDNISOLONE SODIUM SUCCINATE 40 MG: 40 INJECTION, POWDER, FOR SOLUTION INTRAMUSCULAR; INTRAVENOUS at 20:19

## 2019-03-11 RX ADMIN — GUAIFENESIN 1200 MG: 600 TABLET, EXTENDED RELEASE ORAL at 01:41

## 2019-03-11 RX ADMIN — AZITHROMYCIN MONOHYDRATE 500 MG: 500 INJECTION, POWDER, LYOPHILIZED, FOR SOLUTION INTRAVENOUS at 00:00

## 2019-03-11 RX ADMIN — FOLIC ACID 1 MG: 1 TABLET ORAL at 08:07

## 2019-03-11 RX ADMIN — METHYLPREDNISOLONE SODIUM SUCCINATE 20 MG: 40 INJECTION, POWDER, FOR SOLUTION INTRAMUSCULAR; INTRAVENOUS at 08:07

## 2019-03-11 RX ADMIN — INSULIN LISPRO 2 UNITS: 100 INJECTION, SOLUTION INTRAVENOUS; SUBCUTANEOUS at 20:30

## 2019-03-11 RX ADMIN — MYCOPHENOLIC ACID 540 MG: 360 TABLET, DELAYED RELEASE ORAL at 01:41

## 2019-03-11 RX ADMIN — ATORVASTATIN CALCIUM 20 MG: 10 TABLET, FILM COATED ORAL at 01:40

## 2019-03-11 RX ADMIN — SODIUM CHLORIDE, PRESERVATIVE FREE 3 ML: 5 INJECTION INTRAVENOUS at 20:20

## 2019-03-11 NOTE — H&P
[facility] Medicine Services  HISTORY AND PHYSICAL    Date of Admission: 3/10/2019  Primary Care Physician: Jhon Santo MD    Subjective     Chief Complaint: Dyspnea    History of Present Illness  4-year-old  male presents to the emergency department complaining of shortness of breath.  The patient saw someone from his pulmonology team and was given antibiotics on Monday.  He is not getting any better.  He has been feeling bad for the last 2 weeks he states he has had a fever of 101 at home.  He said mild sputum production.  He got abdominal distention on exam.  He states he has swelling in his lower extremities with ambulation.  He presented with tachypnea, tachycardia, and hypoxia.  Patient was seen on a BiPAP, and was complaining stating that the only thing that was helping him as morphine injections.        Review of Systems   Dyspnea    Otherwise complete ROS reviewed and negative except as mentioned in the HPI.    Past Medical History:   Past Medical History:   Diagnosis Date   • Cancer (CMS/[facility])     Skin   • Chronic back pain    • Chronic pain syndrome 12/4/2018   • Chronic respiratory failure with hypoxia (CMS/[facility]) 12/4/2018   • COPD (chronic obstructive pulmonary disease) (CMS/[facility])    • COPD, group D, by GOLD 2017 classification (CMS/[facility]) 11/29/2018   • Diabetes mellitus (CMS/[facility])    • Emphysema lung (CMS/[facility])    • Essential hypertension 12/4/2018   • Gastroesophageal reflux disease without esophagitis 12/4/2018   • Hypertension    • Lung transplant status (CMS/[facility]) 12/4/2018   • Lung transplanted (CMS/[facility])    • Personal history of nicotine dependence 12/4/2018     Past Surgical History:  Past Surgical History:   Procedure Laterality Date   • APPENDECTOMY     • CHOLECYSTECTOMY     • COLONOSCOPY     • ENDOSCOPY     • LUNG REMOVAL, TOTAL      right upper lobe   • LUNG TRANSPLANT      2006     Social History:  reports that he has quit smoking. He does  not have any smokeless tobacco history on file. He reports that he does not drink alcohol or use drugs. . Worked construction.     Family History: family history includes Heart attack in his brother; Heart disease in his father.       Allergies:  Allergies   Allergen Reactions   • Clindamycin/Lincomycin Diarrhea   • Moxifloxacin Rash     Sweating      Medications:  Prior to Admission medications    Medication Sig Start Date End Date Taking? Authorizing Provider   albuterol (PROVENTIL HFA;VENTOLIN HFA) 108 (90 BASE) MCG/ACT inhaler Inhale 2 puffs Every 4 (Four) Hours As Needed for Wheezing or Shortness of Air.    Fidel Mendez MD   albuterol (PROVENTIL) (2.5 MG/3ML) 0.083% nebulizer solution Take 2.5 mg by nebulization 4 (Four) Times a Day As Needed for Wheezing. 3/4/19   Shruti Kenney APRN   amLODIPine (NORVASC) 5 MG tablet Take 5 mg by mouth Every Morning.    Fidel Mendez MD   atorvastatin (LIPITOR) 20 MG tablet Take 20 mg by mouth Every Night.    Fidel Mendez MD   azithromycin (ZITHROMAX) 250 MG tablet Take 250 mg by mouth. Monday, Wednesday and Friday.    Fidel Mendez MD   budesonide-formoterol (SYMBICORT) 160-4.5 MCG/ACT inhaler Inhale 2 puffs 2 (Two) Times a Day. 12/13/18   Shruti Kenney APRN   cefdinir (OMNICEF) 300 MG capsule Take 1 capsule by mouth 2 (Two) Times a Day for 10 days. 3/4/19 3/14/19  Shruti Kenney APRN   folic acid (FOLVITE) 1 MG tablet Take 1 mg by mouth Every Morning.    Fidel Mendez MD   furosemide (LASIX) 20 MG tablet Take 20 mg by mouth Every Morning.    Fidel Mendez MD   hydrochlorothiazide (HYDRODIURIL) 50 MG tablet Take 50 mg by mouth Every Morning.    Fidel Mendez MD   HYDROcodone-acetaminophen (NORCO) 7.5-325 MG per tablet Take 1 tablet by mouth Every 4 (Four) Hours As Needed for Moderate Pain (4-6).    Fidel Mendez MD   magnesium oxide (MAG-OX) 400 MG tablet Take 400 mg by mouth 2 (Two) Times a  Day.    Fidel Mendez MD   metoprolol tartrate (LOPRESSOR) 100 MG tablet Take 100 mg by mouth 2 (Two) Times a Day.    Fidel Mendez MD   Mycophenolate Sodium (MYCOPHENOLIC ACID PO) Take 540 mg by mouth 2 (Two) Times a Day.    Fidel Mendez MD   O2 (OXYGEN) Inhale.    Fidel Mendez MD   omeprazole (priLOSEC) 20 MG capsule Take 20 mg by mouth 2 (Two) Times a Day.    Fidel Mendez MD   oxyCODONE-acetaminophen (PERCOCET) 5-325 MG per tablet Take 1 tablet by mouth Every 8 (Eight) Hours As Needed.    Fidel Mendez MD   oxyCODONE-acetaminophen (PERCOCET) 5-325 MG per tablet Take 1 tablet by mouth Every 4 (Four) Hours As Needed for Moderate Pain . 7/1/18   Radha Castanon APRN   predniSONE (DELTASONE) 10 MG tablet Take 4 tabs daily x 3 days, then take 3 tabs daily x 3 days, then take 2 tabs daily x 3 days, then take 1 tab daily x 3 days 12/5/18   Shruti Kenney APRN   predniSONE (DELTASONE) 10 MG tablet Take 4 tabs daily x 3 days, then take 3 tabs daily x 3 days, then take 2 tabs daily x 3 days, then take 1 tab daily x 3 days 3/4/19   Shruti Kenney APRN   pregabalin (LYRICA) 150 MG capsule Take 150 mg by mouth 2 (Two) Times a Day.    Fidel Mendez MD   sulfamethoxazole-trimethoprim (BACTRIM,SEPTRA) 400-80 MG tablet Take 1 tablet by mouth. Monday, Wednesday and friday    Fidel Mendez MD   tacrolimus (PROGRAF) 0.5 MG capsule Take 0.5 mg by mouth Every Night.    Fidel Mendez MD   tacrolimus (PROGRAF) 1 MG capsule Take 1 mg by mouth Every Morning.    Fidel Mendez MD   tiotropium (SPIRIVA) 18 MCG per inhalation capsule Place 1 capsule into inhaler and inhale Every Morning.    Fidel Mendez MD   tiZANidine (ZANAFLEX) 4 MG tablet TAKE 1 TABLET BY MOUTH EVERY 8 HOURS AS NEEDED NO MORE THAN 3 DOSES IN 24 HOURS 11/26/18   Fidel Mendez MD   tobramycin 0.3 % solution ophthalmic solution Administer 2 drops to the right eye  "Every 4 (Four) Hours. 7/1/18   Lg Radha Tita, ENRICO   vitamin B-12 (CYANOCOBALAMIN) 500 MCG tablet Take 500 mcg by mouth Every Morning.    Provider, MD Fidel     Objective     Vital Signs: /72   Pulse 115   Temp 98.2 °F (36.8 °C) (Oral)   Resp 24   Ht 175.3 cm (69\")   Wt 70 kg (154 lb 6.4 oz)   SpO2 96%   BMI 22.80 kg/m²   Physical Exam   HENT:   Head: Normocephalic and atraumatic.   Nose: Nose normal.   Mouth/Throat: Oropharynx is clear and moist.   Eyes: Conjunctivae and EOM are normal.   Neck: Normal range of motion. Neck supple.   Cardiovascular: Normal rate, regular rhythm and normal heart sounds.   Pulmonary/Chest: He is in respiratory distress. He has wheezes. He has rales.   Abdominal: Soft. Bowel sounds are normal.   Mild abdominal distention   Musculoskeletal: He exhibits no edema or tenderness.   Neurological: He is alert. No cranial nerve deficit.   Skin: Skin is warm and dry.   Varicose veins bilateral LE   Psychiatric: He has a normal mood and affect.   Vitals reviewed.          Results Reviewed:  Lab Results (last 24 hours)     Procedure Component Value Units Date/Time    Respiratory Culture - Sputum, Cough [[id number]] Collected:  03/10/19 2254    Specimen:  Sputum from Cough Updated:  03/10/19 2258    Troponin [[id number]]  (Normal) Collected:  03/10/19 2046    Specimen:  Blood Updated:  03/10/19 2115     Troponin I <0.012 ng/mL     BNP [[id number]]  (Normal) Collected:  03/10/19 2046    Specimen:  Blood Updated:  03/10/19 2115     proBNP 276.0 pg/mL     Comprehensive Metabolic Panel [[id number]]  (Abnormal) Collected:  03/10/19 2046    Specimen:  Blood Updated:  03/10/19 2104     Glucose 139 mg/dL      BUN 33 mg/dL      Creatinine 1.66 mg/dL      Sodium 139 mmol/L      Potassium 3.3 mmol/L      Chloride 93 mmol/L      CO2 32.0 mmol/L      Calcium 9.3 mg/dL      Total Protein 6.9 g/dL      Albumin 4.50 g/dL      ALT (SGPT) 23 U/L      AST (SGOT) 20 U/L      Alkaline Phosphatase " 68 U/L      Total Bilirubin 0.9 mg/dL      eGFR Non African Amer 42 mL/min/1.73      Globulin 2.4 gm/dL      A/G Ratio 1.9 g/dL      BUN/Creatinine Ratio 19.9     Anion Gap 14.0 mmol/L     Influenza Antigen, Rapid - Swab, Nasopharynx [[id number]]  (Normal) Collected:  03/10/19 2036    Specimen:  Swab from Nasopharynx Updated:  03/10/19 2052     Influenza A Ag, EIA Negative     Influenza B Ag, EIA Negative    Narrative:       Recommend confirmation of negative results by viral culture or molecular assay.    [place] Draw [[id number]] Collected:  03/10/19 1910    Specimen:  Blood Updated:  03/10/19 2030    Narrative:       The following orders were created for panel order [place] Draw.  Procedure                               Abnormality         Status                     ---------                               -----------         ------                     Light Blue Top[[id number]]                                   Final result               Green Top (Gel)[[id number]]                                  Final result               Lavender Top[[id number]]                                     Final result               Red Top[[id number]]                                          Final result                 Please view results for these tests on the individual orders.    L[place]ender Top [[id number]] Collected:  03/10/19 1929    Specimen:  Blood Updated:  03/10/19 2030     Extra Tube hold for add-on     Comment: Auto resulted       Green Top (Gel) [[id number]] Collected:  03/10/19 1910    Specimen:  Blood Updated:  03/10/19 2016     Extra Tube Hold for add-ons.     Comment: Auto resulted.       Red Top [[id number]] Collected:  03/10/19 1910    Specimen:  Blood Updated:  03/10/19 2016     Extra Tube Hold for add-ons.     Comment: Auto resulted.       Light Blue Top [[id number]] Collected:  03/10/19 1910    Specimen:  Blood Updated:  03/10/19 2015     Extra Tube hold for add-on     Comment: Auto resulted       Lactic Acid, Plasma  [[id number]]  (Normal) Collected:  03/10/19 1953    Specimen:  Blood Updated:  03/10/19 2009     Lactate 1.2 mmol/L     Urinalysis, Microscopic Only - Urine, Clean Catch [[id number]]  (Abnormal) Collected:  03/10/19 1940    Specimen:  Urine, Clean Catch Updated:  03/10/19 2005     RBC, UA 0-2 /HPF      WBC, UA None Seen /HPF      Bacteria, UA None Seen /HPF      Squamous Epithelial Cells, UA None Seen /HPF      Hyaline Casts, UA None Seen /LPF      Methodology Automated Microscopy    Urinalysis With Culture If Indicated - Urine, Clean Catch [[id number]]  (Abnormal) Collected:  03/10/19 1940    Specimen:  Urine, Clean Catch Updated:  03/10/19 2002     Color, UA Yellow     Appearance, UA Clear     pH, UA 6.0     Specific Gravity, UA 1.015     Glucose, UA Negative     Ketones, UA Negative     Bilirubin, UA Negative     Blood, UA Trace     Protein, UA Trace     Leuk Esterase, UA Negative     Nitrite, UA Negative     Urobilinogen, UA 0.2 E.U./dL    Blood Culture - Blood, Arm, Right [[id number]] Collected:  03/10/19 1929    Specimen:  Blood from Arm, Right Updated:  03/10/19 1948    CBC & Differential [[id number]] Collected:  03/10/19 1929    Specimen:  Blood Updated:  03/10/19 1938    Narrative:       The following orders were created for panel order CBC & Differential.  Procedure                               Abnormality         Status                     ---------                               -----------         ------                     CBC Auto Differential[[id number]]        Abnormal            Final result                 Please view results for these tests on the individual orders.    CBC Auto Differential [[id number]]  (Abnormal) Collected:  03/10/19 1929    Specimen:  Blood Updated:  03/10/19 1938     WBC 13.11 10*3/mm3      RBC 5.17 10*6/mm3      Hemoglobin 14.9 g/dL      Hematocrit 44.0 %      MCV 85.1 fL      MCH 28.8 pg      MCHC 33.9 g/dL      RDW 13.1 %      RDW-SD 39.9 fl      MPV 11.4 fL      Platelets 214  10*3/mm3      Neutrophil % 77.4 %      Lymphocyte % 10.2 %      Monocyte % 9.8 %      Eosinophil % 0.5 %      Basophil % 0.5 %      Immature Grans % 1.6 %      Neutrophils, Absolute 10.13 10*3/mm3      Lymphocytes, Absolute 1.34 10*3/mm3      Monocytes, Absolute 1.29 10*3/mm3      Eosinophils, Absolute 0.07 10*3/mm3      Basophils, Absolute 0.07 10*3/mm3      Immature Grans, Absolute 0.21 10*3/mm3      nRBC 0.0 /100 WBC     Tacrolimus Level [[id number]] Collected:  03/10/19 1929    Specimen:  Blood Updated:  03/10/19 1934    Blood Culture - Blood, Hand, Left [[id number]] Collected:  03/10/19 1910    Specimen:  Blood from Hand, Left Updated:  03/10/19 1926    Blood Gas, Arterial [[id number]]  (Abnormal) Collected:  03/10/19 1853    Specimen:  Arterial Blood Updated:  03/10/19 1854     Site Right Radial     Nikolsa's Test Positive     pH, Arterial 7.453 pH units      Comment: 83 Value above reference range        pCO2, Arterial 43.0 mm Hg      pO2, Arterial 60.8 mm Hg      Comment: 84 Value below reference range        HCO3, Arterial 30.1 mmol/L      Comment: 83 Value above reference range        Base Excess, Arterial 5.4 mmol/L      Comment: 83 Value above reference range        O2 Saturation, Arterial 90.8 %      Comment: 84 Value below reference range        Temperature 37.0 C      Barometric Pressure for Blood Gas 757 mmHg      Modality Room Air     Ventilator Mode NA     Collected by [id number]     Comment: Meter: [id number]     :  [id number]           Imaging Results (last 24 hours)     Procedure Component Value Units Date/Time    XR Chest 1 View [[id number]] Collected:  03/10/19 2039     Updated:  03/10/19 2043    Narrative:       EXAMINATION: XR CHEST 1 VW-. 3/10/2019 8:39 PM CDT     CHEST, ONE VIEW:     HISTORY: Sepsis     COMPARISON: 5/8/2018, 12/4/2017 and 5/17/2017     A single frontal chest radiograph was obtained.     FINDINGS:     Severe right-sided pulmonary emphysema observed. Postsurgical changes  in  the right perihilar region identified.     No definite acute lung infiltrates observed.     The heart is normal in size, without heart failure.     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 No acute osseous  abnormalities identified.                                     Impression:       1. Postsurgical/chronic changes.  2. No definite acute cardiopulmonary process.     This report was finalized on 03/10/2019 20:40 by Dr. Wilder Salinas MD.        I have personally reviewed and interpreted the radiology studies and ECG obtained at time of admission.     Assessment / Plan     Assessment:   Active Hospital Problems    Diagnosis   • COPD exacerbation (CMS/[facility])     Impression:  1. AECOPD failed OP therapy  2. Hypoxia  3. Chronically immunosuppressed from lung transplant.   4. Mild RAMIN  6. Hypokalemia    Plan:   1. Zosyn/Doxycycline  2. Nebs  3. Pulmonary toilet  4. Strep and legionella  5. Respiratory culture  6. Pulmonology consult  7. BiPAP to maintain saturation  8. Medrol  9. Labs in the am        Code Status: Full     I discussed the patient's findings and my recommendations with the patient and wife at bedside    Estimated length of stay 4-5 days    Soham Werner DO   03/11/19   12:45 AM

## 2019-03-11 NOTE — PLAN OF CARE
Problem: Patient Care Overview  Goal: Plan of Care Review   03/11/19 0517   Coping/Psychosocial   Plan of Care Reviewed With patient   Plan of Care Review   Progress improving   OTHER   Outcome Summary Patient has been on bipap most of the shift w/ complaints of discomfort. Bipap currently off per patient request. Chronic back pain noted. PRN pain meds administered with effectiveness. VSS. Continue to assess.       Problem: Fall Risk (Adult)  Goal: Identify Related Risk Factors and Signs and Symptoms  Outcome: Outcome(s) achieved Date Met: 03/11/19      Problem: Skin Injury Risk (Adult)  Goal: Identify Related Risk Factors and Signs and Symptoms  Outcome: Outcome(s) achieved Date Met: 03/11/19      Problem: Pain, Chronic (Adult)  Goal: Identify Related Risk Factors and Signs and Symptoms  Outcome: Outcome(s) achieved Date Met: 03/11/19

## 2019-03-11 NOTE — PROGRESS NOTES
[facility] Medicine Services  HISTORY AND PHYSICAL    Date of Admission: 3/10/2019  Primary Care Physician: Jhon Santo MD    Subjective     Chief Complaint: Shortness of breath    History of Present Illness    Patient seen and examined at bedside.  Patient indicates that his breathing much better.  Patient did not tolerate the BiPAP, and indicated that he would prefer not to try to get him.  He indicated that he felt like he was suffocating.  Patient indicates that his chronic back pain is worsening, and requested some IV morphine, we discussed using increased p.o. medications and he agreed to this.  Patient was able to provide a sputum specimen last night, this is pending.        Review of Systems   Constitutional: Negative for activity change, chills and fever.   Respiratory: Positive for cough and shortness of breath (improved).    Cardiovascular: Negative for chest pain and palpitations.   Gastrointestinal: Negative for abdominal distention, abdominal pain, constipation, diarrhea, nausea and vomiting.        Otherwise complete ROS reviewed and negative except as mentioned in the HPI.    Past Medical History:   Past Medical History:   Diagnosis Date   • Cancer (CMS/HCC)     Skin   • Chronic back pain    • Chronic pain syndrome 12/4/2018   • Chronic respiratory failure with hypoxia (CMS/[facility]) 12/4/2018   • COPD (chronic obstructive pulmonary disease) (CMS/[facility])    • COPD, group D, by GOLD 2017 classification (CMS/[facility]) 11/29/2018   • Diabetes mellitus (CMS/[facility])    • Emphysema lung (CMS/[facility])    • Essential hypertension 12/4/2018   • Gastroesophageal reflux disease without esophagitis 12/4/2018   • Hypertension    • Lung transplant status (CMS/[facility]) 12/4/2018   • Lung transplanted (CMS/[facility])    • Personal history of nicotine dependence 12/4/2018     Past Surgical History:  Past Surgical History:   Procedure Laterality Date   • APPENDECTOMY     • CHOLECYSTECTOMY     •  COLONOSCOPY     • ENDOSCOPY     • LUNG REMOVAL, TOTAL      right upper lobe   • LUNG TRANSPLANT      2006     Social History:  reports that he has quit smoking. He has quit using smokeless tobacco. He reports that he does not drink alcohol or use drugs.    Family History: family history includes Heart attack in his brother; Heart disease in his father.       Allergies:  Allergies   Allergen Reactions   • Clindamycin/Lincomycin Diarrhea   • Moxifloxacin Rash     Sweating      Medications:  Prior to Admission medications    Medication Sig Start Date End Date Taking? Authorizing Provider   albuterol (PROVENTIL HFA;VENTOLIN HFA) 108 (90 BASE) MCG/ACT inhaler Inhale 2 puffs Every 4 (Four) Hours As Needed for Wheezing or Shortness of Air.    Fidel Mendez MD   albuterol (PROVENTIL) (2.5 MG/3ML) 0.083% nebulizer solution Take 2.5 mg by nebulization 4 (Four) Times a Day As Needed for Wheezing. 3/4/19   Shruti Kenney APRN   amLODIPine (NORVASC) 5 MG tablet Take 5 mg by mouth Every Morning.    Fidel Mendez MD   atorvastatin (LIPITOR) 20 MG tablet Take 20 mg by mouth Every Night.    Fidel Mendez MD   azithromycin (ZITHROMAX) 250 MG tablet Take 250 mg by mouth. Monday, Wednesday and Friday.    Fidel Mendez MD   budesonide-formoterol (SYMBICORT) 160-4.5 MCG/ACT inhaler Inhale 2 puffs 2 (Two) Times a Day. 12/13/18   Shruti Kenney APRN   cefdinir (OMNICEF) 300 MG capsule Take 1 capsule by mouth 2 (Two) Times a Day for 10 days. 3/4/19 3/14/19  Shruti Kenney APRN   folic acid (FOLVITE) 1 MG tablet Take 1 mg by mouth Every Morning.    Fidel Mendez MD   furosemide (LASIX) 20 MG tablet Take 20 mg by mouth Every Morning.    Fidel Mendez MD   hydrochlorothiazide (HYDRODIURIL) 50 MG tablet Take 50 mg by mouth Every Morning.    Fidel Mendez MD   HYDROcodone-acetaminophen (NORCO) 7.5-325 MG per tablet Take 1 tablet by mouth Every 4 (Four) Hours As Needed for Moderate  Pain (4-6).    Fidel Mendez MD   magnesium oxide (MAG-OX) 400 MG tablet Take 400 mg by mouth 2 (Two) Times a Day.    Fidel Mendez MD   metoprolol tartrate (LOPRESSOR) 100 MG tablet Take 100 mg by mouth 2 (Two) Times a Day.    Fidel Mendez MD   Mycophenolate Sodium (MYCOPHENOLIC ACID PO) Take 540 mg by mouth 2 (Two) Times a Day.    Fidel Mendez MD   O2 (OXYGEN) Inhale.    Fidel Mendez MD   omeprazole (priLOSEC) 20 MG capsule Take 20 mg by mouth 2 (Two) Times a Day.    Fidel Mendez MD   oxyCODONE-acetaminophen (PERCOCET) 5-325 MG per tablet Take 1 tablet by mouth Every 8 (Eight) Hours As Needed.    Fidel Mendez MD   oxyCODONE-acetaminophen (PERCOCET) 5-325 MG per tablet Take 1 tablet by mouth Every 4 (Four) Hours As Needed for Moderate Pain . 7/1/18   Radha Castanon APRN   predniSONE (DELTASONE) 10 MG tablet Take 4 tabs daily x 3 days, then take 3 tabs daily x 3 days, then take 2 tabs daily x 3 days, then take 1 tab daily x 3 days 12/5/18   Shruti Kenney APRN   predniSONE (DELTASONE) 10 MG tablet Take 4 tabs daily x 3 days, then take 3 tabs daily x 3 days, then take 2 tabs daily x 3 days, then take 1 tab daily x 3 days 3/4/19   Shruti Kenney APRN   pregabalin (LYRICA) 150 MG capsule Take 150 mg by mouth 2 (Two) Times a Day.    Fidel Mendez MD   sulfamethoxazole-trimethoprim (BACTRIM,SEPTRA) 400-80 MG tablet Take 1 tablet by mouth. Monday, Wednesday and friday    Fidel Mendez MD   tacrolimus (PROGRAF) 1 MG capsule Take 1 mg by mouth Every Morning.    Fidel Mendez MD   tiotropium (SPIRIVA) 18 MCG per inhalation capsule Place 1 capsule into inhaler and inhale Every Morning.    Fidel Mendez MD   tiZANidine (ZANAFLEX) 4 MG tablet TAKE 1 TABLET BY MOUTH EVERY 8 HOURS AS NEEDED NO MORE THAN 3 DOSES IN 24 HOURS 11/26/18   Fidel Mendez MD   tobramycin 0.3 % solution ophthalmic solution Administer 2 drops  "to the right eye Every 4 (Four) Hours. 7/1/18   Lg RadhaENRICO Perez   vitamin B-12 (CYANOCOBALAMIN) 500 MCG tablet Take 500 mcg by mouth Every Morning.    Provider, MD Fidel   tacrolimus (PROGRAF) 0.5 MG capsule Take 0.5 mg by mouth Every Night.  3/11/19  Provider, MD Fidel     Objective     Vital Signs: /77   Pulse 71   Temp 98.4 °F (36.9 °C) (Oral)   Resp 16   Ht 172.7 cm (68\")   Wt 67.3 kg (148 lb 4.8 oz)   SpO2 96%   BMI 22.55 kg/m²   Physical Exam   Constitutional: He is oriented to person, place, and time. No distress.   HENT:   Head: Normocephalic and atraumatic.   Eyes: Conjunctivae are normal. No scleral icterus.   Neck: Neck supple. No JVD present.   Cardiovascular: Normal rate, regular rhythm, normal heart sounds and intact distal pulses.   Pulmonary/Chest: Effort normal. He has wheezes.   Prolonged expiration   Abdominal: Soft. Bowel sounds are normal. He exhibits no distension and no mass. There is no tenderness. There is no guarding.   Neurological: He is alert and oriented to person, place, and time.   Skin: Skin is warm and dry. He is not diaphoretic. No erythema.   Psychiatric: He has a normal mood and affect. His behavior is normal.   Nursing note and vitals reviewed.      Results Reviewed:  Lab Results (last 24 hours)     Procedure Component Value Units Date/Time    POC Glucose Once [[id number]]  (Abnormal) Collected:  03/11/19 0831    Specimen:  Blood Updated:  03/11/19 0843     Glucose 219 mg/dL      Comment: : [id number] Alec LarryMeter ID: [id number]       Blood Culture - Blood, Arm, Right [[id number]] Collected:  03/10/19 1929    Specimen:  Blood from Arm, Right Updated:  03/11/19 0800     Blood Culture No growth at less than 24 hours    Blood Culture - Blood, Hand, Left [[id number]] Collected:  03/10/19 1910    Specimen:  Blood from Hand, Left Updated:  03/11/19 0730     Blood Culture No growth at less than 24 hours    S. Pneumo Ag Urine or CSF - Urine, " Urine, Clean Catch [[id number]]  (Normal) Collected:  03/11/19 0616    Specimen:  Urine, Clean Catch Updated:  03/11/19 0658     Strep Pneumo Ag Negative    Legionella Antigen, Urine - Urine, Urine, Clean Catch [[id number]]  (Normal) Collected:  03/11/19 0616    Specimen:  Urine, Clean Catch Updated:  03/11/19 0658     LEGIONELLA ANTIGEN, URINE Negative    Blood Gas, Arterial [[id number]]  (Abnormal) Collected:  03/11/19 0520    Specimen:  Arterial Blood Updated:  03/11/19 0527     Site Left Radial     Nikolas's Test Positive     pH, Arterial 7.419 pH units      pCO2, Arterial 44.3 mm Hg      pO2, Arterial 56.4 mm Hg      Comment: 84 Value below reference range        HCO3, Arterial 28.7 mmol/L      Comment: 83 Value above reference range        Base Excess, Arterial 3.6 mmol/L      Comment: 83 Value above reference range        O2 Saturation, Arterial 88.8 %      Comment: 84 Value below reference range        Temperature 37.0 C      Barometric Pressure for Blood Gas 758 mmHg      Modality Room Air     Ventilator Mode NA     Collected by [id number]     Comment: Meter: [id number]     :  [id number]       SCANNED - LABS [[id number]] Resulted:  03/10/19      Updated:  03/11/19 0444    Comprehensive Metabolic Panel [[id number]]  (Abnormal) Collected:  03/11/19 0313    Specimen:  Blood Updated:  03/11/19 0347     Glucose 258 mg/dL      BUN 31 mg/dL      Creatinine 1.29 mg/dL      Sodium 136 mmol/L      Potassium 3.8 mmol/L      Chloride 94 mmol/L      CO2 26.0 mmol/L      Calcium 8.9 mg/dL      Total Protein 6.0 g/dL      Albumin 4.00 g/dL      ALT (SGPT) 27 U/L      AST (SGOT) 18 U/L      Alkaline Phosphatase 56 U/L      Total Bilirubin 0.9 mg/dL      eGFR Non African Amer 57 mL/min/1.73      Globulin 2.0 gm/dL      A/G Ratio 2.0 g/dL      BUN/Creatinine Ratio 24.0     Anion Gap 16.0 mmol/L     CBC Auto Differential [[id number]]  (Abnormal) Collected:  03/11/19 0313    Specimen:  Blood Updated:  03/11/19 0332     WBC  13.06 10*3/mm3      RBC 4.59 10*6/mm3      Hemoglobin 13.3 g/dL      Hematocrit 39.7 %      MCV 86.5 fL      MCH 29.0 pg      MCHC 33.5 g/dL      RDW 13.1 %      RDW-SD 40.7 fl      MPV 11.6 fL      Platelets 187 10*3/mm3      Neutrophil % 95.5 %      Lymphocyte % 1.4 %      Monocyte % 1.8 %      Eosinophil % 0.0 %      Basophil % 0.2 %      Immature Grans % 1.1 %      Neutrophils, Absolute 12.47 10*3/mm3      Lymphocytes, Absolute 0.18 10*3/mm3      Monocytes, Absolute 0.23 10*3/mm3      Eosinophils, Absolute 0.00 10*3/mm3      Basophils, Absolute 0.03 10*3/mm3      Immature Grans, Absolute 0.15 10*3/mm3      nRBC 0.0 /100 WBC     Respiratory Culture - Sputum, Cough [[id number]] Collected:  03/11/19 0306    Specimen:  Sputum from Cough Updated:  03/11/19 0315    Respiratory Culture - Sputum, Cough [[id number]] Collected:  03/10/19 2254    Specimen:  Sputum from Cough Updated:  03/10/19 2258    Troponin [[id number]]  (Normal) Collected:  03/10/19 2046    Specimen:  Blood Updated:  03/10/19 2115     Troponin I <0.012 ng/mL     BNP [[id number]]  (Normal) Collected:  03/10/19 2046    Specimen:  Blood Updated:  03/10/19 2115     proBNP 276.0 pg/mL     Comprehensive Metabolic Panel [[id number]]  (Abnormal) Collected:  03/10/19 2046    Specimen:  Blood Updated:  03/10/19 2104     Glucose 139 mg/dL      BUN 33 mg/dL      Creatinine 1.66 mg/dL      Sodium 139 mmol/L      Potassium 3.3 mmol/L      Chloride 93 mmol/L      CO2 32.0 mmol/L      Calcium 9.3 mg/dL      Total Protein 6.9 g/dL      Albumin 4.50 g/dL      ALT (SGPT) 23 U/L      AST (SGOT) 20 U/L      Alkaline Phosphatase 68 U/L      Total Bilirubin 0.9 mg/dL      eGFR Non African Amer 42 mL/min/1.73      Globulin 2.4 gm/dL      A/G Ratio 1.9 g/dL      BUN/Creatinine Ratio 19.9     Anion Gap 14.0 mmol/L     Influenza Antigen, Rapid - Swab, Nasopharynx [[id number]]  (Normal) Collected:  03/10/19 2036    Specimen:  Swab from Nasopharynx Updated:  03/10/19 2052     Influenza  A Ag, EIA Negative     Influenza B Ag, EIA Negative    Narrative:       Recommend confirmation of negative results by viral culture or molecular assay.    [place] Draw [[id number]] Collected:  03/10/19 1910    Specimen:  Blood Updated:  03/10/19 2030    Narrative:       The following orders were created for panel order [place] Draw.  Procedure                               Abnormality         Status                     ---------                               -----------         ------                     Light Blue Top[[id number]]                                   Final result               Green Top (Gel)[[id number]]                                  Final result               Lavender Top[[id number]]                                     Final result               Red Top[[id number]]                                          Final result                 Please view results for these tests on the individual orders.    Lavender Top [[id number]] Collected:  03/10/19 1929    Specimen:  Blood Updated:  03/10/19 2030     Extra Tube hold for add-on     Comment: Auto resulted       Green Top (Gel) [[id number]] Collected:  03/10/19 1910    Specimen:  Blood Updated:  03/10/19 2016     Extra Tube Hold for add-ons.     Comment: Auto resulted.       Red Top [[id number]] Collected:  03/10/19 1910    Specimen:  Blood Updated:  03/10/19 2016     Extra Tube Hold for add-ons.     Comment: Auto resulted.       Light Blue Top [[id number]] Collected:  03/10/19 1910    Specimen:  Blood Updated:  03/10/19 2015     Extra Tube hold for add-on     Comment: Auto resulted       Lactic Acid, Plasma [[id number]]  (Normal) Collected:  03/10/19 1953    Specimen:  Blood Updated:  03/10/19 2009     Lactate 1.2 mmol/L     Urinalysis, Microscopic Only - Urine, Clean Catch [[id number]]  (Abnormal) Collected:  03/10/19 1940    Specimen:  Urine, Clean Catch Updated:  03/10/19 2005     RBC, UA 0-2 /HPF      WBC, UA None Seen /HPF      Bacteria, UA None Seen /HPF       Squamous Epithelial Cells, UA None Seen /HPF      Hyaline Casts, UA None Seen /LPF      Methodology Automated Microscopy    Urinalysis With Culture If Indicated - Urine, Clean Catch [[id number]]  (Abnormal) Collected:  03/10/19 1940    Specimen:  Urine, Clean Catch Updated:  03/10/19 2002     Color, UA Yellow     Appearance, UA Clear     pH, UA 6.0     Specific Gravity, UA 1.015     Glucose, UA Negative     Ketones, UA Negative     Bilirubin, UA Negative     Blood, UA Trace     Protein, UA Trace     Leuk Esterase, UA Negative     Nitrite, UA Negative     Urobilinogen, UA 0.2 E.U./dL    CBC & Differential [[id number]] Collected:  03/10/19 1929    Specimen:  Blood Updated:  03/10/19 1938    Narrative:       The following orders were created for panel order CBC & Differential.  Procedure                               Abnormality         Status                     ---------                               -----------         ------                     CBC Auto Differential[[id number]]        Abnormal            Final result                 Please view results for these tests on the individual orders.    CBC Auto Differential [[id number]]  (Abnormal) Collected:  03/10/19 1929    Specimen:  Blood Updated:  03/10/19 1938     WBC 13.11 10*3/mm3      RBC 5.17 10*6/mm3      Hemoglobin 14.9 g/dL      Hematocrit 44.0 %      MCV 85.1 fL      MCH 28.8 pg      MCHC 33.9 g/dL      RDW 13.1 %      RDW-SD 39.9 fl      MPV 11.4 fL      Platelets 214 10*3/mm3      Neutrophil % 77.4 %      Lymphocyte % 10.2 %      Monocyte % 9.8 %      Eosinophil % 0.5 %      Basophil % 0.5 %      Immature Grans % 1.6 %      Neutrophils, Absolute 10.13 10*3/mm3      Lymphocytes, Absolute 1.34 10*3/mm3      Monocytes, Absolute 1.29 10*3/mm3      Eosinophils, Absolute 0.07 10*3/mm3      Basophils, Absolute 0.07 10*3/mm3      Immature Grans, Absolute 0.21 10*3/mm3      nRBC 0.0 /100 WBC     Tacrolimus Level [[id number]] Collected:  03/10/19 1929    Specimen:   Blood Updated:  03/10/19 1934    Blood Gas, Arterial [[id number]]  (Abnormal) Collected:  03/10/19 1853    Specimen:  Arterial Blood Updated:  03/10/19 1854     Site Right Radial     Nikolas's Test Positive     pH, Arterial 7.453 pH units      Comment: 83 Value above reference range        pCO2, Arterial 43.0 mm Hg      pO2, Arterial 60.8 mm Hg      Comment: 84 Value below reference range        HCO3, Arterial 30.1 mmol/L      Comment: 83 Value above reference range        Base Excess, Arterial 5.4 mmol/L      Comment: 83 Value above reference range        O2 Saturation, Arterial 90.8 %      Comment: 84 Value below reference range        Temperature 37.0 C      Barometric Pressure for Blood Gas 757 mmHg      Modality Room Air     Ventilator Mode NA     Collected by [id number]     Comment: Meter: [id number]     :  [id number]           Imaging Results (last 24 hours)     Procedure Component Value Units Date/Time    XR Chest 1 View [[id number]] Collected:  03/10/19 2039     Updated:  03/10/19 2043    Narrative:       EXAMINATION: XR CHEST 1 VW-. 3/10/2019 8:39 PM CDT     CHEST, ONE VIEW:     HISTORY: Sepsis     COMPARISON: 5/8/2018, 12/4/2017 and 5/17/2017     A single frontal chest radiograph was obtained.     FINDINGS:     Severe right-sided pulmonary emphysema observed. Postsurgical changes in  the right perihilar region identified.     No definite acute lung infiltrates observed.     The heart is normal in size, without heart failure.     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 No acute osseous  abnormalities identified.                                     Impression:       1. Postsurgical/chronic changes.  2. No definite acute cardiopulmonary process.     This report was finalized on 03/10/2019 20:40 by Dr. Wilder Salinas MD.        I have personally reviewed and interpreted the radiology studies and ECG obtained at time of admission.     Assessment / Plan     Assessment:   Active Hospital Problems     Diagnosis   • COPD exacerbation (CMS/[facility])     Assessment:  1.  COPD exacerbation with increased sputum production in the setting of previous lung transplant  2.  Acute hypoxic respiratory failure, resolved  3.  Chronic immunosuppression due to antirejection medications from lung transplant  4.  Acute kidney injury on chronic kidney disease stage III  6.  Leukocytosis  7.  Mild normocytic anemia    Plan:   1.  Strep pneumo and Legionella antigens are negative  2.  Respiratory culture pending  3.  Continue IV solumedrol  4.  Switch BiPAP to at night and as needed  5.  Continue bronchodilators  6.  Continue doxycycline and Zosyn  7.  Chest x-ray reviewed, no obvious pneumonia  8.  Transfer to floor      Code Status: Full     I discussed the patient's findings and my recommendations with the patient and bedside RN.    Estimated length of stay 2-4 days    Kmaar Joy MD   03/11/19   9:26 AM

## 2019-03-11 NOTE — PLAN OF CARE
Problem: Patient Care Overview  Goal: Plan of Care Review  Outcome: Ongoing (interventions implemented as appropriate)   03/11/19 0523   Coping/Psychosocial   Plan of Care Reviewed With patient   Plan of Care Review   Progress improving   OTHER   Outcome Summary Pt a transfer from ICU to  this shift. VSS. 02 stable on room air. Ambulating in leigh ad/ santosh.IV abx/steriods continue. C/O chronic back pain. Medicated with PRN meds with good relief. Safety maintained. Will continue to monitor.      Goal: Individualization and Mutuality  Outcome: Ongoing (interventions implemented as appropriate)    Goal: Discharge Needs Assessment  Outcome: Ongoing (interventions implemented as appropriate)    Goal: Interprofessional Rounds/Family Conf  Outcome: Ongoing (interventions implemented as appropriate)      Problem: Fall Risk (Adult)  Goal: Absence of Fall  Outcome: Ongoing (interventions implemented as appropriate)      Problem: Chronic Obstructive Pulmonary Disease (Adult)  Goal: Signs and Symptoms of Listed Potential Problems Will be Absent, Minimized or Managed (Chronic Obstructive Pulmonary Disease)  Outcome: Ongoing (interventions implemented as appropriate)      Problem: Skin Injury Risk (Adult)  Goal: Skin Health and Integrity  Outcome: Ongoing (interventions implemented as appropriate)      Problem: Pain, Chronic (Adult)  Goal: Acceptable Pain/Comfort Level and Functional Ability  Outcome: Ongoing (interventions implemented as appropriate)

## 2019-03-11 NOTE — CONSULTS
PULMONARY & CRITICAL CARE CONSULT - [facility]    19, 12:14 PM  Patient Care Team:  Jhon Santo MD as PCP - General  Jhon Santo MD as PCP - Family Medicine  Young, King Aldridge MD as Consulting Physician (Urology)  Shruti Kenney APRN as Nurse Practitioner (Pulmonary Disease)  Name: Bayron Rodriguez  : 1960  MRN: [id number]  Contact Serial Number [id number]    Chief complaint: Respiratory failure    HPI:  We have been consulted by Kamar Joy MD to see this 58 y.o. male born on 1960.  Patient complains of dyspnea in the chest for several weeks. Severity: severe.  Aggravating factors: walking.   Alleviating factors: medication(s) (albuterol and atrovent) Associated symptoms: shortness of breath, weakness and wheezing. Sputum is mucoid, tenacious and copious.  Patient currently is on oxygen at 2 L/min per nasal cannula.. Respiratory history: COPD, emphysema and Unilateral lung transplant.  This is a gentleman whom I see at the office with a history of a unilateral lung transplant approximately 11 years ago.  He sees me in the office every 3 months for spirometry.  At one time he was considering having a transplant of the other lung however I believe he was found to not be a candidate.  Is been fairly stable up until the last 2-3 months.  He has had increasing sputum production and shortness of breath.  At baseline he is on  Zithromax and Bactrim.  He is also on mycophenolate daily.  Prograf daily.  He is no longer on prednisone daily.  He should be on supplemental oxygen however he typically does not come in office wearing it.  He is compliant with full dose Mucinex.  We have been asked to see him as he presented to the emergency room with fever and respiratory failure.  This did require BiPAP therapy.  He did feel that this helped his breathing however the mask hurt his face.  Presently he is breathing comfortably on  room air with a saturation of 93%.  There is no family at the bedside.    Past Medical History:   has a past medical history of Cancer (CMS/HCC), Chronic back pain, Chronic pain syndrome (12/4/2018), Chronic respiratory failure with hypoxia (CMS/HCC) (12/4/2018), COPD (chronic obstructive pulmonary disease) (CMS/HCC), COPD, group D, by GOLD 2017 classification (CMS/HCC) (11/29/2018), Diabetes mellitus (CMS/HCC), Emphysema lung (CMS/HCC), Essential hypertension (12/4/2018), Gastroesophageal reflux disease without esophagitis (12/4/2018), Hypertension, Lung transplant status (CMS/HCC) (12/4/2018), Lung transplanted (CMS/HCC), and Personal history of nicotine dependence (12/4/2018).   has a past surgical history that includes Appendectomy; Cholecystectomy; Colonoscopy; Lung removal, total; Lung transplant; and Esophagogastroduodenoscopy.  Allergies   Allergen Reactions   • Clindamycin/Lincomycin Diarrhea   • Moxifloxacin Rash     Sweating      Medications:    acetylcysteine 1.5 mL Nebulization Q8H - RT   amLODIPine 5 mg Oral QAM   atorvastatin 20 mg Oral Nightly   doxycycline 100 mg Intravenous Q12H   folic acid 1 mg Oral QAM   guaiFENesin 1,200 mg Oral Q12H   insulin lispro 2-7 Units Subcutaneous 4x Daily With Meals & Nightly   ipratropium-albuterol 3 mL Nebulization Q4H - RT   methylPREDNISolone sodium succinate 40 mg Intravenous Q6H   metoprolol tartrate 100 mg Oral Q12H   mycophenolate 540 mg Oral Q12H   pantoprazole 40 mg Oral QAM   piperacillin-tazobactam 3.375 g Intravenous Q8H   pregabalin 150 mg Oral Q12H   sodium chloride 3 mL Intravenous Q12H   tacrolimus 0.5 mg Oral Nightly   tacrolimus 1 mg Oral QAM   vitamin B-12 500 mcg Oral QAM        Family History:  Family History   Problem Relation Age of Onset   • Heart disease Father    • Heart attack Brother      Social History:   reports that he has quit smoking. He has quit using smokeless tobacco. He reports that he does not drink alcohol or use drugs.  Review  of Systems:  Review of Systems   Constitutional: Positive for activity change, chills, fatigue and fever.   HENT: Positive for congestion. Negative for nosebleeds, rhinorrhea, sinus pressure, sore throat and trouble swallowing.    Eyes: Negative for pain, discharge and itching.   Respiratory: Positive for cough, chest tightness, shortness of breath and wheezing. Negative for apnea.    Cardiovascular: Negative for chest pain, palpitations and leg swelling.   Gastrointestinal: Negative for abdominal distention, constipation, diarrhea, nausea and vomiting.   Endocrine: Negative for polydipsia, polyphagia and polyuria.   Genitourinary: Negative for difficulty urinating, hematuria and urgency.   Musculoskeletal: Negative for arthralgias, back pain, gait problem and joint swelling.   Skin: Negative for color change, pallor and rash.   Neurological: Negative for dizziness, speech difficulty, weakness and numbness.   Hematological: Negative for adenopathy. Does not bruise/bleed easily.   Psychiatric/Behavioral: Negative for agitation and confusion. The patient is not nervous/anxious.    All other systems reviewed and are negative.     Physical Exam:  Temp:  [98.2 °F (36.8 °C)-98.4 °F (36.9 °C)] 98.3 °F (36.8 °C)  Heart Rate:  [] 80  Resp:  [16-24] 18  BP: (124-170)/() 145/67  FiO2 (%):  [30 %] 30 %    Intake/Output Summary (Last 24 hours) at 3/11/2019 1214  Last data filed at 3/11/2019 0647  Gross per 24 hour   Intake 1231.6 ml   Output 625 ml   Net 606.6 ml         03/10/19  1851 03/11/19  0038 03/11/19  1055   Weight: 70 kg (154 lb 6.4 oz) 67.3 kg (148 lb 4.8 oz) 67.2 kg (148 lb 3.2 oz)     SpO2 Percentage    03/11/19 0915 03/11/19 1047 03/11/19 1055   SpO2: 98% 94% 93%     Physical Exam   Constitutional: He is oriented to person, place, and time. He appears well-developed and well-nourished. No distress.   HENT:   Head: Normocephalic and atraumatic.   Right Ear: External ear normal.   Left Ear: External ear  normal.   Nose: Nose normal.   Mouth/Throat: Oropharynx is clear and moist. No oropharyngeal exudate.   Eyes: Conjunctivae and EOM are normal. Pupils are equal, round, and reactive to light. Right eye exhibits no discharge. Left eye exhibits no discharge.   Neck: Normal range of motion. Neck supple. No JVD present.   Cardiovascular: Normal rate and regular rhythm.   No murmur heard.  Pulmonary/Chest: He has decreased breath sounds in the right upper field, the right middle field and the right lower field. He has rhonchi in the left upper field and the left lower field.   Abdominal: Soft. Bowel sounds are normal. He exhibits no distension.   Musculoskeletal: Normal range of motion. He exhibits no edema or deformity.   Neurological: He is alert and oriented to person, place, and time. He displays normal reflexes. No cranial nerve deficit. Coordination normal.   Skin: Skin is warm and dry. No rash noted. He is not diaphoretic. No erythema.   Psychiatric: He has a normal mood and affect. His behavior is normal. Thought content normal.   Nursing note and vitals reviewed.    Results from last 7 days   Lab Units 03/11/19  0313 03/10/19  1929   WBC 10*3/mm3 13.06* 13.11*   HEMOGLOBIN g/dL 13.3* 14.9   PLATELETS 10*3/mm3 187 214     Results from last 7 days   Lab Units 03/11/19  0313 03/10/19  2046   SODIUM mmol/L 136 139   POTASSIUM mmol/L 3.8 3.3*   CO2 mmol/L 26.0 32.0*   BUN mg/dL 31* 33*   CREATININE mg/dL 1.29 1.66*   GLUCOSE mg/dL 258* 139*     Results from last 7 days   Lab Units 03/11/19  0520 03/10/19  1853   PH, ARTERIAL pH units 7.419 7.453*   PCO2, ARTERIAL mm Hg 44.3 43.0   PO2 ART mm Hg 56.4* 60.8*     Lab Results   Component Value Date    PROBNP 276.0 03/10/2019     Blood Culture   Date Value Ref Range Status   03/10/2019 No growth at less than 24 hours  Preliminary   03/10/2019 No growth at less than 24 hours  Preliminary     Recent radiology:   Imaging Results (last 72 hours)     Procedure Component Value  Units Date/Time    XR Chest 1 View [[id number]] Collected:  03/10/19 2039     Updated:  03/10/19 2043    Narrative:       EXAMINATION: XR CHEST 1 VW-. 3/10/2019 8:39 PM CDT     CHEST, ONE VIEW:     HISTORY: Sepsis     COMPARISON: 2018, 2017 and 2017     A single frontal chest radiograph was obtained.     FINDINGS:     Severe right-sided pulmonary emphysema observed. Postsurgical changes in  the right perihilar region identified.     No definite acute lung infiltrates observed.     The heart is normal in size, without heart failure.     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 No acute osseous  abnormalities identified.                                     Impression:       1. Postsurgical/chronic changes.  2. No definite acute cardiopulmonary process.     This report was finalized on 03/10/2019 20:40 by Dr. Wilder Salinas MD.        My radiograph interpretation/independent review of imaging: Chronic changes to the right lung of emphysema, no acute pathology to the left lung    Other test results (not lab or imaging): None    Independent review of ekg: Sinus tach, rate 120,     Problem List as identified by Epic (may contain historical, inactive problems)  Patient Active Problem List   Diagnosis   • COPD, group D, by GOLD 2017 classification (CMS/HCC)   • Lung transplant status (CMS/[facility])   • Chronic respiratory failure with hypoxia (CMS/HCC)   • Gastroesophageal reflux disease without esophagitis   • Essential hypertension   • Chronic pain syndrome   • Personal history of nicotine dependence   • COPD exacerbation (CMS/HCC)     Pulmonary Assessment:  New problem (to me), with additional workup planned: COPD exacerbation    New problem (to me), no additional workup planned: Acute on chronic renal failure, defer to attending    Other problems either stable, failing to improve or worsenin. Unilateral lung transplant history  2. Chronic immunosuppressive state  3. Anemia    4. Questionable aspiration    Recommend/plan:     · Okay to the floor from a pulmonary standpoint  · Respiratory culture questioning debris from aspiration.  We will inquire about reflux and swallowing issues tomorrow when the patient is reexamined.  Swallowing and reflux precautions in the interim  · Increase IV steroid dosing  · BiPAP with sleep and as needed  · Continue broad-spectrum coverage  · Spoke with nurse on the floor to have patient come up with a medication list that is accurate.  Per his visit with me in the office on 3-4-19 he indicated he is on Bactrim and Zithromax however this is not on the list  · Add fungus culture  · Add Aspergillus blood testing  · Add histo/blasto urinary antigen testing    Thank you for this consult.  We will follow along.  Electronically signed by ENRICO Ovalles, 03/11/19, 12:14 PM    Physician substantive portion:  Patient is several years out from lung transplantation who has fortunately not suffered tremendous rejection syndrome.  Now he presents with what may be a aspiration pneumonia event.  Chest exam shows diminished breath sounds on the right side, wheezes and rhonchi on left.  Gram stain of the sputum is back suggestive of that possibility.  He is immune compromised.  Recommend continuing broad-spectrum antibiotics.  CXR shows hyperinflation of the native right sided lung some increased marks on the left.  Hard to determine whether pneumonia is present or absent.  We will check assays for fungal infection.  Monitor oxygenation and monitor for development of ARDS or other complications.    I have seen and examined patient personally, performing a face-to-face diagnostic evaluation with plan of care reviewed and developed with APRN and nursing staff. I have addended and/or modified the above history of present illness, physical examination, and assessment and plan to reflect my findings and impressions. Essential elements of the care plan were discussed with  APRN above.  Agree with findings and assessment/plan as documented above.    Electronically signed by Erik Richardson MD, on 3/11/2019, 2:43 PM

## 2019-03-11 NOTE — PAYOR COMM NOTE
"ADMIT INPT 3-10-19  UR PHONE    [phone]    Dwayne Rodriguez (58 y.o. Male)     Date of Birth Social Security Number Address Home Phone MRN    1960  [address] [place] [id number] [phone] [id number]    [employer]vent Marital Status          Non-Temple        Admission Date Admission Type Admitting Provider Attending Provider Department, Room/Bed    3/10/19 Emergency Kamar Joy MD Fleming, John Eric, MD [facility] INTENSIVE CARE, I011/1    Discharge Date Discharge Disposition Discharge Destination                       Attending Provider:  Kamar Joy MD    Allergies:  Clindamycin/lincomycin, Moxifloxacin    Isolation:  None   Infection:  None   Code Status:  CPR    Ht:  172.7 cm (68\")   Wt:  67.3 kg (148 lb 4.8 oz)    Admission Cmt:  None   Principal Problem:  None                Active Insurance as of 3/10/2019     Primary Coverage     Payor Plan Insurance Group Employer/Plan Group    [employer] [place] [employer] [place]ICA[place]      Payor Plan Address Payor Plan Phone Number Payor Plan Fax Number Effective Dates    [address] [address] [phone]  3/22/2017 - None Entered    [place] [address]       Subscriber Name Subscriber Birth Date Member ID       DWAYNE RODRIGUEZ 1960 [id number]                 Emergency Contacts      (Rel.) Home Phone Work Phone Mobile Phone    Cee Rodriguez (Spouse) [phone] -- --               History & Physical      Soham Werner DO at 3/11/2019 12:45 AM              [facility] Medicine Services  HISTORY AND PHYSICAL    Date of Admission: 3/10/2019  Primary Care Physician: Jhon Santo MD    Subjective     Chief Complaint: Dyspnea    History of Present Illness  4-year-old  male presents to the emergency department complaining of shortness of breath.  The patient saw someone from his pulmonology team and was given antibiotics on Monday.  He is " not getting any better.  He has been feeling bad for the last 2 weeks he states he has had a fever of 101 at home.  He said mild sputum production.  He got abdominal distention on exam.  He states he has swelling in his lower extremities with ambulation.  He presented with tachypnea, tachycardia, and hypoxia.  Patient was seen on a BiPAP, and was complaining stating that the only thing that was helping him as morphine injections.        Review of Systems   Dyspnea    Otherwise complete ROS reviewed and negative except as mentioned in the HPI.    Past Medical History:   Past Medical History:   Diagnosis Date   • Cancer (CMS/[facility])     Skin   • Chronic back pain    • Chronic pain syndrome 12/4/2018   • Chronic respiratory failure with hypoxia (CMS/HCC) 12/4/2018   • COPD (chronic obstructive pulmonary disease) (CMS/HCC)    • COPD, group D, by GOLD 2017 classification (CMS/HCC) 11/29/2018   • Diabetes mellitus (CMS/HCC)    • Emphysema lung (CMS/HCC)    • Essential hypertension 12/4/2018   • Gastroesophageal reflux disease without esophagitis 12/4/2018   • Hypertension    • Lung transplant status (CMS/HCC) 12/4/2018   • Lung transplanted (CMS/HCC)    • Personal history of nicotine dependence 12/4/2018     Past Surgical History:  Past Surgical History:   Procedure Laterality Date   • APPENDECTOMY     • CHOLECYSTECTOMY     • COLONOSCOPY     • ENDOSCOPY     • LUNG REMOVAL, TOTAL      right upper lobe   • LUNG TRANSPLANT      2006     Social History:  reports that he has quit smoking. He does not have any smokeless tobacco history on file. He reports that he does not drink alcohol or use drugs. . Worked construction.     Family History: family history includes Heart attack in his brother; Heart disease in his father.       Allergies:  Allergies   Allergen Reactions   • Clindamycin/Lincomycin Diarrhea   • Moxifloxacin Rash     Sweating      Medications:  Prior to Admission medications    Medication Sig Start Date End  Date Taking? Authorizing Provider   albuterol (PROVENTIL HFA;VENTOLIN HFA) 108 (90 BASE) MCG/ACT inhaler Inhale 2 puffs Every 4 (Four) Hours As Needed for Wheezing or Shortness of Air.    Fidel Mendez MD   albuterol (PROVENTIL) (2.5 MG/3ML) 0.083% nebulizer solution Take 2.5 mg by nebulization 4 (Four) Times a Day As Needed for Wheezing. 3/4/19   Shruti Kenney APRN   amLODIPine (NORVASC) 5 MG tablet Take 5 mg by mouth Every Morning.    Fidel Mendez MD   atorvastatin (LIPITOR) 20 MG tablet Take 20 mg by mouth Every Night.    Fidel Mendez MD   azithromycin (ZITHROMAX) 250 MG tablet Take 250 mg by mouth. Monday, Wednesday and Friday.    Fidel Mendez MD   budesonide-formoterol (SYMBICORT) 160-4.5 MCG/ACT inhaler Inhale 2 puffs 2 (Two) Times a Day. 12/13/18   Shruti Kenney APRN   cefdinir (OMNICEF) 300 MG capsule Take 1 capsule by mouth 2 (Two) Times a Day for 10 days. 3/4/19 3/14/19  Shruti Kenney APRN   folic acid (FOLVITE) 1 MG tablet Take 1 mg by mouth Every Morning.    Fidel Mendez MD   furosemide (LASIX) 20 MG tablet Take 20 mg by mouth Every Morning.    Fidel Mendez MD   hydrochlorothiazide (HYDRODIURIL) 50 MG tablet Take 50 mg by mouth Every Morning.    Fidel Mendez MD   HYDROcodone-acetaminophen (NORCO) 7.5-325 MG per tablet Take 1 tablet by mouth Every 4 (Four) Hours As Needed for Moderate Pain (4-6).    Fidel Mendez MD   magnesium oxide (MAG-OX) 400 MG tablet Take 400 mg by mouth 2 (Two) Times a Day.    Fidel Mendez MD   metoprolol tartrate (LOPRESSOR) 100 MG tablet Take 100 mg by mouth 2 (Two) Times a Day.    Fidel Mendez MD   Mycophenolate Sodium (MYCOPHENOLIC ACID PO) Take 540 mg by mouth 2 (Two) Times a Day.    Fidel Mendez MD   O2 (OXYGEN) Inhale.    Fidel Mendez MD   omeprazole (priLOSEC) 20 MG capsule Take 20 mg by mouth 2 (Two) Times a Day.    Provider, MD Fidel  "  oxyCODONE-acetaminophen (PERCOCET) 5-325 MG per tablet Take 1 tablet by mouth Every 8 (Eight) Hours As Needed.    Fidel Mendez MD   oxyCODONE-acetaminophen (PERCOCET) 5-325 MG per tablet Take 1 tablet by mouth Every 4 (Four) Hours As Needed for Moderate Pain . 7/1/18   Radha Castanon APRN   predniSONE (DELTASONE) 10 MG tablet Take 4 tabs daily x 3 days, then take 3 tabs daily x 3 days, then take 2 tabs daily x 3 days, then take 1 tab daily x 3 days 12/5/18   Shruti Kenney APRN   predniSONE (DELTASONE) 10 MG tablet Take 4 tabs daily x 3 days, then take 3 tabs daily x 3 days, then take 2 tabs daily x 3 days, then take 1 tab daily x 3 days 3/4/19   Shruti Kenney APRN   pregabalin (LYRICA) 150 MG capsule Take 150 mg by mouth 2 (Two) Times a Day.    Fidel Mendez MD   sulfamethoxazole-trimethoprim (BACTRIM,SEPTRA) 400-80 MG tablet Take 1 tablet by mouth. Monday, Wednesday and friday    Fidel Mendez MD   tacrolimus (PROGRAF) 0.5 MG capsule Take 0.5 mg by mouth Every Night.    Fidel Mendez MD   tacrolimus (PROGRAF) 1 MG capsule Take 1 mg by mouth Every Morning.    Fidel Mendez MD   tiotropium (SPIRIVA) 18 MCG per inhalation capsule Place 1 capsule into inhaler and inhale Every Morning.    Fidel Mendez MD   tiZANidine (ZANAFLEX) 4 MG tablet TAKE 1 TABLET BY MOUTH EVERY 8 HOURS AS NEEDED NO MORE THAN 3 DOSES IN 24 HOURS 11/26/18   Fidel Mendez MD   tobramycin 0.3 % solution ophthalmic solution Administer 2 drops to the right eye Every 4 (Four) Hours. 7/1/18   Radha Castanon APRN   vitamin B-12 (CYANOCOBALAMIN) 500 MCG tablet Take 500 mcg by mouth Every Morning.    Fidel Mendez MD     Objective     Vital Signs: /72   Pulse 115   Temp 98.2 °F (36.8 °C) (Oral)   Resp 24   Ht 175.3 cm (69\")   Wt 70 kg (154 lb 6.4 oz)   SpO2 96%   BMI 22.80 kg/m²    Physical Exam   HENT:   Head: Normocephalic and atraumatic.   Nose: Nose " normal.   Mouth/Throat: Oropharynx is clear and moist.   Eyes: Conjunctivae and EOM are normal.   Neck: Normal range of motion. Neck supple.   Cardiovascular: Normal rate, regular rhythm and normal heart sounds.   Pulmonary/Chest: He is in respiratory distress. He has wheezes. He has rales.   Abdominal: Soft. Bowel sounds are normal.   Mild abdominal distention   Musculoskeletal: He exhibits no edema or tenderness.   Neurological: He is alert. No cranial nerve deficit.   Skin: Skin is warm and dry.   Varicose veins bilateral LE   Psychiatric: He has a normal mood and affect.   Vitals reviewed.          Results Reviewed:  Lab Results (last 24 hours)     Procedure Component Value Units Date/Time    Respiratory Culture - Sputum, Cough [[id number]] Collected:  03/10/19 2254    Specimen:  Sputum from Cough Updated:  03/10/19 2258    Troponin [[id number]]  (Normal) Collected:  03/10/19 2046    Specimen:  Blood Updated:  03/10/19 2115     Troponin I <0.012 ng/mL     BNP [[id number]]  (Normal) Collected:  03/10/19 2046    Specimen:  Blood Updated:  03/10/19 2115     proBNP 276.0 pg/mL     Comprehensive Metabolic Panel [[id number]]  (Abnormal) Collected:  03/10/19 2046    Specimen:  Blood Updated:  03/10/19 2104     Glucose 139 mg/dL      BUN 33 mg/dL      Creatinine 1.66 mg/dL      Sodium 139 mmol/L      Potassium 3.3 mmol/L      Chloride 93 mmol/L      CO2 32.0 mmol/L      Calcium 9.3 mg/dL      Total Protein 6.9 g/dL      Albumin 4.50 g/dL      ALT (SGPT) 23 U/L      AST (SGOT) 20 U/L      Alkaline Phosphatase 68 U/L      Total Bilirubin 0.9 mg/dL      eGFR Non African Amer 42 mL/min/1.73      Globulin 2.4 gm/dL      A/G Ratio 1.9 g/dL      BUN/Creatinine Ratio 19.9     Anion Gap 14.0 mmol/L     Influenza Antigen, Rapid - Swab, Nasopharynx [[id number]]  (Normal) Collected:  03/10/19 2036    Specimen:  Swab from Nasopharynx Updated:  03/10/19 2052     Influenza A Ag, EIA Negative     Influenza B Ag, EIA Negative    Narrative:        Recommend confirmation of negative results by viral culture or molecular assay.    [place] Draw [[id number]] Collected:  03/10/19 1910    Specimen:  Blood Updated:  03/10/19 2030    Narrative:       The following orders were created for panel order [place] [facility].  Procedure                               Abnormality         Status                     ---------                               -----------         ------                     Light Blue Top[[id number]]                                   Final result               Green Top (Gel)[[id number]]                                  Final result               Lavender Top[[id number]]                                     Final result               Red Top[[id number]]                                          Final result                 Please view results for these tests on the individual orders.    Lavender Top [[id number]] Collected:  03/10/19 1929    Specimen:  Blood Updated:  03/10/19 2030     Extra Tube hold for add-on     Comment: Auto resulted       Green Top (Gel) [[id number]] Collected:  03/10/19 1910    Specimen:  Blood Updated:  03/10/19 2016     Extra Tube Hold for add-ons.     Comment: Auto resulted.       Red Top [[id number]] Collected:  03/10/19 1910    Specimen:  Blood Updated:  03/10/19 2016     Extra Tube Hold for add-ons.     Comment: Auto resulted.       Light Blue Top [[id number]] Collected:  03/10/19 1910    Specimen:  Blood Updated:  03/10/19 2015     Extra Tube hold for add-on     Comment: Auto resulted       Lactic Acid, Plasma [[id number]]  (Normal) Collected:  03/10/19 1953    Specimen:  Blood Updated:  03/10/19 2009     Lactate 1.2 mmol/L     Urinalysis, Microscopic Only - Urine, Clean Catch [[id number]]  (Abnormal) Collected:  03/10/19 1940    Specimen:  Urine, Clean Catch Updated:  03/10/19 2005     RBC, UA 0-2 /HPF      WBC, UA None Seen /HPF      Bacteria, UA None Seen /HPF      Squamous Epithelial Cells, UA None Seen /HPF      Hyaline Casts, UA  None Seen /LPF      Methodology Automated Microscopy    Urinalysis With Culture If Indicated - Urine, Clean Catch [[id number]]  (Abnormal) Collected:  03/10/19 1940    Specimen:  Urine, Clean Catch Updated:  03/10/19 2002     Color, UA Yellow     Appearance, UA Clear     pH, UA 6.0     Specific Gravity, UA 1.015     Glucose, UA Negative     Ketones, UA Negative     Bilirubin, UA Negative     Blood, UA Trace     Protein, UA Trace     Leuk Esterase, UA Negative     Nitrite, UA Negative     Urobilinogen, UA 0.2 E.U./dL    Blood Culture - Blood, Arm, Right [[id number]] Collected:  03/10/19 1929    Specimen:  Blood from Arm, Right Updated:  03/10/19 1948    CBC & Differential [[id number]] Collected:  03/10/19 1929    Specimen:  Blood Updated:  03/10/19 1938    Narrative:       The following orders were created for panel order CBC & Differential.  Procedure                               Abnormality         Status                     ---------                               -----------         ------                     CBC Auto Differential[[id number]]        Abnormal            Final result                 Please view results for these tests on the individual orders.    CBC Auto Differential [[id number]]  (Abnormal) Collected:  03/10/19 1929    Specimen:  Blood Updated:  03/10/19 1938     WBC 13.11 10*3/mm3      RBC 5.17 10*6/mm3      Hemoglobin 14.9 g/dL      Hematocrit 44.0 %      MCV 85.1 fL      MCH 28.8 pg      MCHC 33.9 g/dL      RDW 13.1 %      RDW-SD 39.9 fl      MPV 11.4 fL      Platelets 214 10*3/mm3      Neutrophil % 77.4 %      Lymphocyte % 10.2 %      Monocyte % 9.8 %      Eosinophil % 0.5 %      Basophil % 0.5 %      Immature Grans % 1.6 %      Neutrophils, Absolute 10.13 10*3/mm3      Lymphocytes, Absolute 1.34 10*3/mm3      Monocytes, Absolute 1.29 10*3/mm3      Eosinophils, Absolute 0.07 10*3/mm3      Basophils, Absolute 0.07 10*3/mm3      Immature Grans, Absolute 0.21 10*3/mm3      nRBC 0.0 /100 WBC      Tacrolimus Level [[id number]] Collected:  03/10/19 1929    Specimen:  Blood Updated:  03/10/19 1934    Blood Culture - Blood, Hand, Left [[id number]] Collected:  03/10/19 1910    Specimen:  Blood from Hand, Left Updated:  03/10/19 1926    Blood Gas, Arterial [[id number]]  (Abnormal) Collected:  03/10/19 1853    Specimen:  Arterial Blood Updated:  03/10/19 1854     Site Right Radial     Nikolas's Test Positive     pH, Arterial 7.453 pH units      Comment: 83 Value above reference range        pCO2, Arterial 43.0 mm Hg      pO2, Arterial 60.8 mm Hg      Comment: 84 Value below reference range        HCO3, Arterial 30.1 mmol/L      Comment: 83 Value above reference range        Base Excess, Arterial 5.4 mmol/L      Comment: 83 Value above reference range        O2 Saturation, Arterial 90.8 %      Comment: 84 Value below reference range        Temperature 37.0 C      Barometric Pressure for Blood Gas 757 mmHg      Modality Room Air     Ventilator Mode NA     Collected by [id number]     Comment: Meter: [id number]     :  [id number]           Imaging Results (last 24 hours)     Procedure Component Value Units Date/Time    XR Chest 1 View [[id number]] Collected:  03/10/19 2039     Updated:  03/10/19 2043    Narrative:       EXAMINATION: XR CHEST 1 VW-. 3/10/2019 8:39 PM CDT     CHEST, ONE VIEW:     HISTORY: Sepsis     COMPARISON: 5/8/2018, 12/4/2017 and 5/17/2017     A single frontal chest radiograph was obtained.     FINDINGS:     Severe right-sided pulmonary emphysema observed. Postsurgical changes in  the right perihilar region identified.     No definite acute lung infiltrates observed.     The heart is normal in size, without heart failure.     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 No acute osseous  abnormalities identified.                                     Impression:       1. Postsurgical/chronic changes.  2. No definite acute cardiopulmonary process.     This report was finalized on 03/10/2019  20:40 by Dr. Wilder Salinas MD.        I have personally reviewed and interpreted the radiology studies and ECG obtained at time of admission.     Assessment / Plan     Assessment:   Active Hospital Problems    Diagnosis   • COPD exacerbation (CMS/[facility])     Impression:  1. AECOPD failed OP therapy  2. Hypoxia  3. Chronically immunosuppressed from lung transplant.   4. Mild RAMIN  6. Hypokalemia    Plan:   1. Zosyn/Doxycycline  2. Nebs  3. Pulmonary toilet  4. Strep and legionella  5. Respiratory culture  6. Pulmonology consult  7. BiPAP to maintain saturation  8. Medrol  9. Labs in the am        Code Status: Full     I discussed the patient's findings and my recommendations with the patient and wife at bedside    Estimated length of stay 4-5 days    Soham Werner DO   03/11/19   12:45 AM              Electronically signed by Soham Werner DO at 3/11/2019 12:50 AM          Emergency Department Notes      Sebastián Hernandez MD at 3/10/2019  7:04 PM          Subjective   History of Present Illness    58-year-old male presenting with difficulty breathing.    Patient was onset of symptoms several days prior, gradually worsening, associated with new cough productive of greenish foul-smelling sputum.  The difficulty breathing is associated with chest tightness.  Patient has a complicated pulmonary history, status post left lung transplant and right lung resection due to severe COPD.  Patient reports using his albuterol nebulizer and inhaler at home with only minimal benefit.  Reports subjective chills but no fevers.    Patient denies any associated abdominal pain, vomiting, diarrhea, difficulty urinating.  Reports compliance with all of his medications.    Review of Systems   Constitutional: Positive for chills. Negative for fever.   HENT: Negative for sinus pain.    Eyes: Negative for pain.   Respiratory: Positive for cough, chest tightness and shortness of breath.    Cardiovascular: Negative for chest pain.    Gastrointestinal: Negative for abdominal pain.   Genitourinary: Negative for flank pain.   Musculoskeletal: Negative for back pain.   Skin: Negative for wound.   Neurological: Negative for syncope and weakness.   Psychiatric/Behavioral: The patient is not hyperactive.        Past Medical History:   Diagnosis Date   • Cancer (CMS/HCC)     Skin   • Chronic back pain    • Chronic pain syndrome 12/4/2018   • Chronic respiratory failure with hypoxia (CMS/HCC) 12/4/2018   • COPD (chronic obstructive pulmonary disease) (CMS/HCC)    • COPD, group D, by GOLD 2017 classification (CMS/HCC) 11/29/2018   • Diabetes mellitus (CMS/HCC)    • Emphysema lung (CMS/HCC)    • Essential hypertension 12/4/2018   • Gastroesophageal reflux disease without esophagitis 12/4/2018   • Hypertension    • Lung transplant status (CMS/HCC) 12/4/2018   • Lung transplanted (CMS/HCC)    • Personal history of nicotine dependence 12/4/2018       Allergies   Allergen Reactions   • Clindamycin/Lincomycin Diarrhea   • Moxifloxacin Rash     Sweating        Past Surgical History:   Procedure Laterality Date   • APPENDECTOMY     • CHOLECYSTECTOMY     • COLONOSCOPY     • ENDOSCOPY     • LUNG REMOVAL, TOTAL      right upper lobe   • LUNG TRANSPLANT      2006       Family History   Problem Relation Age of Onset   • Heart disease Father    • Heart attack Brother        Social History     Socioeconomic History   • Marital status:      Spouse name: Not on file   • Number of children: Not on file   • Years of education: Not on file   • Highest education level: Not on file   Tobacco Use   • Smoking status: Former Smoker   • Smokeless tobacco: Former User   • Tobacco comment: quit 9 years ago   Substance and Sexual Activity   • Alcohol use: No   • Drug use: No     Comment: when in severe pain    • Sexual activity: Defer           Objective   Physical Exam   Constitutional: He is oriented to person, place, and time. He appears well-developed and well-nourished.    HENT:   Head: Normocephalic and atraumatic.   Eyes: Conjunctivae are normal.   Neck: Normal range of motion.   Cardiovascular: Intact distal pulses.   Regular tachycardia   Pulmonary/Chest: Accessory muscle usage present. Tachypnea noted. He is in respiratory distress. He has decreased breath sounds in the right upper field and the right lower field. He has wheezes. He has rhonchi.   Abdominal: Soft. He exhibits no distension. There is no tenderness.   Musculoskeletal: He exhibits no edema.   Neurological: He is alert and oriented to person, place, and time.   Skin: Skin is warm and dry.   Psychiatric: He has a normal mood and affect.   Nursing note and vitals reviewed.      Procedures          ED Course                  MDM  Number of Diagnoses or Management Options  COPD exacerbation (CMS/HCC):   Diagnosis management comments: 58-year-old male in respiratory distress.  Differential includes COPD exacerbation, pneumonia, influenza, bronchitis, pneumothorax.  Initial stabilization included initiation of BiPAP.  Patient's ABG without any evidence of CO2 retention, supports a diagnosis of pneumonia.  Patient will be treated with steroids stacked nebulizer treatment and antibiotics.       Amount and/or Complexity of Data Reviewed  Clinical lab tests: reviewed  Tests in the radiology section of CPT®:  reviewed  Tests in the medicine section of CPT®:  reviewed  Decide to obtain previous medical records or to obtain history from someone other than the patient: yes          Final diagnoses:   COPD exacerbation (CMS/HCC)            Sebastián Hernandez MD  03/11/19 0701      Electronically signed by Sebastián Hernandez MD at 3/11/2019  7:01 AM       ICU Vital Signs     Row Name 03/11/19 0742 03/11/19 0700 03/11/19 0645 03/11/19 0630 03/11/19 0615       Vitals    Pulse  --  71  71  70  74    Resp  --  21  --  --  --    BP  --  152/77  --  --  142/75    Noninvasive MAP (mmHg)  --  97  --  --  95       Oxygen Therapy     SpO2  --  93 %  95 %  92 %  93 %    Device (Oxygen Therapy)  NPPV/NIV  --  --  --  --    Oxygen Concentration (%)  30  --  --  --  --    Row Name 03/11/19 0600 03/11/19 0545 03/11/19 0530 03/11/19 0515 03/11/19 0500       Vitals    Temp  --  --  --  --  98.4 °F (36.9 °C)    Temp src  --  --  --  --  Oral    Pulse  77  72  78  77  87    BP  --  --  150/85  131/84  143/75    Noninvasive MAP (mmHg)  --  --  105  99  95       Oxygen Therapy    SpO2  92 %  93 %  91 %  91 %  90 %    Row Name 03/11/19 0445 03/11/19 0430 03/11/19 0415 03/11/19 0400 03/11/19 0345       Vitals    Pulse  90  93  97  96  102    BP  135/72  143/79  137/74  132/83  132/82    Noninvasive MAP (mmHg)  91  98  92  97  98       Oxygen Therapy    SpO2  93 %  94 %  96 %  96 %  96 %    Row Name 03/11/19 0330 03/11/19 0315 03/11/19 0300 03/11/19 0245 03/11/19 0230       Vitals    Pulse  99  104  112  104  105    BP  130/74  127/82  154/83  139/75  140/81    Noninvasive MAP (mmHg)  92  96  104  92  96       Oxygen Therapy    SpO2  95 %  96 %  95 %  96 %  96 %    Row Name 03/11/19 0221 03/11/19 0215 03/11/19 0206 03/11/19 0200 03/11/19 0151       Vitals    Pulse  107  110  115  115  113    BP  --  131/116  (Abnormal)   --  --  --    Noninvasive MAP (mmHg)  --  124  --  --  --       Oxygen Therapy    SpO2  96 %  96 %  96 %  97 %  96 %    Row Name 03/11/19 0145 03/11/19 0136 03/11/19 0130 03/11/19 0121 03/11/19 0115       Vitals    Pulse  121  (Abnormal)   109  109  111  109    BP  170/85  --  146/78  126/83  --    Noninvasive MAP (mmHg)  111  --  99  95  --       Oxygen Therapy    SpO2  92 %  92 %  93 %  96 %  97 %    Row Name 03/11/19 0106 03/11/19 0100 03/11/19 0051 03/11/19 0045 03/11/19 0043       Vitals    Pulse  109  110  109  --  --       Oxygen Therapy    SpO2  96 %  96 %  97 %  97 %  96 %    Pulse Oximetry Type  --  --  --  --  Continuous    Device (Oxygen Therapy)  --  --  --  --  NPPV/NIV    Oxygen Concentration (%)  --  --  --  --  30    Row Name  "03/11/19 0038 03/11/19 0036 03/11/19 0030 03/11/19 0016 03/11/19 0015       Height and Weight    Height  172.7 cm (68\")  --  --  --  --    Height Method  Actual  --  --  --  --    Weight  67.3 kg (148 lb 4.8 oz)  --  --  --  --    Weight Method  Bed scale  --  --  --  --    Ideal Body Weight (IBW) (kg)  70.89  --  --  --  --    BSA (Calculated - sq m)  1.8 sq meters  --  --  --  --    BMI (Calculated)  22.6  --  --  --  --    Weight in (lb) to have BMI = 25  164.1  --  --  --  --       Vitals    Temp  98.4 °F (36.9 °C)  --  --  --  --    Temp src  Oral  --  --  --  --    Pulse  --  113  115  115  117    BP  --  --  135/74  147/72  --    Noninvasive MAP (mmHg)  --  --  90  --  90       Oxygen Therapy    SpO2  --  96 %  97 %  92 %  96 %    Row Name 03/11/19 0001 03/11/19 0000 03/10/19 2346 03/10/19 2332 03/10/19 2317       Vitals    Pulse  114  116  115  118  112    BP  150/72  --  144/75  150/74  138/74    Noninvasive MAP (mmHg)  --  91  --  --  --       Oxygen Therapy    SpO2  96 %  95 %  95 %  97 %  95 %    Row Name 03/10/19 2302 03/10/19 2301 03/10/19 2255 03/10/19 2247 03/10/19 2231       Vitals    Pulse  116  115  116  120  119    Heart Rate Source  Monitor  --  --  --  --    Resp  24  --  --  --  --    Resp Rate Source  Monitor  --  --  --  --    Resp Rate (Observed) Vent  24  --  --  --  --    BP  --  134/84  --  141/79  149/76       Oxygen Therapy    SpO2  94 %  90 %  91 %  95 %  93 %    Pulse Oximetry Type  Continuous  --  --  --  --    Device (Oxygen Therapy)  NPPV/NIV  --  --  --  --    Oxygen Concentration (%)  30  --  --  --  --    Row Name 03/10/19 2216 03/10/19 2201 03/10/19 2147 03/10/19 2132 03/10/19 2046       Vitals    Pulse  118  116  117  119  126  (Abnormal)     BP  137/79  131/77  141/84  138/85  137/86       Oxygen Therapy    SpO2  93 %  93 %  98 %  96 %  94 %    Row Name 03/10/19 2031 03/10/19 2023 03/10/19 2016 03/10/19 2001 03/10/19 2000       Vitals    Pulse  125  (Abnormal)   125  " "(Abnormal)   126  (Abnormal)   127  (Abnormal)   127  (Abnormal)     Heart Rate Source  --  Monitor  --  --  Monitor    Resp  --  21  --  --  20    Resp Rate Source  --  Monitor  --  --  Monitor    Resp Rate (Observed) Vent  --  --  --  --  20    BP  126/82  --  148/76  139/87  --       Oxygen Therapy    SpO2  96 %  96 %  95 %  95 %  96 %    Pulse Oximetry Type  --  Continuous  --  --  Continuous    Device (Oxygen Therapy)  --  NPPV/NIV  --  --  NPPV/NIV    Oxygen Concentration (%)  --  30  --  --  30    Row Name 03/10/19 1947 03/10/19 1943 03/10/19 1942 03/10/19 1906 03/10/19 1851       Height and Weight    Height  --  --  --  --  175.3 cm (69\")    Height Method  --  --  --  --  Stated    Weight  --  --  --  --  70 kg (154 lb 6.4 oz)    Weight Method  --  --  --  --  Bed scale    Ideal Body Weight (IBW) (kg)  --  --  --  --  73.69    BSA (Calculated - sq m)  --  --  --  --  1.85 sq meters    BMI (Calculated)  --  --  --  --  22.8    Weight in (lb) to have BMI = 25  --  --  --  --  168.9       Vitals    Temp  --  --  --  --  98.2 °F (36.8 °C)    Temp src  --  --  --  --  Oral    Pulse  130  (Abnormal)   125  (Abnormal)   127  (Abnormal)   124  (Abnormal)   121  (Abnormal)     Heart Rate Source  --  Monitor  Monitor  Monitor  Monitor    Resp  --  20  18  16  22    Resp Rate Source  --  Monitor  Monitor  Monitor  --    Resp Rate (Observed) Vent  --  --  --  16  --    BP  133/86  --  --  --  156/77    BP Location  --  --  --  --  Right arm    BP Method  --  --  --  --  Automatic    Patient Position  --  --  --  --  Lying       Oxygen Therapy    SpO2  98 %  97 %  96 %  95 %  94 %    Pulse Oximetry Type  --  Continuous  Continuous  Continuous  Continuous    Device (Oxygen Therapy)  --  NPPV/NIV  NPPV/NIV  NPPV/NIV  room air    Oxygen Concentration (%)  --  30  30  30  --        Intake & Output (last 3 days)       03/08 0701 - 03/09 0700 03/09 0701 - 03/10 0700 03/10 0701 - 03/11 0700 03/11 0701 - 03/12 0700    I.V. " (mL/kg)   606.1 (9)     IV Piggyback   625.5     Total Intake(mL/kg)   1231.6 (18.3)     Urine (mL/kg/hr)   625     Total Output   625     Net   +606.6                 Lines, Drains & Airways    Active LDAs     Name:   Placement date:   Placement time:   Site:   Days:    Peripheral IV 03/10/19 1947 Left Hand   03/10/19    1947    Hand   less than 1    Peripheral IV 03/10/19 1940 Right Antecubital   03/10/19    1940    Antecubital   less than 1         Inactive LDAs     None                Hospital Medications (all)       Dose Frequency Start End    acetaminophen (TYLENOL) tablet 650 mg 650 mg Every 4 Hours PRN 3/11/2019     Sig - Route: Take 2 tablets by mouth Every 4 (Four) Hours As Needed for Headache or Fever (temperature greater than 101.5 F). - Oral    albuterol (PROVENTIL) nebulizer solution 0.083% 2.5 mg/3mL 2.5 mg Every 15 Minutes 3/10/2019 3/10/2019    Sig - Route: Take 2.5 mg by nebulization Every 15 (Fifteen) Minutes. - Nebulization    amLODIPine (NORVASC) tablet 5 mg 5 mg Every Morning 3/11/2019     Sig - Route: Take 1 tablet by mouth Every Morning. - Oral    atorvastatin (LIPITOR) tablet 20 mg 20 mg Nightly 3/11/2019     Sig - Route: Take 2 tablets by mouth Every Night. - Oral    AZITHROMYCIN 500 MG/250 ML 0.9% NS IVPB (vial-mate) 500 mg Once 3/10/2019 3/11/2019    Sig - Route: Infuse 500 mg into a venous catheter 1 (One) Time. - Intravenous    cefTRIAXone (ROCEPHIN) 1 g/100 mL 0.9% NS (MBP) 1 g Once 3/10/2019 3/10/2019    Sig - Route: Infuse 100 mL into a venous catheter 1 (One) Time. - Intravenous    doxycycline (VIBRAMYCIN) 100 mg/100 mL 0.9% NS  mg Every 12 Hours 3/11/2019 3/18/2019    Sig - Route: Infuse 100 mL into a venous catheter Every 12 (Twelve) Hours. - Intravenous    folic acid (FOLVITE) tablet 1 mg 1 mg Every Morning 3/11/2019     Sig - Route: Take 1 tablet by mouth Every Morning. - Oral    guaiFENesin (MUCINEX) 12 hr tablet 1,200 mg 1,200 mg Every 12 Hours Scheduled 3/11/2019      Sig - Route: Take 2 tablets by mouth Every 12 (Twelve) Hours. - Oral    HYDROcodone-acetaminophen (NORCO) 7.5-325 MG per tablet 1 tablet 1 tablet Every 4 Hours PRN 3/11/2019     Sig - Route: Take 1 tablet by mouth Every 4 (Four) Hours As Needed for Moderate Pain . - Oral    insulin lispro (humaLOG) injection 2-7 Units 2-7 Units 4 Times Daily With Meals & Nightly 3/11/2019     Sig - Route: Inject 2-7 Units under the skin into the appropriate area as directed 4 (Four) Times a Day With Meals & at Bedtime. - Subcutaneous    ipratropium-albuterol (DUO-NEB) nebulizer solution 3 mL 3 mL Once 3/10/2019 3/10/2019    Sig - Route: Take 3 mL by nebulization 1 (One) Time. - Nebulization    ipratropium-albuterol (DUO-NEB) nebulizer solution 3 mL 3 mL 4 Times Daily - RT 3/11/2019     Sig - Route: Take 3 mL by nebulization 4 (Four) Times a Day. - Nebulization    LORazepam (ATIVAN) injection 0.5 mg 0.5 mg Once 3/10/2019 3/10/2019    Sig - Route: Infuse 0.25 mL into a venous catheter 1 (One) Time. - Intravenous    methylPREDNISolone sodium succinate (SOLU-Medrol) injection 125 mg 125 mg Once 3/10/2019 3/10/2019    Sig - Route: Infuse 2 mL into a venous catheter 1 (One) Time. - Intravenous    methylPREDNISolone sodium succinate (SOLU-Medrol) injection 20 mg 20 mg Every 12 Hours 3/11/2019     Sig - Route: Infuse 0.5 mL into a venous catheter Every 12 (Twelve) Hours. - Intravenous    metoprolol tartrate (LOPRESSOR) tablet 100 mg 100 mg Every 12 Hours Scheduled 3/11/2019     Sig - Route: Take 1 tablet by mouth Every 12 (Twelve) Hours. - Oral    morphine injection 4 mg 4 mg Once 3/10/2019 3/11/2019    Sig - Route: Infuse 1 mL into a venous catheter 1 (One) Time. - Intravenous    mycophenolate (MYFORTIC) EC tablet 540 mg 540 mg Every 12 Hours Scheduled 3/11/2019     Sig - Route: Take 540 mg by mouth Every 12 (Twelve) Hours. - Oral    ondansetron (ZOFRAN) injection 4 mg 4 mg Every 6 Hours PRN 3/11/2019     Sig - Route: Infuse 2 mL into a  venous catheter Every 6 (Six) Hours As Needed for Nausea or Vomiting. - Intravenous    pantoprazole (PROTONIX) EC tablet 40 mg 40 mg Every Morning 3/11/2019     Sig - Route: Take 1 tablet by mouth Every Morning. - Oral    piperacillin-tazobactam (ZOSYN) 3.375 g in iso-osmotic dextrose 50 ml (premix) 3.375 g Once 3/11/2019 3/11/2019    Sig - Route: Infuse 50 mL into a venous catheter 1 (One) Time. - Intravenous    piperacillin-tazobactam (ZOSYN) 3.375 g in iso-osmotic dextrose 50 ml (premix) 3.375 g Every 8 Hours 3/11/2019 3/18/2019    Sig - Route: Infuse 50 mL into a venous catheter Every 8 (Eight) Hours. - Intravenous    pregabalin (LYRICA) capsule 150 mg 150 mg Every 12 Hours Scheduled 3/11/2019     Sig - Route: Take 2 capsules by mouth Every 12 (Twelve) Hours. - Oral    sodium chloride 0.9 % bolus 500 mL 500 mL Once 3/10/2019 3/10/2019    Sig - Route: Infuse 500 mL into a venous catheter 1 (One) Time. - Intravenous    sodium chloride 0.9 % flush 10 mL 10 mL As Needed 3/10/2019     Sig - Route: Infuse 10 mL into a venous catheter As Needed for Line Care. - Intravenous    sodium chloride 0.9 % flush 3 mL 3 mL Every 12 Hours Scheduled 3/11/2019     Sig - Route: Infuse 3 mL into a venous catheter Every 12 (Twelve) Hours. - Intravenous    sodium chloride 0.9 % flush 3-10 mL 3-10 mL As Needed 3/11/2019     Sig - Route: Infuse 3-10 mL into a venous catheter As Needed for Line Care. - Intravenous    sodium chloride 0.9 % infusion 100 mL/hr Continuous 3/11/2019     Sig - Route: Infuse 100 mL/hr into a venous catheter Continuous. - Intravenous    tacrolimus (PROGRAF) capsule 0.5 mg 0.5 mg Nightly 3/11/2019     Sig - Route: Take 1 capsule by mouth Every Night. - Oral    tacrolimus (PROGRAF) capsule 1 mg 1 mg Every Morning 3/11/2019     Sig - Route: Take 1 capsule by mouth Every Morning. - Oral    tiZANidine (ZANAFLEX) tablet 4 mg 4 mg Every 6 Hours PRN 3/11/2019     Sig - Route: Take 1 tablet by mouth Every 6 (Six) Hours As  Needed for Muscle Spasms. - Oral    tobramycin 0.3 % ophthalmic solution 2 drop 2 drop Every 4 Hours Scheduled 3/11/2019     Sig - Route: Administer 2 drops to the right eye Every 4 (Four) Hours. - Right Eye    vitamin B-12 (CYANOCOBALAMIN) tablet 500 mcg 500 mcg Every Morning 3/11/2019     Sig - Route: Take 1 tablet by mouth Every Morning. - Oral          Lab Results (last 7 days)     Procedure Component Value Units Date/Time    Blood Culture - Blood, Arm, Right [[id number]] Collected:  03/10/19 1929    Specimen:  Blood from Arm, Right Updated:  03/11/19 0800     Blood Culture No growth at less than 24 hours    Blood Culture - Blood, Hand, Left [[id number]] Collected:  03/10/19 1910    Specimen:  Blood from Hand, Left Updated:  03/11/19 0730     Blood Culture No growth at less than 24 hours    S. Pneumo Ag Urine or CSF - Urine, Urine, Clean Catch [[id number]]  (Normal) Collected:  03/11/19 0616    Specimen:  Urine, Clean Catch Updated:  03/11/19 0658     Strep Pneumo Ag Negative    Legionella Antigen, Urine - Urine, Urine, Clean Catch [[id number]]  (Normal) Collected:  03/11/19 0616    Specimen:  Urine, Clean Catch Updated:  03/11/19 0658     LEGIONELLA ANTIGEN, URINE Negative    Blood Gas, Arterial [[id number]]  (Abnormal) Collected:  03/11/19 0520    Specimen:  Arterial Blood Updated:  03/11/19 0527     Site Left Radial     Nikolas's Test Positive     pH, Arterial 7.419 pH units      pCO2, Arterial 44.3 mm Hg      pO2, Arterial 56.4 mm Hg      Comment: 84 Value below reference range        HCO3, Arterial 28.7 mmol/L      Comment: 83 Value above reference range        Base Excess, Arterial 3.6 mmol/L      Comment: 83 Value above reference range        O2 Saturation, Arterial 88.8 %      Comment: 84 Value below reference range        Temperature 37.0 C      Barometric Pressure for Blood Gas 758 mmHg      Modality Room Air     Ventilator Mode NA     Collected by [id number]     Comment: Meter: [id number]     :   [id number]       SCANNED - LABS [[id number]] Resulted:  03/10/19      Updated:  03/11/19 0444    Comprehensive Metabolic Panel [[id number]]  (Abnormal) Collected:  03/11/19 0313    Specimen:  Blood Updated:  03/11/19 0347     Glucose 258 mg/dL      BUN 31 mg/dL      Creatinine 1.29 mg/dL      Sodium 136 mmol/L      Potassium 3.8 mmol/L      Chloride 94 mmol/L      CO2 26.0 mmol/L      Calcium 8.9 mg/dL      Total Protein 6.0 g/dL      Albumin 4.00 g/dL      ALT (SGPT) 27 U/L      AST (SGOT) 18 U/L      Alkaline Phosphatase 56 U/L      Total Bilirubin 0.9 mg/dL      eGFR Non African Amer 57 mL/min/1.73      Globulin 2.0 gm/dL      A/G Ratio 2.0 g/dL      BUN/Creatinine Ratio 24.0     Anion Gap 16.0 mmol/L     CBC Auto Differential [[id number]]  (Abnormal) Collected:  03/11/19 0313    Specimen:  Blood Updated:  03/11/19 0332     WBC 13.06 10*3/mm3      RBC 4.59 10*6/mm3      Hemoglobin 13.3 g/dL      Hematocrit 39.7 %      MCV 86.5 fL      MCH 29.0 pg      MCHC 33.5 g/dL      RDW 13.1 %      RDW-SD 40.7 fl      MPV 11.6 fL      Platelets 187 10*3/mm3      Neutrophil % 95.5 %      Lymphocyte % 1.4 %      Monocyte % 1.8 %      Eosinophil % 0.0 %      Basophil % 0.2 %      Immature Grans % 1.1 %      Neutrophils, Absolute 12.47 10*3/mm3      Lymphocytes, Absolute 0.18 10*3/mm3      Monocytes, Absolute 0.23 10*3/mm3      Eosinophils, Absolute 0.00 10*3/mm3      Basophils, Absolute 0.03 10*3/mm3      Immature Grans, Absolute 0.15 10*3/mm3      nRBC 0.0 /100 WBC     Respiratory Culture - Sputum, Cough [[id number]] Collected:  03/11/19 0306    Specimen:  Sputum from Cough Updated:  03/11/19 0315    Respiratory Culture - Sputum, Cough [[id number]] Collected:  03/10/19 2254    Specimen:  Sputum from Cough Updated:  03/10/19 2258    Troponin [[id number]]  (Normal) Collected:  03/10/19 2046    Specimen:  Blood Updated:  03/10/19 2115     Troponin I <0.012 ng/mL     BNP [[id number]]  (Normal) Collected:  03/10/19 2046    Specimen:  Blood  Updated:  03/10/19 2115     proBNP 276.0 pg/mL     Comprehensive Metabolic Panel [[id number]]  (Abnormal) Collected:  03/10/19 2046    Specimen:  Blood Updated:  03/10/19 2104     Glucose 139 mg/dL      BUN 33 mg/dL      Creatinine 1.66 mg/dL      Sodium 139 mmol/L      Potassium 3.3 mmol/L      Chloride 93 mmol/L      CO2 32.0 mmol/L      Calcium 9.3 mg/dL      Total Protein 6.9 g/dL      Albumin 4.50 g/dL      ALT (SGPT) 23 U/L      AST (SGOT) 20 U/L      Alkaline Phosphatase 68 U/L      Total Bilirubin 0.9 mg/dL      eGFR Non African Amer 42 mL/min/1.73      Globulin 2.4 gm/dL      A/G Ratio 1.9 g/dL      BUN/Creatinine Ratio 19.9     Anion Gap 14.0 mmol/L     Influenza Antigen, Rapid - Swab, Nasopharynx [[id number]]  (Normal) Collected:  03/10/19 2036    Specimen:  Swab from Nasopharynx Updated:  03/10/19 2052     Influenza A Ag, EIA Negative     Influenza B Ag, EIA Negative    Narrative:       Recommend confirmation of negative results by viral culture or molecular assay.    [place] Draw [[id number]] Collected:  03/10/19 1910    Specimen:  Blood Updated:  03/10/19 2030    Narrative:       The following orders were created for panel order [place] Draw.  Procedure                               Abnormality         Status                     ---------                               -----------         ------                     Light Blue Top[[id number]]                                   Final result               Green Top (Gel)[[id number]]                                  Final result               Lavender Top[[id number]]                                     Final result               Red Top[[id number]]                                          Final result                 Please view results for these tests on the individual orders.    Lavender Top [[id number]] Collected:  03/10/19 1929    Specimen:  Blood Updated:  03/10/19 2030     Extra Tube hold for add-on     Comment: Auto resulted       Green Top (Gel) [[id number]]  Collected:  03/10/19 1910    Specimen:  Blood Updated:  03/10/19 2016     Extra Tube Hold for add-ons.     Comment: Auto resulted.       Red Top [[id number]] Collected:  03/10/19 1910    Specimen:  Blood Updated:  03/10/19 2016     Extra Tube Hold for add-ons.     Comment: Auto resulted.       Light Blue Top [[id number]] Collected:  03/10/19 1910    Specimen:  Blood Updated:  03/10/19 2015     Extra Tube hold for add-on     Comment: Auto resulted       Lactic Acid, Plasma [[id number]]  (Normal) Collected:  03/10/19 1953    Specimen:  Blood Updated:  03/10/19 2009     Lactate 1.2 mmol/L     Urinalysis, Microscopic Only - Urine, Clean Catch [[id number]]  (Abnormal) Collected:  03/10/19 1940    Specimen:  Urine, Clean Catch Updated:  03/10/19 2005     RBC, UA 0-2 /HPF      WBC, UA None Seen /HPF      Bacteria, UA None Seen /HPF      Squamous Epithelial Cells, UA None Seen /HPF      Hyaline Casts, UA None Seen /LPF      Methodology Automated Microscopy    Urinalysis With Culture If Indicated - Urine, Clean Catch [[id number]]  (Abnormal) Collected:  03/10/19 1940    Specimen:  Urine, Clean Catch Updated:  03/10/19 2002     Color, UA Yellow     Appearance, UA Clear     pH, UA 6.0     Specific Gravity, UA 1.015     Glucose, UA Negative     Ketones, UA Negative     Bilirubin, UA Negative     Blood, UA Trace     Protein, UA Trace     Leuk Esterase, UA Negative     Nitrite, UA Negative     Urobilinogen, UA 0.2 E.U./dL    CBC & Differential [[id number]] Collected:  03/10/19 1929    Specimen:  Blood Updated:  03/10/19 1938    Narrative:       The following orders were created for panel order CBC & Differential.  Procedure                               Abnormality         Status                     ---------                               -----------         ------                     CBC Auto Differential[[id number]]        Abnormal            Final result                 Please view results for these tests on the individual orders.     CBC Auto Differential [[id number]]  (Abnormal) Collected:  03/10/19 1929    Specimen:  Blood Updated:  03/10/19 1938     WBC 13.11 10*3/mm3      RBC 5.17 10*6/mm3      Hemoglobin 14.9 g/dL      Hematocrit 44.0 %      MCV 85.1 fL      MCH 28.8 pg      MCHC 33.9 g/dL      RDW 13.1 %      RDW-SD 39.9 fl      MPV 11.4 fL      Platelets 214 10*3/mm3      Neutrophil % 77.4 %      Lymphocyte % 10.2 %      Monocyte % 9.8 %      Eosinophil % 0.5 %      Basophil % 0.5 %      Immature Grans % 1.6 %      Neutrophils, Absolute 10.13 10*3/mm3      Lymphocytes, Absolute 1.34 10*3/mm3      Monocytes, Absolute 1.29 10*3/mm3      Eosinophils, Absolute 0.07 10*3/mm3      Basophils, Absolute 0.07 10*3/mm3      Immature Grans, Absolute 0.21 10*3/mm3      nRBC 0.0 /100 WBC     Tacrolimus Level [[id number]] Collected:  03/10/19 1929    Specimen:  Blood Updated:  03/10/19 1934    Blood Gas, Arterial [[id number]]  (Abnormal) Collected:  03/10/19 1853    Specimen:  Arterial Blood Updated:  03/10/19 1854     Site Right Radial     Nikolas's Test Positive     pH, Arterial 7.453 pH units      Comment: 83 Value above reference range        pCO2, Arterial 43.0 mm Hg      pO2, Arterial 60.8 mm Hg      Comment: 84 Value below reference range        HCO3, Arterial 30.1 mmol/L      Comment: 83 Value above reference range        Base Excess, Arterial 5.4 mmol/L      Comment: 83 Value above reference range        O2 Saturation, Arterial 90.8 %      Comment: 84 Value below reference range        Temperature 37.0 C      Barometric Pressure for Blood Gas 757 mmHg      Modality Room Air     Ventilator Mode NA     Collected by [id number]     Comment: Meter: [id number]     :  [id number]             Imaging Results (last 72 hours)     Procedure Component Value Units Date/Time    XR Chest 1 View [[id number]] Collected:  03/10/19 2039     Updated:  03/10/19 2043    Narrative:       EXAMINATION: XR CHEST 1 VW-. 3/10/2019 8:39 PM CDT     CHEST, ONE VIEW:      HISTORY: Sepsis     COMPARISON: 5/8/2018, 12/4/2017 and 5/17/2017     A single frontal chest radiograph was obtained.     FINDINGS:     Severe right-sided pulmonary emphysema observed. Postsurgical changes in  the right perihilar region identified.     No definite acute lung infiltrates observed.     The heart is normal in size, without heart failure.     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 No acute osseous  abnormalities identified.                                     Impression:       1. Postsurgical/chronic changes.  2. No definite acute cardiopulmonary process.     This report was finalized on 03/10/2019 20:40 by Dr. Wilder Salinas MD.          Orders (last 72 hrs)     Start     Ordered    03/11/19 0800  piperacillin-tazobactam (ZOSYN) 3.375 g in iso-osmotic dextrose 50 ml (premix)  Every 8 Hours      03/11/19 0050 03/11/19 0800  insulin lispro (humaLOG) injection 2-7 Units  4 Times Daily With Meals & Nightly      03/11/19 0525    03/11/19 0730  ipratropium-albuterol (DUO-NEB) nebulizer solution 3 mL  4 Times Daily - RT      03/11/19 0050 03/11/19 0730  methylPREDNISolone sodium succinate (SOLU-Medrol) injection 20 mg  Every 12 Hours      03/11/19 0050 03/11/19 0700  amLODIPine (NORVASC) tablet 5 mg  Every Morning      03/11/19 0050    03/11/19 0700  folic acid (FOLVITE) tablet 1 mg  Every Morning      03/11/19 0050 03/11/19 0700  pantoprazole (PROTONIX) EC tablet 40 mg  Every Morning      03/11/19 0050    03/11/19 0700  tacrolimus (PROGRAF) capsule 1 mg  Every Morning      03/11/19 0050    03/11/19 0700  vitamin B-12 (CYANOCOBALAMIN) tablet 500 mcg  Every Morning      03/11/19 0050    03/11/19 0700  POC Glucose 4x Daily AC & at Bedtime  4 Times Daily Before Meals & at Bedtime      03/11/19 0525    03/11/19 0600  Comprehensive Metabolic Panel  Morning Draw      03/11/19 0050    03/11/19 0600  CBC Auto Differential  Morning Draw      03/11/19 0050 03/11/19 0528  Blood Gas, Arterial   Once      03/11/19 0520 03/11/19 0526  Do NOT Hold Basal Insulin When Patient is NPO, Hold Bolus Dose if NPO  Continuous      03/11/19 0525 03/11/19 0526  Follow [facility] Hypoglycemia Standing Orders For Blood Glucose Less Than 70 mg/dL  Until Discontinued      03/11/19 0525 03/11/19 0526  Document event and patients response to treatment in EMR, any medications given to be documented on MAR.  Once      03/11/19 0525 03/11/19 0515  Blood Gas, Arterial  Once      03/11/19 0514    03/11/19 0200  piperacillin-tazobactam (ZOSYN) 3.375 g in iso-osmotic dextrose 50 ml (premix)  Once      03/11/19 0050 03/11/19 0145  atorvastatin (LIPITOR) tablet 20 mg  Nightly      03/11/19 0050 03/11/19 0145  metoprolol tartrate (LOPRESSOR) tablet 100 mg  Every 12 Hours Scheduled      03/11/19 0050 03/11/19 0145  mycophenolate (MYFORTIC) EC tablet 540 mg  Every 12 Hours Scheduled      03/11/19 0050 03/11/19 0145  pregabalin (LYRICA) capsule 150 mg  Every 12 Hours Scheduled      03/11/19 0050 03/11/19 0145  tacrolimus (PROGRAF) capsule 0.5 mg  Nightly      03/11/19 0050 03/11/19 0145  tobramycin 0.3 % ophthalmic solution 2 drop  Every 4 Hours Scheduled      03/11/19 0050 03/11/19 0145  sodium chloride 0.9 % flush 3 mL  Every 12 Hours Scheduled      03/11/19 0050 03/11/19 0145  sodium chloride 0.9 % infusion  Continuous      03/11/19 0050 03/11/19 0145  guaiFENesin (MUCINEX) 12 hr tablet 1,200 mg  Every 12 Hours Scheduled      03/11/19 0050 03/11/19 0145  doxycycline (VIBRAMYCIN) 100 mg/100 mL 0.9% NS MBP  Every 12 Hours      03/11/19 0050 03/11/19 0100  Vital Signs Every Hour and Per Hospital Policy Based on Patient Condition  Every Hour      03/11/19 0050 03/11/19 0100  Intake and Output  Every Hour      03/11/19 0050 03/11/19 0051  Cardiac Monitoring  Continuous      03/11/19 0050    03/11/19 0051  Continuous Pulse Oximetry  Continuous      03/11/19 0050    03/11/19 0051  Strict Bed Rest   Until Discontinued      03/11/19 0050    03/11/19 0051  Use Mobility Guidelines for Advancement of Activity  Continuous      03/11/19 0050    03/11/19 0051  Height & Weight  Once      03/11/19 0050    03/11/19 0051  Daily Weights  Daily      03/11/19 0050    03/11/19 0051  Insert Peripheral IV  Once      03/11/19 0050    03/11/19 0051  Saline Lock & Maintain IV Access  Continuous      03/11/19 0050    03/11/19 0051  Place Sequential Compression Device  Once      03/11/19 0050    03/11/19 0051  Maintain Sequential Compression Device  Continuous      03/11/19 0050    03/11/19 0051  Diet Regular  Diet Effective Now      03/11/19 0050    03/11/19 0051  Inpatient Pulmonology Consult  Once     Specialty:  Pulmonary Disease  Provider:  Erik Richardson MD    03/11/19 0050    03/11/19 0051  Oxygen Therapy- Nasal Cannula; Titrate for SPO2: 90%  Continuous      03/11/19 0050    03/11/19 0051  S. Pneumo Ag Urine or CSF - Urine, Urine, Clean Catch  Once      03/11/19 0050    03/11/19 0051  Legionella Antigen, Urine - Urine, Urine, Clean Catch  Once      03/11/19 0050    03/11/19 0051  Respiratory Culture - Sputum, Cough  Once      03/11/19 0050    03/11/19 0050  tiZANidine (ZANAFLEX) tablet 4 mg  Every 6 Hours PRN      03/11/19 0050    03/11/19 0050  HYDROcodone-acetaminophen (NORCO) 7.5-325 MG per tablet 1 tablet  Every 4 Hours PRN      03/11/19 0050    03/11/19 0050  sodium chloride 0.9 % flush 3-10 mL  As Needed      03/11/19 0050    03/11/19 0050  ondansetron (ZOFRAN) injection 4 mg  Every 6 Hours PRN      03/11/19 0050    03/11/19 0050  acetaminophen (TYLENOL) tablet 650 mg  Every 4 Hours PRN      03/11/19 0050    03/11/19 0041  Code Status and Medical Interventions:  Continuous      03/11/19 0044    03/11/19 0004  Inpatient Admission  Once      03/11/19 0004    03/10/19 2347  morphine injection 4 mg  Once      03/10/19 2347    03/10/19 3385  Hospitalist (on-call MD unless specified)  Once     Specialty:  Hospitalist   Provider:  (Not yet assigned)    03/10/19 2333    03/10/19 2319  cefTRIAXone (ROCEPHIN) 1 g/100 mL 0.9% NS (MBP)  Once      03/10/19 2317    03/10/19 2319  AZITHROMYCIN 500 MG/250 ML 0.9% NS IVPB (vial-mate)  Once      03/10/19 2317    03/10/19 2016  LORazepam (ATIVAN) injection 0.5 mg  Once      03/10/19 2014    03/10/19 2015  Comprehensive Metabolic Panel  Once      03/10/19 1904    03/10/19 2015  Troponin  Once      03/10/19 1904    03/10/19 2015  BNP  STAT      03/10/19 1904    03/10/19 2000  NIPPV (CPAP or BIPAP)  Until Discontinued      03/10/19 2000    03/10/19 1953  Urinalysis, Microscopic Only - Urine, Clean Catch  Once      03/10/19 1952    03/10/19 1933  Lactic Acid, Plasma  Once      03/10/19 1904    03/10/19 1912  Urinalysis With Culture If Indicated - Urine, Clean Catch  Once      03/10/19 1911    03/10/19 1908  methylPREDNISolone sodium succinate (SOLU-Medrol) injection 125 mg  Once      03/10/19 1906    03/10/19 1906  ipratropium-albuterol (DUO-NEB) nebulizer solution 3 mL  Once      03/10/19 1904    03/10/19 1906  albuterol (PROVENTIL) nebulizer solution 0.083% 2.5 mg/3mL  Every 15 Minutes      03/10/19 1904    03/10/19 1906  sodium chloride 0.9 % bolus 500 mL  Once      03/10/19 1904    03/10/19 1906  Tacrolimus Level  Once      03/10/19 1906    03/10/19 1904  NIPPV (CPAP or BIPAP)  Until Discontinued,   Status:  Canceled      03/10/19 1904    03/10/19 1903  Influenza Antigen, Rapid - Swab, Nasopharynx  Once      03/10/19 1904    03/10/19 1903  Respiratory Culture - Sputum, Cough  Once      03/10/19 1904    03/10/19 1903  NPO Diet  Diet Effective Now,   Status:  Canceled      03/10/19 1904    03/10/19 1902  Undress and Gown  Once      03/10/19 1904    03/10/19 1902  Continuous Pulse Oximetry  Per Hospital Policy,   Status:  Canceled      03/10/19 1904    03/10/19 1902  Vital Signs  Every 30 Minutes      03/10/19 1904    03/10/19 1902  Insert Peripheral IV  Once      03/10/19 1904    03/10/19 1902   [place] Draw  Once      03/10/19 1904    03/10/19 1902  CBC & Differential  Once      03/10/19 1904    03/10/19 1902  Blood Culture - Blood,  Once      03/10/19 1904    03/10/19 1902  Blood Culture - Blood,  Once      03/10/19 1904    03/10/19 1902  XR Chest 1 View  1 Time Imaging      03/10/19 1904    03/10/19 1902  Cardiac Monitoring  Per Hospital Policy,   Status:  Canceled      03/10/19 1904    03/10/19 1902  ECG 12 Lead  Once      03/10/19 1904    03/10/19 1902  Light Blue Top  PROCEDURE ONCE      03/10/19 1904    03/10/19 1902  Green Top (Gel)  PROCEDURE ONCE      03/10/19 1904    03/10/19 1902  Lavender Top  PROCEDURE ONCE      03/10/19 1904    03/10/19 1902  Red Top  PROCEDURE ONCE      03/10/19 1904    03/10/19 1902  CBC Auto Differential  PROCEDURE ONCE      03/10/19 1904    03/10/19 1901  sodium chloride 0.9 % flush 10 mL  As Needed      03/10/19 1904    03/10/19 1859  ECG 12 Lead  Once      03/10/19 1859    03/10/19 1855  Blood Gas, Arterial  Once      03/10/19 1853    --  SCANNED - LABS      03/10/19 0000          Ventilator/Non-Invasive Ventilation Settings (From admission, onward)    Start     Ordered    03/10/19 2000  NIPPV (CPAP or BIPAP)  Until Discontinued     Question Answer Comment   Type: AVAPS/PC/PS    NIPPV Mask Interface: Per Patient Preference    Backup Rate 18    Target VT (mL) 600    EPAP/PEEP (cm H2O) 10    Min Pressure (cm H2O) 14    Max Pressure (cm H2O) 40        03/10/19 2000    03/10/19 1904  NIPPV (CPAP or BIPAP)  Until Discontinued,   Status:  Canceled     Question Answer Comment   Type: BIPAP    NIPPV Mask Interface: Per Patient Preference        03/10/19 1904          Physician Progress Notes (last 72 hours) (Notes from 3/8/2019  8:24 AM through 3/11/2019  8:24 AM)     No notes of this type exist for this encounter.        Consult Notes (last 72 hours) (Notes from 3/8/2019  8:24 AM through 3/11/2019  8:24 AM)     No notes of this type exist for this encounter.

## 2019-03-11 NOTE — NURSING NOTE
Patient arrived via stretcher, on tele and O2 monitoring. Accompanied By YOON Hussein and TOMASA Rocha.

## 2019-03-11 NOTE — ED PROVIDER NOTES
Subjective   History of Present Illness    58-year-old male presenting with difficulty breathing.    Patient was onset of symptoms several days prior, gradually worsening, associated with new cough productive of greenish foul-smelling sputum.  The difficulty breathing is associated with chest tightness.  Patient has a complicated pulmonary history, status post left lung transplant and right lung resection due to severe COPD.  Patient reports using his albuterol nebulizer and inhaler at home with only minimal benefit.  Reports subjective chills but no fevers.    Patient denies any associated abdominal pain, vomiting, diarrhea, difficulty urinating.  Reports compliance with all of his medications.    Review of Systems   Constitutional: Positive for chills. Negative for fever.   HENT: Negative for sinus pain.    Eyes: Negative for pain.   Respiratory: Positive for cough, chest tightness and shortness of breath.    Cardiovascular: Negative for chest pain.   Gastrointestinal: Negative for abdominal pain.   Genitourinary: Negative for flank pain.   Musculoskeletal: Negative for back pain.   Skin: Negative for wound.   Neurological: Negative for syncope and weakness.   Psychiatric/Behavioral: The patient is not hyperactive.        Past Medical History:   Diagnosis Date   • Cancer (CMS/[facility])     Skin   • Chronic back pain    • Chronic pain syndrome 12/4/2018   • Chronic respiratory failure with hypoxia (CMS/[facility]) 12/4/2018   • COPD (chronic obstructive pulmonary disease) (CMS/[facility])    • COPD, group D, by GOLD 2017 classification (CMS/[facility]) 11/29/2018   • Diabetes mellitus (CMS/[facility])    • Emphysema lung (CMS/[facility])    • Essential hypertension 12/4/2018   • Gastroesophageal reflux disease without esophagitis 12/4/2018   • Hypertension    • Lung transplant status (CMS/[facility]) 12/4/2018   • Lung transplanted (CMS/[facility])    • Personal history of nicotine dependence 12/4/2018       Allergies   Allergen Reactions   • Clindamycin/Lincomycin  Diarrhea   • Moxifloxacin Rash     Sweating        Past Surgical History:   Procedure Laterality Date   • APPENDECTOMY     • CHOLECYSTECTOMY     • COLONOSCOPY     • ENDOSCOPY     • LUNG REMOVAL, TOTAL      right upper lobe   • LUNG TRANSPLANT      2006       Family History   Problem Relation Age of Onset   • Heart disease Father    • Heart attack Brother        Social History     Socioeconomic History   • Marital status:      Spouse name: Not on file   • Number of children: Not on file   • Years of education: Not on file   • Highest education level: Not on file   Tobacco Use   • Smoking status: Former Smoker   • Smokeless tobacco: Former User   • Tobacco comment: quit 9 years ago   Substance and Sexual Activity   • Alcohol use: No   • Drug use: No     Comment: when in severe pain    • Sexual activity: Defer           Objective   Physical Exam   Constitutional: He is oriented to person, place, and time. He appears well-developed and well-nourished.   HENT:   Head: Normocephalic and atraumatic.   Eyes: Conjunctivae are normal.   Neck: Normal range of motion.   Cardiovascular: Intact distal pulses.   Regular tachycardia   Pulmonary/Chest: Accessory muscle usage present. Tachypnea noted. He is in respiratory distress. He has decreased breath sounds in the right upper field and the right lower field. He has wheezes. He has rhonchi.   Abdominal: Soft. He exhibits no distension. There is no tenderness.   Musculoskeletal: He exhibits no edema.   Neurological: He is alert and oriented to person, place, and time.   Skin: Skin is warm and dry.   Psychiatric: He has a normal mood and affect.   Nursing note and vitals reviewed.      Procedures           ED Course                  MDM  Number of Diagnoses or Management Options  COPD exacerbation (CMS/HCC):   Diagnosis management comments: 58-year-old male in respiratory distress.  Differential includes COPD exacerbation, pneumonia, influenza, bronchitis, pneumothorax.   Initial stabilization included initiation of BiPAP.  Patient's ABG without any evidence of CO2 retention, supports a diagnosis of pneumonia.  Patient will be treated with steroids stacked nebulizer treatment and antibiotics.       Amount and/or Complexity of Data Reviewed  Clinical lab tests: reviewed  Tests in the radiology section of CPT®: reviewed  Tests in the medicine section of CPT®: reviewed  Decide to obtain previous medical records or to obtain history from someone other than the patient: yes          Final diagnoses:   COPD exacerbation (CMS/[facility])            Sebastián Hernandez MD  03/11/19 0701

## 2019-03-12 LAB
ANION GAP SERPL CALCULATED.3IONS-SCNC: 13 MMOL/L (ref 4–13)
BUN BLD-MCNC: 35 MG/DL (ref 5–21)
BUN/CREAT SERPL: 27.1 (ref 7–25)
CALCIUM SPEC-SCNC: 9.8 MG/DL (ref 8.4–10.4)
CHLORIDE SERPL-SCNC: 97 MMOL/L (ref 98–110)
CO2 SERPL-SCNC: 28 MMOL/L (ref 24–31)
CREAT BLD-MCNC: 1.29 MG/DL (ref 0.5–1.4)
DEPRECATED RDW RBC AUTO: 40.3 FL (ref 40–54)
ERYTHROCYTE [DISTWIDTH] IN BLOOD BY AUTOMATED COUNT: 13.2 % (ref 12–15)
GFR SERPL CREATININE-BSD FRML MDRD: 57 ML/MIN/1.73
GLUCOSE BLD-MCNC: 255 MG/DL (ref 70–100)
GLUCOSE BLDC GLUCOMTR-MCNC: 240 MG/DL (ref 70–130)
GLUCOSE BLDC GLUCOMTR-MCNC: 268 MG/DL (ref 70–130)
GLUCOSE BLDC GLUCOMTR-MCNC: 289 MG/DL (ref 70–130)
GLUCOSE BLDC GLUCOMTR-MCNC: 323 MG/DL (ref 70–130)
HCT VFR BLD AUTO: 38.8 % (ref 40–52)
HGB BLD-MCNC: 13.3 G/DL (ref 14–18)
MCH RBC QN AUTO: 28.9 PG (ref 28–32)
MCHC RBC AUTO-ENTMCNC: 34.3 G/DL (ref 33–36)
MCV RBC AUTO: 84.2 FL (ref 82–95)
PLATELET # BLD AUTO: 215 10*3/MM3 (ref 130–400)
PMV BLD AUTO: 11.5 FL (ref 6–12)
POTASSIUM BLD-SCNC: 3.6 MMOL/L (ref 3.5–5.3)
RBC # BLD AUTO: 4.61 10*6/MM3 (ref 4.8–5.9)
SODIUM BLD-SCNC: 138 MMOL/L (ref 135–145)
WBC NRBC COR # BLD: 16.88 10*3/MM3 (ref 4.8–10.8)

## 2019-03-12 PROCEDURE — 94760 N-INVAS EAR/PLS OXIMETRY 1: CPT

## 2019-03-12 PROCEDURE — 94799 UNLISTED PULMONARY SVC/PX: CPT

## 2019-03-12 PROCEDURE — 80048 BASIC METABOLIC PNL TOTAL CA: CPT | Performed by: INTERNAL MEDICINE

## 2019-03-12 PROCEDURE — 82962 GLUCOSE BLOOD TEST: CPT

## 2019-03-12 PROCEDURE — 99232 SBSQ HOSP IP/OBS MODERATE 35: CPT | Performed by: INTERNAL MEDICINE

## 2019-03-12 PROCEDURE — 63710000001 INSULIN LISPRO (HUMAN) PER 5 UNITS: Performed by: INTERNAL MEDICINE

## 2019-03-12 PROCEDURE — 94668 MNPJ CHEST WALL SBSQ: CPT

## 2019-03-12 PROCEDURE — 63710000001 TACROLIMUS PER 1 MG: Performed by: INTERNAL MEDICINE

## 2019-03-12 PROCEDURE — 63710000001 MYCOPHENOLATE PER 180 MG: Performed by: INTERNAL MEDICINE

## 2019-03-12 PROCEDURE — 85027 COMPLETE CBC AUTOMATED: CPT | Performed by: INTERNAL MEDICINE

## 2019-03-12 PROCEDURE — 25010000002 METHYLPREDNISOLONE PER 40 MG: Performed by: NURSE PRACTITIONER

## 2019-03-12 PROCEDURE — 63710000001 MYCOPHENOLATE 360 MG TABLET DELAYED-RELEASE: Performed by: INTERNAL MEDICINE

## 2019-03-12 PROCEDURE — 25010000002 PIPERACILLIN SOD-TAZOBACTAM PER 1 G: Performed by: INTERNAL MEDICINE

## 2019-03-12 RX ADMIN — GUAIFENESIN 1200 MG: 600 TABLET, EXTENDED RELEASE ORAL at 21:27

## 2019-03-12 RX ADMIN — IPRATROPIUM BROMIDE AND ALBUTEROL SULFATE 3 ML: 2.5; .5 SOLUTION RESPIRATORY (INHALATION) at 06:57

## 2019-03-12 RX ADMIN — INSULIN LISPRO 3 UNITS: 100 INJECTION, SOLUTION INTRAVENOUS; SUBCUTANEOUS at 12:06

## 2019-03-12 RX ADMIN — Medication 500 MCG: at 06:10

## 2019-03-12 RX ADMIN — Medication 3 MG: at 21:29

## 2019-03-12 RX ADMIN — ATORVASTATIN CALCIUM 20 MG: 10 TABLET, FILM COATED ORAL at 21:27

## 2019-03-12 RX ADMIN — DOXYCYCLINE 100 MG: 100 INJECTION, POWDER, LYOPHILIZED, FOR SOLUTION INTRAVENOUS at 01:10

## 2019-03-12 RX ADMIN — PREGABALIN 150 MG: 75 CAPSULE ORAL at 21:26

## 2019-03-12 RX ADMIN — DOXYCYCLINE 100 MG: 100 INJECTION, POWDER, LYOPHILIZED, FOR SOLUTION INTRAVENOUS at 14:20

## 2019-03-12 RX ADMIN — TACROLIMUS 0.5 MG: 0.5 CAPSULE, GELATIN COATED ORAL at 21:27

## 2019-03-12 RX ADMIN — HYDROCODONE BITARTRATE AND ACETAMINOPHEN 1 TABLET: 10; 325 TABLET ORAL at 10:01

## 2019-03-12 RX ADMIN — PANTOPRAZOLE SODIUM 40 MG: 40 TABLET, DELAYED RELEASE ORAL at 06:10

## 2019-03-12 RX ADMIN — IPRATROPIUM BROMIDE AND ALBUTEROL SULFATE 3 ML: 2.5; .5 SOLUTION RESPIRATORY (INHALATION) at 11:05

## 2019-03-12 RX ADMIN — INSULIN LISPRO 4 UNITS: 100 INJECTION, SOLUTION INTRAVENOUS; SUBCUTANEOUS at 08:00

## 2019-03-12 RX ADMIN — GUAIFENESIN 1200 MG: 600 TABLET, EXTENDED RELEASE ORAL at 08:00

## 2019-03-12 RX ADMIN — TAZOBACTAM SODIUM AND PIPERACILLIN SODIUM 3.38 G: 375; 3 INJECTION, SOLUTION INTRAVENOUS at 17:20

## 2019-03-12 RX ADMIN — INSULIN LISPRO 5 UNITS: 100 INJECTION, SOLUTION INTRAVENOUS; SUBCUTANEOUS at 21:26

## 2019-03-12 RX ADMIN — ACETYLCYSTEINE 1.5 ML: 200 SOLUTION ORAL; RESPIRATORY (INHALATION) at 06:57

## 2019-03-12 RX ADMIN — METHYLPREDNISOLONE SODIUM SUCCINATE 40 MG: 40 INJECTION, POWDER, FOR SOLUTION INTRAMUSCULAR; INTRAVENOUS at 14:20

## 2019-03-12 RX ADMIN — METOPROLOL TARTRATE 100 MG: 100 TABLET, FILM COATED ORAL at 21:27

## 2019-03-12 RX ADMIN — ACETYLCYSTEINE 1.5 ML: 200 SOLUTION ORAL; RESPIRATORY (INHALATION) at 15:16

## 2019-03-12 RX ADMIN — PREGABALIN 150 MG: 75 CAPSULE ORAL at 08:00

## 2019-03-12 RX ADMIN — IPRATROPIUM BROMIDE AND ALBUTEROL SULFATE 3 ML: 2.5; .5 SOLUTION RESPIRATORY (INHALATION) at 03:11

## 2019-03-12 RX ADMIN — METOPROLOL TARTRATE 100 MG: 100 TABLET, FILM COATED ORAL at 08:00

## 2019-03-12 RX ADMIN — TAZOBACTAM SODIUM AND PIPERACILLIN SODIUM 3.38 G: 375; 3 INJECTION, SOLUTION INTRAVENOUS at 11:08

## 2019-03-12 RX ADMIN — AMLODIPINE BESYLATE 5 MG: 5 TABLET ORAL at 06:10

## 2019-03-12 RX ADMIN — TACROLIMUS 1 MG: 1 CAPSULE, GELATIN COATED ORAL at 06:10

## 2019-03-12 RX ADMIN — SODIUM CHLORIDE, PRESERVATIVE FREE 3 ML: 5 INJECTION INTRAVENOUS at 21:29

## 2019-03-12 RX ADMIN — TAZOBACTAM SODIUM AND PIPERACILLIN SODIUM 3.38 G: 375; 3 INJECTION, SOLUTION INTRAVENOUS at 06:10

## 2019-03-12 RX ADMIN — METHYLPREDNISOLONE SODIUM SUCCINATE 40 MG: 40 INJECTION, POWDER, FOR SOLUTION INTRAMUSCULAR; INTRAVENOUS at 01:10

## 2019-03-12 RX ADMIN — IPRATROPIUM BROMIDE AND ALBUTEROL SULFATE 3 ML: 2.5; .5 SOLUTION RESPIRATORY (INHALATION) at 23:46

## 2019-03-12 RX ADMIN — IPRATROPIUM BROMIDE AND ALBUTEROL SULFATE 3 ML: 2.5; .5 SOLUTION RESPIRATORY (INHALATION) at 15:16

## 2019-03-12 RX ADMIN — SODIUM CHLORIDE, PRESERVATIVE FREE 3 ML: 5 INJECTION INTRAVENOUS at 08:00

## 2019-03-12 RX ADMIN — HYDROCODONE BITARTRATE AND ACETAMINOPHEN 1 TABLET: 10; 325 TABLET ORAL at 03:16

## 2019-03-12 RX ADMIN — METHYLPREDNISOLONE SODIUM SUCCINATE 40 MG: 40 INJECTION, POWDER, FOR SOLUTION INTRAMUSCULAR; INTRAVENOUS at 08:00

## 2019-03-12 RX ADMIN — HYDROCODONE BITARTRATE AND ACETAMINOPHEN 1 TABLET: 10; 325 TABLET ORAL at 18:13

## 2019-03-12 RX ADMIN — MYCOPHENOLIC ACID 540 MG: 360 TABLET, DELAYED RELEASE ORAL at 08:00

## 2019-03-12 RX ADMIN — ACETYLCYSTEINE 1.5 ML: 200 SOLUTION ORAL; RESPIRATORY (INHALATION) at 23:46

## 2019-03-12 RX ADMIN — INSULIN LISPRO 4 UNITS: 100 INJECTION, SOLUTION INTRAVENOUS; SUBCUTANEOUS at 17:19

## 2019-03-12 RX ADMIN — METHYLPREDNISOLONE SODIUM SUCCINATE 40 MG: 40 INJECTION, POWDER, FOR SOLUTION INTRAMUSCULAR; INTRAVENOUS at 21:28

## 2019-03-12 RX ADMIN — IPRATROPIUM BROMIDE AND ALBUTEROL SULFATE 3 ML: 2.5; .5 SOLUTION RESPIRATORY (INHALATION) at 19:41

## 2019-03-12 RX ADMIN — FOLIC ACID 1 MG: 1 TABLET ORAL at 06:10

## 2019-03-12 RX ADMIN — MYCOPHENOLIC ACID 540 MG: 360 TABLET, DELAYED RELEASE ORAL at 22:18

## 2019-03-12 NOTE — PROGRESS NOTES
PULMONARY AND CRITICAL CARE PROGRESS NOTE - [facility]    Patient: Bayron Rodriguez  1960   MR# [id number]   Acct# [id number]  03/12/19   9:02 AM  Referring Provider: Kamar Joy MD    Chief Complaint: Shortness of breath    Interval history: He feels that he is doing much better today he is able to produce more sputum than when he arrived.  He thinks that the flutter and metaneb have been helpful.  He did sleep with supplemental oxygen last night.  He does not require it during the daytime.  He is afebrile.  No family at the bedside.    Meds:    acetylcysteine 1.5 mL Nebulization Q8H - RT   amLODIPine 5 mg Oral QAM   atorvastatin 20 mg Oral Nightly   doxycycline 100 mg Intravenous Q12H   folic acid 1 mg Oral QAM   guaiFENesin 1,200 mg Oral Q12H   insulin lispro 2-7 Units Subcutaneous 4x Daily With Meals & Nightly   ipratropium-albuterol 3 mL Nebulization Q4H - RT   melatonin 3 mg Oral Nightly   methylPREDNISolone sodium succinate 40 mg Intravenous Q6H   metoprolol tartrate 100 mg Oral Q12H   mycophenolate 540 mg Oral Q12H   pantoprazole 40 mg Oral QAM   piperacillin-tazobactam 3.375 g Intravenous Q6H   pregabalin 150 mg Oral Q12H   sodium chloride 3 mL Intravenous Q12H   tacrolimus 0.5 mg Oral Nightly   tacrolimus 1 mg Oral QAM   vitamin B-12 500 mcg Oral QAM        Review of Systems:   Review of Systems:    Constitutional: Positive for activity change, chills, fatigue and fever.   HENT: Positive for congestion. Negative for nosebleeds, rhinorrhea, sinus pressure, sore throat and trouble swallowing.    Eyes: Negative for pain, discharge and itching.   Respiratory: Positive for cough, chest tightness, shortness of breath and wheezing. Negative for apnea.    Cardiovascular: Negative for chest pain, palpitations and leg swelling.   Gastrointestinal: Negative for abdominal distention, constipation, diarrhea, nausea and vomiting.   Endocrine: Negative for polydipsia, polyphagia and  polyuria.   Genitourinary: Negative for difficulty urinating, hematuria and urgency.   Musculoskeletal: Negative for arthralgias, back pain, gait problem and joint swelling.   Skin: Negative for color change, pallor and rash.   Neurological: Negative for dizziness, speech difficulty, weakness and numbness.   Hematological: Negative for adenopathy. Does not bruise/bleed easily.   Psychiatric/Behavioral: Negative for agitation and confusion. The patient is not nervous/anxious.    All other systems reviewed and are negative.    Physical Exam:  SpO2 Percentage    03/12/19 0311 03/12/19 0657 03/12/19 0732   SpO2: 97% 93% 92%     Temp:  [97.6 °F (36.4 °C)-98.3 °F (36.8 °C)] 97.7 °F (36.5 °C)  Heart Rate:  [71-94] 93  Resp:  [16-18] 18  BP: (124-153)/(67-73) 145/68    Intake/Output Summary (Last 24 hours) at 3/12/2019 0902  Last data filed at 3/12/2019 0315  Gross per 24 hour   Intake 680.22 ml   Output --   Net 680.22 ml     Physical Exam :    Constitutional: He is oriented to person, place, and time. He appears well-developed and well-nourished. No distress.   HENT:   Head: Normocephalic and atraumatic.   Right Ear: External ear normal.   Left Ear: External ear normal.   Nose: Nose normal.   Mouth/Throat: Oropharynx is clear and moist. No oropharyngeal exudate.   Eyes: Conjunctivae and EOM are normal. Pupils are equal, round, and reactive to light. Right eye exhibits no discharge. Left eye exhibits no discharge.   Neck: Normal range of motion. Neck supple. No JVD present.   Cardiovascular: Normal rate and regular rhythm.   No murmur heard.  Pulmonary/Chest: He has decreased breath sounds in the right upper field, the right middle field and the right lower field. He has rhonchi in the left upper field and the left lower field.   Abdominal: Soft. Bowel sounds are normal. He exhibits no distension.   Musculoskeletal: Normal range of motion. He exhibits no edema or deformity.   Neurological: He is alert and oriented to  person, place, and time. He displays normal reflexes. No cranial nerve deficit. Coordination normal.   Skin: Skin is warm and dry. No rash noted. He is not diaphoretic. No erythema.   Psychiatric: He has a normal mood and affect. His behavior is normal. Thought content normal.   Nursing note and vitals reviewed    Laboratory Data:  Results from last 7 days   Lab Units 03/12/19  0639 03/11/19  0313 03/10/19  1929   WBC 10*3/mm3 16.88* 13.06* 13.11*   HEMOGLOBIN g/dL 13.3* 13.3* 14.9   PLATELETS 10*3/mm3 215 187 214     Results from last 7 days   Lab Units 03/12/19  0639 03/11/19  0313 03/10/19  2046   SODIUM mmol/L 138 136 139   POTASSIUM mmol/L 3.6 3.8 3.3*   BUN mg/dL 35* 31* 33*   CREATININE mg/dL 1.29 1.29 1.66*     Results from last 7 days   Lab Units 03/11/19  0520 03/10/19  1853   PH, ARTERIAL pH units 7.419 7.453*   PCO2, ARTERIAL mm Hg 44.3 43.0   PO2 ART mm Hg 56.4* 60.8*     Blood Culture   Date Value Ref Range Status   03/10/2019 No growth at 24 hours  Preliminary   03/10/2019 No growth at 24 hours  Preliminary     Respiratory Culture   Date Value Ref Range Status   03/10/2019 Heavy growth (4+) Gram Negative Bacilli (A)  Preliminary   03/10/2019 Heavy growth (4+) Normal Respiratory Mahsa  Preliminary     Recent films:  Xr Chest 1 View    Result Date: 3/10/2019  1. Postsurgical/chronic changes. 2. No definite acute cardiopulmonary process.  This report was finalized on 03/10/2019 20:40 by Dr. Wilder Salinas MD.    Films reviewed personally by me.  My interpretation: None today    Pulmonary Assessment:    1. COPD exacerbation  2. Acute on chronic renal failure  3. Unilateral lung transplant history  4. Chronic immunosuppressive state  5. Anemia   6. Questionable aspiration    Recommend:     · Doing well with Metaneb and flutter device  · Continue current IV steroid dosing  · BiPAP as needed  · Continue antibiotic coverage  · Supplemental oxygen at nighttime  · Additional cultures pending  · Not ready to go  home from a pulmonary standpoint       Electronically signed by ENRICO Ovalles, 03/12/19, 9:02 AM     Physician substantive portion:  He is doing better.  However he has been using Hickey neb has liberated a lot of secretions out of him.  He feels this works much better than the aerobic a device which he has already at the bedside.  He continues him to be in no distress, awake and alert.  Chest has diminished breath sounds throughout with scattered rhonchi.  Plan continue antibiotics.  Await cultures continue Hickey neb.  He is a potential candidate for home mucus clearance devices he has had several exacerbations requiring antibiotics orally over the last several months, and now has required this admission clinical improvement attributable to the Hickey neb device.  Ambulate.  I do not think he is ready to go home yet today, perhaps tomorrow or the next day.    I have seen and examined patient personally, performing a face-to-face diagnostic evaluation with plan of care reviewed and developed with APRN and nursing staff. I have addended and/or modified the above history of present illness, physical examination, and assessment and plan to reflect my findings and impressions. Essential elements of the care plan were discussed with APRN above.  Agree with findings and assessment/plan as documented above.    Electronically signed by Erik Richardson MD, on 3/12/2019, 7:25 PM

## 2019-03-12 NOTE — PROGRESS NOTES
[facility] Medicine Services  INPATIENT PROGRESS NOTE    Patient Name: Bayron Rodriguez  Date of Admission: 3/10/2019  Today's Date: 03/12/19  Length of Stay: 1  Primary Care Physician: Jhon Santo MD    Subjective   Chief Complaint: productive cough  HPI     Patient was seen and examined at bedside.  Patient has been ambulating around unit without any significant issues.  Patient indicates that he really likes the flutter valve, it is helping to get up his sputum.  Patient is requesting to go home, but he understands that he needs additional treatment at this time.  4+ bacteria noted on respiratory culture, it is gram-negative bacilli, awaiting speciation.        Review of Systems   Constitutional: Negative for activity change, chills, diaphoresis, fatigue, fever and unexpected weight change.   Respiratory: Positive for cough (productive). Negative for shortness of breath.    Cardiovascular: Negative for chest pain and palpitations.   Gastrointestinal: Negative for abdominal distention, abdominal pain, constipation, diarrhea, nausea and vomiting.      All pertinent negatives and positives are as above. All other systems have been reviewed and are negative unless otherwise stated.     Objective    Temp:  [97.6 °F (36.4 °C)-98 °F (36.7 °C)] 98 °F (36.7 °C)  Heart Rate:  [81-99] 99  Resp:  [18] 18  BP: (130-153)/(68-79) 146/78  Physical Exam  Constitutional: He is oriented to person, place, and time. No distress.   HENT:   Head: Normocephalic and atraumatic.   Eyes: Conjunctivae are normal. No scleral icterus.   Neck: Neck supple. No JVD present.   Cardiovascular: Normal rate, regular rhythm, normal heart sounds and intact distal pulses.   Pulmonary/Chest: Effort normal. He has mild wheezes.   Prolonged expiration   Abdominal: Soft. Bowel sounds are normal. He exhibits no distension and no mass. There is no tenderness. There is no guarding.   Neurological: He is alert  and oriented to person, place, and time.   Skin: Skin is warm and dry. He is not diaphoretic. No erythema.   Psychiatric: He has a normal mood and affect. His behavior is normal.   Nursing note and vitals reviewed.          Results Review:  I have reviewed the labs, radiology results, and diagnostic studies.    Laboratory Data:   Results from last 7 days   Lab Units 03/12/19  0639 03/11/19  0313 03/10/19  1929   WBC 10*3/mm3 16.88* 13.06* 13.11*   HEMOGLOBIN g/dL 13.3* 13.3* 14.9   HEMATOCRIT % 38.8* 39.7* 44.0   PLATELETS 10*3/mm3 215 187 214        Results from last 7 days   Lab Units 03/12/19  0639 03/11/19  0313 03/10/19  2046   SODIUM mmol/L 138 136 139   POTASSIUM mmol/L 3.6 3.8 3.3*   CHLORIDE mmol/L 97* 94* 93*   CO2 mmol/L 28.0 26.0 32.0*   BUN mg/dL 35* 31* 33*   CREATININE mg/dL 1.29 1.29 1.66*   CALCIUM mg/dL 9.8 8.9 9.3   BILIRUBIN mg/dL  --  0.9 0.9   ALK PHOS U/L  --  56 68   ALT (SGPT) U/L  --  27 23   AST (SGOT) U/L  --  18 20   GLUCOSE mg/dL 255* 258* 139*       Culture Data:   @[facility]@    Radiology Data:   Imaging Results (last 24 hours)     ** No results found for the last 24 hours. **          I have reviewed the patient's current medications.     Assessment/Plan     Active Hospital Problems    Diagnosis   • COPD exacerbation (CMS/[facility])       Assessment:  1.  COPD exacerbation with increased sputum production in the setting of previous lung transplant with gram negative bacilli in sputum   2.  Acute hypoxic respiratory failure, resolved  3.  Chronic immunosuppression due to antirejection medications from lung transplant  4.  Acute kidney injury on chronic kidney disease stage III  6.  Leukocytosis  7.  Mild normocytic anemia     Plan:   1.  Strep pneumo and Legionella antigens are negative  2.  Respiratory culture pending  3.  Continue IV solumedrol  4.  Switch BiPAP to at night and as needed  5.  Continue bronchodilators  6.  Continue doxycycline and Zosyn  7.  Gram negative bacilli in  sputum 4+ - Pending speciation and sensitivities                   Discharge Planning: I expect the patient to be discharged to home in 1-2 days.    Kamar Joy MD   03/12/19   6:16 PM

## 2019-03-12 NOTE — PLAN OF CARE
Problem: Patient Care Overview  Goal: Plan of Care Review  Outcome: Ongoing (interventions implemented as appropriate)   03/12/19 0335   Coping/Psychosocial   Plan of Care Reviewed With patient   Plan of Care Review   Progress no change   OTHER   Outcome Summary PRN pain med given with good relief. Pt on RA when awake, 2L at night, NSR per tele, VSS. IV steroids and ABX continue. Safety maintained. Continue to monitor.        Problem: Fall Risk (Adult)  Goal: Identify Related Risk Factors and Signs and Symptoms  Outcome: Ongoing (interventions implemented as appropriate)   03/12/19 0335   Fall Risk (Adult)   Related Risk Factors (Fall Risk) fatigue/slow reaction;polypharmacy   Signs and Symptoms (Fall Risk) presence of risk factors     Goal: Absence of Fall  Outcome: Ongoing (interventions implemented as appropriate)   03/12/19 0335   Fall Risk (Adult)   Absence of Fall achieves outcome       Problem: Chronic Obstructive Pulmonary Disease (Adult)  Goal: Signs and Symptoms of Listed Potential Problems Will be Absent, Minimized or Managed (Chronic Obstructive Pulmonary Disease)  Outcome: Ongoing (interventions implemented as appropriate)   03/12/19 0335   Goal/Outcome Evaluation   Problems Assessed (Chronic Obstructive Pulmonary Disease (COPD)) all   Problems Present (COPD, Bronch/Emphy) situational response       Problem: Skin Injury Risk (Adult)  Goal: Identify Related Risk Factors and Signs and Symptoms  Outcome: Ongoing (interventions implemented as appropriate)   03/12/19 0335   Skin Injury Risk (Adult)   Related Risk Factors (Skin Injury Risk) mobility impaired     Goal: Skin Health and Integrity  Outcome: Ongoing (interventions implemented as appropriate)   03/12/19 0335   Skin Injury Risk (Adult)   Skin Health and Integrity making progress toward outcome       Problem: Pain, Chronic (Adult)  Goal: Identify Related Risk Factors and Signs and Symptoms  Outcome: Ongoing (interventions implemented as  appropriate)   03/12/19 0335   Pain, Chronic (Adult)   Related Risk Factors (Chronic Pain) disease process;physical disability   Signs and Symptoms (Chronic Pain) fatigue/weakness;sleep pattern change;verbalization of pain descriptors;verbalization of pain/discomfort for a prolonged time period     Goal: Acceptable Pain/Comfort Level and Functional Ability  Outcome: Ongoing (interventions implemented as appropriate)   03/12/19 0335   Pain, Chronic (Adult)   Acceptable Pain/Comfort Level and Functional Ability making progress toward outcome

## 2019-03-12 NOTE — PROGRESS NOTES
Discharge Planning Assessment  [facility]     Patient Name: Bayron Rodriguez  MRN: [id number]  Today's Date: 3/12/2019    Admit Date: 3/10/2019    Discharge Needs Assessment     Row Name 03/12/19 1056       Living Environment    Lives With  spouse    Current Living Arrangements  home/apartment/condo    Primary Care Provided by  spouse/significant other    Provides Primary Care For  no one    Family Caregiver if Needed  spouse    Able to Return to Prior Arrangements  yes       Resource/Environmental Concerns    Resource/Environmental Concerns  none       Transition Planning    Patient/Family Anticipates Transition to  home with family    Patient/Family Anticipated Services at Transition  none    Transportation Anticipated  family or friend will provide       Discharge Needs Assessment    Readmission Within the Last 30 Days  no previous admission in last 30 days    Concerns to be Addressed  no discharge needs identified    Equipment Currently Used at Home  oxygen;nebulizer OXYGEN [facility]    Discharge Coordination/Progress  LIVES WITH SPOUSE; HAS RX COVERAGE THAT PAYS FOR MEDICATION; DENIES NEED FOR PURCHASE [facility]; WILL FOLLOW FOR DC NEEDS        Discharge Plan    No documentation.       Destination      No service coordination in this encounter.      Durable Medical Equipment      No service coordination in this encounter.      Dialysis/Infusion      No service coordination in this encounter.      Home Medical Care      No service coordination in this encounter.      Community Resources      No service coordination in this encounter.          Demographic Summary    No documentation.       Functional Status    No documentation.       Psychosocial    No documentation.       Abuse/Neglect    No documentation.       Legal    No documentation.       Substance Abuse    No documentation.       Patient Forms    No documentation.           Debra Whitfield RN

## 2019-03-12 NOTE — PLAN OF CARE
Problem: Patient Care Overview  Goal: Plan of Care Review  Outcome: Ongoing (interventions implemented as appropriate)   03/12/19 1549   Coping/Psychosocial   Plan of Care Reviewed With patient   Plan of Care Review   Progress improving   OTHER   Outcome Summary Pt c/o chronic back pain. Medicated with PRN meds with some relief. VSS. 02 stable on room air. IV abx/steriods continue. Pt hopeful to return home soon. Ambulated in [place] throughout shift. Safety maintained. Willl continue to monitor.      Goal: Individualization and Mutuality  Outcome: Ongoing (interventions implemented as appropriate)    Goal: Discharge Needs Assessment  Outcome: Ongoing (interventions implemented as appropriate)    Goal: Interprofessional Rounds/Family Conf  Outcome: Ongoing (interventions implemented as appropriate)      Problem: Fall Risk (Adult)  Goal: Identify Related Risk Factors and Signs and Symptoms  Outcome: Ongoing (interventions implemented as appropriate)    Goal: Absence of Fall  Outcome: Ongoing (interventions implemented as appropriate)      Problem: Chronic Obstructive Pulmonary Disease (Adult)  Goal: Signs and Symptoms of Listed Potential Problems Will be Absent, Minimized or Managed (Chronic Obstructive Pulmonary Disease)  Outcome: Ongoing (interventions implemented as appropriate)      Problem: Skin Injury Risk (Adult)  Goal: Identify Related Risk Factors and Signs and Symptoms  Outcome: Ongoing (interventions implemented as appropriate)    Goal: Skin Health and Integrity  Outcome: Ongoing (interventions implemented as appropriate)      Problem: Pain, Chronic (Adult)  Goal: Identify Related Risk Factors and Signs and Symptoms  Outcome: Ongoing (interventions implemented as appropriate)    Goal: Acceptable Pain/Comfort Level and Functional Ability  Outcome: Ongoing (interventions implemented as appropriate)

## 2019-03-13 VITALS
SYSTOLIC BLOOD PRESSURE: 133 MMHG | DIASTOLIC BLOOD PRESSURE: 82 MMHG | WEIGHT: 148.2 LBS | TEMPERATURE: 98 F | RESPIRATION RATE: 16 BRPM | HEART RATE: 81 BPM | OXYGEN SATURATION: 92 % | BODY MASS INDEX: 21.95 KG/M2 | HEIGHT: 69 IN

## 2019-03-13 LAB
ANION GAP SERPL CALCULATED.3IONS-SCNC: 14 MMOL/L (ref 4–13)
BACTERIA SPEC RESP CULT: ABNORMAL
BACTERIA SPEC RESP CULT: ABNORMAL
BUN BLD-MCNC: 36 MG/DL (ref 5–21)
BUN/CREAT SERPL: 31.3 (ref 7–25)
CALCIUM SPEC-SCNC: 9.6 MG/DL (ref 8.4–10.4)
CHLORIDE SERPL-SCNC: 95 MMOL/L (ref 98–110)
CO2 SERPL-SCNC: 28 MMOL/L (ref 24–31)
CREAT BLD-MCNC: 1.15 MG/DL (ref 0.5–1.4)
DEPRECATED RDW RBC AUTO: 40.9 FL (ref 40–54)
ERYTHROCYTE [DISTWIDTH] IN BLOOD BY AUTOMATED COUNT: 13.2 % (ref 12–15)
GFR SERPL CREATININE-BSD FRML MDRD: 65 ML/MIN/1.73
GLUCOSE BLD-MCNC: 270 MG/DL (ref 70–100)
GLUCOSE BLDC GLUCOMTR-MCNC: 204 MG/DL (ref 70–130)
GLUCOSE BLDC GLUCOMTR-MCNC: 341 MG/DL (ref 70–130)
GRAM STN SPEC: ABNORMAL
HCT VFR BLD AUTO: 37.3 % (ref 40–52)
HGB BLD-MCNC: 12.4 G/DL (ref 14–18)
MCH RBC QN AUTO: 28.2 PG (ref 28–32)
MCHC RBC AUTO-ENTMCNC: 33.2 G/DL (ref 33–36)
MCV RBC AUTO: 85 FL (ref 82–95)
PLATELET # BLD AUTO: 220 10*3/MM3 (ref 130–400)
PMV BLD AUTO: 11.5 FL (ref 6–12)
POTASSIUM BLD-SCNC: 3.6 MMOL/L (ref 3.5–5.3)
RBC # BLD AUTO: 4.39 10*6/MM3 (ref 4.8–5.9)
SODIUM BLD-SCNC: 137 MMOL/L (ref 135–145)
TACROLIMUS BLD-MCNC: 5 NG/ML (ref 2–20)
WBC NRBC COR # BLD: 18.19 10*3/MM3 (ref 4.8–10.8)

## 2019-03-13 PROCEDURE — 25010000002 METHYLPREDNISOLONE PER 40 MG: Performed by: NURSE PRACTITIONER

## 2019-03-13 PROCEDURE — 94760 N-INVAS EAR/PLS OXIMETRY 1: CPT

## 2019-03-13 PROCEDURE — 82962 GLUCOSE BLOOD TEST: CPT

## 2019-03-13 PROCEDURE — 99232 SBSQ HOSP IP/OBS MODERATE 35: CPT | Performed by: INTERNAL MEDICINE

## 2019-03-13 PROCEDURE — 94799 UNLISTED PULMONARY SVC/PX: CPT

## 2019-03-13 PROCEDURE — 85027 COMPLETE CBC AUTOMATED: CPT | Performed by: INTERNAL MEDICINE

## 2019-03-13 PROCEDURE — 80048 BASIC METABOLIC PNL TOTAL CA: CPT | Performed by: INTERNAL MEDICINE

## 2019-03-13 PROCEDURE — 25010000002 PIPERACILLIN SOD-TAZOBACTAM PER 1 G: Performed by: INTERNAL MEDICINE

## 2019-03-13 PROCEDURE — 63710000001 MYCOPHENOLATE 360 MG TABLET DELAYED-RELEASE: Performed by: INTERNAL MEDICINE

## 2019-03-13 PROCEDURE — 63710000001 INSULIN LISPRO (HUMAN) PER 5 UNITS: Performed by: INTERNAL MEDICINE

## 2019-03-13 PROCEDURE — 63710000001 MYCOPHENOLATE PER 180 MG: Performed by: INTERNAL MEDICINE

## 2019-03-13 PROCEDURE — 63710000001 TACROLIMUS PER 1 MG: Performed by: INTERNAL MEDICINE

## 2019-03-13 RX ORDER — DOXYCYCLINE 100 MG/1
100 TABLET ORAL EVERY 12 HOURS SCHEDULED
Qty: 10 TABLET | Refills: 0 | Status: SHIPPED | OUTPATIENT
Start: 2019-03-13 | End: 2019-03-18

## 2019-03-13 RX ORDER — METHYLPREDNISOLONE SODIUM SUCCINATE 40 MG/ML
40 INJECTION, POWDER, LYOPHILIZED, FOR SOLUTION INTRAMUSCULAR; INTRAVENOUS EVERY 12 HOURS
Status: DISCONTINUED | OUTPATIENT
Start: 2019-03-13 | End: 2019-03-13

## 2019-03-13 RX ORDER — GUAIFENESIN 600 MG/1
1200 TABLET, EXTENDED RELEASE ORAL EVERY 12 HOURS SCHEDULED
Qty: 14 TABLET | Refills: 0 | Status: SHIPPED | OUTPATIENT
Start: 2019-03-13

## 2019-03-13 RX ORDER — DOXYCYCLINE 100 MG/1
100 TABLET ORAL EVERY 12 HOURS SCHEDULED
Status: DISCONTINUED | OUTPATIENT
Start: 2019-03-13 | End: 2019-03-13 | Stop reason: HOSPADM

## 2019-03-13 RX ORDER — PREDNISONE 10 MG/1
TABLET ORAL
Qty: 40 TABLET | Refills: 0 | Status: SHIPPED | OUTPATIENT
Start: 2019-03-13 | End: 2019-03-25

## 2019-03-13 RX ORDER — LEVOFLOXACIN 500 MG/1
500 TABLET, FILM COATED ORAL EVERY 24 HOURS
Status: DISCONTINUED | OUTPATIENT
Start: 2019-03-13 | End: 2019-03-13 | Stop reason: HOSPADM

## 2019-03-13 RX ORDER — LEVOFLOXACIN 500 MG/1
500 TABLET, FILM COATED ORAL EVERY 24 HOURS
Qty: 5 TABLET | Refills: 0 | Status: SHIPPED | OUTPATIENT
Start: 2019-03-13 | End: 2019-03-18

## 2019-03-13 RX ORDER — PREDNISONE 10 MG/1
10 TABLET ORAL
Status: DISCONTINUED | OUTPATIENT
Start: 2019-03-13 | End: 2019-03-13 | Stop reason: HOSPADM

## 2019-03-13 RX ADMIN — PREGABALIN 150 MG: 75 CAPSULE ORAL at 08:43

## 2019-03-13 RX ADMIN — PANTOPRAZOLE SODIUM 40 MG: 40 TABLET, DELAYED RELEASE ORAL at 06:33

## 2019-03-13 RX ADMIN — HYDROCODONE BITARTRATE AND ACETAMINOPHEN 1 TABLET: 10; 325 TABLET ORAL at 08:48

## 2019-03-13 RX ADMIN — ACETYLCYSTEINE 1.5 ML: 200 SOLUTION ORAL; RESPIRATORY (INHALATION) at 06:35

## 2019-03-13 RX ADMIN — DOXYCYCLINE 100 MG: 100 INJECTION, POWDER, LYOPHILIZED, FOR SOLUTION INTRAVENOUS at 01:34

## 2019-03-13 RX ADMIN — TAZOBACTAM SODIUM AND PIPERACILLIN SODIUM 3.38 G: 375; 3 INJECTION, SOLUTION INTRAVENOUS at 06:33

## 2019-03-13 RX ADMIN — TACROLIMUS 1 MG: 1 CAPSULE, GELATIN COATED ORAL at 06:33

## 2019-03-13 RX ADMIN — IPRATROPIUM BROMIDE AND ALBUTEROL SULFATE 3 ML: 2.5; .5 SOLUTION RESPIRATORY (INHALATION) at 09:56

## 2019-03-13 RX ADMIN — METOPROLOL TARTRATE 100 MG: 100 TABLET, FILM COATED ORAL at 08:44

## 2019-03-13 RX ADMIN — Medication 500 MCG: at 06:33

## 2019-03-13 RX ADMIN — HYDROCODONE BITARTRATE AND ACETAMINOPHEN 1 TABLET: 10; 325 TABLET ORAL at 01:34

## 2019-03-13 RX ADMIN — METHYLPREDNISOLONE SODIUM SUCCINATE 40 MG: 40 INJECTION, POWDER, FOR SOLUTION INTRAMUSCULAR; INTRAVENOUS at 01:35

## 2019-03-13 RX ADMIN — FOLIC ACID 1 MG: 1 TABLET ORAL at 06:33

## 2019-03-13 RX ADMIN — IPRATROPIUM BROMIDE AND ALBUTEROL SULFATE 3 ML: 2.5; .5 SOLUTION RESPIRATORY (INHALATION) at 06:35

## 2019-03-13 RX ADMIN — MYCOPHENOLIC ACID 540 MG: 360 TABLET, DELAYED RELEASE ORAL at 08:43

## 2019-03-13 RX ADMIN — GUAIFENESIN 1200 MG: 600 TABLET, EXTENDED RELEASE ORAL at 08:44

## 2019-03-13 RX ADMIN — TIZANIDINE 4 MG: 4 TABLET ORAL at 11:48

## 2019-03-13 RX ADMIN — IPRATROPIUM BROMIDE AND ALBUTEROL SULFATE 3 ML: 2.5; .5 SOLUTION RESPIRATORY (INHALATION) at 02:58

## 2019-03-13 RX ADMIN — TAZOBACTAM SODIUM AND PIPERACILLIN SODIUM 3.38 G: 375; 3 INJECTION, SOLUTION INTRAVENOUS at 00:20

## 2019-03-13 RX ADMIN — SODIUM CHLORIDE, PRESERVATIVE FREE 3 ML: 5 INJECTION INTRAVENOUS at 08:45

## 2019-03-13 RX ADMIN — AMLODIPINE BESYLATE 5 MG: 5 TABLET ORAL at 06:33

## 2019-03-13 RX ADMIN — INSULIN LISPRO 5 UNITS: 100 INJECTION, SOLUTION INTRAVENOUS; SUBCUTANEOUS at 08:43

## 2019-03-13 NOTE — PLAN OF CARE
Problem: Patient Care Overview  Goal: Plan of Care Review  Outcome: Ongoing (interventions implemented as appropriate)   03/13/19 0651   Coping/Psychosocial   Plan of Care Reviewed With patient   Plan of Care Review   Progress no change   OTHER   Outcome Summary Pt up ad santosh in [place]. Gait steady. VSS. HR sinus on tele. O2 sats stable. No signs of respiratory distress. Pt anticipating d/c today.        Problem: Fall Risk (Adult)  Goal: Identify Related Risk Factors and Signs and Symptoms  Outcome: Outcome(s) achieved Date Met: 03/13/19    Goal: Absence of Fall  Outcome: Outcome(s) achieved Date Met: 03/13/19      Problem: Chronic Obstructive Pulmonary Disease (Adult)  Goal: Signs and Symptoms of Listed Potential Problems Will be Absent, Minimized or Managed (Chronic Obstructive Pulmonary Disease)  Outcome: Ongoing (interventions implemented as appropriate)      Problem: Skin Injury Risk (Adult)  Goal: Identify Related Risk Factors and Signs and Symptoms  Outcome: Outcome(s) achieved Date Met: 03/13/19    Goal: Skin Health and Integrity  Outcome: Ongoing (interventions implemented as appropriate)      Problem: Pain, Chronic (Adult)  Goal: Identify Related Risk Factors and Signs and Symptoms  Outcome: Outcome(s) achieved Date Met: 03/13/19    Goal: Acceptable Pain/Comfort Level and Functional Ability  Outcome: Ongoing (interventions implemented as appropriate)

## 2019-03-13 NOTE — NURSING NOTE
Two unsuccessful IV attempts. 20g LFA unable to get needle into vein. 22g RAC, flash obtained, unable to thread cathlon, and patient c/o burning when flushed with saline.

## 2019-03-13 NOTE — DISCHARGE SUMMARY
[facility]  DISCHARGE SUMMARY       Date of Admission: 3/10/2019  Date of Discharge:  3/13/2019  Primary Care Physician: Jhno Santo MD    Presenting Problem/History of Present Illness:  Shortness of breath    Final Discharge Diagnoses:  1.  COPD exacerbation with increased sputum production in the setting of previous lung transplant with pseudomonas growing in sputum   2.  Acute hypoxic respiratory failure, resolved  3.  Chronic immunosuppression due to antirejection medications from lung transplant  4.  Acute kidney injury on chronic kidney disease stage III  6.  Leukocytosis  7.  Mild normocytic anemia        Consults: Pulmonary    Procedures Performed: None    Pertinent Test Results:   Imaging Results (last 7 days)     Procedure Component Value Units Date/Time    XR Chest 1 View [[id number]] Collected:  03/10/19 2039     Updated:  03/10/19 2043    Narrative:       EXAMINATION: XR CHEST 1 VW-. 3/10/2019 8:39 PM CDT     CHEST, ONE VIEW:     HISTORY: Sepsis     COMPARISON: 5/8/2018, 12/4/2017 and 5/17/2017     A single frontal chest radiograph was obtained.     FINDINGS:     Severe right-sided pulmonary emphysema observed. Postsurgical changes in  the right perihilar region identified.     No definite acute lung infiltrates observed.     The heart is normal in size, without heart failure.     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 No acute osseous  abnormalities identified.                                     Impression:       1. Postsurgical/chronic changes.  2. No definite acute cardiopulmonary process.     This report was finalized on 03/10/2019 20:40 by Dr. Wilder Salinas MD.        Lab Results (last 7 days)     Procedure Component Value Units Date/Time    Respiratory Culture - Sputum, Cough [[id number]]  (Abnormal)  (Susceptibility) Collected:  03/10/19 8839    Specimen:  Sputum from Cough Updated:  03/13/19 0801     Respiratory  Culture Heavy growth (4+) Pseudomonas aeruginosa      Heavy growth (4+) Normal Respiratory Stevie     Gram Stain Greater than 25 WBCs per low power field      Few (2+) Epithelial cells per low power field      Moderate (3+) Mixed gram positive stevie      Many (4+) Gram negative bacilli      Mixed intracellular organisms suggestive of an aspiration event    Susceptibility      Pseudomonas aeruginosa     SUJATA     Cefepime Susceptible     Ceftazidime Susceptible     Gentamicin Susceptible     Levofloxacin Susceptible     Meropenem Susceptible     Piperacillin + Tazobactam Susceptible                    POC Glucose Once [[id number]]  (Abnormal) Collected:  03/13/19 0739    Specimen:  Blood Updated:  03/13/19 0750     Glucose 341 mg/dL      Comment: : [id number] Vishnu AlegreandraMeter ID: [id number]       Basic Metabolic Panel [[id number]]  (Abnormal) Collected:  03/13/19 0452    Specimen:  Blood Updated:  03/13/19 0545     Glucose 270 mg/dL      BUN 36 mg/dL      Creatinine 1.15 mg/dL      Sodium 137 mmol/L      Potassium 3.6 mmol/L      Chloride 95 mmol/L      CO2 28.0 mmol/L      Calcium 9.6 mg/dL      eGFR Non African Amer 65 mL/min/1.73      BUN/Creatinine Ratio 31.3     Anion Gap 14.0 mmol/L     Narrative:       GFR Normal >60  Chronic Kidney Disease <60  Kidney Failure <15    CBC (No Diff) [[id number]]  (Abnormal) Collected:  03/13/19 0452    Specimen:  Blood Updated:  03/13/19 0532     WBC 18.19 10*3/mm3      RBC 4.39 10*6/mm3      Hemoglobin 12.4 g/dL      Hematocrit 37.3 %      MCV 85.0 fL      MCH 28.2 pg      MCHC 33.2 g/dL      RDW 13.2 %      RDW-SD 40.9 fl      MPV 11.5 fL      Platelets 220 10*3/mm3     POC Glucose Once [[id number]]  (Abnormal) Collected:  03/12/19 2115    Specimen:  Blood Updated:  03/12/19 2146     Glucose 323 mg/dL      Comment: : [id number] Travis RachelMeter ID: [id number]       Blood Culture - Blood, Arm, Right [[id number]] Collected:  03/10/19 1929    Specimen:  Blood from Arm,  Right Updated:  03/12/19 2000     Blood Culture No growth at 2 days    Blood Culture - Blood, Hand, Left [[id number]] Collected:  03/10/19 1910    Specimen:  Blood from Hand, Left Updated:  03/12/19 1930     Blood Culture No growth at 2 days    POC Glucose Once [[id number]]  (Abnormal) Collected:  03/12/19 1647    Specimen:  Blood Updated:  03/12/19 1658     Glucose 268 mg/dL      Comment: : [id number] iWOPIandraMeter ID: [id number]       POC Glucose Once [[id number]]  (Abnormal) Collected:  03/12/19 1106    Specimen:  Blood Updated:  03/12/19 1118     Glucose 240 mg/dL      Comment: : [id number] iWOPIandraMeter ID: [id number]       Basic Metabolic Panel [[id number]]  (Abnormal) Collected:  03/12/19 0639    Specimen:  Blood Updated:  03/12/19 0815     Glucose 255 mg/dL      BUN 35 mg/dL      Creatinine 1.29 mg/dL      Sodium 138 mmol/L      Potassium 3.6 mmol/L      Chloride 97 mmol/L      CO2 28.0 mmol/L      Calcium 9.8 mg/dL      eGFR Non African Amer 57 mL/min/1.73      BUN/Creatinine Ratio 27.1     Anion Gap 13.0 mmol/L     Narrative:       GFR Normal >60  Chronic Kidney Disease <60  Kidney Failure <15    CBC (No Diff) [[id number]]  (Abnormal) Collected:  03/12/19 0639    Specimen:  Blood Updated:  03/12/19 0749     WBC 16.88 10*3/mm3      RBC 4.61 10*6/mm3      Hemoglobin 13.3 g/dL      Hematocrit 38.8 %      MCV 84.2 fL      MCH 28.9 pg      MCHC 34.3 g/dL      RDW 13.2 %      RDW-SD 40.3 fl      MPV 11.5 fL      Platelets 215 10*3/mm3     POC Glucose Once [[id number]]  (Abnormal) Collected:  03/12/19 0734    Specimen:  Blood Updated:  03/12/19 0745     Glucose 289 mg/dL      Comment: : [id number] IsaiBetTech GamingandraMeter ID: [id number]       POC Glucose Once [[id number]]  (Abnormal) Collected:  03/11/19 2018    Specimen:  Blood Updated:  03/11/19 2034     Glucose 168 mg/dL      Comment: : [id number] Jessica HeatherMeter ID: [id number]       Blastomyces Antigen - Urine, Urine, Clean  Catch [[id number]] Collected:  03/11/19 1705    Specimen:  Urine, Clean Catch Updated:  03/11/19 1710    Histoplasma Ag Ur - Urine, Clean Catch [[id number]] Collected:  03/11/19 1705    Specimen:  Urine, Clean Catch Updated:  03/11/19 1710    POC Glucose Once [[id number]]  (Abnormal) Collected:  03/11/19 1635    Specimen:  Blood Updated:  03/11/19 1646     Glucose 341 mg/dL      Comment: : [id number] Centrif[employer] SystemsandraMeter ID: [id number]       Fungus Culture - Sputum, Trachea [[id number]] Collected:  03/11/19 1410    Specimen:  Sputum from Trachea Updated:  03/11/19 1415    Aspergillus Galactomannan Antigen [[id number]] Collected:  03/11/19 1313    Specimen:  Blood Updated:  03/11/19 1321    POC Glucose Once [[id number]]  (Normal) Collected:  03/11/19 1058    Specimen:  Blood Updated:  03/11/19 1110     Glucose 87 mg/dL      Comment: : [id number] Centrif[employer] SystemsandraMeter ID: [id number]       POC Glucose Once [[id number]]  (Abnormal) Collected:  03/11/19 0831    Specimen:  Blood Updated:  03/11/19 0843     Glucose 219 mg/dL      Comment: : [id number] [facility] RobertMeter ID: [id number]       S. Pneumo Ag Urine or CSF - Urine, Urine, Clean Catch [[id number]]  (Normal) Collected:  03/11/19 0616    Specimen:  Urine, Clean Catch Updated:  03/11/19 0658     Strep Pneumo Ag Negative    Legionella Antigen, Urine - Urine, Urine, Clean Catch [[id number]]  (Normal) Collected:  03/11/19 0616    Specimen:  Urine, Clean Catch Updated:  03/11/19 0658     LEGIONELLA ANTIGEN, URINE Negative    Blood Gas, Arterial [[id number]]  (Abnormal) Collected:  03/11/19 0520    Specimen:  Arterial Blood Updated:  03/11/19 0527     Site Left Radial     Nikolas's Test Positive     pH, Arterial 7.419 pH units      pCO2, Arterial 44.3 mm Hg      pO2, Arterial 56.4 mm Hg      Comment: 84 Value below reference range        HCO3, Arterial 28.7 mmol/L      Comment: 83 Value above reference range        Base Excess, Arterial 3.6 mmol/L      Comment: 83  Value above reference range        O2 Saturation, Arterial 88.8 %      Comment: 84 Value below reference range        Temperature 37.0 C      Barometric Pressure for Blood Gas 758 mmHg      Modality Room Air     Ventilator Mode NA     Collected by [id number]     Comment: Meter: [id number]     :  [id number]       SCANNED - LABS [[id number]] Resulted:  03/10/19      Updated:  03/11/19 0444    Comprehensive Metabolic Panel [[id number]]  (Abnormal) Collected:  03/11/19 0313    Specimen:  Blood Updated:  03/11/19 0347     Glucose 258 mg/dL      BUN 31 mg/dL      Creatinine 1.29 mg/dL      Sodium 136 mmol/L      Potassium 3.8 mmol/L      Chloride 94 mmol/L      CO2 26.0 mmol/L      Calcium 8.9 mg/dL      Total Protein 6.0 g/dL      Albumin 4.00 g/dL      ALT (SGPT) 27 U/L      AST (SGOT) 18 U/L      Alkaline Phosphatase 56 U/L      Total Bilirubin 0.9 mg/dL      eGFR Non African Amer 57 mL/min/1.73      Globulin 2.0 gm/dL      A/G Ratio 2.0 g/dL      BUN/Creatinine Ratio 24.0     Anion Gap 16.0 mmol/L     CBC Auto Differential [[id number]]  (Abnormal) Collected:  03/11/19 0313    Specimen:  Blood Updated:  03/11/19 0332     WBC 13.06 10*3/mm3      RBC 4.59 10*6/mm3      Hemoglobin 13.3 g/dL      Hematocrit 39.7 %      MCV 86.5 fL      MCH 29.0 pg      MCHC 33.5 g/dL      RDW 13.1 %      RDW-SD 40.7 fl      MPV 11.6 fL      Platelets 187 10*3/mm3      Neutrophil % 95.5 %      Lymphocyte % 1.4 %      Monocyte % 1.8 %      Eosinophil % 0.0 %      Basophil % 0.2 %      Immature Grans % 1.1 %      Neutrophils, Absolute 12.47 10*3/mm3      Lymphocytes, Absolute 0.18 10*3/mm3      Monocytes, Absolute 0.23 10*3/mm3      Eosinophils, Absolute 0.00 10*3/mm3      Basophils, Absolute 0.03 10*3/mm3      Immature Grans, Absolute 0.15 10*3/mm3      nRBC 0.0 /100 WBC     Troponin [[id number]]  (Normal) Collected:  03/10/19 2046    Specimen:  Blood Updated:  03/10/19 2115     Troponin I <0.012 ng/mL     BNP [[id number]]  (Normal)  Collected:  03/10/19 2046    Specimen:  Blood Updated:  03/10/19 2115     proBNP 276.0 pg/mL     Comprehensive Metabolic Panel [[id number]]  (Abnormal) Collected:  03/10/19 2046    Specimen:  Blood Updated:  03/10/19 2104     Glucose 139 mg/dL      BUN 33 mg/dL      Creatinine 1.66 mg/dL      Sodium 139 mmol/L      Potassium 3.3 mmol/L      Chloride 93 mmol/L      CO2 32.0 mmol/L      Calcium 9.3 mg/dL      Total Protein 6.9 g/dL      Albumin 4.50 g/dL      ALT (SGPT) 23 U/L      AST (SGOT) 20 U/L      Alkaline Phosphatase 68 U/L      Total Bilirubin 0.9 mg/dL      eGFR Non African Amer 42 mL/min/1.73      Globulin 2.4 gm/dL      A/G Ratio 1.9 g/dL      BUN/Creatinine Ratio 19.9     Anion Gap 14.0 mmol/L     Influenza Antigen, Rapid - Swab, Nasopharynx [[id number]]  (Normal) Collected:  03/10/19 2036    Specimen:  Swab from Nasopharynx Updated:  03/10/19 2052     Influenza A Ag, EIA Negative     Influenza B Ag, EIA Negative    Narrative:       Recommend confirmation of negative results by viral culture or molecular assay.    [place] Draw [[id number]] Collected:  03/10/19 1910    Specimen:  Blood Updated:  03/10/19 2030    Narrative:       The following orders were created for panel order [place] Draw.  Procedure                               Abnormality         Status                     ---------                               -----------         ------                     Light Blue Top[[id number]]                                   Final result               Green Top (Gel)[[id number]]                                  Final result               Lavender Top[[id number]]                                     Final result               Red Top[[id number]]                                          Final result                 Please view results for these tests on the individual orders.    Lavender Top [[id number]] Collected:  03/10/19 1929    Specimen:  Blood Updated:  03/10/19 2030     Extra Tube hold for add-on     Comment:  Auto resulted       Green Top (Gel) [[id number]] Collected:  03/10/19 1910    Specimen:  Blood Updated:  03/10/19 2016     Extra Tube Hold for add-ons.     Comment: Auto resulted.       Red Top [[id number]] Collected:  03/10/19 1910    Specimen:  Blood Updated:  03/10/19 2016     Extra Tube Hold for add-ons.     Comment: Auto resulted.       Light Blue Top [[id number]] Collected:  03/10/19 1910    Specimen:  Blood Updated:  03/10/19 2015     Extra Tube hold for add-on     Comment: Auto resulted       Lactic Acid, Plasma [[id number]]  (Normal) Collected:  03/10/19 1953    Specimen:  Blood Updated:  03/10/19 2009     Lactate 1.2 mmol/L     Urinalysis, Microscopic Only - Urine, Clean Catch [[id number]]  (Abnormal) Collected:  03/10/19 1940    Specimen:  Urine, Clean Catch Updated:  03/10/19 2005     RBC, UA 0-2 /HPF      WBC, UA None Seen /HPF      Bacteria, UA None Seen /HPF      Squamous Epithelial Cells, UA None Seen /HPF      Hyaline Casts, UA None Seen /LPF      Methodology Automated Microscopy    Urinalysis With Culture If Indicated - Urine, Clean Catch [[id number]]  (Abnormal) Collected:  03/10/19 1940    Specimen:  Urine, Clean Catch Updated:  03/10/19 2002     Color, UA Yellow     Appearance, UA Clear     pH, UA 6.0     Specific Gravity, UA 1.015     Glucose, UA Negative     Ketones, UA Negative     Bilirubin, UA Negative     Blood, UA Trace     Protein, UA Trace     Leuk Esterase, UA Negative     Nitrite, UA Negative     Urobilinogen, UA 0.2 E.U./dL    CBC & Differential [[id number]] Collected:  03/10/19 1929    Specimen:  Blood Updated:  03/10/19 1938    Narrative:       The following orders were created for panel order CBC & Differential.  Procedure                               Abnormality         Status                     ---------                               -----------         ------                     CBC Auto Differential[[id number]]        Abnormal            Final result                 Please view  results for these tests on the individual orders.    CBC Auto Differential [[id number]]  (Abnormal) Collected:  03/10/19 1929    Specimen:  Blood Updated:  03/10/19 1938     WBC 13.11 10*3/mm3      RBC 5.17 10*6/mm3      Hemoglobin 14.9 g/dL      Hematocrit 44.0 %      MCV 85.1 fL      MCH 28.8 pg      MCHC 33.9 g/dL      RDW 13.1 %      RDW-SD 39.9 fl      MPV 11.4 fL      Platelets 214 10*3/mm3      Neutrophil % 77.4 %      Lymphocyte % 10.2 %      Monocyte % 9.8 %      Eosinophil % 0.5 %      Basophil % 0.5 %      Immature Grans % 1.6 %      Neutrophils, Absolute 10.13 10*3/mm3      Lymphocytes, Absolute 1.34 10*3/mm3      Monocytes, Absolute 1.29 10*3/mm3      Eosinophils, Absolute 0.07 10*3/mm3      Basophils, Absolute 0.07 10*3/mm3      Immature Grans, Absolute 0.21 10*3/mm3      nRBC 0.0 /100 WBC     Tacrolimus Level [[id number]] Collected:  03/10/19 1929    Specimen:  Blood Updated:  03/10/19 1934    Blood Gas, Arterial [[id number]]  (Abnormal) Collected:  03/10/19 1853    Specimen:  Arterial Blood Updated:  03/10/19 1854     Site Right Radial     Nikolas's Test Positive     pH, Arterial 7.453 pH units      Comment: 83 Value above reference range        pCO2, Arterial 43.0 mm Hg      pO2, Arterial 60.8 mm Hg      Comment: 84 Value below reference range        HCO3, Arterial 30.1 mmol/L      Comment: 83 Value above reference range        Base Excess, Arterial 5.4 mmol/L      Comment: 83 Value above reference range        O2 Saturation, Arterial 90.8 %      Comment: 84 Value below reference range        Temperature 37.0 C      Barometric Pressure for Blood Gas 757 mmHg      Modality Room Air     Ventilator Mode NA     Collected by [id number]     Comment: Meter: [id number]     :  [id number]           Hospital Course:  The patient is a 58 y.o. male who presented to [facility] with shortness of breath.  Patient was found to have an acute exacerbation of his COPD, with increased sputum production.   "There is no obvious pneumonia on his chest x-ray.  Patient initially had acute hypoxic respiratory failure that was treated with oxygen, he was able to be titrated off of oxygen prior to discharge.  Patient was treated with IV corticosteroids, bronchodilators, and initiate broad-spectrum antibiotics with doxycycline and Zosyn.  Sputum culture was noted to have Pseudomonas on the day of discharge, this was sensitive to levofloxacin, patient was discharged on levofloxacin for an additional 5 days.  Patient was back to baseline status, ambulating around unit on the day of discharge.  Patient has reached maximum benefit of his hospitalization and this will be appropriate for discharge.    Physical Exam on Discharge:  /68 (BP Location: Right arm, Patient Position: Lying)   Pulse 74   Temp 97.8 °F (36.6 °C) (Oral)   Resp 16   Ht 175.3 cm (69\")   Wt 67.2 kg (148 lb 3.2 oz)   SpO2 92%   BMI 21.89 kg/m²   Physical Exam  Constitutional: He is oriented to person, place, and time. No distress.   HENT:   Head: Normocephalic and atraumatic.   Eyes: Conjunctivae are normal. No scleral icterus.   Neck: Neck supple. No JVD present.   Cardiovascular: Normal rate, regular rhythm, normal heart sounds and intact distal pulses.   Pulmonary/Chest: Effort normal. He has mild wheezes.   Prolonged expiration   Abdominal: Soft. Bowel sounds are normal. He exhibits no distension and no mass. There is no tenderness. There is no guarding.   Neurological: He is alert and oriented to person, place, and time.   Skin: Skin is warm and dry. He is not diaphoretic. No erythema.   Psychiatric: He has a normal mood and affect. His behavior is normal.   Nursing note and vitals reviewed.        Condition on Discharge: Stable    Discharge Disposition:  Home or Self Care    Discharge Medications:     Discharge Medications      New Medications      Instructions Start Date   doxycycline 100 MG tablet  Commonly known as:  ADOXA   100 mg, Oral, " Every 12 Hours Scheduled      guaiFENesin 600 MG 12 hr tablet  Commonly known as:  MUCINEX   1,200 mg, Oral, Every 12 Hours Scheduled      levoFLOXacin 500 MG tablet  Commonly known as:  LEVAQUIN   500 mg, Oral, Every 24 Hours      predniSONE 10 MG tablet  Commonly known as:  DELTASONE   Take 6 tablets by mouth Daily for 3 days, THEN 4 tablets Daily for 3 days, THEN 2 tablets Daily for 3 days, THEN 1 tablet Daily for 3 days.   Start Date:  3/13/2019        Continue These Medications      Instructions Start Date   albuterol (2.5 MG/3ML) 0.083% nebulizer solution  Commonly known as:  PROVENTIL   2.5 mg, Nebulization, 4 Times Daily PRN      albuterol sulfate  (90 Base) MCG/ACT inhaler  Commonly known as:  PROVENTIL HFA;VENTOLIN HFA;PROAIR HFA   2 puffs, Inhalation, Every 4 Hours PRN      amLODIPine 5 MG tablet  Commonly known as:  NORVASC   5 mg, Oral, Every Morning      atorvastatin 20 MG tablet  Commonly known as:  LIPITOR   20 mg, Oral, Nightly      azithromycin 250 MG tablet  Commonly known as:  ZITHROMAX   250 mg, Oral, Start 02/27 Monday, Wednesday and Friday.      budesonide-formoterol 160-4.5 MCG/ACT inhaler  Commonly known as:  SYMBICORT   2 puffs, Inhalation, 2 Times Daily - RT      folic acid 1 MG tablet  Commonly known as:  FOLVITE   1 mg, Oral, Every Morning      furosemide 20 MG tablet  Commonly known as:  LASIX   20 mg, Oral, Every Morning      hydrochlorothiazide 50 MG tablet  Commonly known as:  HYDRODIURIL   50 mg, Oral, Every Morning      HYDROcodone-acetaminophen 7.5-325 MG per tablet  Commonly known as:  NORCO   1 tablet, Oral, Every 8 Hours PRN      levocetirizine 5 MG tablet  Commonly known as:  XYZAL   5 mg, Oral, Nightly      magnesium oxide 400 MG tablet  Commonly known as:  MAG-OX   400 mg, Oral, 2 Times Daily      metoprolol tartrate 100 MG tablet  Commonly known as:  LOPRESSOR   100 mg, Oral, 2 Times Daily      MYCOPHENOLIC ACID PO   540 mg, Oral, 2 Times Daily      omeprazole 20 MG  capsule  Commonly known as:  priLOSEC   20 mg, Oral, 2 Times Daily      pregabalin 150 MG capsule  Commonly known as:  LYRICA   150 mg, Oral, 3 Times Daily PRN      tacrolimus 1 MG capsule  Commonly known as:  PROGRAF   1 mg, Oral, 2 Times Daily      tiotropium 18 MCG per inhalation capsule  Commonly known as:  SPIRIVA   1 capsule, Inhalation, Every Morning      tiZANidine 4 MG tablet  Commonly known as:  ZANAFLEX   4 mg, Oral, Every 8 Hours PRN      traZODone 50 MG tablet  Commonly known as:  DESYREL    mg, Oral, Nightly      vitamin B-12 500 MCG tablet  Commonly known as:  CYANOCOBALAMIN   500 mcg, Oral, Every Morning         Stop These Medications    cefdinir 300 MG capsule  Commonly known as:  OMNICEF     sulfamethoxazole-trimethoprim 400-80 MG tablet  Commonly known as:  BACTMARIJASEPTRA          Discharge Diet:   Activity at Discharge:     Follow-up Appointments:   Future Appointments   Date Time Provider Department Center   6/5/2019  9:00 AM Shruti Kenney APRN [facility] None       Test Results Pending at Discharge: None    Kamar Joy MD  03/13/19  10:56 AM    Time: 35 minutes.

## 2019-03-14 ENCOUNTER — READMISSION MANAGEMENT (OUTPATIENT)
Dept: CALL CENTER | Facility: HOSPITAL | Age: 59
End: 2019-03-14

## 2019-03-14 LAB
GALACTOMANNAN AG SPEC IA-ACNC: 0.03 INDEX (ref 0–0.49)
H CAPSUL AG UR-MCNC: <0.5 NG/ML
Lab: NORMAL

## 2019-03-14 NOTE — OUTREACH NOTE
Prep Survey      Responses   Facility patient discharged from?  [place]   Is patient eligible?  Yes   Discharge diagnosis  COPD   Does the patient have one of the following disease processes/diagnoses(primary or secondary)?  COPD/Pneumonia   Does the patient have Home health ordered?  No   Is there a DME ordered?  No   Comments regarding appointments  see AVS   Prep survey completed?  Yes          Lolly Becker RN

## 2019-03-15 ENCOUNTER — READMISSION MANAGEMENT (OUTPATIENT)
Dept: CALL CENTER | Facility: HOSPITAL | Age: 59
End: 2019-03-15

## 2019-03-15 LAB
BACTERIA SPEC AEROBE CULT: NORMAL
BACTERIA SPEC AEROBE CULT: NORMAL
MVISTA(R) BLASTOMYCES AG: NORMAL NG/ML
SPECIMEN SOURCE: NORMAL

## 2019-03-16 ENCOUNTER — READMISSION MANAGEMENT (OUTPATIENT)
Dept: CALL CENTER | Facility: HOSPITAL | Age: 59
End: 2019-03-16

## 2019-03-16 NOTE — OUTREACH NOTE
COPD/PN Week 1 Survey      Responses   Facility patient discharged from?  [place]   Does the patient have one of the following disease processes/diagnoses(primary or secondary)?  COPD/Pneumonia   Is there a successful TCM telephone encounter documented?  No   Was the primary reason for admission:  COPD exacerbation   Week 1 attempt successful?  No   Revoke  Readmitted [readmitted to [facility] on 3/15/19]          Scarlet Abel RN

## 2019-03-19 ENCOUNTER — TELEPHONE (OUTPATIENT)
Dept: INTERNAL MEDICINE | Age: 59
End: 2019-03-19

## 2019-03-21 ENCOUNTER — TELEPHONE (OUTPATIENT)
Dept: INTERNAL MEDICINE | Age: 59
End: 2019-03-21

## 2019-03-26 ENCOUNTER — TELEPHONE (OUTPATIENT)
Dept: INTERNAL MEDICINE | Age: 59
End: 2019-03-26

## 2019-03-26 DIAGNOSIS — R06.02 SHORTNESS OF BREATH: Primary | ICD-10-CM

## 2019-03-27 ENCOUNTER — OFFICE VISIT (OUTPATIENT)
Dept: PRIMARY CARE CLINIC | Age: 59
End: 2019-03-27
Payer: MEDICAID

## 2019-03-27 VITALS
DIASTOLIC BLOOD PRESSURE: 96 MMHG | SYSTOLIC BLOOD PRESSURE: 162 MMHG | OXYGEN SATURATION: 99 % | BODY MASS INDEX: 22.66 KG/M2 | WEIGHT: 153 LBS | TEMPERATURE: 96.6 F | HEIGHT: 69 IN | HEART RATE: 87 BPM

## 2019-03-27 DIAGNOSIS — R11.0 NAUSEA: ICD-10-CM

## 2019-03-27 DIAGNOSIS — J18.9 PNEUMONIA OF BOTH LOWER LOBES DUE TO INFECTIOUS ORGANISM: Primary | ICD-10-CM

## 2019-03-27 DIAGNOSIS — Z09 HOSPITAL DISCHARGE FOLLOW-UP: ICD-10-CM

## 2019-03-27 DIAGNOSIS — Z94.2 LUNG TRANSPLANT RECIPIENT (HCC): ICD-10-CM

## 2019-03-27 DIAGNOSIS — J44.9 CHRONIC OBSTRUCTIVE PULMONARY DISEASE, UNSPECIFIED COPD TYPE (HCC): ICD-10-CM

## 2019-03-27 PROCEDURE — 99495 TRANSJ CARE MGMT MOD F2F 14D: CPT | Performed by: NURSE PRACTITIONER

## 2019-03-27 PROCEDURE — 1111F DSCHRG MED/CURRENT MED MERGE: CPT | Performed by: NURSE PRACTITIONER

## 2019-03-27 RX ORDER — PROMETHAZINE HYDROCHLORIDE 25 MG/1
25 TABLET ORAL EVERY 8 HOURS PRN
Qty: 21 TABLET | Refills: 0 | Status: SHIPPED | OUTPATIENT
Start: 2019-03-27 | End: 2019-04-03

## 2019-03-27 ASSESSMENT — PATIENT HEALTH QUESTIONNAIRE - PHQ9
SUM OF ALL RESPONSES TO PHQ9 QUESTIONS 1 & 2: 0
SUM OF ALL RESPONSES TO PHQ QUESTIONS 1-9: 0
1. LITTLE INTEREST OR PLEASURE IN DOING THINGS: 0
SUM OF ALL RESPONSES TO PHQ QUESTIONS 1-9: 0
2. FEELING DOWN, DEPRESSED OR HOPELESS: 0

## 2019-04-03 ENCOUNTER — OFFICE VISIT (OUTPATIENT)
Dept: PRIMARY CARE CLINIC | Age: 59
End: 2019-04-03
Payer: MEDICAID

## 2019-04-03 ENCOUNTER — HOSPITAL ENCOUNTER (OUTPATIENT)
Dept: GENERAL RADIOLOGY | Age: 59
Discharge: HOME | End: 2019-04-03
Payer: MEDICAID

## 2019-04-03 VITALS
HEART RATE: 90 BPM | TEMPERATURE: 98.4 F | OXYGEN SATURATION: 100 % | DIASTOLIC BLOOD PRESSURE: 76 MMHG | HEIGHT: 69 IN | WEIGHT: 154.4 LBS | SYSTOLIC BLOOD PRESSURE: 130 MMHG | BODY MASS INDEX: 22.87 KG/M2 | RESPIRATION RATE: 16 BRPM

## 2019-04-03 DIAGNOSIS — Z94.2 STATUS POST LUNG TRANSPLANTATION (HCC): ICD-10-CM

## 2019-04-03 DIAGNOSIS — Z94.2 STATUS POST LUNG TRANSPLANTATION (HCC): Primary | ICD-10-CM

## 2019-04-03 DIAGNOSIS — D84.9 ACQUIRED IMMUNOCOMPROMISED STATE (HCC): ICD-10-CM

## 2019-04-03 DIAGNOSIS — J96.11 CHRONIC RESPIRATORY FAILURE WITH HYPOXIA, ON HOME O2 THERAPY (HCC): ICD-10-CM

## 2019-04-03 DIAGNOSIS — Z99.81 CHRONIC RESPIRATORY FAILURE WITH HYPOXIA, ON HOME O2 THERAPY (HCC): ICD-10-CM

## 2019-04-03 DIAGNOSIS — J44.9 CHRONIC OBSTRUCTIVE PULMONARY DISEASE, UNSPECIFIED COPD TYPE (HCC): ICD-10-CM

## 2019-04-03 DIAGNOSIS — B37.0 ORAL THRUSH: ICD-10-CM

## 2019-04-03 DIAGNOSIS — J18.9 HCAP (HEALTHCARE-ASSOCIATED PNEUMONIA): ICD-10-CM

## 2019-04-03 PROCEDURE — 3023F SPIROM DOC REV: CPT | Performed by: FAMILY MEDICINE

## 2019-04-03 PROCEDURE — 3017F COLORECTAL CA SCREEN DOC REV: CPT | Performed by: FAMILY MEDICINE

## 2019-04-03 PROCEDURE — G8420 CALC BMI NORM PARAMETERS: HCPCS | Performed by: FAMILY MEDICINE

## 2019-04-03 PROCEDURE — 1036F TOBACCO NON-USER: CPT | Performed by: FAMILY MEDICINE

## 2019-04-03 PROCEDURE — G8926 SPIRO NO PERF OR DOC: HCPCS | Performed by: FAMILY MEDICINE

## 2019-04-03 PROCEDURE — 71046 X-RAY EXAM CHEST 2 VIEWS: CPT

## 2019-04-03 PROCEDURE — G8427 DOCREV CUR MEDS BY ELIG CLIN: HCPCS | Performed by: FAMILY MEDICINE

## 2019-04-03 PROCEDURE — 99215 OFFICE O/P EST HI 40 MIN: CPT | Performed by: FAMILY MEDICINE

## 2019-04-03 RX ORDER — FLUCONAZOLE 100 MG/1
100 TABLET ORAL DAILY
Qty: 14 TABLET | Refills: 0 | Status: SHIPPED | OUTPATIENT
Start: 2019-04-03 | End: 2019-04-17

## 2019-04-03 RX ORDER — CEFDINIR 300 MG/1
300 CAPSULE ORAL 2 TIMES DAILY
Qty: 20 CAPSULE | Refills: 0 | Status: SHIPPED | OUTPATIENT
Start: 2019-04-03 | End: 2019-04-13

## 2019-04-03 RX ORDER — DOXYCYCLINE 100 MG/1
100 CAPSULE ORAL 2 TIMES DAILY
Qty: 20 CAPSULE | Refills: 0 | Status: SHIPPED | OUTPATIENT
Start: 2019-04-03 | End: 2019-04-13

## 2019-04-03 NOTE — PROGRESS NOTES
Chaitanya Miranda is a 59 y.o. male who presents today for   Chief Complaint   Patient presents with    Follow-Up from Hospital     needs new Neb machine and pulseOx    Insomnia     is tired but cant sleep    Yeast Infection     can't eat        HPI  Patient is here for f/u PNA and h/o lung transplant. Had 20% removal of R lung. He is not feeling well. He is feelingworse again, some right sided chest pain. He is still on steroid taper. He is tired but not sleeping well. He notes as well that he is having difficulty eating due to ongoing thrush. He is currently on nystatin. He is needing a new nebulizer machine home. Patient has follow-up with transplant doctor and [facility] next Monday. He states he once to stay out of the hospital to try to get that appointment if possible. Does not have much help at home. He continues use oxygen at home. No change in PMH, family, social, or surgical history unless mentioned above. Review of Systems   Constitutional: Positive for fatigue. Negative for chills and fever. HENT: Positive for trouble swallowing. Negative for rhinorrhea and sore throat. Eyes: Negative for itching and visual disturbance. Respiratory: Positive for cough and shortness of breath. Negative for chest tightness and wheezing. Cardiovascular: Negative for chest pain, palpitations and leg swelling. Gastrointestinal: Negative for abdominal pain, constipation, diarrhea, nausea and vomiting. Endocrine: Negative for polydipsia and polyuria. Genitourinary: Negative for difficulty urinating, dysuria and frequency. Musculoskeletal: Negative for arthralgias and myalgias. Skin: Negative for color change and rash. Allergic/Immunologic: Negative for environmental allergies and food allergies. Neurological: Negative for dizziness and light-headedness. Hematological: Negative for adenopathy. Does not bruise/bleed easily.    Psychiatric/Behavioral: Positive for sleep ACID) 180 MG TBEC 180 mg 2 times daily       Blood Glucose Monitoring Suppl (ACCU-CHEK JOANNE PLUS) W/DEVICE KIT   0    ACCU-CHEK JOANNE PLUS strip   3    Accu-Chek Softclix Lancets MISC   3    albuterol (PROVENTIL) (2.5 MG/3ML) 0.083% nebulizer solution       amLODIPine (NORVASC) 5 MG tablet Take 5 mg by mouth daily   11    magnesium oxide (MAG-OX) 400 (241.3 MG) MG TABS tablet 400 mg 2 times daily   11    tacrolimus (PROGRAF) 1 MG capsule Take 1 mg by mouth 2 times daily Take one tablet in the am and 1/2 tablet in the afternoon      Cyanocobalamin (VITAMIN B 12) 250 MCG LOZG Take  by mouth.  hydrochlorothiazide (HYDRODIURIL) 50 MG tablet Take 50 mg by mouth daily.  budesonide-formoterol (SYMBICORT) 160-4.5 MCG/ACT AERO Inhale 2 puffs into the lungs 2 times daily.  LYRICA 150 MG capsule TAKE ONE CAPSULE BY MOUTH TWICE A DAY. 60 capsule 5     No current facility-administered medications for this visit. Allergies   Allergen Reactions    Avelox [Moxifloxacin Hydrochloride] Other (See Comments)     Pt states this medicine will make him sweat, turn red, itchy, and pass out.     Clindamycin/Lincomycin Diarrhea     [facility]    Moxifloxacin Rash     Sweating        Past Surgical History:   Procedure Laterality Date    APPENDECTOMY      CHOLECYSTECTOMY  14    COLONOSCOPY  2008    Dr. Florida Watkins, PARTIAL  2002    right upper    LUNG TRANSPLANT, SINGLE  2006    left    SKIN CANCER DESTRUCTION      UPPER GASTROINTESTINAL ENDOSCOPY  2008     Dr. Eppie Goltz       Social History     Tobacco Use    Smoking status: Former Smoker     Packs/day: 3.00     Years: 15.00     Pack years: 45.00     Types: Cigarettes     Start date: 1972     Last attempt to quit: 1/10/2002     Years since quittin.2    Smokeless tobacco: Never Used    Tobacco comment: Pt used to smoke quit 9.5 years ago   Substance Use Topics    Alcohol use: No    Drug use: Yes     Frequency: 2.0 times per week     Types: Marijuana     Comment: marijuana in brownies when in extreme pain       Family History   Problem Relation Age of Onset    Cancer Mother     Heart Disease Father        /76   Pulse 90   Temp 98.4 °F (36.9 °C) (Temporal)   Resp 16   Ht 5' 9\" (1.753 m)   Wt 154 lb 6.4 oz (70 kg)   SpO2 100%   BMI 22.80 kg/m²     Physical Exam   Constitutional: He is oriented to person, place, and time. He appears well-developed and well-nourished. Non-toxic appearance. He appears ill. No distress. HENT:   Head: Normocephalic and atraumatic. Eyes: Pupils are equal, round, and reactive to light. Right eye exhibits no discharge. Neck: No tracheal deviation present. No thyromegaly present. Cardiovascular: Normal rate, regular rhythm, normal heart sounds and intact distal pulses. Exam reveals no gallop and no friction rub. No murmur heard. Pulmonary/Chest: Accessory muscle usage present. No respiratory distress. He has decreased breath sounds. He has no wheezes. He has rales in the right middle field, the right lower field, the left middle field and the left lower field. He exhibits no tenderness. Abdominal: Soft. Bowel sounds are normal. He exhibits no distension and no mass. There is no tenderness. There is no rebound and no guarding. Musculoskeletal: He exhibits no edema or tenderness. Right lower leg: He exhibits no edema. Left lower leg: He exhibits no edema. Neurological: He is alert and oriented to person, place, and time. Coordination and gait normal.   Skin: Skin is warm and dry. Capillary refill takes less than 2 seconds. He is not diaphoretic. No cyanosis. Nails show no clubbing. Psychiatric: He has a normal mood and affect. His behavior is normal. Judgment and thought content normal.   Nursing note and vitals reviewed.       Assessment:    ICD-10-CM    1. Status post lung transplantation ([facility] Z94.2 XR CHEST STANDARD (2 VW)   2. Chronic respiratory failure with hypoxia, on home O2 therapy ([facility]) J96.11 XR CHEST STANDARD (2 VW)    Z99.81    3. Chronic obstructive pulmonary disease, unspecified COPD type ([facility]) J44.9 XR CHEST STANDARD (2 VW)   4. HCAP (healthcare-associated pneumonia) J18.9 doxycycline monohydrate (MONODOX) 100 MG capsule     XR CHEST STANDARD (2 VW)     cefdinir (OMNICEF) 300 MG capsule     fluconazole (DIFLUCAN) 100 MG tablet   5. Acquired immunocompromised state ([facility] [address] D84.9 nystatin (MYCOSTATIN) 075543 UNIT/ML suspension     doxycycline monohydrate (MONODOX) 100 MG capsule     XR CHEST STANDARD (2 VW)     cefdinir (OMNICEF) 300 MG capsule     fluconazole (DIFLUCAN) 100 MG tablet   6. Oral thrush B37.0 nystatin (MYCOSTATIN) 157831 UNIT/ML suspension     fluconazole (DIFLUCAN) 100 MG tablet       Plan:   Pt not doing well at this time, concern of possible recurrence of pneumonia. I will order a stat chest x-ray. I've advised him that if he worsens he needs to go to the emergency department and then we will likely need to transfer him to [address] [place] [address] where his transplant doctor is at. He is extremely high risk for mortality. I will treat his ongoing thrush with Diflucan but if not improving, we may need to consider evaluation for candidal esophagitis due to his chronic immunocompromise from medication. I've also recommended to the patient direct inpatient treatment today but he declines and wants to wait until he sees his transplant doctor next Monday. If patient does worsen he agrees to go to the emergency department and have ER physician transfer to [facility] or to call me about possible transfer.   Orders Placed This Encounter   Procedures    XR CHEST STANDARD (2 VW)     Send copy to [facility] pulmonology Dr Edward Like today     Standing Status:   Future     Number of Occurrences:   1     Standing Expiration Date:   4/3/2020     Order Specific Question:   Reason for exam:     Answer:   lung transplant,

## 2019-04-05 PROBLEM — D84.9 ACQUIRED IMMUNOCOMPROMISED STATE (HCC): Status: ACTIVE | Noted: 2019-04-05

## 2019-04-05 ASSESSMENT — ENCOUNTER SYMPTOMS
EYE ITCHING: 0
TROUBLE SWALLOWING: 1
SORE THROAT: 0
SHORTNESS OF BREATH: 1
COLOR CHANGE: 0
ABDOMINAL PAIN: 0
CONSTIPATION: 0
VOMITING: 0
COUGH: 1
DIARRHEA: 0
RHINORRHEA: 0
CHEST TIGHTNESS: 0
NAUSEA: 0
WHEEZING: 0

## 2019-04-15 ENCOUNTER — TRANSCRIBE ORDERS (OUTPATIENT)
Dept: ADMINISTRATIVE | Facility: HOSPITAL | Age: 59
End: 2019-04-15

## 2019-04-15 ENCOUNTER — APPOINTMENT (OUTPATIENT)
Dept: LAB | Facility: HOSPITAL | Age: 59
End: 2019-04-15

## 2019-04-15 DIAGNOSIS — Z94.2 LUNG TRANSPLANT RECIPIENT (HCC): ICD-10-CM

## 2019-04-15 DIAGNOSIS — J44.9 CHRONIC OBSTRUCTIVE PULMONARY DISEASE, UNSPECIFIED COPD TYPE (HCC): Primary | ICD-10-CM

## 2019-04-15 DIAGNOSIS — N18.9 CHRONIC KIDNEY DISEASE, UNSPECIFIED CKD STAGE: ICD-10-CM

## 2019-04-15 DIAGNOSIS — C44.90: ICD-10-CM

## 2019-04-15 LAB
ANION GAP SERPL CALCULATED.3IONS-SCNC: 8 MMOL/L (ref 4–13)
BASOPHILS # BLD AUTO: 0.03 10*3/MM3 (ref 0–0.2)
BASOPHILS NFR BLD AUTO: 0.3 % (ref 0–2)
BUN BLD-MCNC: 28 MG/DL (ref 5–21)
BUN/CREAT SERPL: 23.5 (ref 7–25)
CALCIUM SPEC-SCNC: 9.2 MG/DL (ref 8.4–10.4)
CHLORIDE SERPL-SCNC: 92 MMOL/L (ref 98–110)
CO2 SERPL-SCNC: 36 MMOL/L (ref 24–31)
CREAT BLD-MCNC: 1.19 MG/DL (ref 0.5–1.4)
DEPRECATED RDW RBC AUTO: 47.1 FL (ref 40–54)
EOSINOPHIL # BLD AUTO: 0.15 10*3/MM3 (ref 0–0.7)
EOSINOPHIL NFR BLD AUTO: 1.6 % (ref 0–4)
ERYTHROCYTE [DISTWIDTH] IN BLOOD BY AUTOMATED COUNT: 15.7 % (ref 12–15)
GFR SERPL CREATININE-BSD FRML MDRD: 62 ML/MIN/1.73
GLUCOSE BLD-MCNC: 96 MG/DL (ref 70–100)
HCT VFR BLD AUTO: 35.9 % (ref 40–52)
HGB BLD-MCNC: 12.1 G/DL (ref 14–18)
IMM GRANULOCYTES # BLD AUTO: 0.28 10*3/MM3 (ref 0–0.05)
IMM GRANULOCYTES NFR BLD AUTO: 3 % (ref 0–5)
LYMPHOCYTES # BLD AUTO: 1.07 10*3/MM3 (ref 0.72–4.86)
LYMPHOCYTES NFR BLD AUTO: 11.3 % (ref 15–45)
MAGNESIUM SERPL-MCNC: 1 MG/DL (ref 1.4–2.2)
MCH RBC QN AUTO: 30.1 PG (ref 28–32)
MCHC RBC AUTO-ENTMCNC: 33.7 G/DL (ref 33–36)
MCV RBC AUTO: 89.3 FL (ref 82–95)
MONOCYTES # BLD AUTO: 0.95 10*3/MM3 (ref 0.19–1.3)
MONOCYTES NFR BLD AUTO: 10 % (ref 4–12)
NEUTROPHILS # BLD AUTO: 6.99 10*3/MM3 (ref 1.87–8.4)
NEUTROPHILS NFR BLD AUTO: 73.8 % (ref 39–78)
NRBC BLD AUTO-RTO: 0 /100 WBC (ref 0–0)
PLATELET # BLD AUTO: 258 10*3/MM3 (ref 130–400)
PMV BLD AUTO: 10.5 FL (ref 6–12)
POTASSIUM BLD-SCNC: 3.8 MMOL/L (ref 3.5–5.3)
RBC # BLD AUTO: 4.02 10*6/MM3 (ref 4.8–5.9)
SODIUM BLD-SCNC: 136 MMOL/L (ref 135–145)
WBC NRBC COR # BLD: 9.47 10*3/MM3 (ref 4.8–10.8)

## 2019-04-15 PROCEDURE — 80197 ASSAY OF TACROLIMUS: CPT | Performed by: INTERNAL MEDICINE

## 2019-04-15 PROCEDURE — 85025 COMPLETE CBC W/AUTO DIFF WBC: CPT | Performed by: INTERNAL MEDICINE

## 2019-04-15 PROCEDURE — 80048 BASIC METABOLIC PNL TOTAL CA: CPT | Performed by: INTERNAL MEDICINE

## 2019-04-15 PROCEDURE — 83735 ASSAY OF MAGNESIUM: CPT | Performed by: INTERNAL MEDICINE

## 2019-04-15 PROCEDURE — 36415 COLL VENOUS BLD VENIPUNCTURE: CPT | Performed by: INTERNAL MEDICINE

## 2019-04-17 ENCOUNTER — TELEPHONE (OUTPATIENT)
Dept: PRIMARY CARE CLINIC | Age: 59
End: 2019-04-17

## 2019-04-17 LAB — TACROLIMUS BLD-MCNC: 6 NG/ML (ref 2–20)

## 2019-04-17 RX ORDER — MAGNESIUM SULFATE HEPTAHYDRATE 500 MG/ML
4 INJECTION, SOLUTION INTRAMUSCULAR; INTRAVENOUS ONCE
Status: SHIPPED | OUTPATIENT
Start: 2019-04-17

## 2019-04-17 NOTE — TELEPHONE ENCOUNTER
Patient has been scheduled for 9am at Outpatient infusion center for 4mg Mag infusion over 4 hours. Patient has been notified of appointment and voiced understanding. Order faxed.

## 2019-04-17 NOTE — TELEPHONE ENCOUNTER
Transplant team called and spoke with [facility]. Patient has Magnesium of 1. They are requesting an order be placed for 4g of IV Mag. They do not have the ability to order this or our infusion center. Spoke with Dr. Heriberto Jackson about this and order has been placed as well as a daily magnesium oxide.

## 2019-04-18 ENCOUNTER — HOSPITAL ENCOUNTER (OUTPATIENT)
Dept: INFUSION THERAPY | Age: 59
Setting detail: INFUSION SERIES
Discharge: HOME | End: 2019-04-18
Payer: MEDICAID

## 2019-04-18 VITALS
TEMPERATURE: 97.7 F | DIASTOLIC BLOOD PRESSURE: 74 MMHG | HEART RATE: 79 BPM | RESPIRATION RATE: 18 BRPM | SYSTOLIC BLOOD PRESSURE: 124 MMHG

## 2019-04-18 PROCEDURE — 96365 THER/PROPH/DIAG IV INF INIT: CPT

## 2019-04-18 PROCEDURE — 96366 THER/PROPH/DIAG IV INF ADDON: CPT

## 2019-04-18 PROCEDURE — 6360000002 HC RX W HCPCS: Performed by: FAMILY MEDICINE

## 2019-04-18 RX ORDER — MAGNESIUM SULFATE IN WATER 40 MG/ML
4 INJECTION, SOLUTION INTRAVENOUS ONCE
Status: COMPLETED | OUTPATIENT
Start: 2019-04-18 | End: 2019-04-18

## 2019-04-18 RX ADMIN — MAGNESIUM SULFATE HEPTAHYDRATE 4 G: 40 INJECTION, SOLUTION INTRAVENOUS at 09:54

## 2019-04-18 NOTE — DISCHARGE INSTR - COC
Continuity of Care Form    Patient Name: Wileen Castleman   :  1960  MRN:  [id number]    Admit date:  2019  Discharge date:  ***    Code Status Order: No Order   Advance Directives:     Admitting Physician:  No admitting provider for patient encounter. PCP: Nate Alvarado MD    Discharging Nurse: [facility] Unit/Room#: No information available for this encounter.   Discharging Unit Phone Number: ***    Emergency Contact:   Extended Emergency Contact Information  Primary Emergency Contact: Marcella Weir  Address: [address] [place] [address] Phone: [phone]  Relation: Spouse    Past Surgical History:  Past Surgical History:   Procedure Laterality Date    APPENDECTOMY      CHOLECYSTECTOMY  14    COLONOSCOPY  2008    Dr. Shante Helm, PARTIAL  2002    right upper    LUNG TRANSPLANT, SINGLE  2006    left    SKIN CANCER DESTRUCTION      UPPER GASTROINTESTINAL ENDOSCOPY  2008     Dr. Frederick Gastelum       Immunization History:   Immunization History   Administered Date(s) Administered    Influenza Vaccine, unspecified formulation 2017    Influenza Virus Vaccine 11/15/2012, 2015, 10/03/2016    Influenza, High Dose (Fluzone 65 yrs and older) 09/10/2018    Pneumococcal 13-valent Conjugate (Caqfamu17) 09/10/2018    Pneumococcal Polysaccharide ([id number]) 2018    Tdap (Boostrix, Adacel) 10/24/2013       Active Problems:  Patient Active Problem List   Diagnosis Code    [facility] J44.9    Emphysema of lung ([facility] [address] J43.9    HTN (hypertension) I10    Status post lung transplantation ([facility] [address] Z94.2    Chronic respiratory failure with hypoxia, on home O2 therapy ([facility] [address] J96.11, Z99.81    HCAP (healthcare-associated pneumonia) J18.9    Acquired immunocompromised state ([facility] [address] D84.9       Isolation/Infection:   Isolation          No Isolation            Nurse Assessment:  Last Vital Signs: /76   Pulse 86   Temp 97.7 °F (36.5 °C)   Resp 16     Last documented pain score (0-10 scale):    Last Weight:   Wt Readings from Last 1 Encounters:   19 154 lb 6.4 oz (70 kg)     Mental Status:  {IP PT MENTAL STATUS:}    IV Access:  { EZEQU[employer]EL IV ACCESS:421842995}    Nursing Mobility/ADLs:  Walking   {CHP DME WTAD:779748533}  Transfer  {CHP DME AWNJ:219544989}  Bathing  {CHP DME NCXS:363668370}  Dressing  {CHP DME XWMW:447214177}  Toileting  {CHP DME SOZT:332344511}  Feeding  {CHP DME HFUZ:645164110}  Med Admin  {CHP DME VDTO:491328278}  Med Delivery   { EZEQUIEL MED Delivery:073689047}    Wound Care Documentation and Therapy:        Elimination:  Continence:   · Bowel: {YES / MN:24460}  · Bladder: {YES / UM:80190}  Urinary Catheter: {Urinary Catheter:145413299}   Colostomy/Ileostomy/Ileal Conduit: {YES / JA:53602}       Date of Last BM: ***  No intake or output data in the 24 hours ending 19 0926  No intake/output data recorded.     Safety Concerns:     [employer] Safety Concerns:283867662}    Impairments/Disabilities:      [employer] Impairments/Disabilities:239583352}    Nutrition Therapy:  Current Nutrition Therapy:   [employer] Diet List:727454413}    Routes of Feeding: {[facility] DME Other Feedings:976722376}  Liquids: {Slp liquid thickness:61597}  Daily Fluid Restriction: {CHP DME Yes amt example:302423143}  Last Modified Barium Swallow with Video (Video Swallowing Test): {Done Not Done CTQI:770925435}    Treatments at the Time of Hospital Discharge:   Respiratory Treatments: ***  Oxygen Therapy:  {Therapy; copd oxygen:93101}  Ventilator:    { CC Vent IEIW:953747367}    Rehab Therapies: {THERAPEUTIC INTERVENTION:3078726423}  Weight Bearing Status/Restrictions: [employer]  Weight Bearin}  Other Medical Equipment (for information only, NOT a DME order):  {EQUIPMENT:225044045}  Other Treatments: ***    Patient's personal belongings (please select all that are sent with patient):  {CHP DME Belongings:246259896}    RN SIGNATURE:  {Esignature:455780363}    CASE MANAGEMENT/SOCIAL WORK SECTION    Inpatient Status Date: ***    Readmission Risk Assessment Score:  Readmission Risk              Risk of Unplanned Readmission:        0           Discharging to Facility/ Agency   · Name:   · Address:  · Phone:  · Fax:    Dialysis Facility (if applicable)   · Name:  · Address:  · Dialysis Schedule:  · Phone:  · Fax:    / signature: {Esignature:842599244}    PHYSICIAN SECTION    Prognosis: {Prognosis:9491547546}    Condition at Discharge: [address] [place] [address] Patient Condition:946093853}    Rehab Potential (if transferring to Rehab): {Prognosis:6038856041}    Recommended Labs or Other Treatments After Discharge: ***    Physician Certification: I certify the above information and transfer of Bernice Christensen  is necessary for the continuing treatment of the diagnosis listed and that he requires {Admit to Appropriate Level of Care:28164} for {GREATER/LESS:943186416} 30 days.      Update Admission H&P: {CHP DME Changes in [facility]:845659153}    PHYSICIAN SIGNATURE:  {Esignature:984276511}

## 2019-04-19 ENCOUNTER — TELEPHONE (OUTPATIENT)
Dept: PRIMARY CARE CLINIC | Age: 59
End: 2019-04-19

## 2019-04-19 NOTE — TELEPHONE ENCOUNTER
Pt call stating that he is needing paper work for a new nebulizer from [employer]. Please call pt with information [phone].

## 2019-04-22 ENCOUNTER — TRANSCRIBE ORDERS (OUTPATIENT)
Dept: ADMINISTRATIVE | Facility: HOSPITAL | Age: 59
End: 2019-04-22

## 2019-04-22 ENCOUNTER — TELEPHONE (OUTPATIENT)
Dept: PULMONOLOGY | Facility: CLINIC | Age: 59
End: 2019-04-22

## 2019-04-22 ENCOUNTER — APPOINTMENT (OUTPATIENT)
Dept: LAB | Facility: HOSPITAL | Age: 59
End: 2019-04-22

## 2019-04-22 DIAGNOSIS — N18.9 CHRONIC KIDNEY DISEASE, UNSPECIFIED CKD STAGE: ICD-10-CM

## 2019-04-22 DIAGNOSIS — J44.9 OBSTRUCTIVE CHRONIC BRONCHITIS WITHOUT EXACERBATION (HCC): ICD-10-CM

## 2019-04-22 DIAGNOSIS — C44.90: ICD-10-CM

## 2019-04-22 DIAGNOSIS — Z94.2 LUNG TRANSPLANT RECIPIENT (HCC): Primary | ICD-10-CM

## 2019-04-22 LAB
ANION GAP SERPL CALCULATED.3IONS-SCNC: 8 MMOL/L (ref 4–13)
BASOPHILS # BLD AUTO: 0.05 10*3/MM3 (ref 0–0.2)
BASOPHILS NFR BLD AUTO: 0.4 % (ref 0–2)
BUN BLD-MCNC: 32 MG/DL (ref 5–21)
BUN/CREAT SERPL: 25.6 (ref 7–25)
CALCIUM SPEC-SCNC: 9.8 MG/DL (ref 8.4–10.4)
CHLORIDE SERPL-SCNC: 97 MMOL/L (ref 98–110)
CO2 SERPL-SCNC: 35 MMOL/L (ref 24–31)
CREAT BLD-MCNC: 1.25 MG/DL (ref 0.5–1.4)
DEPRECATED RDW RBC AUTO: 54.5 FL (ref 40–54)
EOSINOPHIL # BLD AUTO: 0.05 10*3/MM3 (ref 0–0.7)
EOSINOPHIL NFR BLD AUTO: 0.4 % (ref 0–4)
ERYTHROCYTE [DISTWIDTH] IN BLOOD BY AUTOMATED COUNT: 17.6 % (ref 12–15)
FUNGUS WND CULT: NORMAL
GFR SERPL CREATININE-BSD FRML MDRD: 59 ML/MIN/1.73
GLUCOSE BLD-MCNC: 93 MG/DL (ref 70–100)
HCT VFR BLD AUTO: 36.6 % (ref 40–52)
HGB BLD-MCNC: 12.1 G/DL (ref 14–18)
IMM GRANULOCYTES # BLD AUTO: 0.2 10*3/MM3 (ref 0–0.05)
IMM GRANULOCYTES NFR BLD AUTO: 1.8 % (ref 0–5)
LYMPHOCYTES # BLD AUTO: 1.94 10*3/MM3 (ref 0.72–4.86)
LYMPHOCYTES NFR BLD AUTO: 17.4 % (ref 15–45)
MAGNESIUM SERPL-MCNC: 1.2 MG/DL (ref 1.4–2.2)
MCH RBC QN AUTO: 30.6 PG (ref 28–32)
MCHC RBC AUTO-ENTMCNC: 33.1 G/DL (ref 33–36)
MCV RBC AUTO: 92.7 FL (ref 82–95)
MONOCYTES # BLD AUTO: 1.12 10*3/MM3 (ref 0.19–1.3)
MONOCYTES NFR BLD AUTO: 10 % (ref 4–12)
NEUTROPHILS # BLD AUTO: 7.81 10*3/MM3 (ref 1.87–8.4)
NEUTROPHILS NFR BLD AUTO: 70 % (ref 39–78)
NRBC BLD AUTO-RTO: 0 /100 WBC (ref 0–0.2)
PLATELET # BLD AUTO: 269 10*3/MM3 (ref 130–400)
PMV BLD AUTO: 10.2 FL (ref 6–12)
POTASSIUM BLD-SCNC: 3.7 MMOL/L (ref 3.5–5.3)
RBC # BLD AUTO: 3.95 10*6/MM3 (ref 4.8–5.9)
SODIUM BLD-SCNC: 140 MMOL/L (ref 135–145)
WBC NRBC COR # BLD: 11.17 10*3/MM3 (ref 4.8–10.8)

## 2019-04-22 PROCEDURE — 80048 BASIC METABOLIC PNL TOTAL CA: CPT | Performed by: INTERNAL MEDICINE

## 2019-04-22 PROCEDURE — 83735 ASSAY OF MAGNESIUM: CPT | Performed by: INTERNAL MEDICINE

## 2019-04-22 PROCEDURE — 36415 COLL VENOUS BLD VENIPUNCTURE: CPT | Performed by: INTERNAL MEDICINE

## 2019-04-22 PROCEDURE — 85025 COMPLETE CBC W/AUTO DIFF WBC: CPT | Performed by: INTERNAL MEDICINE

## 2019-04-22 NOTE — TELEPHONE ENCOUNTER
Iveth with [employer] left a voicemail stating they did get the vest approved and it was shipped out Friday and setting him up this week.        She just wanted to let you know.

## 2019-05-02 ENCOUNTER — HOSPITAL ENCOUNTER (OUTPATIENT)
Dept: PREOP | Facility: HOSPITAL | Age: 59
Discharge: HOME | End: 2019-05-02
Admitting: INTERNAL MEDICINE

## 2019-05-02 ENCOUNTER — TRANSCRIBE ORDERS (OUTPATIENT)
Dept: ADMINISTRATIVE | Facility: HOSPITAL | Age: 59
End: 2019-05-02

## 2019-05-02 DIAGNOSIS — C44.90: ICD-10-CM

## 2019-05-02 DIAGNOSIS — Z94.2 S/P LUNG TRANSPLANT (HCC): ICD-10-CM

## 2019-05-02 DIAGNOSIS — J44.9 CHRONIC OBSTRUCTIVE PULMONARY DISEASE, UNSPECIFIED COPD TYPE (HCC): Primary | ICD-10-CM

## 2019-05-02 DIAGNOSIS — N18.9 CHRONIC KIDNEY DISEASE, UNSPECIFIED CKD STAGE: ICD-10-CM

## 2019-05-02 LAB — RSV AG SPEC QL: NEGATIVE

## 2019-05-02 PROCEDURE — 87807 RSV ASSAY W/OPTIC: CPT | Performed by: INTERNAL MEDICINE

## 2019-05-13 ASSESSMENT — ENCOUNTER SYMPTOMS
SHORTNESS OF BREATH: 1
ABDOMINAL PAIN: 0
DIARRHEA: 0
EYE ITCHING: 0
COUGH: 1
COLOR CHANGE: 0
NAUSEA: 0
RHINORRHEA: 0
CONSTIPATION: 0
WHEEZING: 0
VOMITING: 0
CHEST TIGHTNESS: 0
SORE THROAT: 0

## 2019-05-13 NOTE — PROGRESS NOTES
Subjective:      Patient ID: Jeanetta Merlin is a 59 y.o. male  Jay Sinha MD  Author MD Jalil    [facility] [place]  Chief Complaint   Patient presents with    Colonoscopy    Other     history of colon polyps     Patient with history of adenomatous colon polyps due for screening colonoscopy. Last c-scope 12/29/2008 with removal of adenomatous colon polyp    The patient denies abdominal or flank pain, anorexia, nausea or vomiting, dysphagia, change in bowel habits or black or bloody stools or weight loss. He has complex history of lung transplant and uses oxygen at night. Uses during day if needed.          Family History   Problem Relation Age of Onset    Cancer Mother     Heart Disease Father     Colon Cancer Neg Hx     Colon Polyps Neg Hx     Esophageal Cancer Neg Hx     Liver Cancer Neg Hx     Rectal Cancer Neg Hx     Liver Disease Neg Hx     Stomach Cancer Neg Hx        Past Medical History:   Diagnosis Date    COPD (chronic obstructive pulmonary disease) ([address]     Emphysema of lung ([address]     Hypertension     Pneumonia     in left lung    Skin cancer     Wears glasses        Past Surgical History:   Procedure Laterality Date    APPENDECTOMY      CHOLECYSTECTOMY  9/26/14    COLONOSCOPY  12/29/2008    Dr. Diego Reed: [facility]    LUNG REMOVAL, PARTIAL  2002    right upper    LUNG TRANSPLANT, SINGLE  2006    left    SKIN CANCER DESTRUCTION      UPPER GASTROINTESTINAL ENDOSCOPY  12/30/2008     Dr. Diego Reed       Current Outpatient Medications   Medication Sig Dispense Refill    Magnesium 400 MG CAPS Take 400 mg by mouth daily 30 capsule 3    Nebulizers (COMPRESSOR/NEBULIZER) MISC Nebulizer with supplies 1 each 0    OXYGEN pulse oximeter (not oxygen itself) 1 kit 0    nystatin (MYCOSTATIN) 022562 UNIT/ML suspension Take 500,000 Units by mouth 4 times daily      traZODone (DESYREL) 50 MG tablet TAKE 1-2 TABLETS AT NIGHT AS DIRECTED (Patient taking differently: TAKE 1-2 TABLETS AT NIGHT AS DIRECTED PRN) 60 tablet 5    metoprolol (LOPRESSOR) 100 MG tablet TAKE 1 TABLET BY MOUTH TWICE A DAY 60 tablet 4    albuterol sulfate HFA (VENTOLIN HFA) 108 (90 Base) MCG/ACT inhaler INHALE TWO PUFFS EVERY FOUR HOURS AS NEEDED FOR THIRTY DAYS 18 Inhaler 12    SPIRIVA HANDIHALER 18 MCG inhalation capsule INHALE THE CONTENTS OF 1 CAPSULE BY MOUTH EVERYD AY 30 capsule 5    levocetirizine (XYZAL) 5 MG tablet Take 1 tablet by mouth nightly 90 tablet 3    furosemide (LASIX) 20 MG tablet TAKE 1 TABLET BY MOUTH DAILY 30 tablet 10    omeprazole (PRILOSEC) 20 MG delayed release capsule TAKE ONE CAPSULE BY MOUTH TWICE A DAY 60 capsule 11    folic acid (FOLVITE) 1 MG tablet TAKE 1 TABLET BY MOUTH EVERY DAY 30 tablet 11    atorvastatin (LIPITOR) 20 MG tablet TAKE 1 TABLET BY MOUTH AT BEDTIME 30 tablet 11    LYRICA 150 MG capsule TAKE ONE CAPSULE BY MOUTH TWICE A DAY. 60 capsule 5    HYDROcodone-acetaminophen (NORCO) 7.5-325 MG per tablet Take 1 tablet by mouth.  OXYGEN Inhale into the lungs 3-4 L      Mycophenolate Sodium 360 MG TBEC Take 360 mg by mouth daily      Mycophenolate Sodium (MYCOPHENOLIC ACID) 258 MG TBEC 180 mg 2 times daily       Blood Glucose Monitoring Suppl (ACCU-CHEK JOANNE PLUS) W/DEVICE KIT   0    ACCU-CHEK JOANNE PLUS strip   3    Accu-Chek Softclix Lancets MISC   3    albuterol (PROVENTIL) (2.5 MG/3ML) 0.083% nebulizer solution       amLODIPine (NORVASC) 5 MG tablet Take 5 mg by mouth daily   11    magnesium oxide (MAG-OX) 400 (241.3 MG) MG TABS tablet 400 mg 2 times daily   11    tacrolimus (PROGRAF) 1 MG capsule Take 1 mg by mouth 2 times daily Take one tablet in the am and 1/2 tablet in the afternoon      Cyanocobalamin (VITAMIN B 12) 250 MCG LOZG Take  by mouth.  hydrochlorothiazide (HYDRODIURIL) 50 MG tablet Take 50 mg by mouth daily.  budesonide-formoterol (SYMBICORT) 160-4.5 MCG/ACT AERO Inhale 2 puffs into the lungs 2 times daily.          Current Facility-Administered Medications   Medication Dose Route Frequency Provider Last Rate Last Dose    magnesium sulfate injection 4 g  4 g Intravenous Once Benito Monet MD           Allergies   Allergen Reactions    Avelox [Moxifloxacin Hydrochloride] Other (See Comments)     Pt states this medicine will make him sweat, turn red, itchy, and pass out.  Clindamycin/Lincomycin Diarrhea     Told by Pulmonologist at [facility] not to take    Moxifloxacin Rash     Sweating         reports that he quit smoking about 17 years ago. His smoking use included cigarettes. He started smoking about 47 years ago. He has a 45.00 pack-year smoking history. He has never used smokeless tobacco. He reports that he has current or past drug history. Drug: Marijuana. Frequency: 2.00 times per week. He reports that he does not drink alcohol. Review of Systems   Constitutional: Positive for fatigue. Negative for chills and fever. HENT: Negative for rhinorrhea, sore throat and trouble swallowing. Eyes: Negative for itching and visual disturbance. Respiratory: Positive for cough and shortness of breath. Negative for chest tightness and wheezing. Cardiovascular: Negative for chest pain, palpitations and leg swelling. Gastrointestinal: Negative for abdominal pain, constipation, diarrhea, nausea and vomiting. Endocrine: Negative for polydipsia and polyuria. Genitourinary: Negative for difficulty urinating, dysuria and frequency. Musculoskeletal: Negative for arthralgias and myalgias. Skin: Negative for color change and rash. Allergic/Immunologic: Negative for environmental allergies and food allergies. Neurological: Negative for dizziness and light-headedness. Hematological: Negative for adenopathy. Does not bruise/bleed easily. Psychiatric/Behavioral: Positive for sleep disturbance. Negative for dysphoric mood. The patient is not nervous/anxious.         Objective:   Physical Exam   Constitutional: He is oriented to person, place, and time. He appears well-developed and well-nourished. No distress. /60   Pulse 89   Ht 5' 9\" (1.753 m)   Wt 156 lb (70.8 kg)   SpO2 96%   BMI 23.04 kg/m²      Eyes: Conjunctivae are normal. No scleral icterus. Neck: No tracheal deviation present. Cardiovascular: Normal rate and regular rhythm. Exam reveals no gallop and no friction rub. No murmur heard. Pulmonary/Chest: Effort normal and breath sounds normal. No respiratory distress. He has no wheezes. He has no rhonchi. He has no rales. Abdominal: Soft. Normal appearance and bowel sounds are normal. He exhibits no distension and no mass. There is no hepatomegaly. There is no tenderness. There is no rebound and no guarding. Musculoskeletal: He exhibits no edema. Neurological: He is alert and oriented to person, place, and time. He has normal strength. Skin: Skin is warm, dry and intact. No cyanosis. No pallor. Psychiatric: He has a normal mood and affect. His behavior is normal. Thought content normal. Cognition and memory are normal.       Assessment:      1. History of adenomatous polyp of colon    2. Screening for colon cancer          Plan:      Schedule colonoscopy  screening    Instruct on bowel prep. Nothing to eat or drink after midnight the day of the exam.  Unable to drive for 24 hours after the procedure. No aspirin or nonsteroidal anti-inflammatories for 5 days before procedure. I have discussed the benefits, alternatives, and risks (including bleeding, perforation and death)  for pursuing Endoscopy (EGD/Colonscopy/EUS/ERCP) with the patient and they are willing to continue. We also discussed the need for anesthesia, IV access, proper dietary changes, medication changes if necessary, and need for bowel prep (if ordered) prior to their Endoscopic procedure. They are aware they must have someone accompany them to their scheduled procedure to drive them home - they agree to the above and are willing to continue.      Plan for anesthesia: MAC  History of lung transplant.    Home oxygen use

## 2019-05-14 ENCOUNTER — OFFICE VISIT (OUTPATIENT)
Dept: GASTROENTEROLOGY | Age: 59
End: 2019-05-14

## 2019-05-14 VITALS
OXYGEN SATURATION: 96 % | HEIGHT: 69 IN | HEART RATE: 89 BPM | BODY MASS INDEX: 23.11 KG/M2 | WEIGHT: 156 LBS | SYSTOLIC BLOOD PRESSURE: 115 MMHG | DIASTOLIC BLOOD PRESSURE: 60 MMHG

## 2019-05-14 DIAGNOSIS — Z86.010 HISTORY OF ADENOMATOUS POLYP OF COLON: Primary | ICD-10-CM

## 2019-05-14 DIAGNOSIS — Z12.11 SCREENING FOR COLON CANCER: ICD-10-CM

## 2019-05-14 PROCEDURE — 99999 PR OFFICE/OUTPT VISIT,PROCEDURE ONLY: CPT | Performed by: NURSE PRACTITIONER

## 2019-05-14 ASSESSMENT — ENCOUNTER SYMPTOMS: TROUBLE SWALLOWING: 0

## 2019-06-03 PROBLEM — J44.1 COPD EXACERBATION: Status: RESOLVED | Noted: 2019-03-11 | Resolved: 2019-06-03

## 2019-06-03 NOTE — PROGRESS NOTES
ENRICO Ovalles  [facility]   Respiratory Disease Clinic  [address] [place] [address]  Phone: [phone]  Fax: [phone]     Bayron Rodriguez is a 58 y.o. male.   CC:   Chief Complaint   Patient presents with   • F/U OF COPD        HPI: Mr. Rodriguez is coming in for follow-up of his chronic respiratory failure, COPD and unilateral lung transplant status.  He has moderate to severe shortness of breath on a daily basis.  It is somewhat improved with the use of Spiriva and Symbicort routinely.  He is on 2 L of supplemental oxygen and does benefit from this.  He has albuterol breathing treatments he can use when needed.  He has been using those 3 times daily.  He remains on Zithromax, mycophenolate, Prograf and Bactrim.  Recently hospitalized for pseudomonal pneumonia.  After this hospitalization he indicates he was actually hospitalized again at  for bilateral pneumonia secondary to a viral illness.  Since then he has been on insulin which she is requiring intermittently.  He is not on prednisone and has not been for greater than 1 month.  Blood sugars are running between 4 and 500.  They have increased his magnesium supplement to 2 tablets twice daily.  Insurance is auto substituted him to Pro Air.  He reports more frequent exacerbations and use of his nebulizer since changing from Ventolin to ProAir air.  He would like to go back to Ventolin.  He is on full dose Mucinex.  He reports some leg edema.  Improved with the use of diuresis which she is now on daily.    The following portions of the patient's history were reviewed and updated as appropriate: allergies, current medications, past family history, past medical history, past social history, past surgical history and problem list.    Past Medical History:   Diagnosis Date   • Cancer (CMS/HCC)     Skin   • Chronic back pain    • Chronic pain syndrome 12/4/2018   • Chronic respiratory failure with hypoxia (CMS/HCC) 12/4/2018   •  COPD (chronic obstructive pulmonary disease) (CMS/HCC)    • COPD, group D, by GOLD 2017 classification (CMS/HCC) 2018   • Diabetes mellitus (CMS/HCC)    • Emphysema lung (CMS/HCC)    • Essential hypertension 2018   • Gastroesophageal reflux disease without esophagitis 2018   • Hypertension    • Lung transplant status (CMS/HCC) 2018   • Lung transplanted (CMS/HCC)    • Personal history of nicotine dependence 2018       Family History   Problem Relation Age of Onset   • Heart disease Father    • Heart attack Brother        Social History     Socioeconomic History   • Marital status:      Spouse name: Not on file   • Number of children: Not on file   • Years of education: Not on file   • Highest education level: Not on file   Tobacco Use   • Smoking status: Former Smoker     Packs/day: 3.00     Years: 28.00     Pack years: 84.00     Types: Cigarettes     Last attempt to quit:      Years since quittin.4   • Smokeless tobacco: Never Used   Substance and Sexual Activity   • Alcohol use: No   • Drug use: No     Comment: when in severe pain    • Sexual activity: Defer       Review of Systems   Constitutional: Positive for fatigue. Negative for activity change.   HENT: Positive for congestion. Negative for postnasal drip, rhinorrhea, sinus pressure, sore throat and trouble swallowing.    Eyes: Negative for blurred vision, double vision and pain.   Respiratory: Positive for cough and shortness of breath. Negative for chest tightness and wheezing.    Cardiovascular: Positive for leg swelling. Negative for chest pain and palpitations.   Gastrointestinal: Negative for abdominal distention, constipation, diarrhea, nausea and vomiting.   Endocrine: Negative for polydipsia, polyphagia and polyuria.   Genitourinary: Negative for dysuria, frequency and urgency.   Musculoskeletal: Negative for arthralgias, back pain, gait problem and joint swelling.   Skin: Negative for color change, dry skin,  rash and skin lesions.   Allergic/Immunologic: Negative for environmental allergies, food allergies and immunocompromised state.   Neurological: Negative for dizziness, seizures, speech difficulty, weakness, light-headedness, memory problem and confusion.   Hematological: Negative for adenopathy. Does not bruise/bleed easily.   Psychiatric/Behavioral: Negative for sleep disturbance, negative for hyperactivity and depressed mood. The patient is not nervous/anxious.        .vs    Physical Exam   Constitutional: He is oriented to person, place, and time. He appears well-developed and well-nourished. No distress.   HENT:   Head: Normocephalic and atraumatic.   Right Ear: External ear normal.   Left Ear: External ear normal.   Nose: Nose normal.   Mouth/Throat: Oropharynx is clear and moist. No oropharyngeal exudate.   Eyes: Conjunctivae and EOM are normal. Pupils are equal, round, and reactive to light. Right eye exhibits no discharge. Left eye exhibits no discharge.   Neck: Normal range of motion. Neck supple. No JVD present.   Cardiovascular: Normal rate and regular rhythm.   No murmur heard.  Pulmonary/Chest: Effort normal. No accessory muscle usage. No tachypnea. No respiratory distress. He has decreased breath sounds in the right upper field, the right middle field and the right lower field. He has no wheezes. He has rales in the left upper field and the left lower field.   Abdominal: Soft. Bowel sounds are normal. He exhibits no distension. There is no tenderness.   Musculoskeletal: Normal range of motion. He exhibits edema. He exhibits no deformity.   Neurological: He is alert and oriented to person, place, and time. He displays normal reflexes. No cranial nerve deficit. Coordination normal.   Skin: Skin is warm and dry. No rash noted. He is not diaphoretic. No erythema.   Psychiatric: He has a normal mood and affect. His behavior is normal. Thought content normal.   Nursing note and vitals  reviewed.      Pulmonary Functions Testing Results:    FEV1   Date Value Ref Range Status   06/05/2019 20% liters Final     FVC   Date Value Ref Range Status   06/05/2019 38% liters Final     FEV1/FVC   Date Value Ref Range Status   06/05/2019 41.93% % Final     My interpretation of his flow volume loop reflects very severe obstructive disease with reduced mid flows, FEV1 is improved from 17 last time to 20 today    CXR: No imaging for this visit        Problem List Items Addressed This Visit        Cardiovascular and Mediastinum    Essential hypertension       Respiratory    COPD, group D, by GOLD 2017 classification (CMS/[facility])    Relevant Medications    ipratropium-albuterol (DUO-NEB) 0.5-2.5 mg/3 ml nebulizer    promethazine (PHENERGAN) 25 MG tablet    albuterol sulfate  (90 Base) MCG/ACT inhaler    Other Relevant Orders    Pulmonary Function Test (Completed)    Chronic respiratory failure with hypoxia (CMS/[facility])       Digestive    Gastroesophageal reflux disease without esophagitis       Nervous and Auditory    Chronic pain syndrome       Other    Lung transplant status (CMS/[facility]) - Primary    Personal history of nicotine dependence        Patient's Body mass index is 23.21 kg/m². BMI is within normal parameters. No follow-up required..      Assessment/Plan         He is improved from a pulmonary standpoint since I saw him in the hospital.  He will continue his Spiriva and Symbicort as scheduled.  He will continue his supplemental oxygen since he is benefiting from that.  He has nebulizer treatments he can use when needed.  Failure of improvement and worsening of symptoms on pro-air.  Will attempt to prior authorize Ventolin.  He is having some increased leg edema.  I encouraged him to monitor this and also start weighing himself daily.  If his weight continues to increase we may need to order echocardiogram or make adjustments to diuresis otherwise he will come back in 3 months with spirometry or sooner if  christiano Kenney, APRN  6/5/2019  9:35 AM    Return in about 3 months (around 9/5/2019) for FVL.

## 2019-06-04 RX ORDER — METOPROLOL TARTRATE 100 MG/1
TABLET ORAL
Qty: 60 TABLET | Refills: 4 | Status: SHIPPED | OUTPATIENT
Start: 2019-06-04

## 2019-06-05 ENCOUNTER — OFFICE VISIT (OUTPATIENT)
Dept: PULMONOLOGY | Facility: CLINIC | Age: 59
End: 2019-06-05

## 2019-06-05 VITALS
SYSTOLIC BLOOD PRESSURE: 140 MMHG | HEART RATE: 72 BPM | BODY MASS INDEX: 23.28 KG/M2 | DIASTOLIC BLOOD PRESSURE: 80 MMHG | OXYGEN SATURATION: 98 % | WEIGHT: 157.2 LBS | HEIGHT: 69 IN

## 2019-06-05 DIAGNOSIS — G89.4 CHRONIC PAIN SYNDROME: ICD-10-CM

## 2019-06-05 DIAGNOSIS — Z87.891 PERSONAL HISTORY OF NICOTINE DEPENDENCE: ICD-10-CM

## 2019-06-05 DIAGNOSIS — J96.11 CHRONIC RESPIRATORY FAILURE WITH HYPOXIA (HCC): ICD-10-CM

## 2019-06-05 DIAGNOSIS — K21.9 GASTROESOPHAGEAL REFLUX DISEASE WITHOUT ESOPHAGITIS: ICD-10-CM

## 2019-06-05 DIAGNOSIS — J44.9 COPD, GROUP D, BY GOLD 2017 CLASSIFICATION (HCC): ICD-10-CM

## 2019-06-05 DIAGNOSIS — Z94.2 LUNG TRANSPLANT STATUS (HCC): Primary | ICD-10-CM

## 2019-06-05 DIAGNOSIS — I10 ESSENTIAL HYPERTENSION: ICD-10-CM

## 2019-06-05 LAB
FEV1/FVC: NORMAL %
FEV1: NORMAL LITERS
FVC VOL RESPIRATORY: NORMAL LITERS

## 2019-06-05 PROCEDURE — 99214 OFFICE O/P EST MOD 30 MIN: CPT | Performed by: NURSE PRACTITIONER

## 2019-06-05 PROCEDURE — 94010 BREATHING CAPACITY TEST: CPT | Performed by: NURSE PRACTITIONER

## 2019-06-05 RX ORDER — PROMETHAZINE HYDROCHLORIDE 25 MG/1
25 TABLET ORAL AS NEEDED
Refills: 0 | COMMUNITY
Start: 2019-03-27

## 2019-06-05 RX ORDER — ALBUTEROL SULFATE 90 UG/1
2 AEROSOL, METERED RESPIRATORY (INHALATION) EVERY 4 HOURS PRN
Qty: 1 INHALER | Refills: 5 | Status: SHIPPED | OUTPATIENT
Start: 2019-06-05 | End: 2019-07-05

## 2019-06-05 RX ORDER — BLOOD SUGAR DIAGNOSTIC
STRIP MISCELLANEOUS
Refills: 11 | COMMUNITY
Start: 2019-05-30

## 2019-06-05 RX ORDER — FLURBIPROFEN SODIUM 0.3 MG/ML
SOLUTION/ DROPS OPHTHALMIC
Refills: 11 | COMMUNITY
Start: 2019-05-29

## 2019-06-05 RX ORDER — IPRATROPIUM BROMIDE AND ALBUTEROL SULFATE 2.5; .5 MG/3ML; MG/3ML
SOLUTION RESPIRATORY (INHALATION)
Refills: 6 | COMMUNITY
Start: 2019-05-08

## 2019-06-05 RX ORDER — SULFAMETHOXAZOLE AND TRIMETHOPRIM 400; 80 MG/1; MG/1
1 TABLET ORAL SEE ADMIN INSTRUCTIONS
Refills: 11 | COMMUNITY
Start: 2019-03-26 | End: 2022-01-01

## 2019-06-05 RX ORDER — FLUCONAZOLE 100 MG/1
TABLET ORAL
Refills: 0 | COMMUNITY
Start: 2019-04-03 | End: 2022-01-01

## 2019-06-18 ENCOUNTER — TELEPHONE (OUTPATIENT)
Dept: PULMONOLOGY | Facility: CLINIC | Age: 59
End: 2019-06-18

## 2019-06-25 DIAGNOSIS — E78.5 HYPERLIPIDEMIA, UNSPECIFIED HYPERLIPIDEMIA TYPE: ICD-10-CM

## 2019-06-25 RX ORDER — OMEPRAZOLE 20 MG/1
CAPSULE, DELAYED RELEASE ORAL
Qty: 60 CAPSULE | Refills: 11 | Status: SHIPPED | OUTPATIENT
Start: 2019-06-25 | End: 2020-05-10

## 2019-06-25 RX ORDER — FOLIC ACID 1 MG/1
TABLET ORAL
Qty: 30 TABLET | Refills: 11 | Status: SHIPPED | OUTPATIENT
Start: 2019-06-25 | End: 2020-05-14

## 2019-06-25 RX ORDER — ATORVASTATIN CALCIUM 20 MG/1
TABLET, FILM COATED ORAL
Qty: 30 TABLET | Refills: 11 | Status: SHIPPED | OUTPATIENT
Start: 2019-06-25 | End: 2020-05-29 | Stop reason: SDUPTHER

## 2019-07-26 RX ORDER — TRAZODONE HYDROCHLORIDE 50 MG/1
TABLET ORAL
Qty: 60 TABLET | Refills: 0 | Status: SHIPPED | OUTPATIENT
Start: 2019-07-26 | End: 2019-08-23 | Stop reason: SDUPTHER

## 2019-07-26 RX ORDER — LEVOCETIRIZINE DIHYDROCHLORIDE 5 MG/1
TABLET, FILM COATED ORAL
Qty: 30 TABLET | Refills: 0 | Status: SHIPPED | OUTPATIENT
Start: 2019-07-26 | End: 2019-12-26 | Stop reason: SDUPTHER

## 2019-08-06 ENCOUNTER — TRANSCRIBE ORDERS (OUTPATIENT)
Dept: ADMINISTRATIVE | Facility: HOSPITAL | Age: 59
End: 2019-08-06

## 2019-08-06 ENCOUNTER — APPOINTMENT (OUTPATIENT)
Dept: LAB | Facility: HOSPITAL | Age: 59
End: 2019-08-06

## 2019-08-06 DIAGNOSIS — J44.9 CHRONIC OBSTRUCTIVE PULMONARY DISEASE, UNSPECIFIED COPD TYPE (HCC): Primary | ICD-10-CM

## 2019-08-06 DIAGNOSIS — N18.9 CHRONIC KIDNEY DISEASE, UNSPECIFIED CKD STAGE: ICD-10-CM

## 2019-08-06 DIAGNOSIS — C44.90: ICD-10-CM

## 2019-08-06 LAB
ANION GAP SERPL CALCULATED.3IONS-SCNC: 9 MMOL/L (ref 4–13)
BASOPHILS # BLD AUTO: 0.04 10*3/MM3 (ref 0–0.2)
BASOPHILS NFR BLD AUTO: 0.5 % (ref 0–1.5)
BUN BLD-MCNC: 26 MG/DL (ref 5–21)
BUN/CREAT SERPL: 17 (ref 7–25)
CALCIUM SPEC-SCNC: 9.8 MG/DL (ref 8.4–10.4)
CHLORIDE SERPL-SCNC: 96 MMOL/L (ref 98–110)
CO2 SERPL-SCNC: 35 MMOL/L (ref 24–31)
CREAT BLD-MCNC: 1.53 MG/DL (ref 0.5–1.4)
DEPRECATED RDW RBC AUTO: 38.4 FL (ref 37–54)
EOSINOPHIL # BLD AUTO: 0.12 10*3/MM3 (ref 0–0.4)
EOSINOPHIL NFR BLD AUTO: 1.5 % (ref 0.3–6.2)
ERYTHROCYTE [DISTWIDTH] IN BLOOD BY AUTOMATED COUNT: 12.5 % (ref 12.3–15.4)
GFR SERPL CREATININE-BSD FRML MDRD: 47 ML/MIN/1.73
GLUCOSE BLD-MCNC: 121 MG/DL (ref 70–100)
HCT VFR BLD AUTO: 38.5 % (ref 37.5–51)
HGB BLD-MCNC: 13.2 G/DL (ref 13–17.7)
IMM GRANULOCYTES # BLD AUTO: 0.04 10*3/MM3 (ref 0–0.05)
IMM GRANULOCYTES NFR BLD AUTO: 0.5 % (ref 0–0.5)
LYMPHOCYTES # BLD AUTO: 1.48 10*3/MM3 (ref 0.7–3.1)
LYMPHOCYTES NFR BLD AUTO: 18.7 % (ref 19.6–45.3)
MAGNESIUM SERPL-MCNC: 1.6 MG/DL (ref 1.4–2.2)
MCH RBC QN AUTO: 29.1 PG (ref 26.6–33)
MCHC RBC AUTO-ENTMCNC: 34.3 G/DL (ref 31.5–35.7)
MCV RBC AUTO: 84.8 FL (ref 79–97)
MONOCYTES # BLD AUTO: 0.79 10*3/MM3 (ref 0.1–0.9)
MONOCYTES NFR BLD AUTO: 10 % (ref 5–12)
NEUTROPHILS # BLD AUTO: 5.45 10*3/MM3 (ref 1.7–7)
NEUTROPHILS NFR BLD AUTO: 68.8 % (ref 42.7–76)
NRBC BLD AUTO-RTO: 0 /100 WBC (ref 0–0.2)
PLATELET # BLD AUTO: 220 10*3/MM3 (ref 140–450)
PMV BLD AUTO: 11.8 FL (ref 6–12)
POTASSIUM BLD-SCNC: 3.7 MMOL/L (ref 3.5–5.3)
RBC # BLD AUTO: 4.54 10*6/MM3 (ref 4.14–5.8)
SODIUM BLD-SCNC: 140 MMOL/L (ref 135–145)
WBC NRBC COR # BLD: 7.92 10*3/MM3 (ref 3.4–10.8)

## 2019-08-06 PROCEDURE — 85025 COMPLETE CBC W/AUTO DIFF WBC: CPT | Performed by: INTERNAL MEDICINE

## 2019-08-06 PROCEDURE — 83735 ASSAY OF MAGNESIUM: CPT | Performed by: INTERNAL MEDICINE

## 2019-08-06 PROCEDURE — 80048 BASIC METABOLIC PNL TOTAL CA: CPT | Performed by: INTERNAL MEDICINE

## 2019-08-06 PROCEDURE — 36415 COLL VENOUS BLD VENIPUNCTURE: CPT | Performed by: INTERNAL MEDICINE

## 2019-08-19 RX ORDER — FUROSEMIDE 20 MG/1
TABLET ORAL
Qty: 30 TABLET | Refills: 10 | OUTPATIENT
Start: 2019-08-19

## 2019-09-09 NOTE — PROGRESS NOTES
ENRICO Ovalles  [facility]   Respiratory Disease Clinic  [address] [place] [address]  Phone: [phone]  Fax: [phone]     Bayron Rodriguez is a 58 y.o. male.   CC:   Chief Complaint   Patient presents with   • COPD        HPI: Mr. Rodriguez is coming in for a follow-up of his chronic respiratory failure, COPD and unilateral lung transplant status.  Spirits his moderate to severe shortness of breath and cough daily.  It is worsened with activity and improved with the use of bronchodilators and supplemental oxygen.  He is on Spiriva and Symbicort routinely.  He has breathing treatments he can use as needed.  He typically requires those 2-3 times daily.  He is on 2 L of supplemental oxygen and benefits from this.  In terms of his lung transplant status he remains on Zithromax, mycophenolate, Prograf and Bactrim regularly.  He has had a hospitalization over the last year for Pseudomonas pneumonia.  Last office visit he was having issues with elevated blood sugars.  They had started him on insulin.  He remains on insulin.  Blood sugars are still an issue.  He reports having an upper respiratory illness last month and having to go see his transplant service at .  They treated him and he did improve.  He reports today he is doing as well now from a breathing standpoint as he has in months.  He is dealing with some stress related to raising a couple of his grandchildren that his daughter has abandoned.  He has however coping with that and has a good support system.    The following portions of the patient's history were reviewed and updated as appropriate: allergies, current medications, past family history, past medical history, past social history, past surgical history and problem list.    Past Medical History:   Diagnosis Date   • Cancer (CMS/HCC)     Skin   • Chronic back pain    • Chronic pain syndrome 12/4/2018   • Chronic respiratory failure with hypoxia (CMS/HCC) 12/4/2018   •  COPD (chronic obstructive pulmonary disease) (CMS/HCC)    • COPD, group D, by GOLD 2017 classification (CMS/HCC) 2018   • Diabetes mellitus (CMS/HCC)    • Emphysema lung (CMS/HCC)    • Essential hypertension 2018   • Gastroesophageal reflux disease without esophagitis 2018   • Hypertension    • Lung transplant status (CMS/HCC) 2018   • Lung transplanted (CMS/HCC)    • Personal history of nicotine dependence 2018       Family History   Problem Relation Age of Onset   • Heart disease Father    • Heart attack Brother        Social History     Socioeconomic History   • Marital status:      Spouse name: Not on file   • Number of children: Not on file   • Years of education: Not on file   • Highest education level: Not on file   Tobacco Use   • Smoking status: Former Smoker     Packs/day: 3.00     Years: 28.00     Pack years: 84.00     Types: Cigarettes     Last attempt to quit:      Years since quittin.7   • Smokeless tobacco: Never Used   Substance and Sexual Activity   • Alcohol use: No   • Drug use: No     Comment: when in severe pain    • Sexual activity: Defer       Review of Systems   Constitutional: Negative for activity change, chills, fatigue and fever.   HENT: Negative for congestion, postnasal drip, rhinorrhea, sinus pressure, sore throat and trouble swallowing.    Eyes: Negative for blurred vision, double vision and pain.   Respiratory: Positive for cough and shortness of breath. Negative for chest tightness and wheezing.    Cardiovascular: Negative for chest pain, palpitations and leg swelling.   Gastrointestinal: Negative for abdominal distention, constipation, diarrhea, nausea and vomiting.   Endocrine: Negative for polydipsia, polyphagia and polyuria.   Genitourinary: Negative for dysuria, frequency and urgency.   Musculoskeletal: Negative for arthralgias, back pain, gait problem and joint swelling.   Skin: Negative for color change, dry skin, rash and skin  lesions.   Allergic/Immunologic: Negative for environmental allergies, food allergies and immunocompromised state.   Neurological: Negative for dizziness, seizures, speech difficulty, weakness, light-headedness, memory problem and confusion.   Hematological: Negative for adenopathy. Does not bruise/bleed easily.   Psychiatric/Behavioral: Positive for stress. Negative for sleep disturbance, negative for hyperactivity and depressed mood. The patient is not nervous/anxious.        .vs    Physical Exam   Constitutional: He is oriented to person, place, and time. He appears well-developed and well-nourished. No distress.   HENT:   Head: Normocephalic and atraumatic.   Right Ear: External ear normal.   Left Ear: External ear normal.   Nose: Nose normal.   Mouth/Throat: Oropharynx is clear and moist. No oropharyngeal exudate.   Eyes: Conjunctivae and EOM are normal. Pupils are equal, round, and reactive to light. Right eye exhibits no discharge. Left eye exhibits no discharge.   Neck: Normal range of motion. Neck supple. No JVD present.   Cardiovascular: Normal rate and regular rhythm.   No murmur heard.  Pulmonary/Chest: Effort normal. No respiratory distress. He has decreased breath sounds in the right upper field, the right middle field and the right lower field. He has no wheezes. He has rales in the left upper field and the left lower field.   Abdominal: Soft. Bowel sounds are normal. He exhibits no distension. There is no tenderness.   Musculoskeletal: Normal range of motion. He exhibits no edema or deformity.   Neurological: He is alert and oriented to person, place, and time. He displays normal reflexes. No cranial nerve deficit. Coordination normal.   Skin: Skin is warm and dry. No rash noted. He is not diaphoretic. No erythema.   Psychiatric: He has a normal mood and affect. His behavior is normal. Thought content normal.   Nursing note and vitals reviewed.      Pulmonary Functions Testing Results:    FEV1   Date  Value Ref Range Status   09/11/2019 20% liters Final     FVC   Date Value Ref Range Status   09/11/2019 34% liters Final     FEV1/FVC   Date Value Ref Range Status   09/11/2019 47.47 % Final     My interpretation of his flow volume loop reflects very severe obstructive disease, FEV1 is stable at 20 or 0.62 L, mid flows reduced, stable    CXR: No imaging for today.  He did have updated imaging in April of this year [facility]        Problem List Items Addressed This Visit        Respiratory    COPD, group D, by GOLD 2017 classification (CMS/[facility])    Relevant Medications    WIXELA INHUB 250-50 MCG/DOSE DISKUS    albuterol sulfate  (90 Base) MCG/ACT inhaler    Other Relevant Orders    Pulmonary Function Test (Completed)    Chronic respiratory failure with hypoxia (CMS/[facility]) - Primary       Nervous and Auditory    Chronic pain syndrome       Other    Lung transplant status (CMS/[facility])    Personal history of nicotine dependence        Patient's Body mass index is 24.75 kg/m². BMI is within normal parameters. No follow-up required..      Assessment/Plan         He feels that he is doing better today.  I will not make any changes to his bronchodilators.  I did refill his emergency inhaler as he does require this daily.  He has supplemental oxygen he benefits from and will continue this.  He will do be due to follow-up with his transplant service at  November 13.  He will come back here in 3 months with repeat spirometry however I could see him sooner if he becomes ill before then.  We discussed vaccinations and I encouraged him to get his flu shot sometime in October which he will do.    Shruti Kenney, ENRICO  9/11/2019  9:11 AM    Return in about 3 months (around 12/11/2019) for FVL.

## 2019-09-11 ENCOUNTER — OFFICE VISIT (OUTPATIENT)
Dept: PULMONOLOGY | Facility: CLINIC | Age: 59
End: 2019-09-11

## 2019-09-11 VITALS
HEIGHT: 67 IN | WEIGHT: 158 LBS | BODY MASS INDEX: 24.8 KG/M2 | SYSTOLIC BLOOD PRESSURE: 110 MMHG | OXYGEN SATURATION: 97 % | DIASTOLIC BLOOD PRESSURE: 70 MMHG | HEART RATE: 90 BPM

## 2019-09-11 DIAGNOSIS — Z94.2 LUNG TRANSPLANT STATUS (HCC): ICD-10-CM

## 2019-09-11 DIAGNOSIS — Z87.891 PERSONAL HISTORY OF NICOTINE DEPENDENCE: ICD-10-CM

## 2019-09-11 DIAGNOSIS — G89.4 CHRONIC PAIN SYNDROME: ICD-10-CM

## 2019-09-11 DIAGNOSIS — J96.11 CHRONIC RESPIRATORY FAILURE WITH HYPOXIA (HCC): Primary | ICD-10-CM

## 2019-09-11 DIAGNOSIS — J44.9 COPD, GROUP D, BY GOLD 2017 CLASSIFICATION (HCC): ICD-10-CM

## 2019-09-11 PROCEDURE — 99214 OFFICE O/P EST MOD 30 MIN: CPT | Performed by: NURSE PRACTITIONER

## 2019-09-11 PROCEDURE — 94010 BREATHING CAPACITY TEST: CPT | Performed by: NURSE PRACTITIONER

## 2019-09-11 RX ORDER — ALBUTEROL SULFATE 90 UG/1
2 AEROSOL, METERED RESPIRATORY (INHALATION) EVERY 4 HOURS PRN
Qty: 2 INHALER | Refills: 5 | Status: SHIPPED | OUTPATIENT
Start: 2019-09-11 | End: 2019-10-11

## 2019-09-11 RX ORDER — INSULIN GLARGINE 100 [IU]/ML
INJECTION, SOLUTION SUBCUTANEOUS
Refills: 11 | COMMUNITY
Start: 2019-08-21

## 2019-09-24 ENCOUNTER — TELEPHONE (OUTPATIENT)
Dept: PRIMARY CARE CLINIC | Age: 59
End: 2019-09-24

## 2019-09-24 RX ORDER — LEVOFLOXACIN 500 MG/1
500 TABLET, FILM COATED ORAL DAILY
Qty: 7 TABLET | Refills: 0 | Status: SHIPPED | OUTPATIENT
Start: 2019-09-24 | End: 2019-10-01

## 2019-10-24 RX ORDER — ALBUTEROL SULFATE 90 UG/1
2 AEROSOL, METERED RESPIRATORY (INHALATION) 4 TIMES DAILY PRN
Qty: 1 INHALER | Refills: 11 | Status: SHIPPED | OUTPATIENT
Start: 2019-10-24 | End: 2019-10-30 | Stop reason: SDUPTHER

## 2019-10-30 DIAGNOSIS — J44.9 COPD, GROUP D, BY GOLD 2017 CLASSIFICATION (HCC): Primary | ICD-10-CM

## 2019-10-30 RX ORDER — ALBUTEROL SULFATE 90 UG/1
2 AEROSOL, METERED RESPIRATORY (INHALATION) 4 TIMES DAILY PRN
Qty: 1 INHALER | Refills: 11 | Status: SHIPPED | OUTPATIENT
Start: 2019-10-30 | End: 2020-01-08 | Stop reason: SDUPTHER

## 2019-11-13 ENCOUNTER — TELEPHONE (OUTPATIENT)
Dept: PULMONOLOGY | Facility: CLINIC | Age: 59
End: 2019-11-13

## 2019-11-13 NOTE — TELEPHONE ENCOUNTER
I spoke with Dr. Nicole in regards to this patient.  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  He reports he saw him today in the clinic with significantly worsening spirometry and worsening symptoms.  He feels that he is likely nearing the end of his course.  He gave him some oral steroids to see if these would be beneficial. They discussed hospice however the patient declined.  Patient does wish to be a DNI or DO NOT INTUBATE.  He did go ahead and make the patient a follow-up appointment at his clinic in January however he wanted us to be aware in the event that the patient has complications between now and then.

## 2019-11-19 RX ORDER — LEVOFLOXACIN 500 MG/1
500 TABLET, FILM COATED ORAL DAILY
Qty: 7 TABLET | Refills: 0 | OUTPATIENT
Start: 2019-11-19 | End: 2019-11-26

## 2019-11-24 ENCOUNTER — NURSE TRIAGE (OUTPATIENT)
Dept: CALL CENTER | Facility: HOSPITAL | Age: 59
End: 2019-11-24

## 2019-11-24 ENCOUNTER — HOSPITAL ENCOUNTER (EMERGENCY)
Facility: HOSPITAL | Age: 59
Discharge: HOME | End: 2019-11-25
Attending: EMERGENCY MEDICINE | Admitting: EMERGENCY MEDICINE

## 2019-11-24 ENCOUNTER — APPOINTMENT (OUTPATIENT)
Dept: GENERAL RADIOLOGY | Facility: HOSPITAL | Age: 59
End: 2019-11-24

## 2019-11-24 DIAGNOSIS — R73.9 HYPERGLYCEMIA: Primary | ICD-10-CM

## 2019-11-24 LAB
ACETONE BLD QL: NEGATIVE
ALBUMIN SERPL-MCNC: 3.8 G/DL (ref 3.5–5.2)
ALBUMIN SERPL-MCNC: 4.4 G/DL (ref 3.5–5.2)
ALBUMIN/GLOB SERPL: 1.7 G/DL
ALBUMIN/GLOB SERPL: 2 G/DL
ALP SERPL-CCNC: 62 U/L (ref 39–117)
ALP SERPL-CCNC: 66 U/L (ref 39–117)
ALT SERPL W P-5'-P-CCNC: 10 U/L (ref 1–41)
ALT SERPL W P-5'-P-CCNC: 12 U/L (ref 1–41)
ANION GAP SERPL CALCULATED.3IONS-SCNC: 11 MMOL/L (ref 5–15)
ANION GAP SERPL CALCULATED.3IONS-SCNC: 13 MMOL/L (ref 5–15)
AST SERPL-CCNC: 14 U/L (ref 1–40)
AST SERPL-CCNC: 7 U/L (ref 1–40)
ATMOSPHERIC PRESS: 747 MMHG
BASE EXCESS BLDV CALC-SCNC: 6.1 MMOL/L (ref 0–2)
BASOPHILS # BLD AUTO: 0.06 10*3/MM3 (ref 0–0.2)
BASOPHILS NFR BLD AUTO: 0.4 % (ref 0–1.5)
BDY SITE: ABNORMAL
BILIRUB SERPL-MCNC: 0.5 MG/DL (ref 0.2–1.2)
BILIRUB SERPL-MCNC: 0.6 MG/DL (ref 0.2–1.2)
BILIRUB UR QL STRIP: NEGATIVE
BODY TEMPERATURE: 37 C
BUN BLD-MCNC: 41 MG/DL (ref 8–23)
BUN BLD-MCNC: 44 MG/DL (ref 8–23)
BUN/CREAT SERPL: 28.6 (ref 7–25)
BUN/CREAT SERPL: 29.1 (ref 7–25)
CALCIUM SPEC-SCNC: 9.1 MG/DL (ref 8.6–10.5)
CALCIUM SPEC-SCNC: 9.4 MG/DL (ref 8.6–10.5)
CHLORIDE SERPL-SCNC: 88 MMOL/L (ref 98–107)
CHLORIDE SERPL-SCNC: 94 MMOL/L (ref 98–107)
CK SERPL-CCNC: 78 U/L (ref 20–200)
CLARITY UR: CLEAR
CO2 SERPL-SCNC: 30 MMOL/L (ref 22–29)
CO2 SERPL-SCNC: 31 MMOL/L (ref 22–29)
COLOR UR: YELLOW
CREAT BLD-MCNC: 1.41 MG/DL (ref 0.76–1.27)
CREAT BLD-MCNC: 1.54 MG/DL (ref 0.76–1.27)
D-LACTATE SERPL-SCNC: 2.3 MMOL/L (ref 0.5–2)
DEPRECATED RDW RBC AUTO: 39.8 FL (ref 37–54)
EOSINOPHIL # BLD AUTO: 0 10*3/MM3 (ref 0–0.4)
EOSINOPHIL NFR BLD AUTO: 0 % (ref 0.3–6.2)
ERYTHROCYTE [DISTWIDTH] IN BLOOD BY AUTOMATED COUNT: 13 % (ref 12.3–15.4)
FLUAV AG NPH QL: NEGATIVE
FLUBV AG NPH QL IA: NEGATIVE
GFR SERPL CREATININE-BSD FRML MDRD: 46 ML/MIN/1.73
GFR SERPL CREATININE-BSD FRML MDRD: 51 ML/MIN/1.73
GLOBULIN UR ELPH-MCNC: 2.2 GM/DL
GLOBULIN UR ELPH-MCNC: 2.3 GM/DL
GLUCOSE BLD-MCNC: 463 MG/DL (ref 65–99)
GLUCOSE BLD-MCNC: 674 MG/DL (ref 65–99)
GLUCOSE BLDC GLUCOMTR-MCNC: 297 MG/DL (ref 70–130)
GLUCOSE BLDC GLUCOMTR-MCNC: 435 MG/DL (ref 70–130)
GLUCOSE BLDC GLUCOMTR-MCNC: 450 MG/DL (ref 70–130)
GLUCOSE UR STRIP-MCNC: ABNORMAL MG/DL
HBA1C MFR BLD: 7.6 % (ref 4.8–5.6)
HCO3 BLDV-SCNC: 31.7 MMOL/L (ref 22–28)
HCT VFR BLD AUTO: 38.7 % (ref 37.5–51)
HGB BLD-MCNC: 13 G/DL (ref 13–17.7)
HGB UR QL STRIP.AUTO: NEGATIVE
HOLD SPECIMEN: NORMAL
HOLD SPECIMEN: NORMAL
IMM GRANULOCYTES # BLD AUTO: 0.39 10*3/MM3 (ref 0–0.05)
IMM GRANULOCYTES NFR BLD AUTO: 2.7 % (ref 0–0.5)
KETONES UR QL STRIP: NEGATIVE
LEUKOCYTE ESTERASE UR QL STRIP.AUTO: NEGATIVE
LYMPHOCYTES # BLD AUTO: 0.73 10*3/MM3 (ref 0.7–3.1)
LYMPHOCYTES NFR BLD AUTO: 5.1 % (ref 19.6–45.3)
Lab: ABNORMAL
MCH RBC QN AUTO: 28.3 PG (ref 26.6–33)
MCHC RBC AUTO-ENTMCNC: 33.6 G/DL (ref 31.5–35.7)
MCV RBC AUTO: 84.3 FL (ref 79–97)
MODALITY: ABNORMAL
MONOCYTES # BLD AUTO: 0.66 10*3/MM3 (ref 0.1–0.9)
MONOCYTES NFR BLD AUTO: 4.6 % (ref 5–12)
NEUTROPHILS # BLD AUTO: 12.37 10*3/MM3 (ref 1.7–7)
NEUTROPHILS NFR BLD AUTO: 87.2 % (ref 42.7–76)
NITRITE UR QL STRIP: NEGATIVE
NRBC BLD AUTO-RTO: 0 /100 WBC (ref 0–0.2)
PCO2 BLDV: 48.8 MM HG (ref 41–51)
PH BLDV: 7.42 PH UNITS (ref 7.32–7.42)
PH UR STRIP.AUTO: 7 [PH] (ref 5–8)
PLATELET # BLD AUTO: 236 10*3/MM3 (ref 140–450)
PMV BLD AUTO: 11.6 FL (ref 6–12)
PO2 BLDV: 55.8 MM HG (ref 27–53)
POTASSIUM BLD-SCNC: 4.1 MMOL/L (ref 3.5–5.2)
POTASSIUM BLD-SCNC: 4.6 MMOL/L (ref 3.5–5.2)
PROCALCITONIN SERPL-MCNC: 0.08 NG/ML (ref 0.1–0.25)
PROT SERPL-MCNC: 6.1 G/DL (ref 6–8.5)
PROT SERPL-MCNC: 6.6 G/DL (ref 6–8.5)
PROT UR QL STRIP: NEGATIVE
RBC # BLD AUTO: 4.59 10*6/MM3 (ref 4.14–5.8)
SAO2 % BLDCOV: 88.4 % (ref 45–75)
SODIUM BLD-SCNC: 131 MMOL/L (ref 136–145)
SODIUM BLD-SCNC: 136 MMOL/L (ref 136–145)
SP GR UR STRIP: 1.02 (ref 1–1.03)
TROPONIN T SERPL-MCNC: <0.01 NG/ML (ref 0–0.03)
UROBILINOGEN UR QL STRIP: ABNORMAL
VENTILATOR MODE: ABNORMAL
WBC NRBC COR # BLD: 14.21 10*3/MM3 (ref 3.4–10.8)
WHOLE BLOOD HOLD SPECIMEN: NORMAL
WHOLE BLOOD HOLD SPECIMEN: NORMAL

## 2019-11-24 PROCEDURE — 84484 ASSAY OF TROPONIN QUANT: CPT | Performed by: EMERGENCY MEDICINE

## 2019-11-24 PROCEDURE — 96368 THER/DIAG CONCURRENT INF: CPT

## 2019-11-24 PROCEDURE — 83036 HEMOGLOBIN GLYCOSYLATED A1C: CPT | Performed by: EMERGENCY MEDICINE

## 2019-11-24 PROCEDURE — 99284 EMERGENCY DEPT VISIT MOD MDM: CPT

## 2019-11-24 PROCEDURE — 87804 INFLUENZA ASSAY W/OPTIC: CPT | Performed by: EMERGENCY MEDICINE

## 2019-11-24 PROCEDURE — 84145 PROCALCITONIN (PCT): CPT | Performed by: EMERGENCY MEDICINE

## 2019-11-24 PROCEDURE — 25010000002 MORPHINE PER 10 MG: Performed by: EMERGENCY MEDICINE

## 2019-11-24 PROCEDURE — 81003 URINALYSIS AUTO W/O SCOPE: CPT | Performed by: EMERGENCY MEDICINE

## 2019-11-24 PROCEDURE — 63710000001 INSULIN REGULAR HUMAN PER 5 UNITS: Performed by: EMERGENCY MEDICINE

## 2019-11-24 PROCEDURE — 96365 THER/PROPH/DIAG IV INF INIT: CPT

## 2019-11-24 PROCEDURE — 71045 X-RAY EXAM CHEST 1 VIEW: CPT

## 2019-11-24 PROCEDURE — 87040 BLOOD CULTURE FOR BACTERIA: CPT | Performed by: EMERGENCY MEDICINE

## 2019-11-24 PROCEDURE — 85025 COMPLETE CBC W/AUTO DIFF WBC: CPT | Performed by: EMERGENCY MEDICINE

## 2019-11-24 PROCEDURE — 82009 KETONE BODYS QUAL: CPT | Performed by: EMERGENCY MEDICINE

## 2019-11-24 PROCEDURE — 25010000002 CEFTRIAXONE PER 250 MG: Performed by: EMERGENCY MEDICINE

## 2019-11-24 PROCEDURE — 82550 ASSAY OF CK (CPK): CPT | Performed by: EMERGENCY MEDICINE

## 2019-11-24 PROCEDURE — 83605 ASSAY OF LACTIC ACID: CPT | Performed by: EMERGENCY MEDICINE

## 2019-11-24 PROCEDURE — 82803 BLOOD GASES ANY COMBINATION: CPT

## 2019-11-24 PROCEDURE — 82962 GLUCOSE BLOOD TEST: CPT

## 2019-11-24 PROCEDURE — 25010000002 AZITHROMYCIN PER 500 MG: Performed by: EMERGENCY MEDICINE

## 2019-11-24 PROCEDURE — 80053 COMPREHEN METABOLIC PANEL: CPT | Performed by: EMERGENCY MEDICINE

## 2019-11-24 PROCEDURE — 96375 TX/PRO/DX INJ NEW DRUG ADDON: CPT

## 2019-11-24 RX ORDER — SODIUM CHLORIDE 0.9 % (FLUSH) 0.9 %
10 SYRINGE (ML) INJECTION AS NEEDED
Status: DISCONTINUED | OUTPATIENT
Start: 2019-11-24 | End: 2019-11-25 | Stop reason: HOSPADM

## 2019-11-24 RX ORDER — DOXYCYCLINE HYCLATE 100 MG/1
100 TABLET, DELAYED RELEASE ORAL 2 TIMES DAILY
Qty: 14 TABLET | Refills: 0 | Status: SHIPPED | OUTPATIENT
Start: 2019-11-24 | End: 2022-01-01

## 2019-11-24 RX ADMIN — SODIUM CHLORIDE 1000 ML: 9 INJECTION, SOLUTION INTRAVENOUS at 19:47

## 2019-11-24 RX ADMIN — AZITHROMYCIN MONOHYDRATE 500 MG: 500 INJECTION, POWDER, LYOPHILIZED, FOR SOLUTION INTRAVENOUS at 21:42

## 2019-11-24 RX ADMIN — MORPHINE SULFATE 4 MG: 4 INJECTION, SOLUTION INTRAMUSCULAR; INTRAVENOUS at 23:43

## 2019-11-24 RX ADMIN — CEFTRIAXONE SODIUM 2 G: 2 INJECTION, POWDER, FOR SOLUTION INTRAMUSCULAR; INTRAVENOUS at 21:42

## 2019-11-24 RX ADMIN — SODIUM CHLORIDE 1000 ML: 9 INJECTION, SOLUTION INTRAVENOUS at 21:42

## 2019-11-24 RX ADMIN — INSULIN HUMAN 15 UNITS: 100 INJECTION, SOLUTION PARENTERAL at 21:43

## 2019-11-25 VITALS
DIASTOLIC BLOOD PRESSURE: 77 MMHG | RESPIRATION RATE: 20 BRPM | SYSTOLIC BLOOD PRESSURE: 131 MMHG | TEMPERATURE: 97.6 F | HEART RATE: 77 BPM | WEIGHT: 168 LBS | HEIGHT: 69 IN | BODY MASS INDEX: 24.88 KG/M2 | OXYGEN SATURATION: 94 %

## 2019-11-25 LAB — HOLD SPECIMEN: NORMAL

## 2019-11-25 NOTE — ED PROVIDER NOTES
"Subjective   59 y/o male with history of lung transplant on the left on immunosuppresents who was recently started on steroids for a \"lung infection\" by his pmd. He states he takes insuline but deneis having DM stating he \"only has diabetes when I am on steroids.\" He states the last two day his BS was 270 but today the meter read \"high\" and thus he presents here. He denies nausea, vomiting, fevers, chills, cp, sob, falls, trauma, abdominal pain or other issues.         Family, social and past history reviewed as below, prior documentation of H and Ps and other documentation are reviewed:    Past Medical History:  No date: Cancer (CMS/HCC)      Comment:  Skin  No date: Chronic back pain  2018: Chronic pain syndrome  2018: Chronic respiratory failure with hypoxia (CMS/HCC)  No date: COPD (chronic obstructive pulmonary disease) (CMS/HCC)  2018: COPD, group D, by GOLD 2017 classification (CMS/HCC)  No date: Diabetes mellitus (CMS/HCC)  No date: Emphysema lung (CMS/HCC)  2018: Essential hypertension  2018: Gastroesophageal reflux disease without esophagitis  No date: Hypertension  2018: Lung transplant status (CMS/HCC)  No date: Lung transplanted (CMS/HCC)  2018: Personal history of nicotine dependence    Past Surgical History:  No date: APPENDECTOMY  No date: CHOLECYSTECTOMY  No date: COLONOSCOPY  No date: ENDOSCOPY  No date: LUNG REMOVAL, TOTAL      Comment:  right upper lobe  No date: LUNG TRANSPLANT      Comment:  2006    Social History    Socioeconomic History      Marital status:       Spouse name: Not on file      Number of children: Not on file      Years of education: Not on file      Highest education level: Not on file    Tobacco Use      Smoking status: Former Smoker        Packs/day: 3.00        Years: 28.00        Pack years: 84        Types: Cigarettes        Quit date:         Years since quittin.9      Smokeless tobacco: Never Used    Substance and " Sexual Activity      Alcohol use: No      Drug use: No        Comment: when in severe pain       Sexual activity: Defer      Family history: reviewed and noncontributory             Review of Systems   All other systems reviewed and are negative.      Past Medical History:   Diagnosis Date   • Cancer (CMS/HCC)     Skin   • Chronic back pain    • Chronic pain syndrome 2018   • Chronic respiratory failure with hypoxia (CMS/HCC) 2018   • COPD (chronic obstructive pulmonary disease) (CMS/HCC)    • COPD, group D, by GOLD 2017 classification (CMS/HCC) 2018   • Diabetes mellitus (CMS/HCC)    • Emphysema lung (CMS/HCC)    • Essential hypertension 2018   • Gastroesophageal reflux disease without esophagitis 2018   • Hypertension    • Lung transplant status (CMS/HCC) 2018   • Lung transplanted (CMS/HCC)    • Personal history of nicotine dependence 2018       Allergies   Allergen Reactions   • Clindamycin/Lincomycin Diarrhea   • Moxifloxacin Rash     Sweating        Past Surgical History:   Procedure Laterality Date   • APPENDECTOMY     • CHOLECYSTECTOMY     • COLONOSCOPY     • ENDOSCOPY     • LUNG REMOVAL, TOTAL      right upper lobe   • LUNG TRANSPLANT      2006       Family History   Problem Relation Age of Onset   • Heart disease Father    • Heart attack Brother        Social History     Socioeconomic History   • Marital status:      Spouse name: Not on file   • Number of children: Not on file   • Years of education: Not on file   • Highest education level: Not on file   Tobacco Use   • Smoking status: Former Smoker     Packs/day: 3.00     Years: 28.00     Pack years: 84.00     Types: Cigarettes     Last attempt to quit:      Years since quittin.9   • Smokeless tobacco: Never Used   Substance and Sexual Activity   • Alcohol use: No   • Drug use: No     Comment: when in severe pain    • Sexual activity: Defer           Objective   Physical Exam   Constitutional: He is  oriented to person, place, and time. He appears well-developed and well-nourished.   HENT:   Head: Normocephalic and atraumatic.   Eyes: Conjunctivae and EOM are normal. Pupils are equal, round, and reactive to light.   Neck: Normal range of motion. Neck supple.   Cardiovascular: Normal rate, regular rhythm, normal heart sounds and intact distal pulses.   Pulmonary/Chest: Effort normal and breath sounds normal.   Abdominal: Soft. Bowel sounds are normal.   Musculoskeletal: Normal range of motion.   Neurological: He is alert and oriented to person, place, and time. He displays normal reflexes. No cranial nerve deficit. Coordination normal.   Skin: Skin is warm. Capillary refill takes less than 2 seconds.   Psychiatric: He has a normal mood and affect. His behavior is normal.   Vitals reviewed.      Procedures           ED Course  ED Course as of Nov 24 2355   Sun Nov 24, 2019   2346 No signs of DKA or HHS, his BS has come down to 297. Abx were given initally given elevated WBC but radiology has read his cxr as no acute process. I have asked he talk to his Pmd Monday (11/25) about increasing his insulin while he is on steroids. At this time he is well appearing and non toxic, He is asking for dc. I do not feel he reqiures abx for dc. I do feel his leukocytosis is reactive secondary to the steroids, he has no Ag, no tachycardia, no fever, no tachypnea.   []   2352 Creatitine 3 months ago was 1.53  []   2352 Lactate is seen, I feel this is likely secondary to dehydration more than infection.   []   2354 Given elevated lactate and history of transplant will air on side of caution and place on abx.   []      ED Course User Index  [JH] Lucio Mckeon MD      XR Chest 1 View   Final Result   1. No acute cardiopulmonary process.       This report was finalized on 11/24/2019 21:39 by Dr. Wilder Salinas MD.        Labs Reviewed   URINALYSIS W/ MICROSCOPIC IF INDICATED (NO CULTURE) - Abnormal; Notable for the  following components:       Result Value    Glucose, UA >=1000 mg/dL (3+) (*)     All other components within normal limits    Narrative:     Urine microscopic not indicated.   COMPREHENSIVE METABOLIC PANEL - Abnormal; Notable for the following components:    Glucose 674 (*)     BUN 44 (*)     Creatinine 1.54 (*)     Sodium 131 (*)     Chloride 88 (*)     CO2 30.0 (*)     eGFR Non African Amer 46 (*)     BUN/Creatinine Ratio 28.6 (*)     All other components within normal limits    Narrative:     GFR Normal >60  Chronic Kidney Disease <60  Kidney Failure <15   HEMOGLOBIN A1C - Abnormal; Notable for the following components:    Hemoglobin A1C 7.60 (*)     All other components within normal limits    Narrative:     Hemoglobin A1C Ranges:    Increased Risk for Diabetes  5.7% to 6.4%  Diabetes                     >= 6.5%  Diabetic Goal                < 7.0%   BLOOD GAS, VENOUS - Abnormal; Notable for the following components:    pH, Venous 7.421 (*)     pO2, Venous 55.8 (*)     HCO3, Venous 31.7 (*)     Base Excess, Venous 6.1 (*)     O2 Saturation, Venous 88.4 (*)     All other components within normal limits   CBC WITH AUTO DIFFERENTIAL - Abnormal; Notable for the following components:    WBC 14.21 (*)     Neutrophil % 87.2 (*)     Lymphocyte % 5.1 (*)     Monocyte % 4.6 (*)     Eosinophil % 0.0 (*)     Immature Grans % 2.7 (*)     Neutrophils, Absolute 12.37 (*)     Immature Grans, Absolute 0.39 (*)     All other components within normal limits   LACTIC ACID, PLASMA - Abnormal; Notable for the following components:    Lactate 2.3 (*)     All other components within normal limits   PROCALCITONIN - Abnormal; Notable for the following components:    Procalcitonin 0.08 (*)     All other components within normal limits    Narrative:     As a Marker for Sepsis (Non-Neonates):   1. <0.5 ng/mL represents a low risk of severe sepsis and/or septic shock.  1. >2 ng/mL represents a high risk of severe sepsis and/or septic  "shock.    As a Marker for Lower Respiratory Tract Infections that require antibiotic therapy:  PCT on Admission     Antibiotic Therapy             6-12 Hrs later  > 0.5                Strongly Recommended            >0.25 - <0.5         Recommended  0.1 - 0.25           Discouraged                   Remeasure/reassess PCT  <0.1                 Strongly Discouraged          Remeasure/reassess PCT      As 28 day mortality risk marker: \"Change in Procalcitonin Result\" (> 80 % or <=80 %) if Day 0 (or Day 1) and Day 4 values are available. Refer to [email or web address]/   Change in PCT <=80 %   A decrease of PCT levels below or equal to 80 % defines a positive change in PCT test result representing a higher risk for 28-day all-cause mortality of patients diagnosed with severe sepsis or septic shock.  Change in PCT > 80 %   A decrease of PCT levels of more than 80 % defines a negative change in PCT result representing a lower risk for 28-day all-cause mortality of patients diagnosed with severe sepsis or septic shock.               COMPREHENSIVE METABOLIC PANEL - Abnormal; Notable for the following components:    Glucose 463 (*)     BUN 41 (*)     Creatinine 1.41 (*)     Chloride 94 (*)     CO2 31.0 (*)     eGFR Non African Amer 51 (*)     BUN/Creatinine Ratio 29.1 (*)     All other components within normal limits    Narrative:     GFR Normal >60  Chronic Kidney Disease <60  Kidney Failure <15   POCT GLUCOSE FINGERSTICK - Abnormal; Notable for the following components:    Glucose 450 (*)     All other components within normal limits   POCT GLUCOSE FINGERSTICK - Abnormal; Notable for the following components:    Glucose 435 (*)     All other components within normal limits   POCT GLUCOSE FINGERSTICK - Abnormal; Notable for the following components:    Glucose 297 (*)     All other components within normal limits   INFLUENZA ANTIGEN, RAPID - Normal    Narrative:     Recommend confirmation of negative results " by viral culture or molecular assay.   ACETONE - Normal   CK - Normal   TROPONIN (IN-HOUSE) - Normal    Narrative:     Troponin T Reference Range:  <= 0.03 ng/mL-   Negative for AMI  >0.03 ng/mL-     Abnormal for myocardial necrosis.  Clinicians would have to utilize clinical acumen, EKG, Troponin and serial changes to determine if it is an Acute Myocardial Infarction or myocardial injury due to an underlying chronic condition.    BLOOD CULTURE WITH YAREIL   BLOOD CULTURE WITH YARIEL   RAINBOW DRAW    Narrative:     The following orders were created for panel order [place] Draw.  Procedure                               Abnormality         Status                     ---------                               -----------         ------                     Light Blue Top[[id number]]                                   Final result               Green Top (Gel)[[id number]]                                  Final result               Lavender Top[[id number]]                                     Final result               Red Top[[id number]]                                          Final result                 Please view results for these tests on the individual orders.   BLOOD GAS, VENOUS   LACTIC ACID REFLEX TIMER   POCT GLUCOSE FINGERSTICK   POCT GLUCOSE FINGERSTICK   LIGHT BLUE TOP   GREEN TOP   LAVENDER TOP   RED TOP   CBC AND DIFFERENTIAL    Narrative:     The following orders were created for panel order CBC & Differential.  Procedure                               Abnormality         Status                     ---------                               -----------         ------                     CBC Auto Differential[[id number]]        Abnormal            Final result                 Please view results for these tests on the individual orders.               MDM    Final diagnoses:   Hyperglycemia              Lucio Mckeon MD  11/24/19 6227       Lucio Mckeon MD  11/24/19 9761

## 2019-11-25 NOTE — DISCHARGE INSTRUCTIONS
Bayron,    Your blood sugars have come down nicely. However, you stated you had 4 more days of steroids. Please call Dr. Santo first thing 11/25 (Monday) about your elevated blood sugars and the fact you are on steroids. He may wish to change your insulin around to prevent elevated blood sugars while you are on the steroids. Please return here for worsening symptoms, difficulty breathing, fevers, vomiting or other issues.

## 2019-11-25 NOTE — TELEPHONE ENCOUNTER
"Caller stated that his glucose monitor reads high and  Then turns off. Patient has had a lung transplant on steroids for a lung infection.  Called admits to being thirsty and having frequent urination    Reason for Disposition  • [1] Blood glucose > 240 mg/dl (13 mmol/l) AND [2] rapid breathing    Additional Information  • Negative: Unconscious or difficult to awaken  • Negative: Acting confused (e.g., disoriented, slurred speech)  • Negative: Very weak (e.g., can't stand)  • Negative: Sounds like a life-threatening emergency to the triager  • Negative: [1] Vomiting AND [2] signs of dehydration (e.g., very dry mouth, lightheaded, etc.)    Answer Assessment - Initial Assessment Questions  1. BLOOD GLUCOSE: \"What is your blood glucose level?\"        Monitor registers high  2. ONSET: \"When did you check the blood glucose?\"      today  3. USUAL RANGE: \"What is your glucose level usually?\" (e.g., usual fasting morning value, usual evening value)      na  4. KETONES: \"Do you check for ketones (urine or blood test strips)?\" If yes, ask: \"What does the test show now?\"       no  5. TYPE 1 or 2:  \"Do you know what type of diabetes you have?\"  (e.g., Type 1, Type 2, Gestational; doesn't know)       unsure  6. INSULIN: \"Do you take insulin?\" If yes, ask: \"Have you missed any shots recently?\"      Basilar  And no  7. DIABETES PILLS: \"Do you take any pills for your diabetes?\" If yes, ask: \"Have you missed taking any pills recently?\"      no  8. OTHER SYMPTOMS: \"Do you have any symptoms?\" (e.g., fever, frequent urination, difficulty breathing, dizziness, weakness, vomiting)      Thirsty and frequent in urination  9. PREGNANCY: \"Is there any chance you are pregnant?\" \"When was your last menstrual period?\"      na    Protocols used: DIABETES - HIGH BLOOD SUGAR-ADULT-AH      "

## 2019-11-29 LAB
BACTERIA SPEC AEROBE CULT: NORMAL
BACTERIA SPEC AEROBE CULT: NORMAL

## 2019-12-12 ENCOUNTER — RESULTS ENCOUNTER (OUTPATIENT)
Dept: PULMONOLOGY | Facility: CLINIC | Age: 59
End: 2019-12-12

## 2019-12-12 ENCOUNTER — TELEPHONE (OUTPATIENT)
Dept: PULMONOLOGY | Facility: CLINIC | Age: 59
End: 2019-12-12

## 2019-12-12 DIAGNOSIS — J44.9 COPD, GROUP D, BY GOLD 2017 CLASSIFICATION (HCC): Primary | ICD-10-CM

## 2019-12-12 DIAGNOSIS — J44.9 COPD, GROUP D, BY GOLD 2017 CLASSIFICATION (HCC): ICD-10-CM

## 2019-12-12 RX ORDER — CEFDINIR 300 MG/1
300 CAPSULE ORAL 2 TIMES DAILY
Qty: 14 CAPSULE | Refills: 0 | Status: SHIPPED | OUTPATIENT
Start: 2019-12-12 | End: 2019-12-19

## 2019-12-12 NOTE — TELEPHONE ENCOUNTER
This is Shruti's patient.  He is calling stating he has an infection in his lungs.  Coughing up yellow mucus.  He is a transplant patient.  He is wanting an antibiotic.

## 2019-12-26 RX ORDER — LEVOCETIRIZINE DIHYDROCHLORIDE 5 MG/1
TABLET, FILM COATED ORAL
Qty: 30 TABLET | Refills: 0 | OUTPATIENT
Start: 2019-12-26

## 2019-12-26 RX ORDER — PREDNISONE 20 MG/1
40 TABLET ORAL DAILY
Qty: 10 TABLET | Refills: 0 | Status: SHIPPED | OUTPATIENT
Start: 2019-12-26 | End: 2020-03-30 | Stop reason: SDUPTHER

## 2019-12-26 RX ORDER — LEVOCETIRIZINE DIHYDROCHLORIDE 5 MG/1
TABLET, FILM COATED ORAL
Qty: 30 TABLET | Refills: 0 | Status: SHIPPED | OUTPATIENT
Start: 2019-12-26 | End: 2020-01-20

## 2019-12-26 RX ORDER — LEVOFLOXACIN 750 MG/1
750 TABLET ORAL DAILY
Qty: 5 TABLET | Refills: 0 | Status: SHIPPED | OUTPATIENT
Start: 2019-12-26 | End: 2019-12-31

## 2020-01-08 ENCOUNTER — TELEPHONE (OUTPATIENT)
Dept: PULMONOLOGY | Facility: CLINIC | Age: 60
End: 2020-01-08

## 2020-01-08 ENCOUNTER — OFFICE VISIT (OUTPATIENT)
Dept: PULMONOLOGY | Facility: CLINIC | Age: 60
End: 2020-01-08

## 2020-01-08 VITALS
DIASTOLIC BLOOD PRESSURE: 70 MMHG | HEART RATE: 94 BPM | WEIGHT: 159 LBS | BODY MASS INDEX: 23.55 KG/M2 | SYSTOLIC BLOOD PRESSURE: 128 MMHG | HEIGHT: 69 IN | OXYGEN SATURATION: 94 %

## 2020-01-08 DIAGNOSIS — J44.9 COPD, GROUP D, BY GOLD 2017 CLASSIFICATION (HCC): ICD-10-CM

## 2020-01-08 DIAGNOSIS — Z94.2 LUNG TRANSPLANT STATUS (HCC): Primary | ICD-10-CM

## 2020-01-08 DIAGNOSIS — G89.4 CHRONIC PAIN SYNDROME: ICD-10-CM

## 2020-01-08 DIAGNOSIS — J96.11 CHRONIC RESPIRATORY FAILURE WITH HYPOXIA (HCC): ICD-10-CM

## 2020-01-08 DIAGNOSIS — Z87.891 PERSONAL HISTORY OF NICOTINE DEPENDENCE: ICD-10-CM

## 2020-01-08 LAB
FEV1/FVC: 47.47 %
FEV1: NORMAL LITERS
FVC VOL RESPIRATORY: NORMAL LITERS

## 2020-01-08 PROCEDURE — 94010 BREATHING CAPACITY TEST: CPT | Performed by: NURSE PRACTITIONER

## 2020-01-08 PROCEDURE — 99214 OFFICE O/P EST MOD 30 MIN: CPT | Performed by: NURSE PRACTITIONER

## 2020-01-08 RX ORDER — ALBUTEROL SULFATE 90 UG/1
2 AEROSOL, METERED RESPIRATORY (INHALATION) EVERY 4 HOURS PRN
Qty: 1 INHALER | Refills: 11 | Status: SHIPPED | OUTPATIENT
Start: 2020-01-08 | End: 2020-02-07

## 2020-01-08 ASSESSMENT — PULMONARY FUNCTION TESTS: FEV1/FVC: 47.47

## 2020-01-09 NOTE — PROCEDURES
Pulmonary Function Test  Performed by: Shruti Kenney APRN  Authorized by: Shruti Kenney APRN      Pre Drug    FVC: 30.05%   FEV1: 16.97%   FEF 25-75%: 7.61%   FEV1/FVC: 45%

## 2020-01-13 ENCOUNTER — NURSE TRIAGE (OUTPATIENT)
Dept: OTHER | Facility: CLINIC | Age: 60
End: 2020-01-13

## 2020-01-13 NOTE — TELEPHONE ENCOUNTER
Reason for Disposition   Patient wants to be seen    Protocols used: HEAD INJURY-ADULT-[place]    Patient called pre-service center [facility] to schedule appointment, with red flag complaint, transferred to RN access for triage. Pt states he had a head injury in July where a piece of metal gutter struck him in the head. No LOC. Pt is not on blood thinners. He states there was a wound on his head at the time of the injury, it is now healing with a scab. He states sometime he accidentally pulls off the scab when he comes his hair. Area is still sore to the touch or when he wears a hat. Triage indicates for pt to be seen in the office today. Pt instructed to call back for any new or worsening symptoms. Patient verbalized understanding. Patient denies any other questions or concerns. Writer provided warm transfer to [facility] for appointment scheduling. Please do not respond to the triage nurse through this encounter. Any subsequent communication should be directly with the patient.

## 2020-01-14 ENCOUNTER — OFFICE VISIT (OUTPATIENT)
Dept: PRIMARY CARE CLINIC | Age: 60
End: 2020-01-14
Payer: MEDICAID

## 2020-01-14 VITALS
DIASTOLIC BLOOD PRESSURE: 74 MMHG | HEIGHT: 69 IN | BODY MASS INDEX: 23.7 KG/M2 | OXYGEN SATURATION: 93 % | HEART RATE: 96 BPM | SYSTOLIC BLOOD PRESSURE: 116 MMHG | WEIGHT: 160 LBS | TEMPERATURE: 97.9 F

## 2020-01-14 PROCEDURE — G8484 FLU IMMUNIZE NO ADMIN: HCPCS | Performed by: NURSE PRACTITIONER

## 2020-01-14 PROCEDURE — 1036F TOBACCO NON-USER: CPT | Performed by: NURSE PRACTITIONER

## 2020-01-14 PROCEDURE — 3023F SPIROM DOC REV: CPT | Performed by: NURSE PRACTITIONER

## 2020-01-14 PROCEDURE — G8926 SPIRO NO PERF OR DOC: HCPCS | Performed by: NURSE PRACTITIONER

## 2020-01-14 PROCEDURE — G8420 CALC BMI NORM PARAMETERS: HCPCS | Performed by: NURSE PRACTITIONER

## 2020-01-14 PROCEDURE — 3017F COLORECTAL CA SCREEN DOC REV: CPT | Performed by: NURSE PRACTITIONER

## 2020-01-14 PROCEDURE — G8427 DOCREV CUR MEDS BY ELIG CLIN: HCPCS | Performed by: NURSE PRACTITIONER

## 2020-01-14 PROCEDURE — 99214 OFFICE O/P EST MOD 30 MIN: CPT | Performed by: NURSE PRACTITIONER

## 2020-01-14 RX ORDER — LEVOFLOXACIN 500 MG/1
500 TABLET, FILM COATED ORAL DAILY
Qty: 7 TABLET | Refills: 0 | Status: SHIPPED | OUTPATIENT
Start: 2020-01-14 | End: 2020-03-30 | Stop reason: SDUPTHER

## 2020-01-14 RX ORDER — DEXTROMETHORPHAN HYDROBROMIDE AND PROMETHAZINE HYDROCHLORIDE 15; 6.25 MG/5ML; MG/5ML
5 SYRUP ORAL 4 TIMES DAILY PRN
Qty: 180 ML | Refills: 0 | Status: SHIPPED | OUTPATIENT
Start: 2020-01-14 | End: 2020-04-09 | Stop reason: SDUPTHER

## 2020-01-14 ASSESSMENT — ENCOUNTER SYMPTOMS
VOMITING: 0
VOICE CHANGE: 0
WHEEZING: 1
NAUSEA: 0
BACK PAIN: 0
RHINORRHEA: 0
SHORTNESS OF BREATH: 1
COUGH: 1
PHOTOPHOBIA: 0
COLOR CHANGE: 0

## 2020-01-14 NOTE — PROGRESS NOTES
[facility]  [address] [address]  [address] [address]  Dept: [phone]  Dept Fax: [phone]  Loc: [phone]    Lacho Gunn is a 62 y.o. male who presents today for his medical conditions/complaints as noted below. Lacho Gunn is c/o of Skin Problem (Has area on scalp near crown that was hit over the summer when cleaning gutters and still has a hard time healing, it tends to open, drain and bleed at times)      HPI:     HPI   Chief Complaint   Patient presents with    Skin Problem     Has area on scalp near crown that was hit over the summer when cleaning gutters and still has a hard time healing, it tends to open, drain and bleed at times     Patient presents today for evaluation of scalp lesion. Patient has history of skin cancer; he is followed by Dr Kenya Law. He states this area has been present for 7-8 months. He complains of frequent bleeding to area and recently notes it has grown in size. He has tried triple antibiotic topically and rubbing alcohol. He states it has not helped. Patient is requesting promethazine DM at night because this helps him sleep and not cough. He also complains of increase respiratory secretions with fever at night. He recently completed 5 days of levaquin and had started to feel better but is now symptomatic again. He is s/p lung transplant 14 years ago; he sees pulmonology.  He does regular breathing treatments and wears compression vest.     Past Medical History:   Diagnosis Date    COPD (chronic obstructive pulmonary disease) ([address]     [facility] lung [facility] [address]     Hypertension     Pneumonia     in left lung    Skin cancer     Wears glasses       Past Surgical History:   Procedure Laterality Date    APPENDECTOMY      CHOLECYSTECTOMY  9/26/14    COLONOSCOPY  12/29/2008    Dr. Gasper Jimenez: [facility]    LUNG REMOVAL, PARTIAL  2002    right upper    LUNG TRANSPLANT, SINGLE  2006    left    SKIN CANCER DESTRUCTION      UPPER GASTROINTESTINAL ENDOSCOPY  2008     Dr. Becky Patel 2020   SYSTOLIC [phone]   DIASTOLIC 74 60 74 76 - 76   Site Left Upper Arm - - - - -   Position Sitting - - - - -   Cuff Size - - - - - -   Pulse 96 89 79 86 - 90   Temp 97.9 - - 97.7 98.5 98.4   Resp - - 18 16 18 16   SpO2 93 96 - - - 100   Weight 160 lb 156 lb - - - 154 lb 6.4 oz   Height 5' 9\" 5' 9\" - - - 5' 9\"   BMI (wt*703/ht~2) 23.63 kg/m2 23.03 kg/m2 - - - 22.8 kg/m2   Some recent data might be hidden       Family History   Problem Relation Age of Onset    Cancer Mother     Heart Disease Father     Colon Cancer Neg Hx     Colon Polyps Neg Hx     Esophageal Cancer Neg Hx     Liver Cancer Neg Hx     Rectal Cancer Neg Hx     Liver Disease Neg Hx     Stomach Cancer Neg Hx        Social History     Tobacco Use    Smoking status: Former Smoker     Packs/day: 3.00     Years: 15.00     Pack years: 45.00     Types: Cigarettes     Start date: 1972     Last attempt to quit: 1/10/2002     Years since quittin.0    Smokeless tobacco: Never Used    Tobacco comment: Pt used to smoke quit 9.5 years ago   Substance Use Topics    Alcohol use: No      Current Outpatient Medications   Medication Sig Dispense Refill    insulin glargine (LANTUS SOLOSTAR) 100 UNIT/ML injection 4 times daily SLIDING SCALE      levofloxacin (LEVAQUIN) 500 MG tablet Take 1 tablet by mouth daily 7 tablet 0    promethazine-dextromethorphan (PROMETHAZINE-DM) 6.25-15 MG/5ML syrup Take 5 mLs by mouth 4 times daily as needed for Cough 180 mL 0    levocetirizine (XYZAL) 5 MG tablet TAKE 1 TABLET BY MOUTH EVERY DAY AT NIGHT 30 tablet 0    traZODone (DESYREL) 50 MG tablet TAKE 1-2 TABLETS AT NIGHT AS DIRECTED 60 tablet 0    omeprazole (PRILOSEC) 20 MG delayed release capsule TAKE 1 CAPSULE BY MOUTH TWICE A DAY 60 capsule 11    folic acid (FOLVITE) 1 MG tablet TAKE 1 TABLET Reviewed health maintenance. Instructed to continue currentmedications, diet and exercise. Patient agreed with treatment plan. Follow up as directed. MEDICATIONS:  Orders Placed This Encounter   Medications    levofloxacin (LEVAQUIN) 500 MG tablet     Sig: Take 1 tablet by mouth daily     Dispense:  7 tablet     Refill:  0    promethazine-dextromethorphan (PROMETHAZINE-DM) 6.25-15 MG/5ML syrup     Sig: Take 5 mLs by mouth 4 times daily as needed for Cough     Dispense:  180 mL     Refill:  0         ORDERS:  No orders of the defined types were placed in this encounter. Follow-up:  Return if symptoms worsen or fail to improve. PATIENT INSTRUCTIONS:  There are no Patient Instructions on file for this visit. Electronically signed by ALVARADO Bourne on 1/14/2020 at 2:49 PM    EMR Dragon/transcription disclaimer:  Much of thisencounter note is electronic transcription/translation of spoken language to printed texts. The electronic translation of spoken language may be erroneous, or at times, nonsensical words or phrases may be inadvertentlytranscribed.   Although I have reviewed the note for such errors, some may still exist.

## 2020-01-20 RX ORDER — LEVOCETIRIZINE DIHYDROCHLORIDE 5 MG/1
TABLET, FILM COATED ORAL
Qty: 30 TABLET | Refills: 0 | Status: SHIPPED | OUTPATIENT
Start: 2020-01-20 | End: 2020-02-17

## 2020-01-20 NOTE — TELEPHONE ENCOUNTER
Patient requesting refills on Spiriva handihaler. Refills sent to [facility].

## 2020-02-17 RX ORDER — LEVOCETIRIZINE DIHYDROCHLORIDE 5 MG/1
TABLET, FILM COATED ORAL
Qty: 30 TABLET | Refills: 5 | Status: SHIPPED | OUTPATIENT
Start: 2020-02-17 | End: 2020-08-07

## 2020-03-04 ENCOUNTER — APPOINTMENT (OUTPATIENT)
Dept: LAB | Facility: HOSPITAL | Age: 60
End: 2020-03-04

## 2020-03-04 ENCOUNTER — TRANSCRIBE ORDERS (OUTPATIENT)
Dept: ADMINISTRATIVE | Facility: HOSPITAL | Age: 60
End: 2020-03-04

## 2020-03-04 DIAGNOSIS — J44.9 CHRONIC OBSTRUCTIVE PULMONARY DISEASE, UNSPECIFIED COPD TYPE (HCC): Primary | ICD-10-CM

## 2020-03-04 DIAGNOSIS — N18.9 CHRONIC KIDNEY DISEASE, UNSPECIFIED CKD STAGE: ICD-10-CM

## 2020-03-04 DIAGNOSIS — C44.90: ICD-10-CM

## 2020-03-04 DIAGNOSIS — Z94.2 LUNG TRANSPLANT RECIPIENT (HCC): ICD-10-CM

## 2020-03-04 LAB
ANION GAP SERPL CALCULATED.3IONS-SCNC: 13 MMOL/L (ref 5–15)
BASOPHILS # BLD AUTO: 0.04 10*3/MM3 (ref 0–0.2)
BASOPHILS NFR BLD AUTO: 0.5 % (ref 0–1.5)
BUN BLD-MCNC: 30 MG/DL (ref 8–23)
BUN/CREAT SERPL: 18.2 (ref 7–25)
CALCIUM SPEC-SCNC: 9.5 MG/DL (ref 8.6–10.5)
CHLORIDE SERPL-SCNC: 98 MMOL/L (ref 98–107)
CO2 SERPL-SCNC: 30 MMOL/L (ref 22–29)
CREAT BLD-MCNC: 1.65 MG/DL (ref 0.76–1.27)
DEPRECATED RDW RBC AUTO: 40.3 FL (ref 37–54)
EOSINOPHIL # BLD AUTO: 0.14 10*3/MM3 (ref 0–0.4)
EOSINOPHIL NFR BLD AUTO: 1.7 % (ref 0.3–6.2)
ERYTHROCYTE [DISTWIDTH] IN BLOOD BY AUTOMATED COUNT: 13.1 % (ref 12.3–15.4)
GFR SERPL CREATININE-BSD FRML MDRD: 43 ML/MIN/1.73
GLUCOSE BLD-MCNC: 169 MG/DL (ref 65–99)
HCT VFR BLD AUTO: 38 % (ref 37.5–51)
HGB BLD-MCNC: 12.7 G/DL (ref 13–17.7)
IMM GRANULOCYTES # BLD AUTO: 0.06 10*3/MM3 (ref 0–0.05)
IMM GRANULOCYTES NFR BLD AUTO: 0.7 % (ref 0–0.5)
LYMPHOCYTES # BLD AUTO: 1.65 10*3/MM3 (ref 0.7–3.1)
LYMPHOCYTES NFR BLD AUTO: 20.1 % (ref 19.6–45.3)
MAGNESIUM SERPL-MCNC: 1.4 MG/DL (ref 1.6–2.4)
MCH RBC QN AUTO: 28.3 PG (ref 26.6–33)
MCHC RBC AUTO-ENTMCNC: 33.4 G/DL (ref 31.5–35.7)
MCV RBC AUTO: 84.6 FL (ref 79–97)
MONOCYTES # BLD AUTO: 0.67 10*3/MM3 (ref 0.1–0.9)
MONOCYTES NFR BLD AUTO: 8.2 % (ref 5–12)
NEUTROPHILS # BLD AUTO: 5.63 10*3/MM3 (ref 1.7–7)
NEUTROPHILS NFR BLD AUTO: 68.8 % (ref 42.7–76)
NRBC BLD AUTO-RTO: 0 /100 WBC (ref 0–0.2)
PLATELET # BLD AUTO: 245 10*3/MM3 (ref 140–450)
PMV BLD AUTO: 11.1 FL (ref 6–12)
POTASSIUM BLD-SCNC: 3.8 MMOL/L (ref 3.5–5.2)
RBC # BLD AUTO: 4.49 10*6/MM3 (ref 4.14–5.8)
SODIUM BLD-SCNC: 141 MMOL/L (ref 136–145)
WBC NRBC COR # BLD: 8.19 10*3/MM3 (ref 3.4–10.8)

## 2020-03-04 PROCEDURE — 36415 COLL VENOUS BLD VENIPUNCTURE: CPT | Performed by: INTERNAL MEDICINE

## 2020-03-04 PROCEDURE — 85025 COMPLETE CBC W/AUTO DIFF WBC: CPT | Performed by: INTERNAL MEDICINE

## 2020-03-04 PROCEDURE — 83735 ASSAY OF MAGNESIUM: CPT | Performed by: INTERNAL MEDICINE

## 2020-03-04 PROCEDURE — 80048 BASIC METABOLIC PNL TOTAL CA: CPT | Performed by: INTERNAL MEDICINE

## 2020-03-30 RX ORDER — LEVOFLOXACIN 500 MG/1
500 TABLET, FILM COATED ORAL DAILY
Qty: 5 TABLET | Refills: 0 | Status: SHIPPED | OUTPATIENT
Start: 2020-03-30 | End: 2020-04-04

## 2020-03-30 RX ORDER — PREDNISONE 20 MG/1
40 TABLET ORAL DAILY
Qty: 10 TABLET | Refills: 0 | Status: SHIPPED | OUTPATIENT
Start: 2020-03-30 | End: 2020-04-04

## 2020-04-01 NOTE — PROGRESS NOTES
ENRICO Ovalles  [facility]   Respiratory Disease Clinic  [address] [place] [address]  Phone: [phone]  Fax: [phone]     Bayron Rodriguez is a 59 y.o. male.   CC:   Chief Complaint   Patient presents with   • Lung Transplant        HPI: Mr. Rodriguez is  being evaluated today for management of his unilateral lung transplant status greater than 10 years ago, chronic respiratory failure and COPD.  He experiences moderate persistent shortness of breath and coughing occurring in the chest daily for many years.  It is worsened by activity and improved with rest, oxygen and bronchodilators.  He is on Spiriva and Symbicort routinely.  He takes breathing treatments 2-3 times daily in addition to this to manage his symptoms. He is compliant with his 2 L of supplemental oxygen and does benefit from this.  He is on full dose Mucinex twice daily.  He remains on Prograf, Bactrim, mycophenolate and Zithromax for his transplant status.  He indicates as a result of the current pandemic he is having to do computer visits with his transplant service.  They are having home health continue to obtain his labs.  He reports he had a visit with him yesterday.  They called today and did increase his mycophenolate otherwise he is doing well.  Treatment for his condition is complicated by being on insulin for poorly controlled diabetes.   He reports he continues sliding scale insulin.  His levels are staying good on average however he has had a couple of days where they were higher than the accepted range.  To his knowledge they are not obtaining A1c's.    The following portions of the patient's history were reviewed and updated as appropriate: allergies, current medications, past family history, past medical history, past social history, past surgical history and problem list.    Past Medical History:   Diagnosis Date   • Cancer (CMS/[facility])     Skin   • Chronic back pain    • Chronic pain syndrome 12/4/2018    • Chronic respiratory failure with hypoxia (CMS/[facility]) 12/4/2018   • COPD (chronic obstructive pulmonary disease) (CMS/HCC)    • COPD, group D, by GOLD 2017 classification (CMS/[facility]) 11/29/2018   • Diabetes mellitus (CMS/HCC)    • Emphysema lung (CMS/HCC)    • Essential hypertension 12/4/2018   • Gastroesophageal reflux disease without esophagitis 12/4/2018   • Hypertension    • Lung transplant status (CMS/HCC) 12/4/2018   • Lung transplanted (CMS/HCC)    • Personal history of nicotine dependence 12/4/2018       Prior to Admission medications    Medication Sig Start Date End Date Taking? Authorizing Provider   amLODIPine (NORVASC) 5 MG tablet Take 5 mg by mouth Every Morning.    Fidel Mendez MD   atorvastatin (LIPITOR) 20 MG tablet Take 20 mg by mouth Every Night.    Fidel Mendez MD   azithromycin (ZITHROMAX) 250 MG tablet Take 250 mg by mouth. Start 02/27 Monday, Wednesday and Friday.    Fidel Mendez MD   B-D UF III MINI PEN NEEDLES 31G X 5 MM misc USE 4 TIMES A DAY AS DIRECTED 5/29/19   Fidel Mendez MD   doxycycline (DORYX) 100 MG enteric coated tablet Take 1 tablet by mouth 2 (Two) Times a Day. 11/24/19   Lucio Mckeon MD   fluconazole (DIFLUCAN) 100 MG tablet TAKE 1 TABLET BY MOUTH EVERY DAY FOR 2 WEEKS 4/3/19   Fidel Mendez MD   folic acid (FOLVITE) 1 MG tablet Take 1 mg by mouth Every Morning.    Fidel Mendez MD   furosemide (LASIX) 20 MG tablet Take 20 mg by mouth Every Morning.    Fidel Mendez MD   guaiFENesin (MUCINEX) 600 MG 12 hr tablet Take 2 tablets by mouth Every 12 (Twelve) Hours. 3/13/19   Kamar Joy MD   hydrochlorothiazide (HYDRODIURIL) 50 MG tablet Take 50 mg by mouth Every Morning.    Fidel Mendez MD   HYDROcodone-acetaminophen (NORCO) 7.5-325 MG per tablet Take 1 tablet by mouth Every 8 (Eight) Hours As Needed for Moderate Pain .    Fidel Mendez MD   Insulin Glargine (BASAGLAR KWIKPEN) 100 UNIT/ML  injection pen INJECT 15 UNIT(S) SUBCUTANEOUS ONCE A DAY (IN THE EVENING) 8/21/19   Fidel Mendez MD   ipratropium-albuterol (DUO-NEB) 0.5-2.5 mg/3 ml nebulizer USE 3ML VIA NEBULIZER 4 TIMES DAILY 5/8/19   Fidel Mendez MD   levocetirizine (XYZAL) 5 MG tablet Take 5 mg by mouth Every Night.    Fidel Mendez MD   magnesium oxide (MAG-OX) 400 MG tablet Take 400 mg by mouth 2 (Two) Times a Day.    Fidel Mendez MD   MAGNESIUM PO Take 400 mg by mouth. 4/17/19   Fidel Mendez MD   metoprolol tartrate (LOPRESSOR) 100 MG tablet Take 50 mg by mouth 2 (Two) Times a Day.    Fidel Mendez MD   Mycophenolate Sodium (MYCOPHENOLIC ACID PO) Take 540 mg by mouth 2 (Two) Times a Day.    Fidel Mendez MD   Nebulizers (COMPRESSOR/NEBULIZER) misc Nebulizer with supplies 4/5/19   Fidel Mendez MD   nystatin (MYCOSTATIN) 994560 UNIT/ML suspension 5 MILLILITER(S) ORALLY 4 TIMES A DAY. SWISH AND SWALLOW 4/6/19   Fidel Mendez MD   O2 (OXYGEN) 2 L Every Night. 4/5/19   Fidel Mendez MD   omeprazole (priLOSEC) 20 MG capsule Take 20 mg by mouth 2 (Two) Times a Day.    Fidel Mendez MD   ONETOUCH VERIO test strip USE 4 TIMES A DAY (AFTER MEALS AND AT BEDTIME) E11.9 5/30/19   Fidel Mendez MD   pregabalin (LYRICA) 150 MG capsule Take 150 mg by mouth 3 (Three) Times a Day As Needed.    Fidel Mendez MD   promethazine (PHENERGAN) 25 MG tablet Take 25 mg by mouth As Needed. for nausea 3/27/19   Fidel Mendez MD   sulfamethoxazole-trimethoprim (BACTRIM,SEPTRA) 400-80 MG tablet Take 1 tablet by mouth See Admin Instructions. Take 1 tablet by mouth every Monday, Wednesday and Friday. 3/26/19   Fidel Mendez MD   tacrolimus (PROGRAF) 1 MG capsule Take 1 mg by mouth 2 (Two) Times a Day.    Fidel Mendez MD   tiotropium (SPIRIVA) 18 MCG per inhalation capsule Place 1 capsule into inhaler and inhale Every Morning. 1/20/20    Shruti Kenney APRN   tiZANidine (ZANAFLEX) 4 MG tablet Take 4 mg by mouth Every 8 (Eight) Hours As Needed for Muscle Spasms.    Fidel Mendez MD   traZODone (DESYREL) 50 MG tablet Take  mg by mouth Every Night.    Fidel Mendez MD   vitamin B-12 (CYANOCOBALAMIN) 500 MCG tablet Take 500 mcg by mouth Every Morning.    Fidel Mendez MD WIXELA INHUB 250-50 MCG/DOSE DISKUS Inhale 1 puff 2 (Two) Times a Day. 19   Fidel Mendez MD       Family History   Problem Relation Age of Onset   • Heart disease Father    • Heart attack Brother        Social History     Socioeconomic History   • Marital status:      Spouse name: Not on file   • Number of children: Not on file   • Years of education: Not on file   • Highest education level: Not on file   Tobacco Use   • Smoking status: Former Smoker     Packs/day: 3.00     Years: 28.00     Pack years: 84.00     Types: Cigarettes     Last attempt to quit:      Years since quittin.2   • Smokeless tobacco: Never Used   Substance and Sexual Activity   • Alcohol use: No   • Drug use: No     Comment: when in severe pain    • Sexual activity: Defer       Review of Systems   Constitutional: Positive for fatigue. Negative for activity change, chills and fever.   HENT: Negative for congestion, postnasal drip, rhinorrhea, sinus pressure, sore throat and trouble swallowing.    Eyes: Negative for blurred vision, double vision and pain.   Respiratory: Positive for cough and shortness of breath. Negative for chest tightness and wheezing.    Cardiovascular: Negative for chest pain, palpitations and leg swelling.   Gastrointestinal: Negative for abdominal distention, constipation, diarrhea, nausea and vomiting.   Endocrine: Negative for polydipsia, polyphagia and polyuria.   Genitourinary: Negative for dysuria, frequency and urgency.   Musculoskeletal: Negative for arthralgias, back pain, gait problem and joint swelling.   Skin: Negative  for color change, dry skin, rash and skin lesions.   Allergic/Immunologic: Negative for environmental allergies, food allergies and immunocompromised state.   Neurological: Negative for dizziness, seizures, speech difficulty, weakness, light-headedness, memory problem and confusion.   Hematological: Negative for adenopathy. Does not bruise/bleed easily.   Psychiatric/Behavioral: Negative for sleep disturbance, negative for hyperactivity and depressed mood. The patient is not nervous/anxious.        There were no vitals taken for this visit.    Physical Exam:    No physical exam due to telephone visit, COVID 19 association.  He seemed alert and coherent during our telephone encounter today.  He was very pleasant and happy.  He did not seem to be short of breath in conversation and could speak in full sentences.  He did not cough during conversation.    Pulmonary Functions Testing Results:    PFT Values        Some values may be hidden. Unless noted otherwise, only the newest values recorded on each date are displayed.         Old Values PFT Results 3/4/19 6/5/19 1/8/20   FVC 34% 38% 34%   FEV1 17% 20% 20%   FEV1/FVC 39.31% 41.93% 47.47      Pre Drug PFT Results 3/4/19 6/5/19 1/8/20   FVC   30.05   FEV1   16.97   FEF 25-75%   7.61   FEV1/FVC   45      Post Drug PFT Results 3/4/19 6/5/19 1/8/20   No data to display.      Other Tests PFT Results 3/4/19 6/5/19 1/8/20   No data to display.           Results for orders placed in visit on 09/11/19   Pulmonary Function Test    Narrative Bautista Daley MA     1/9/2020  3:07 PM  Pulmonary Function Test  Performed by: Shruti Kenney APRN  Authorized by: Shruti Kenney APRN      Pre Drug    FVC: 30.05%   FEV1: 16.97%   FEF 25-75%: 7.61%   FEV1/FVC: 45%   Results for orders placed in visit on 06/05/19   Pulmonary Function Test   Results for orders placed in visit on 03/04/19   Pulmonary Function Test         No PFTs for this visit    CXR: No imaging for this  visit        Problem List Items Addressed This Visit        Respiratory    COPD, group D, by GOLD 2017 classification (CMS/[facility])    Relevant Medications    albuterol sulfate  (90 Base) MCG/ACT inhaler    Chronic respiratory failure with hypoxia (CMS/[facility])    Relevant Medications    albuterol sulfate  (90 Base) MCG/ACT inhaler       Nervous and Auditory    Chronic pain syndrome       Other    Lung transplant status (CMS/[facility]) - Primary    Relevant Medications    albuterol sulfate  (90 Base) MCG/ACT inhaler    Personal history of nicotine dependence        Assessment/Plan         He feels that he is doing very well from a COPD, lung transplant and respiratory failure status.  He just had a computer visit yesterday with his transplant service.  They have made adjustments to his transplant medications.  Needs a refill on his rescue inhaler otherwise all of his other medications are helping.  He is compliant with his 2 L of oxygen and benefiting from that and will continue it.  He is a scheduled visit already arranged with his transplant service in the next few weeks.  Unfortunately we cannot update his spirometry in person due to the current pandemic.  He is aware he can reach out to us if he has any worsening symptoms or issues otherwise we will make arrangements to update his spirometry in the near future.    This visit has been rescheduled as a phone visit to comply with patient safety concerns in accordance with CDC recommendations. Total time of discussion was 12 minutes.      Shruti Kenney, ENRICO  4/9/2020  15:44    Return in about 6 months (around 10/9/2020) for FVL.

## 2020-04-03 ENCOUNTER — APPOINTMENT (OUTPATIENT)
Dept: LAB | Facility: HOSPITAL | Age: 60
End: 2020-04-03

## 2020-04-03 ENCOUNTER — TRANSCRIBE ORDERS (OUTPATIENT)
Dept: ADMINISTRATIVE | Facility: HOSPITAL | Age: 60
End: 2020-04-03

## 2020-04-03 DIAGNOSIS — J44.9 CHRONIC OBSTRUCTIVE BRONCHITIS (HCC): Primary | ICD-10-CM

## 2020-04-03 DIAGNOSIS — N18.9 CHRONIC KIDNEY DISEASE, UNSPECIFIED CKD STAGE: ICD-10-CM

## 2020-04-03 DIAGNOSIS — C44.90: ICD-10-CM

## 2020-04-03 LAB
ANION GAP SERPL CALCULATED.3IONS-SCNC: 15.8 MMOL/L (ref 5–15)
BASOPHILS # BLD AUTO: 0.04 10*3/MM3 (ref 0–0.2)
BASOPHILS NFR BLD AUTO: 0.4 % (ref 0–1.5)
BUN BLD-MCNC: 29 MG/DL (ref 8–23)
BUN/CREAT SERPL: 17.1 (ref 7–25)
CALCIUM SPEC-SCNC: 9.5 MG/DL (ref 8.6–10.5)
CHLORIDE SERPL-SCNC: 93 MMOL/L (ref 98–107)
CO2 SERPL-SCNC: 31.2 MMOL/L (ref 22–29)
CREAT BLD-MCNC: 1.7 MG/DL (ref 0.76–1.27)
DEPRECATED RDW RBC AUTO: 39.8 FL (ref 37–54)
EOSINOPHIL # BLD AUTO: 0.05 10*3/MM3 (ref 0–0.4)
EOSINOPHIL NFR BLD AUTO: 0.5 % (ref 0.3–6.2)
ERYTHROCYTE [DISTWIDTH] IN BLOOD BY AUTOMATED COUNT: 13 % (ref 12.3–15.4)
GFR SERPL CREATININE-BSD FRML MDRD: 41 ML/MIN/1.73
GLUCOSE BLD-MCNC: 227 MG/DL (ref 65–99)
HCT VFR BLD AUTO: 41.3 % (ref 37.5–51)
HGB BLD-MCNC: 13.7 G/DL (ref 13–17.7)
IMM GRANULOCYTES # BLD AUTO: 0.1 10*3/MM3 (ref 0–0.05)
IMM GRANULOCYTES NFR BLD AUTO: 1.1 % (ref 0–0.5)
LYMPHOCYTES # BLD AUTO: 2.05 10*3/MM3 (ref 0.7–3.1)
LYMPHOCYTES NFR BLD AUTO: 22.1 % (ref 19.6–45.3)
MAGNESIUM SERPL-MCNC: 1.6 MG/DL (ref 1.6–2.4)
MCH RBC QN AUTO: 28.2 PG (ref 26.6–33)
MCHC RBC AUTO-ENTMCNC: 33.2 G/DL (ref 31.5–35.7)
MCV RBC AUTO: 85.2 FL (ref 79–97)
MONOCYTES # BLD AUTO: 0.93 10*3/MM3 (ref 0.1–0.9)
MONOCYTES NFR BLD AUTO: 10 % (ref 5–12)
NEUTROPHILS # BLD AUTO: 6.11 10*3/MM3 (ref 1.7–7)
NEUTROPHILS NFR BLD AUTO: 65.9 % (ref 42.7–76)
NRBC BLD AUTO-RTO: 0 /100 WBC (ref 0–0.2)
PLATELET # BLD AUTO: 250 10*3/MM3 (ref 140–450)
PMV BLD AUTO: 11.6 FL (ref 6–12)
POTASSIUM BLD-SCNC: 3.6 MMOL/L (ref 3.5–5.2)
RBC # BLD AUTO: 4.85 10*6/MM3 (ref 4.14–5.8)
SODIUM BLD-SCNC: 140 MMOL/L (ref 136–145)
WBC NRBC COR # BLD: 9.28 10*3/MM3 (ref 3.4–10.8)

## 2020-04-03 PROCEDURE — 83735 ASSAY OF MAGNESIUM: CPT | Performed by: INTERNAL MEDICINE

## 2020-04-03 PROCEDURE — 85025 COMPLETE CBC W/AUTO DIFF WBC: CPT | Performed by: INTERNAL MEDICINE

## 2020-04-03 PROCEDURE — 36415 COLL VENOUS BLD VENIPUNCTURE: CPT | Performed by: INTERNAL MEDICINE

## 2020-04-03 PROCEDURE — 80048 BASIC METABOLIC PNL TOTAL CA: CPT | Performed by: INTERNAL MEDICINE

## 2020-04-03 PROCEDURE — 80197 ASSAY OF TACROLIMUS: CPT | Performed by: INTERNAL MEDICINE

## 2020-04-05 LAB — TACROLIMUS BLD-MCNC: 5.4 NG/ML (ref 2–20)

## 2020-04-09 ENCOUNTER — OFFICE VISIT (OUTPATIENT)
Dept: PULMONOLOGY | Facility: CLINIC | Age: 60
End: 2020-04-09

## 2020-04-09 DIAGNOSIS — G89.4 CHRONIC PAIN SYNDROME: ICD-10-CM

## 2020-04-09 DIAGNOSIS — J44.9 COPD, GROUP D, BY GOLD 2017 CLASSIFICATION (HCC): ICD-10-CM

## 2020-04-09 DIAGNOSIS — Z94.2 LUNG TRANSPLANT STATUS (HCC): Primary | ICD-10-CM

## 2020-04-09 DIAGNOSIS — Z87.891 PERSONAL HISTORY OF NICOTINE DEPENDENCE: ICD-10-CM

## 2020-04-09 DIAGNOSIS — J96.11 CHRONIC RESPIRATORY FAILURE WITH HYPOXIA (HCC): ICD-10-CM

## 2020-04-09 PROCEDURE — 99214 OFFICE O/P EST MOD 30 MIN: CPT | Performed by: NURSE PRACTITIONER

## 2020-04-09 RX ORDER — DEXTROMETHORPHAN HYDROBROMIDE AND PROMETHAZINE HYDROCHLORIDE 15; 6.25 MG/5ML; MG/5ML
5 SYRUP ORAL 4 TIMES DAILY PRN
Qty: 180 ML | Refills: 0 | Status: SHIPPED | OUTPATIENT
Start: 2020-04-09 | End: 2020-04-16

## 2020-04-09 RX ORDER — ALBUTEROL SULFATE 90 UG/1
2 AEROSOL, METERED RESPIRATORY (INHALATION) EVERY 4 HOURS PRN
Qty: 1 INHALER | Refills: 11 | Status: SHIPPED | OUTPATIENT
Start: 2020-04-09 | End: 2020-05-09

## 2020-04-11 LAB
CMV DNA SERPL NAA+PROBE-ACNC: NEGATIVE IU/ML
LOG 10 CMV QN DNA PI: NORMAL

## 2020-04-14 ENCOUNTER — TELEPHONE (OUTPATIENT)
Dept: PULMONOLOGY | Facility: CLINIC | Age: 60
End: 2020-04-14

## 2020-04-14 DIAGNOSIS — J44.9 COPD, GROUP D, BY GOLD 2017 CLASSIFICATION (HCC): ICD-10-CM

## 2020-04-14 DIAGNOSIS — Z94.2 LUNG TRANSPLANT STATUS (HCC): Primary | ICD-10-CM

## 2020-04-14 RX ORDER — DOXYCYCLINE HYCLATE 100 MG/1
100 CAPSULE ORAL 2 TIMES DAILY
Qty: 20 CAPSULE | Refills: 0 | Status: SHIPPED | OUTPATIENT
Start: 2020-04-14 | End: 2022-01-01

## 2020-04-14 NOTE — TELEPHONE ENCOUNTER
"Patient started coughing up tannish/brown sputum this past weekend. He states he is a little short of breath and lungs feel \"raw\". No fever. He is asking for antibiotics.  His meds are the same from his telephone appt on 4/9/20.  "

## 2020-04-27 ENCOUNTER — TRANSCRIBE ORDERS (OUTPATIENT)
Dept: ADMINISTRATIVE | Facility: HOSPITAL | Age: 60
End: 2020-04-27

## 2020-04-27 ENCOUNTER — APPOINTMENT (OUTPATIENT)
Dept: LAB | Facility: HOSPITAL | Age: 60
End: 2020-04-27

## 2020-04-27 DIAGNOSIS — C44.90: ICD-10-CM

## 2020-04-27 DIAGNOSIS — J44.9 CHRONIC OBSTRUCTIVE PULMONARY DISEASE, UNSPECIFIED COPD TYPE (HCC): Primary | ICD-10-CM

## 2020-04-27 DIAGNOSIS — N18.9 CHRONIC KIDNEY DISEASE, UNSPECIFIED CKD STAGE: ICD-10-CM

## 2020-04-27 LAB
ANION GAP SERPL CALCULATED.3IONS-SCNC: 10.6 MMOL/L (ref 5–15)
BASOPHILS # BLD AUTO: 0.03 10*3/MM3 (ref 0–0.2)
BASOPHILS NFR BLD AUTO: 0.3 % (ref 0–1.5)
BUN BLD-MCNC: 24 MG/DL (ref 8–23)
BUN/CREAT SERPL: 14.2 (ref 7–25)
CALCIUM SPEC-SCNC: 9.7 MG/DL (ref 8.6–10.5)
CHLORIDE SERPL-SCNC: 92 MMOL/L (ref 98–107)
CO2 SERPL-SCNC: 32.4 MMOL/L (ref 22–29)
CREAT BLD-MCNC: 1.69 MG/DL (ref 0.76–1.27)
DEPRECATED RDW RBC AUTO: 39.2 FL (ref 37–54)
EOSINOPHIL # BLD AUTO: 0.11 10*3/MM3 (ref 0–0.4)
EOSINOPHIL NFR BLD AUTO: 1.2 % (ref 0.3–6.2)
ERYTHROCYTE [DISTWIDTH] IN BLOOD BY AUTOMATED COUNT: 13 % (ref 12.3–15.4)
GFR SERPL CREATININE-BSD FRML MDRD: 41 ML/MIN/1.73
GLUCOSE BLD-MCNC: 164 MG/DL (ref 65–99)
HCT VFR BLD AUTO: 41 % (ref 37.5–51)
HGB BLD-MCNC: 14 G/DL (ref 13–17.7)
IMM GRANULOCYTES # BLD AUTO: 0.03 10*3/MM3 (ref 0–0.05)
IMM GRANULOCYTES NFR BLD AUTO: 0.3 % (ref 0–0.5)
LYMPHOCYTES # BLD AUTO: 1.27 10*3/MM3 (ref 0.7–3.1)
LYMPHOCYTES NFR BLD AUTO: 14.1 % (ref 19.6–45.3)
MAGNESIUM SERPL-MCNC: 1.5 MG/DL (ref 1.6–2.4)
MCH RBC QN AUTO: 28.9 PG (ref 26.6–33)
MCHC RBC AUTO-ENTMCNC: 34.1 G/DL (ref 31.5–35.7)
MCV RBC AUTO: 84.5 FL (ref 79–97)
MONOCYTES # BLD AUTO: 0.81 10*3/MM3 (ref 0.1–0.9)
MONOCYTES NFR BLD AUTO: 9 % (ref 5–12)
NEUTROPHILS # BLD AUTO: 6.76 10*3/MM3 (ref 1.7–7)
NEUTROPHILS NFR BLD AUTO: 75.1 % (ref 42.7–76)
NRBC BLD AUTO-RTO: 0 /100 WBC (ref 0–0.2)
PLATELET # BLD AUTO: 243 10*3/MM3 (ref 140–450)
PMV BLD AUTO: 11.7 FL (ref 6–12)
POTASSIUM BLD-SCNC: 3.6 MMOL/L (ref 3.5–5.2)
RBC # BLD AUTO: 4.85 10*6/MM3 (ref 4.14–5.8)
SODIUM BLD-SCNC: 135 MMOL/L (ref 136–145)
WBC NRBC COR # BLD: 9.01 10*3/MM3 (ref 3.4–10.8)

## 2020-04-27 PROCEDURE — 80048 BASIC METABOLIC PNL TOTAL CA: CPT | Performed by: INTERNAL MEDICINE

## 2020-04-27 PROCEDURE — 85025 COMPLETE CBC W/AUTO DIFF WBC: CPT | Performed by: INTERNAL MEDICINE

## 2020-04-27 PROCEDURE — 83735 ASSAY OF MAGNESIUM: CPT | Performed by: INTERNAL MEDICINE

## 2020-04-27 PROCEDURE — 36415 COLL VENOUS BLD VENIPUNCTURE: CPT | Performed by: INTERNAL MEDICINE

## 2020-05-29 RX ORDER — ATORVASTATIN CALCIUM 20 MG/1
TABLET, FILM COATED ORAL
Qty: 30 TABLET | Refills: 2 | Status: SHIPPED | OUTPATIENT
Start: 2020-05-29 | End: 2020-08-12

## 2020-07-06 RX ORDER — OMEPRAZOLE 20 MG/1
CAPSULE, DELAYED RELEASE ORAL
Qty: 60 CAPSULE | Refills: 1 | Status: SHIPPED | OUTPATIENT
Start: 2020-07-06 | End: 2020-09-01

## 2020-07-07 ENCOUNTER — TELEPHONE (OUTPATIENT)
Dept: PRIMARY CARE CLINIC | Age: 60
End: 2020-07-07

## 2020-07-07 NOTE — TELEPHONE ENCOUNTER
Chago Osorio from [facility] called to inform us patient has increased his oxygen usage in the past months.

## 2020-07-27 ENCOUNTER — LAB (OUTPATIENT)
Dept: LAB | Facility: HOSPITAL | Age: 60
End: 2020-07-27

## 2020-07-27 ENCOUNTER — TRANSCRIBE ORDERS (OUTPATIENT)
Dept: ADMINISTRATIVE | Facility: HOSPITAL | Age: 60
End: 2020-07-27

## 2020-07-27 ENCOUNTER — NURSE TRIAGE (OUTPATIENT)
Dept: OTHER | Facility: CLINIC | Age: 60
End: 2020-07-27

## 2020-07-27 DIAGNOSIS — C44.90: ICD-10-CM

## 2020-07-27 DIAGNOSIS — N18.9 CHRONIC KIDNEY DISEASE, UNSPECIFIED CKD STAGE: ICD-10-CM

## 2020-07-27 DIAGNOSIS — J44.9 CHRONIC OBSTRUCTIVE BRONCHITIS (HCC): Primary | ICD-10-CM

## 2020-07-27 DIAGNOSIS — Z94.2 HISTORY OF LUNG TRANSPLANT (HCC): ICD-10-CM

## 2020-07-27 LAB
ANION GAP SERPL CALCULATED.3IONS-SCNC: 10.4 MMOL/L (ref 5–15)
BASOPHILS # BLD AUTO: 0.06 10*3/MM3 (ref 0–0.2)
BASOPHILS NFR BLD AUTO: 0.7 % (ref 0–1.5)
BUN SERPL-MCNC: 32 MG/DL (ref 8–23)
BUN/CREAT SERPL: 15.8 (ref 7–25)
CALCIUM SPEC-SCNC: 9.9 MG/DL (ref 8.6–10.5)
CHLORIDE SERPL-SCNC: 96 MMOL/L (ref 98–107)
CO2 SERPL-SCNC: 32.6 MMOL/L (ref 22–29)
CREAT SERPL-MCNC: 2.02 MG/DL (ref 0.76–1.27)
DEPRECATED RDW RBC AUTO: 43.4 FL (ref 37–54)
EOSINOPHIL # BLD AUTO: 0.07 10*3/MM3 (ref 0–0.4)
EOSINOPHIL NFR BLD AUTO: 0.8 % (ref 0.3–6.2)
ERYTHROCYTE [DISTWIDTH] IN BLOOD BY AUTOMATED COUNT: 13.5 % (ref 12.3–15.4)
GFR SERPL CREATININE-BSD FRML MDRD: 34 ML/MIN/1.73
GLUCOSE SERPL-MCNC: 73 MG/DL (ref 65–99)
HCT VFR BLD AUTO: 41.4 % (ref 37.5–51)
HGB BLD-MCNC: 13.6 G/DL (ref 13–17.7)
IMM GRANULOCYTES # BLD AUTO: 0.14 10*3/MM3 (ref 0–0.05)
IMM GRANULOCYTES NFR BLD AUTO: 1.5 % (ref 0–0.5)
LYMPHOCYTES # BLD AUTO: 1.96 10*3/MM3 (ref 0.7–3.1)
LYMPHOCYTES NFR BLD AUTO: 21.2 % (ref 19.6–45.3)
MAGNESIUM SERPL-MCNC: 1.6 MG/DL (ref 1.6–2.4)
MCH RBC QN AUTO: 28.5 PG (ref 26.6–33)
MCHC RBC AUTO-ENTMCNC: 32.9 G/DL (ref 31.5–35.7)
MCV RBC AUTO: 86.8 FL (ref 79–97)
MONOCYTES # BLD AUTO: 0.89 10*3/MM3 (ref 0.1–0.9)
MONOCYTES NFR BLD AUTO: 9.6 % (ref 5–12)
NEUTROPHILS NFR BLD AUTO: 6.11 10*3/MM3 (ref 1.7–7)
NEUTROPHILS NFR BLD AUTO: 66.2 % (ref 42.7–76)
NRBC BLD AUTO-RTO: 0 /100 WBC (ref 0–0.2)
PLATELET # BLD AUTO: 185 10*3/MM3 (ref 140–450)
PMV BLD AUTO: 12.1 FL (ref 6–12)
POTASSIUM SERPL-SCNC: 4 MMOL/L (ref 3.5–5.2)
RBC # BLD AUTO: 4.77 10*6/MM3 (ref 4.14–5.8)
SODIUM SERPL-SCNC: 139 MMOL/L (ref 136–145)
WBC # BLD AUTO: 9.23 10*3/MM3 (ref 3.4–10.8)

## 2020-07-27 PROCEDURE — 85025 COMPLETE CBC W/AUTO DIFF WBC: CPT | Performed by: INTERNAL MEDICINE

## 2020-07-27 PROCEDURE — 80048 BASIC METABOLIC PNL TOTAL CA: CPT | Performed by: INTERNAL MEDICINE

## 2020-07-27 PROCEDURE — 36415 COLL VENOUS BLD VENIPUNCTURE: CPT | Performed by: INTERNAL MEDICINE

## 2020-07-27 PROCEDURE — 83735 ASSAY OF MAGNESIUM: CPT | Performed by: INTERNAL MEDICINE

## 2020-07-27 NOTE — TELEPHONE ENCOUNTER
Please clarify if this patient has MORBID OBESITY with Height 5'6\", Weight 113.2 kg, and BMI 40.28    Thank you,  Barrington Joshi RN CDS  Compliant Documentation Management Program  [phone] Reason for Disposition   MILD difficulty breathing (e.g., minimal/no SOB at rest, SOB with walking, pulse <100)    Answer Assessment - Initial Assessment Questions  1. COVID-19 DIAGNOSIS: \"Who made your Coronavirus (COVID-19) diagnosis? \" \"Was it confirmed by a positive lab test?\" If not diagnosed by a HCP, ask \"Are there lots of cases (community spread) where you live? \" (See public health department website, if unsure)      n/a  2. ONSET: \"When did the COVID-19 symptoms start? \"       2 months ago  3. WORST SYMPTOM: \"What is your worst symptom? \" (e.g., cough, fever, shortness of breath, muscle aches)      Cough and shortness of breath  4. COUGH: \"Do you have a cough? \" If so, ask: \"How bad is the cough? \"        Productive cough   5. FEVER: \"Do you have a fever? \" If so, ask: \"What is your temperature, how was it measured, and when did it start? \"      No fever  6. RESPIRATORY STATUS: \"Describe your breathing? \" (e.g., shortness of breath, wheezing, unable to speak)       Shortness of breath (feels like all of his events with SOB; Patient has a history of lung transplant and COPD)  7. BETTER-SAME-WORSE: [place] you getting better, staying the same or getting worse compared to yesterday? \"  If getting worse, ask, \"In what way? \"      Worse  8. HIGH RISK DISEASE: \"Do you have any chronic medical problems? \" (e.g., asthma, heart or lung disease, weak immune system, etc.)      COPD - lung transplant  9. PREGNANCY: \"Is there any chance you are pregnant? \" \"When was your last menstrual period? \"      no  10. OTHER SYMPTOMS: \"Do you have any other symptoms? \"  (e.g., chills, fatigue, headache, loss of smell or taste, muscle pain, sore throat)        Cough, shortness of breath    Protocols used: CORONAVIRUS (COVID-19) DIAGNOSED OR SUSPECTED-ADULT-    Received call from [address]. Call soft transferred to [address] to schedule appointment. Please do not reply to the triage nurse through this encounter.   Any subsequent communication should be directly with the patient. Patient c/o of productive cough and mild shortness of breath that patient states is within normal limits for him with his history of COPD and lung transplant. Patient wants to speak with provider about cough syrup prescription renewal.  Cautioned patient to be very aware of any worsening shortness of breath with his history. Care advice provided. Warm transfer to service center Renate Verma) for physician contact/appointment scheduling.

## 2020-07-30 LAB
CMV DNA SERPL NAA+PROBE-ACNC: NORMAL IU/ML
LOG 10 CMV QN DNA PI: NORMAL

## 2020-07-31 ENCOUNTER — VIRTUAL VISIT (OUTPATIENT)
Dept: PRIMARY CARE CLINIC | Age: 60
End: 2020-07-31
Payer: MEDICAID

## 2020-07-31 PROCEDURE — G8427 DOCREV CUR MEDS BY ELIG CLIN: HCPCS | Performed by: NURSE PRACTITIONER

## 2020-07-31 PROCEDURE — G8926 SPIRO NO PERF OR DOC: HCPCS | Performed by: NURSE PRACTITIONER

## 2020-07-31 PROCEDURE — G8420 CALC BMI NORM PARAMETERS: HCPCS | Performed by: NURSE PRACTITIONER

## 2020-07-31 PROCEDURE — 3023F SPIROM DOC REV: CPT | Performed by: NURSE PRACTITIONER

## 2020-07-31 PROCEDURE — 3017F COLORECTAL CA SCREEN DOC REV: CPT | Performed by: NURSE PRACTITIONER

## 2020-07-31 PROCEDURE — 99213 OFFICE O/P EST LOW 20 MIN: CPT | Performed by: NURSE PRACTITIONER

## 2020-07-31 PROCEDURE — 1036F TOBACCO NON-USER: CPT | Performed by: NURSE PRACTITIONER

## 2020-07-31 RX ORDER — PREDNISONE 10 MG/1
TABLET ORAL
Qty: 39 TABLET | Refills: 0 | Status: SHIPPED | OUTPATIENT
Start: 2020-07-31 | End: 2021-01-28 | Stop reason: ALTCHOICE

## 2020-07-31 RX ORDER — DEXTROMETHORPHAN HYDROBROMIDE AND PROMETHAZINE HYDROCHLORIDE 15; 6.25 MG/5ML; MG/5ML
5 SYRUP ORAL 4 TIMES DAILY PRN
Qty: 240 ML | Refills: 0 | Status: SHIPPED | OUTPATIENT
Start: 2020-07-31 | End: 2020-08-07

## 2020-07-31 ASSESSMENT — ENCOUNTER SYMPTOMS
BACK PAIN: 0
RHINORRHEA: 0
VOICE CHANGE: 0
SHORTNESS OF BREATH: 1
COUGH: 1
COLOR CHANGE: 0
NAUSEA: 0
VOMITING: 0
PHOTOPHOBIA: 0

## 2020-07-31 NOTE — PROGRESS NOTES
[address] [address]             Phone:  [phone]  Fax:  [phone]       2020    TELEHEALTH EVALUATION -- Audio/Visual (During O-03 public health emergency)    HPI:  Chief Complaint   Patient presents with    Cough    Congestion    Shortness of [place] (:  1960) has requested an audio/video evaluation for the following concern(s):    Patient presents today for evaluation of cough and shortness of breath. Patient has a history of COPD and status post lung transplant. Patient has recently started on Levaquin that he keeps on hand for exacerbation. He states that he had \"some prednisone\" that he thinks was 10 mg that he has been taking. Patient is also doing nebulizer treatments every 4 hours. Patient has been given Promethazine DM in the past and this has significantly helped his cough at night and enable him to sleep. He is out of this medication at this time. He denies any fever. He states that he is coughing up yellow phlegm and is taking Mucinex which is helping with this. He denies any known exposure to person with COVID-19. Review of Systems   Constitutional: Negative for chills and fever. HENT: Negative for ear pain, hearing loss, rhinorrhea and voice change. Eyes: Negative for photophobia and visual disturbance. Respiratory: Positive for cough and shortness of breath. Cardiovascular: Negative for chest pain and palpitations. Gastrointestinal: Negative for nausea and vomiting. Endocrine: Negative. Negative for cold intolerance and heat intolerance. Genitourinary: Negative for difficulty urinating and flank pain. Musculoskeletal: Negative for back pain and neck pain. Skin: Negative for color change and rash. Allergic/Immunologic: Negative for environmental allergies and food allergies. Neurological: Negative for dizziness, speech difficulty and headaches.    Hematological: Does not bruise/bleed easily. Psychiatric/Behavioral: Negative for sleep disturbance and suicidal ideas. Prior to Visit Medications    Medication Sig Taking? Authorizing Provider   promethazine-dextromethorphan (PROMETHAZINE-DM) 6.25-15 MG/5ML syrup Take 5 mLs by mouth 4 times daily as needed for Cough Yes ALVARADO Pope   predniSONE (DELTASONE) 10 MG tablet 60 mg x 3 days; 40 mg x 3 days; 20 mg x 3 days and then 10 mg x 3 days. Yes ALVARADO Pope   omeprazole (PRILOSEC) 20 MG delayed release capsule TAKE 1 CAPSULE BY MOUTH TWICE A DAY  Amanda Curry MD   atorvastatin (LIPITOR) 20 MG tablet TAKE 1 TABLET BY MOUTH AT BEDTIME  Amanda Curry MD   folic acid (FOLVITE) 1 MG tablet TAKE 1 TABLET BY MOUTH EVERY DAY  Amanda Curry MD   levocetirizine (XYZAL) 5 MG tablet TAKE 1 TABLET BY MOUTH EVERY DAY  Amanda Curry MD   blood glucose monitor kit and supplies Test 4- 5  times a day & as needed for symptoms of irregular blood glucose.   Amanda Curry MD   Andre Gone INHUB 250-50 MCG/DOSE AEPB 2 times daily  Historical Provider, MD   insulin glargine (LANTUS SOLOSTAR) 100 UNIT/ML injection 4 times daily SLIDING SCALE  Historical Provider, MD   traZODone (DESYREL) 50 MG tablet TAKE 1-2 TABLETS AT NIGHT AS DIRECTED  Amanda Curry MD   metoprolol (LOPRESSOR) 100 MG tablet TAKE 1 TABLET BY MOUTH TWICE A DAY  Layo Blackburn MD   Magnesium 400 MG CAPS Take 400 mg by mouth daily  Amanda Curry MD   Nebulizers (COMPRESSOR/NEBULIZER) MISC Nebulizer with supplies  Amanda Curry MD   OXYGEN pulse oximeter (not oxygen itself)  Amanda Curry MD   nystatin (MYCOSTATIN) 065833 UNIT/ML suspension Take 500,000 Units by mouth 4 times daily  Historical Provider, MD   albuterol sulfate HFA (VENTOLIN HFA) 108 (90 Base) MCG/ACT inhaler INHALE TWO PUFFS EVERY FOUR HOURS AS NEEDED FOR THIRTY DAYS  Amanda Curry MD   SPIRIVA HANDIHALER 18 MCG inhalation capsule INHALE THE CONTENTS OF 1 CAPSULE BY MOUTH EVERYD Yolanda Cools itchy, and pass out.     Clindamycin/Lincomycin Diarrhea     Told by Pulmonologist at [facility] not to take    Moxifloxacin Rash     Sweating    ,   Past Medical History:   Diagnosis Date    [facility]     Emphysema of lung ([facility] [facility]     Hypertension     Pneumonia     in left lung    Skin cancer     Wears glasses    ,   Past Surgical History:   Procedure Laterality Date    APPENDECTOMY      CHOLECYSTECTOMY  14    COLONOSCOPY  2008    Dr. Oni Chu: AP    LUNG REMOVAL, PARTIAL      right upper    LUNG TRANSPLANT, SINGLE  2006    left    SKIN CANCER DESTRUCTION      UPPER GASTROINTESTINAL ENDOSCOPY  2008     Dr. Oni Chu   ,   Social History     Tobacco Use    Smoking status: Former Smoker     Packs/day: 3.00     Years: 15.00     Pack years: 45.00     Types: Cigarettes     Start date: 1972     Last attempt to quit: 1/10/2002     Years since quittin.5    Smokeless tobacco: Never Used    Tobacco comment: Pt used to smoke quit 9.5 years ago   Substance Use Topics    Alcohol use: No    Drug use: Not Currently     Frequency: 2.0 times per week     Types: Marijuana     Comment: 19 pt stated he stopped 2019   ,   Family History   Problem Relation Age of Onset    Cancer Mother     Heart Disease Father     Colon Cancer Neg Hx     Colon Polyps Neg Hx     Esophageal Cancer Neg Hx     Liver Cancer Neg Hx     Rectal Cancer Neg Hx     Liver Disease Neg Hx     Stomach Cancer Neg Hx    ,   Immunization History   Administered Date(s) Administered    Influenza 11/15/2012    Influenza Vaccine, unspecified formulation 2017    Influenza Virus Vaccine 2015, 10/03/2016    Influenza, High Dose (Fluzone 65 yrs and older) 09/10/2018    Pneumococcal Conjugate 13-valent ([id number]) 09/10/2018    Pneumococcal Polysaccharide ([id number]) 2018    Tdap (Boostrix, Adacel) 10/24/2013   ,   Health Maintenance   Topic Date Due    HIV screen  12/03/1973    Shingles Vaccine (1 of 2) 12/03/2008    Colon cancer screen colonoscopy  11/15/2016    Lipid screen  08/18/2017    A1C test (Diabetic or Prediabetic)  12/02/2017    Potassium monitoring  12/02/2017    Creatinine monitoring  12/02/2017    Flu vaccine (1) 09/01/2020    DTaP/Tdap/Td vaccine (2 - Td) 10/24/2023    Pneumococcal 0-64 years Vaccine (3 of 3 - PPSV23) 12/17/2023    Hepatitis C screen  Completed    Hepatitis A vaccine  Aged Out    Hepatitis B vaccine  Aged Out    Hib vaccine  Aged Out    Meningococcal (ACWY) vaccine  Aged Out       PHYSICAL EXAMINATION:  [ INSTRUCTIONS:  \"[x]\" Indicates a positive item  \"[]\" Indicates a negative item  -- DELETE ALL ITEMS NOT EXAMINED]  [x] Alert  [x] Oriented to person/place/time    [x] No apparent distress  [] Toxic appearing    [] Face flushed appearing [x] Sclera clear  [] Lips are cyanotic      [x] Breathing appears normal  [] Appears tachypneic      [] Rash on visible skin    [x] Cranial Nerves II-XII grossly intact    [x] Motor grossly intact in visible upper extremities    [x] Motor grossly intact in visible lower extremities    [x] Normal Mood  [] Anxious appearing    [] Depressed appearing  [] Confused appearing      [] Poor short term memory  [] Poor long term memory    [] OTHER:      Due to this being a TeleHealth encounter, evaluation of the following organ systems is limited: Vitals/Constitutional/EENT/Resp/CV/GI//MS/Neuro/Skin/Heme-Lymph-Imm. ASSESSMENT/PLAN:  1. Chronic obstructive pulmonary disease, unspecified COPD type ([facility] [address]    - promethazine-dextromethorphan (PROMETHAZINE-DM) 6.25-15 MG/5ML syrup; Take 5 mLs by mouth 4 times daily as needed for Cough  Dispense: 240 mL; Refill: 0  - predniSONE (DELTASONE) 10 MG tablet; 60 mg x 3 days; 40 mg x 3 days; 20 mg x 3 days and then 10 mg x 3 days. Dispense: 39 tablet;  Refill: 0    -Discussed with patient that he is to have low threshold for going to the emergency department for further evaluation. I discussed with him that I do not have a chest x-ray and I am unable to listen to his lungs due to virtual visit. I will go ahead and send him in a prednisone taper as well as the Promethazine DM. He is to continue nebulizer treatments and complete full course of Levaquin. He will follow-up for any worsening or failing to improve symptoms. Return if symptoms worsen or fail to improve. An  electronic signature was used to authenticate this note. --ALVARADO Aguilera on 7/31/2020 at 12:41 PM        Pursuant to the emergency declaration under the [facility] waiver authority and the [employer] and [employer] Act, this Virtual  Visit was conducted, with patient's consent, to reduce the patient's risk of exposure to COVID-19 and provide continuity of care for an established patient. Services were provided through a video synchronous discussion virtually to substitute for in-person clinic visit.

## 2020-08-07 RX ORDER — LEVOCETIRIZINE DIHYDROCHLORIDE 5 MG/1
TABLET, FILM COATED ORAL
Qty: 90 TABLET | Refills: 1 | Status: SHIPPED | OUTPATIENT
Start: 2020-08-07

## 2020-09-01 RX ORDER — OMEPRAZOLE 20 MG/1
CAPSULE, DELAYED RELEASE ORAL
Qty: 60 CAPSULE | Refills: 1 | Status: SHIPPED | OUTPATIENT
Start: 2020-09-01 | End: 2020-11-03

## 2020-09-01 NOTE — TELEPHONE ENCOUNTER
[place] [address] [place] to request a refill on his medication.       Last office visit : 7/31/2020   Next office visit : Visit date not found     Requested Prescriptions     Signed Prescriptions Disp Refills    omeprazole (PRILOSEC) 20 MG delayed release capsule 60 capsule 1     Sig: TAKE [id number] A DAY     Authorizing Provider: Ham Vo     Ordering User: Izabella Pickens MA

## 2020-09-19 ENCOUNTER — TELEPHONE (OUTPATIENT)
Dept: PRIMARY CARE CLINIC | Age: 60
End: 2020-09-19

## 2020-09-19 NOTE — TELEPHONE ENCOUNTER
Called [facility] and spoke with staff regarding form received. [facility] is requesting a ventilator for patient. They are requesting [employer] related to patient is using at least 25 tanks of oxygen a month.  Last appt was 7-31    Please advise

## 2020-09-22 NOTE — TELEPHONE ENCOUNTER
[facility] in October and they should actually be who is doing this for him.

## 2020-09-25 NOTE — TELEPHONE ENCOUNTER
Jazlyn Perkins and informed per Fior's notes will need follow up with Dr.Alex Laci Villanueva and amaury is seeing pulmonology in October so they would be the provider to take care of this.

## 2020-10-10 PROBLEM — E11.65 TYPE 2 DIABETES MELLITUS WITH HYPERGLYCEMIA, WITHOUT LONG-TERM CURRENT USE OF INSULIN (HCC): Status: ACTIVE | Noted: 2020-10-10

## 2020-10-10 NOTE — PROGRESS NOTES
"ENRICO Ovalles  [facility]   Respiratory Disease Clinic  [address] [place] [address]  Phone: [phone]  Fax: [phone]     Bayron Rodriguez is a 59 y.o. male.   CC:   Chief Complaint   Patient presents with   • Follow-up        HPI: Mr. Rodriguez is  being evaluated today for management of his unilateral lung transplant status, chronic respiratory failure and COPD.  Unilateral transplant was greater than 10 years ago.  At baseline he experiences moderate persistent shortness of breath and coughing occurring in the chest daily for many years.  It is worsened by activity and improved with rest, oxygen and bronchodilators.  He is on Spiriva and Symbicort routinely.  He takes breathing treatments 2-3 times daily in addition to this to manage his symptoms. He is compliant with his 2 L of supplemental oxygen and does benefit from this.  He is on full dose Mucinex twice daily.  He remains on Prograf, Bactrim and mycophenolate.  He is no longer taking Zithromax. Last office visit he indicated home health was coming in obtaining his routine lab work for his transplant due to COVID.  He is no longer receiving home health.  He is actually visiting his transplant service at  for routine visits.  He reports a \"lung infection\" ongoing for the last month.  He was initially treated with 3 days of Levaquin without improvement.  He was seen by other services last week and given an additional 7 days of Levaquin and a short course of increased prednisone.  He has 3 days remaining on the Levaquin.  He denies any fever chills or night sweats.  He is coughing up mucus but reports the consistency and color has improved.  He is still having shortness of breath and wheezing.  Treatment for his condition is complicated by being on insulin for poorly controlled diabetes.   He reports he continues sliding scale insulin.    He is uncertain of his most recent A1c.      The following portions of the patient's " history were reviewed and updated as appropriate: allergies, current medications, past family history, past medical history, past social history, past surgical history and problem list.    Past Medical History:   Diagnosis Date   • Cancer (CMS/HCC)     Skin   • Chronic back pain    • Chronic pain syndrome 12/4/2018   • Chronic respiratory failure with hypoxia (CMS/HCC) 12/4/2018   • COPD (chronic obstructive pulmonary disease) (CMS/HCC)    • COPD, group D, by GOLD 2017 classification (CMS/HCC) 11/29/2018   • Diabetes mellitus (CMS/HCC)    • Emphysema lung (CMS/HCC)    • Essential hypertension 12/4/2018   • Gastroesophageal reflux disease without esophagitis 12/4/2018   • Hypertension    • Lung transplant status (CMS/HCC) 12/4/2018   • Lung transplanted (CMS/HCC)    • Personal history of nicotine dependence 12/4/2018   • Type 2 diabetes mellitus with hyperglycemia, without long-term current use of insulin (CMS/HCC) 10/10/2020       Prior to Admission medications    Medication Sig Start Date End Date Taking? Authorizing Provider   amLODIPine (NORVASC) 5 MG tablet Take 5 mg by mouth Every Morning.    Fidel Mendez MD   atorvastatin (LIPITOR) 20 MG tablet Take 20 mg by mouth Every Night.    Fidel Mendez MD   azithromycin (ZITHROMAX) 250 MG tablet Take 250 mg by mouth. Start 02/27 Monday, Wednesday and Friday.    Fidel Mendez MD   B-AAKASH UF III MINI PEN NEEDLES 31G X 5 MM misc USE 4 TIMES A DAY AS DIRECTED 5/29/19   Fidel Mendez MD   doxycycline (DORYX) 100 MG enteric coated tablet Take 1 tablet by mouth 2 (Two) Times a Day. 11/24/19   Lucio Mckeon MD   doxycycline (VIBRAMYCIN) 100 MG capsule Take 1 capsule by mouth 2 (Two) Times a Day. 4/14/20   Shruti Kenney APRN   fluconazole (DIFLUCAN) 100 MG tablet TAKE 1 TABLET BY MOUTH EVERY DAY FOR 2 WEEKS 4/3/19   Fidel Mendez MD   folic acid (FOLVITE) 1 MG tablet Take 1 mg by mouth Every Morning.    Fidel Mendez MD    furosemide (LASIX) 20 MG tablet Take 20 mg by mouth Every Morning.    Fidel Mendez MD   guaiFENesin (MUCINEX) 600 MG 12 hr tablet Take 2 tablets by mouth Every 12 (Twelve) Hours. 3/13/19   Kamar Joy MD   hydrochlorothiazide (HYDRODIURIL) 50 MG tablet Take 50 mg by mouth Every Morning.    Fidel Mendez MD   HYDROcodone-acetaminophen (NORCO) 7.5-325 MG per tablet Take 1 tablet by mouth Every 8 (Eight) Hours As Needed for Moderate Pain .    Fidel Mendez MD   Insulin Glargine (BASAGLAR KWIKPEN) 100 UNIT/ML injection pen INJECT 15 UNIT(S) SUBCUTANEOUS ONCE A DAY (IN THE EVENING) 8/21/19   Fidel Mendez MD   ipratropium-albuterol (DUO-NEB) 0.5-2.5 mg/3 ml nebulizer USE 3ML VIA NEBULIZER 4 TIMES DAILY 5/8/19   Fidel Mendez MD   levocetirizine (XYZAL) 5 MG tablet Take 5 mg by mouth Every Night.    Fidel Mendez MD   magnesium oxide (MAG-OX) 400 MG tablet Take 400 mg by mouth 2 (Two) Times a Day.    Fidel Mendez MD   MAGNESIUM PO Take 400 mg by mouth. 4/17/19   Fidel Mendez MD   metoprolol tartrate (LOPRESSOR) 100 MG tablet Take 50 mg by mouth 2 (Two) Times a Day.    Fidel Mendez MD   Mycophenolate Sodium (MYCOPHENOLIC ACID PO) Take 720 mg by mouth 2 (Two) Times a Day.    Fidel Mendez MD   Nebulizers (COMPRESSOR/NEBULIZER) misc Nebulizer with supplies 4/5/19   Fidel Mendez MD   nystatin (MYCOSTATIN) 423697 UNIT/ML suspension 5 MILLILITER(S) ORALLY 4 TIMES A DAY. SWISH AND SWALLOW 4/6/19   Fidel Mendez MD   O2 (OXYGEN) 2 L Every Night. 4/5/19   Fidel Mendez MD   omeprazole (priLOSEC) 20 MG capsule Take 20 mg by mouth 2 (Two) Times a Day.    Fidel Mendez MD   ONETOUCH VERIO test strip USE 4 TIMES A DAY (AFTER MEALS AND AT BEDTIME) E11.9 5/30/19   Fidel Mendez MD   pregabalin (LYRICA) 150 MG capsule Take 150 mg by mouth 3 (Three) Times a Day As Needed.    Provider, MD Fidel    promethazine (PHENERGAN) 25 MG tablet Take 25 mg by mouth As Needed. for nausea 3/27/19   Fidel Mendez MD   sulfamethoxazole-trimethoprim (BACTRIM,SEPTRA) 400-80 MG tablet Take 1 tablet by mouth See Admin Instructions. Take 1 tablet by mouth every Monday, Wednesday and Friday. 3/26/19   Fidel Mendez MD   tacrolimus (PROGRAF) 1 MG capsule Take 1 mg by mouth 2 (Two) Times a Day.    Fidel Mendez MD   tiotropium (SPIRIVA) 18 MCG per inhalation capsule Place 1 capsule into inhaler and inhale Every Morning. 20   Shruti Kenney APRN   tiZANidine (ZANAFLEX) 4 MG tablet Take 4 mg by mouth Every 8 (Eight) Hours As Needed for Muscle Spasms.    Fidel Mendez MD   traZODone (DESYREL) 50 MG tablet Take  mg by mouth Every Night.    Fidel Mendez MD   vitamin B-12 (CYANOCOBALAMIN) 500 MCG tablet Take 500 mcg by mouth Every Morning.    Fidel Mendez MD   WIXELA INHUB 250-50 MCG/DOSE DISKUS Inhale 1 puff 2 (Two) Times a Day. 19   Fidel Mendez MD       Family History   Problem Relation Age of Onset   • Heart disease Father    • Heart attack Brother        Social History     Socioeconomic History   • Marital status:      Spouse name: Not on file   • Number of children: Not on file   • Years of education: Not on file   • Highest education level: Not on file   Tobacco Use   • Smoking status: Former Smoker     Packs/day: 3.00     Years: 28.00     Pack years: 84.00     Types: Cigarettes     Quit date:      Years since quittin.7   • Smokeless tobacco: Never Used   Substance and Sexual Activity   • Alcohol use: No   • Drug use: No     Comment: when in severe pain    • Sexual activity: Defer       Review of Systems   Constitutional: Positive for fatigue. Negative for activity change, chills and fever.   HENT: Positive for congestion. Negative for postnasal drip, rhinorrhea, sinus pressure, sore throat and trouble swallowing.    Eyes: Negative for  "blurred vision, double vision and pain.   Respiratory: Positive for cough, shortness of breath and wheezing. Negative for chest tightness.    Cardiovascular: Negative for chest pain, palpitations and leg swelling.   Gastrointestinal: Negative for abdominal distention, constipation, diarrhea, nausea and vomiting.   Endocrine: Negative for polydipsia, polyphagia and polyuria.   Genitourinary: Negative for dysuria, frequency and urgency.   Musculoskeletal: Negative for arthralgias, back pain, gait problem and joint swelling.   Skin: Negative for color change, dry skin, rash and skin lesions.   Allergic/Immunologic: Negative for environmental allergies, food allergies and immunocompromised state.   Neurological: Negative for dizziness, seizures, speech difficulty, weakness, light-headedness, memory problem and confusion.   Hematological: Negative for adenopathy. Does not bruise/bleed easily.   Psychiatric/Behavioral: Negative for sleep disturbance, negative for hyperactivity and depressed mood. The patient is not nervous/anxious.        /76 (BP Location: Right arm, Patient Position: Sitting)   Pulse 93   Resp 26   Ht 175.3 cm (69\")   Wt 71.2 kg (157 lb)   SpO2 99%   BMI 23.18 kg/m²     Physical Exam  Vitals signs and nursing note reviewed.   Constitutional:       General: He is not in acute distress.     Appearance: He is well-developed. He is not diaphoretic.      Interventions: Nasal cannula and face mask in place.   HENT:      Head: Normocephalic and atraumatic.      Right Ear: External ear normal.      Left Ear: External ear normal.      Nose: Nose normal.      Mouth/Throat:      Pharynx: No oropharyngeal exudate.   Eyes:      General:         Right eye: No discharge.         Left eye: No discharge.      Conjunctiva/sclera: Conjunctivae normal.      Pupils: Pupils are equal, round, and reactive to light.   Neck:      Musculoskeletal: Normal range of motion and neck supple.      Vascular: No JVD. "   Cardiovascular:      Rate and Rhythm: Normal rate and regular rhythm.      Heart sounds: No murmur.   Pulmonary:      Effort: Pulmonary effort is normal. No tachypnea, accessory muscle usage or respiratory distress.      Breath sounds: Wheezing present.   Abdominal:      General: Bowel sounds are normal. There is no distension.      Palpations: Abdomen is soft.      Tenderness: There is no abdominal tenderness.   Musculoskeletal: Normal range of motion.         General: No deformity.   Skin:     General: Skin is warm and dry.      Findings: No erythema or rash.   Neurological:      Mental Status: He is alert and oriented to person, place, and time.      Cranial Nerves: No cranial nerve deficit.      Coordination: Coordination normal.      Deep Tendon Reflexes: Reflexes normal.   Psychiatric:         Behavior: Behavior normal.         Thought Content: Thought content normal.         Pulmonary Functions Testing Results:    PFT Values        Some values may be hidden. Unless noted otherwise, only the newest values recorded on each date are displayed.         Old Values PFT Results 3/4/19 6/5/19 1/8/20   FVC 34% 38% 34%   FEV1 17% 20% 20%   FEV1/FVC 39.31% 41.93% 47.47      Pre Drug PFT Results 3/4/19 6/5/19 1/8/20   FVC   30.05   FEV1   16.97   FEF 25-75%   7.61   FEV1/FVC   45      Post Drug PFT Results 3/4/19 6/5/19 1/8/20   No data to display.      Other Tests PFT Results 3/4/19 6/5/19 1/8/20   No data to display.           Results for orders placed in visit on 09/11/19   Pulmonary Function Test    Narrative Bautista Daley MA     1/9/2020  3:07 PM  Pulmonary Function Test  Performed by: Shruti Kenney APRN  Authorized by: Shruti Kenney APRN      Pre Drug    FVC: 30.05%   FEV1: 16.97%   FEF 25-75%: 7.61%   FEV1/FVC: 45%   Results for orders placed in visit on 06/05/19   Pulmonary Function Test   Results for orders placed in visit on 03/04/19   Pulmonary Function Test         No PFTs for this visit    CXR: My  interpretation of chest x-ray at [facility] from today shows severe emphysema and hyperinflation to the right lung, normal left lung, no pneumonia        Problem List Items Addressed This Visit        Respiratory    COPD, group D, by GOLD 2017 classification (CMS/[facility])    Relevant Medications    predniSONE (DELTASONE) 10 MG tablet    methylPREDNISolone acetate (DEPO-medrol) injection 80 mg (Start on 10/14/2020 10:00 AM)    Chronic respiratory failure with hypoxia (CMS/[facility]) - Primary       Endocrine    Type 2 diabetes mellitus with hyperglycemia, without long-term current use of insulin (CMS/[facility])       Other    Lung transplant status (CMS/[facility])    Personal history of nicotine dependence        Patient's Body mass index is 23.18 kg/m². BMI is within normal parameters. No follow-up required..      Assessment/Plan         Chest x-ray obtained for ongoing upper respiratory complaints.  Showing hyperinflation of right lung which is chronic for him.  Transplanted lung clear.  No obvious pneumonia.  He is still taking Levaquin which I encouraged him to complete.  He remains on low-dose prednisone.  He is having significant wheezing today.  We will treat him with a Depo-Medrol injection.  He will continue his 2 L of supplemental oxygen since he is benefiting from that.  Most recent FEV1 at transplant service was 20.  This is an improvement for him.  He will have these visits ongoing.  We will see him back in 3 months.  He is already taken a flu shot.  He will call if his condition worsens or does not improve.    Shruti Kenney, ENRICO  10/14/2020  09:16 CDT    Return in about 3 months (around 1/14/2021).

## 2020-10-13 ENCOUNTER — TELEPHONE (OUTPATIENT)
Dept: PULMONOLOGY | Facility: CLINIC | Age: 60
End: 2020-10-13

## 2020-10-13 DIAGNOSIS — R05.9 COUGH: ICD-10-CM

## 2020-10-13 DIAGNOSIS — Z94.2 LUNG TRANSPLANT STATUS (HCC): Primary | ICD-10-CM

## 2020-10-13 NOTE — TELEPHONE ENCOUNTER
"Patient has an appointment with you tomorrow. He states he has had an \"infection\" and would like to get a chest xray before he comes in.  He goes to [facility] for his imaging, thank you.   "

## 2020-10-14 ENCOUNTER — HOSPITAL ENCOUNTER (OUTPATIENT)
Dept: GENERAL RADIOLOGY | Facility: HOSPITAL | Age: 60
Discharge: HOME | End: 2020-10-14
Admitting: NURSE PRACTITIONER

## 2020-10-14 ENCOUNTER — OFFICE VISIT (OUTPATIENT)
Dept: PULMONOLOGY | Facility: CLINIC | Age: 60
End: 2020-10-14

## 2020-10-14 VITALS
BODY MASS INDEX: 23.25 KG/M2 | HEIGHT: 69 IN | HEART RATE: 93 BPM | WEIGHT: 157 LBS | OXYGEN SATURATION: 99 % | DIASTOLIC BLOOD PRESSURE: 76 MMHG | SYSTOLIC BLOOD PRESSURE: 132 MMHG | RESPIRATION RATE: 26 BRPM

## 2020-10-14 DIAGNOSIS — J44.9 COPD, GROUP D, BY GOLD 2017 CLASSIFICATION (HCC): ICD-10-CM

## 2020-10-14 DIAGNOSIS — Z94.2 LUNG TRANSPLANT STATUS (HCC): ICD-10-CM

## 2020-10-14 DIAGNOSIS — J96.11 CHRONIC RESPIRATORY FAILURE WITH HYPOXIA (HCC): Primary | ICD-10-CM

## 2020-10-14 DIAGNOSIS — E11.65 TYPE 2 DIABETES MELLITUS WITH HYPERGLYCEMIA, WITHOUT LONG-TERM CURRENT USE OF INSULIN (HCC): ICD-10-CM

## 2020-10-14 DIAGNOSIS — R05.9 COUGH: ICD-10-CM

## 2020-10-14 DIAGNOSIS — Z87.891 PERSONAL HISTORY OF NICOTINE DEPENDENCE: ICD-10-CM

## 2020-10-14 PROCEDURE — 96372 THER/PROPH/DIAG INJ SC/IM: CPT | Performed by: NURSE PRACTITIONER

## 2020-10-14 PROCEDURE — 71046 X-RAY EXAM CHEST 2 VIEWS: CPT

## 2020-10-14 PROCEDURE — 99214 OFFICE O/P EST MOD 30 MIN: CPT | Performed by: NURSE PRACTITIONER

## 2020-10-14 RX ORDER — PREDNISONE 10 MG/1
5 TABLET ORAL DAILY
COMMUNITY
Start: 2020-07-31

## 2020-10-14 RX ORDER — METHYLPREDNISOLONE ACETATE 80 MG/ML
80 INJECTION, SUSPENSION INTRA-ARTICULAR; INTRALESIONAL; INTRAMUSCULAR; SOFT TISSUE ONCE
Status: COMPLETED | OUTPATIENT
Start: 2020-10-14 | End: 2020-10-14

## 2020-10-14 RX ADMIN — METHYLPREDNISOLONE ACETATE 80 MG: 80 INJECTION, SUSPENSION INTRA-ARTICULAR; INTRALESIONAL; INTRAMUSCULAR; SOFT TISSUE at 09:22

## 2020-11-02 ENCOUNTER — TELEPHONE (OUTPATIENT)
Dept: PULMONOLOGY | Facility: CLINIC | Age: 60
End: 2020-11-02

## 2020-11-02 DIAGNOSIS — Z94.2 LUNG TRANSPLANT STATUS (HCC): ICD-10-CM

## 2020-11-02 DIAGNOSIS — J44.9 COPD, GROUP D, BY GOLD 2017 CLASSIFICATION (HCC): Primary | ICD-10-CM

## 2020-11-02 RX ORDER — PREDNISONE 20 MG/1
40 TABLET ORAL DAILY
Qty: 10 TABLET | Refills: 0 | Status: SHIPPED | OUTPATIENT
Start: 2020-11-02 | End: 2020-11-07
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
## 2020-11-03 RX ORDER — FOLIC ACID 1 MG/1
TABLET ORAL
Qty: 30 TABLET | Refills: 5 | Status: SHIPPED | OUTPATIENT
Start: 2020-11-03 | End: 2020-11-04 | Stop reason: SDUPTHER

## 2020-11-04 ENCOUNTER — TRANSCRIBE ORDERS (OUTPATIENT)
Dept: ADMINISTRATIVE | Facility: HOSPITAL | Age: 60
End: 2020-11-04

## 2020-11-04 ENCOUNTER — LAB (OUTPATIENT)
Dept: LAB | Facility: HOSPITAL | Age: 60
End: 2020-11-04

## 2020-11-04 ENCOUNTER — VIRTUAL VISIT (OUTPATIENT)
Dept: PRIMARY CARE CLINIC | Age: 60
End: 2020-11-04
Payer: MEDICAID

## 2020-11-04 DIAGNOSIS — E13.69 OTHER SPECIFIED DIABETES MELLITUS WITH OTHER SPECIFIED COMPLICATION, UNSPECIFIED WHETHER LONG TERM INSULIN USE (HCC): ICD-10-CM

## 2020-11-04 DIAGNOSIS — Z94.2 STATUS POST LUNG TRANSPLANTATION (HCC): Primary | ICD-10-CM

## 2020-11-04 LAB
ALBUMIN SERPL-MCNC: 4.8 G/DL (ref 3.5–5.2)
ALBUMIN/GLOB SERPL: 2.2 G/DL
ALP SERPL-CCNC: 67 U/L (ref 39–117)
ALT SERPL W P-5'-P-CCNC: 13 U/L (ref 1–41)
ANION GAP SERPL CALCULATED.3IONS-SCNC: 8.3 MMOL/L (ref 5–15)
AST SERPL-CCNC: 12 U/L (ref 1–40)
AVERAGE GLUCOSE: NORMAL
BILIRUB SERPL-MCNC: 0.7 MG/DL (ref 0–1.2)
BUN SERPL-MCNC: 30 MG/DL (ref 8–23)
BUN/CREAT SERPL: 21.9 (ref 7–25)
CALCIUM SPEC-SCNC: 9.9 MG/DL (ref 8.6–10.5)
CHLORIDE SERPL-SCNC: 97 MMOL/L (ref 98–107)
CHOLEST SERPL-MCNC: 170 MG/DL (ref 0–200)
CO2 SERPL-SCNC: 36.7 MMOL/L (ref 22–29)
CREAT SERPL-MCNC: 1.37 MG/DL (ref 0.76–1.27)
DEPRECATED RDW RBC AUTO: 39.3 FL (ref 37–54)
ERYTHROCYTE [DISTWIDTH] IN BLOOD BY AUTOMATED COUNT: 13.3 % (ref 12.3–15.4)
GFR SERPL CREATININE-BSD FRML MDRD: 53 ML/MIN/1.73
GLOBULIN UR ELPH-MCNC: 2.2 GM/DL
GLUCOSE SERPL-MCNC: 126 MG/DL (ref 65–99)
HBA1C MFR BLD: 6.6 %
HBA1C MFR BLD: 6.6 % (ref 4.8–5.6)
HCT VFR BLD AUTO: 38.6 % (ref 37.5–51)
HDLC SERPL-MCNC: 65 MG/DL (ref 40–60)
HGB BLD-MCNC: 13.3 G/DL (ref 13–17.7)
LDLC SERPL CALC-MCNC: 88 MG/DL (ref 0–100)
LDLC/HDLC SERPL: 1.33 {RATIO}
MCH RBC QN AUTO: 28.7 PG (ref 26.6–33)
MCHC RBC AUTO-ENTMCNC: 34.5 G/DL (ref 31.5–35.7)
MCV RBC AUTO: 83.4 FL (ref 79–97)
PLATELET # BLD AUTO: 232 10*3/MM3 (ref 140–450)
PMV BLD AUTO: 11.7 FL (ref 6–12)
POTASSIUM SERPL-SCNC: 3.8 MMOL/L (ref 3.5–5.2)
PROT SERPL-MCNC: 7 G/DL (ref 6–8.5)
RBC # BLD AUTO: 4.63 10*6/MM3 (ref 4.14–5.8)
SODIUM SERPL-SCNC: 142 MMOL/L (ref 136–145)
TRIGL SERPL-MCNC: 93 MG/DL (ref 0–150)
TSH SERPL DL<=0.05 MIU/L-ACNC: 0.17 UIU/ML (ref 0.27–4.2)
VLDLC SERPL-MCNC: 17 MG/DL (ref 5–40)
WBC # BLD AUTO: 10.5 10*3/MM3 (ref 3.4–10.8)

## 2020-11-04 PROCEDURE — 1036F TOBACCO NON-USER: CPT | Performed by: NURSE PRACTITIONER

## 2020-11-04 PROCEDURE — G8926 SPIRO NO PERF OR DOC: HCPCS | Performed by: NURSE PRACTITIONER

## 2020-11-04 PROCEDURE — G8427 DOCREV CUR MEDS BY ELIG CLIN: HCPCS | Performed by: NURSE PRACTITIONER

## 2020-11-04 PROCEDURE — G8420 CALC BMI NORM PARAMETERS: HCPCS | Performed by: NURSE PRACTITIONER

## 2020-11-04 PROCEDURE — G8484 FLU IMMUNIZE NO ADMIN: HCPCS | Performed by: NURSE PRACTITIONER

## 2020-11-04 PROCEDURE — 83036 HEMOGLOBIN GLYCOSYLATED A1C: CPT | Performed by: FAMILY MEDICINE

## 2020-11-04 PROCEDURE — 99213 OFFICE O/P EST LOW 20 MIN: CPT | Performed by: NURSE PRACTITIONER

## 2020-11-04 PROCEDURE — 3017F COLORECTAL CA SCREEN DOC REV: CPT | Performed by: NURSE PRACTITIONER

## 2020-11-04 PROCEDURE — 80050 GENERAL HEALTH PANEL: CPT | Performed by: FAMILY MEDICINE

## 2020-11-04 PROCEDURE — 3023F SPIROM DOC REV: CPT | Performed by: NURSE PRACTITIONER

## 2020-11-04 PROCEDURE — 36415 COLL VENOUS BLD VENIPUNCTURE: CPT | Performed by: FAMILY MEDICINE

## 2020-11-04 PROCEDURE — 80061 LIPID PANEL: CPT | Performed by: FAMILY MEDICINE

## 2020-11-04 RX ORDER — AZITHROMYCIN 250 MG/1
250 TABLET, FILM COATED ORAL SEE ADMIN INSTRUCTIONS
Qty: 6 TABLET | Refills: 0 | Status: SHIPPED | OUTPATIENT
Start: 2020-11-04 | End: 2020-11-09

## 2020-11-04 RX ORDER — DEXTROMETHORPHAN HYDROBROMIDE AND PROMETHAZINE HYDROCHLORIDE 15; 6.25 MG/5ML; MG/5ML
5 SYRUP ORAL 4 TIMES DAILY PRN
Qty: 240 ML | Refills: 0 | Status: SHIPPED | OUTPATIENT
Start: 2020-11-04 | End: 2021-03-15 | Stop reason: SDUPTHER

## 2020-11-04 RX ORDER — PREDNISONE 10 MG/1
TABLET ORAL
Qty: 39 TABLET | Refills: 0 | Status: SHIPPED | OUTPATIENT
Start: 2020-11-04 | End: 2021-01-28 | Stop reason: ALTCHOICE

## 2020-11-04 RX ORDER — FOLIC ACID 1 MG/1
1 TABLET ORAL DAILY
Qty: 90 TABLET | Refills: 3 | Status: SHIPPED | OUTPATIENT
Start: 2020-11-04 | End: 2021-11-27

## 2020-11-04 ASSESSMENT — PATIENT HEALTH QUESTIONNAIRE - PHQ9
1. LITTLE INTEREST OR PLEASURE IN DOING THINGS: 0
SUM OF ALL RESPONSES TO PHQ QUESTIONS 1-9: 0
SUM OF ALL RESPONSES TO PHQ QUESTIONS 1-9: 0
SUM OF ALL RESPONSES TO PHQ9 QUESTIONS 1 & 2: 0
2. FEELING DOWN, DEPRESSED OR HOPELESS: 0
SUM OF ALL RESPONSES TO PHQ QUESTIONS 1-9: 0

## 2020-11-04 ASSESSMENT — ENCOUNTER SYMPTOMS
VOICE CHANGE: 0
SHORTNESS OF BREATH: 0
COLOR CHANGE: 0
PHOTOPHOBIA: 0
NAUSEA: 0
RHINORRHEA: 0
BACK PAIN: 0
VOMITING: 0
COUGH: 0

## 2020-11-04 NOTE — PROGRESS NOTES
250mg on days 2 - 5 Yes Naoma Dine, APRN   promethazine-dextromethorphan (PROMETHAZINE-DM) 6.25-15 MG/5ML syrup Take 5 mLs by mouth 4 times daily as needed for Cough Yes Naoma Dine, APRN   folic acid (FOLVITE) 1 MG tablet Take 1 tablet by mouth daily Yes Naoma Dine, APRN   omeprazole (PRILOSEC) 20 MG delayed release capsule TAKE 1 CAPSULE BY MOUTH TWICE A DAY  Jackelyn Mims MD   atorvastatin (LIPITOR) 20 MG tablet TAKE 1 TABLET BY MOUTH EVERYDAY AT BEDTIME  Jackelyn Mims MD   levocetirizine (XYZAL) 5 MG tablet TAKE 1 TABLET BY MOUTH EVERY DAY  Jackelyn Mims MD   predniSONE (DELTASONE) 10 MG tablet 60 mg x 3 days; 40 mg x 3 days; 20 mg x 3 days and then 10 mg x 3 days. Naoma Dine, APRN   blood glucose monitor kit and supplies Test 4- 5  times a day & as needed for symptoms of irregular blood glucose.   Jackelyn Mims MD   Letta Linger INHUB 250-50 MCG/DOSE AEPB 2 times daily  Historical Provider, MD   insulin glargine (LANTUS SOLOSTAR) 100 UNIT/ML injection 4 times daily SLIDING SCALE  Historical Provider, MD   traZODone (DESYREL) 50 MG tablet TAKE 1-2 TABLETS AT NIGHT AS DIRECTED  Jackelyn Mims MD   metoprolol (LOPRESSOR) 100 MG tablet TAKE 1 TABLET BY MOUTH TWICE A DAY  Destiny Wright MD   Magnesium 400 MG CAPS Take 400 mg by mouth daily  Jackelyn Mims MD   Nebulizers (COMPRESSOR/NEBULIZER) MISC Nebulizer with supplies  Jackelyn Mims MD   OXYGEN pulse oximeter (not oxygen itself)  Jackelyn Mims MD   nystatin (MYCOSTATIN) 935988 UNIT/ML suspension Take 500,000 Units by mouth 4 times daily  Historical Provider, MD   albuterol sulfate HFA (VENTOLIN HFA) 108 (90 Base) MCG/ACT inhaler INHALE TWO PUFFS EVERY FOUR HOURS AS NEEDED FOR THIRTY DAYS  Jackelyn Mims MD   SPIRIVA HANDIHALER 18 MCG inhalation capsule INHALE THE CONTENTS OF 1 CAPSULE BY MOUTH EVERYD Keily Gibbs MD   furosemide (LASIX) 20 MG tablet TAKE 1 TABLET BY MOUTH DAILY  Patient taking differently: Take 40 mg by mouth Sweating    ,   Past Medical History:   Diagnosis Date    [facility] obstructive [facility] disease) [facility]     Emphysema of lung ([facility]     Hypertension     Pneumonia     in left lung    Skin cancer     Wears glasses    ,   Past Surgical History:   Procedure Laterality Date    APPENDECTOMY      CHOLECYSTECTOMY  14    COLONOSCOPY  2008    Dr. Jennifer Lott: [facility]    LUNG REMOVAL, PARTIAL      right upper    LUNG TRANSPLANT, SINGLE      left    SKIN CANCER DESTRUCTION      UPPER GASTROINTESTINAL ENDOSCOPY  2008     Dr. Jennifer Lott   ,   Social History     Tobacco Use    Smoking status: Former Smoker     Packs/day: 3.00     Years: 15.00     Pack years: 45.00     Types: Cigarettes     Start date: 1972     Last attempt to quit: 1/10/2002     Years since quittin.8    Smokeless tobacco: Never Used    Tobacco comment: Pt used to smoke quit 9.5 years ago   Substance Use Topics    Alcohol use: No    Drug use: Not Currently     Frequency: 2.0 times per week     Types: Marijuana     Comment: 19 pt stated he stopped 2019   ,   Family History   Problem Relation Age of Onset    Cancer Mother     Heart Disease Father     Colon Cancer Neg Hx     Colon Polyps Neg Hx     Esophageal Cancer Neg Hx     Liver Cancer Neg Hx     Rectal Cancer Neg Hx     Liver Disease Neg Hx     Stomach Cancer Neg Hx    ,   Immunization History   Administered Date(s) Administered    Influenza 11/15/2012    Influenza Vaccine, unspecified formulation 2017    Influenza Virus Vaccine 2015, 10/03/2016    Influenza, High Dose (Fluzone 65 yrs and older) 09/10/2018    Pneumococcal Conjugate 13-valent ([id number]) 09/10/2018    Pneumococcal Polysaccharide ([id number]) 2018    Tdap (Boostrix, Adacel) 10/24/2013   ,   Health Maintenance   Topic Date Due    HIV screen  1975    Shingles Vaccine (1 of 2) 2008    Colon cancer screen colonoscopy  11/15/2016    Lipid screen  08/18/2017    A1C test (Diabetic or Prediabetic)  12/02/2017    Potassium monitoring  12/02/2017    Creatinine monitoring  12/02/2017    Flu vaccine (1) 09/01/2020    DTaP/Tdap/Td vaccine (2 - Td) 10/24/2023    Pneumococcal 0-64 years Vaccine (3 of 3 - PPSV23) 12/17/2023    Hepatitis C screen  Completed    Hepatitis A vaccine  Aged Out    Hepatitis B vaccine  Aged Out    Hib vaccine  Aged Out    Meningococcal (ACWY) vaccine  Aged Out       PHYSICAL EXAMINATION:  [ INSTRUCTIONS:  \"[x]\" Indicates a positive item  \"[]\" Indicates a negative item  -- DELETE ALL ITEMS NOT EXAMINED]  [x] Alert  [x] Oriented to person/place/time    [x] No apparent distress  [] Toxic appearing    [] Face flushed appearing [x] Sclera clear  [] Lips are cyanotic      [x] Breathing appears normal  [] Appears tachypneic      [] Rash on visible skin    [x] Cranial Nerves II-XII grossly intact    [x] Motor grossly intact in visible upper extremities    [x] Motor grossly intact in visible lower extremities    [x] Normal Mood  [] Anxious appearing    [] Depressed appearing  [] Confused appearing      [] Poor short term memory  [] Poor long term memory    [] OTHER:      Due to this being a TeleHealth encounter, evaluation of the following organ systems is limited: Vitals/Constitutional/EENT/Resp/CV/GI//MS/Neuro/Skin/Heme-Lymph-Imm. ASSESSMENT/PLAN:  1. Chronic obstructive pulmonary disease with acute exacerbation (HCC)  - COPD protocol. Patient will contact us if symptoms not improving. He will need follow-up in 3 months or sooner if needed. - predniSONE (DELTASONE) 10 MG tablet; 60 mg x 3 days; 40 mg x 3 days; 20 mg x 3 days and then 10 mg x 3 days. Dispense: 39 tablet; Refill: 0  - azithromycin (ZITHROMAX) 250 MG tablet; Take 1 tablet by mouth See Admin Instructions for 5 days 500mg on day 1 followed by 250mg on days 2 - 5  Dispense: 6 tablet;  Refill: 0  - promethazine-dextromethorphan (PROMETHAZINE-DM) 6.25-15 MG/5ML syrup; Take 5 mLs by mouth 4 times daily as needed for Cough  Dispense: 240 mL; Refill: 0      Return in about 3 months (around 2/4/2021) for physical, in office. An  electronic signature was used to authenticate this note. --ALVARADO Jin on 11/4/2020 at 3:32 PM        Pursuant to the emergency declaration under the [address] [place] [address], [facility] waiver authority and the [employer] and [employer] Act, this Virtual  Visit was conducted, with patient's consent, to reduce the patient's risk of exposure to COVID-19 and provide continuity of care for an established patient. Services were provided through a video synchronous discussion virtually to substitute for in-person clinic visit.

## 2020-12-07 ENCOUNTER — VIRTUAL VISIT (OUTPATIENT)
Dept: PRIMARY CARE CLINIC | Age: 60
End: 2020-12-07
Payer: MEDICAID

## 2020-12-07 ENCOUNTER — TELEPHONE (OUTPATIENT)
Dept: PRIMARY CARE CLINIC | Age: 60
End: 2020-12-07

## 2020-12-07 PROCEDURE — G8428 CUR MEDS NOT DOCUMENT: HCPCS | Performed by: FAMILY MEDICINE

## 2020-12-07 PROCEDURE — G8420 CALC BMI NORM PARAMETERS: HCPCS | Performed by: FAMILY MEDICINE

## 2020-12-07 PROCEDURE — 3017F COLORECTAL CA SCREEN DOC REV: CPT | Performed by: FAMILY MEDICINE

## 2020-12-07 PROCEDURE — G8484 FLU IMMUNIZE NO ADMIN: HCPCS | Performed by: FAMILY MEDICINE

## 2020-12-07 PROCEDURE — 2022F DILAT RTA XM EVC RTNOPTHY: CPT | Performed by: FAMILY MEDICINE

## 2020-12-07 PROCEDURE — 3023F SPIROM DOC REV: CPT | Performed by: FAMILY MEDICINE

## 2020-12-07 PROCEDURE — 1036F TOBACCO NON-USER: CPT | Performed by: FAMILY MEDICINE

## 2020-12-07 PROCEDURE — G8926 SPIRO NO PERF OR DOC: HCPCS | Performed by: FAMILY MEDICINE

## 2020-12-07 PROCEDURE — 99214 OFFICE O/P EST MOD 30 MIN: CPT | Performed by: FAMILY MEDICINE

## 2020-12-07 PROCEDURE — 3044F HG A1C LEVEL LT 7.0%: CPT | Performed by: FAMILY MEDICINE

## 2020-12-07 RX ORDER — LISINOPRIL 20 MG/1
20 TABLET ORAL DAILY
Qty: 30 TABLET | Refills: 5 | Status: SHIPPED | OUTPATIENT
Start: 2020-12-07 | End: 2021-05-31

## 2020-12-07 RX ORDER — INSULIN GLARGINE 100 [IU]/ML
20 INJECTION, SOLUTION SUBCUTANEOUS NIGHTLY
Qty: 5 PEN | Refills: 1 | Status: SHIPPED | OUTPATIENT
Start: 2020-12-07 | End: 2021-02-03

## 2020-12-07 RX ORDER — LEVOFLOXACIN 750 MG/1
750 TABLET ORAL DAILY
Qty: 5 TABLET | Refills: 0 | Status: SHIPPED | OUTPATIENT
Start: 2020-12-07 | End: 2020-12-12

## 2020-12-07 NOTE — TELEPHONE ENCOUNTER
Patient called with concerns related to elevated B/P 170/85,158/85 and the one in the middle was 169/119  No other signs or symptoms  appt made

## 2020-12-07 NOTE — PROGRESS NOTES
2020    TELEHEALTH EVALUATION -- Audio/Visual (During LPRFT-90 public health emergency)    HPI:    Tigist Grady (:  1960) has requested an audio/video evaluation for the following concern(s):    Patient presents today via video visit for htn. Has had some recent issues w/ inc bp. No Sx w/ it. Has been having some inc productive cough. Continues to be on mucinex bid daily. Breathing is slightly worse. Wheezing primarily in his L lung. Has h/o transplant of the lung. Continues to see Dr. Caryle Ivans of pulmonology. Has had his flu shot this last fall. He is on a sliding scale for his insulin for his DM. Last a1c was 6.6. He is on admelog    Review of Systems   Constitutional: Positive for fatigue. Negative for chills and fever. HENT: Positive for congestion, rhinorrhea, sneezing and sore throat. Respiratory: Positive for cough and shortness of breath. Negative for chest tightness and wheezing. Cardiovascular: Negative for chest pain, palpitations and leg swelling. Gastrointestinal: Negative for abdominal pain, constipation, diarrhea, nausea and vomiting. Genitourinary: Negative for difficulty urinating, dysuria and frequency. Prior to Visit Medications    Medication Sig Taking? Authorizing Provider   lisinopril (PRINIVIL;ZESTRIL) 20 MG tablet Take 1 tablet by mouth daily Yes Anamika Meza MD   insulin lispro (HUMALOG) 100 UNIT/ML injection vial Sliding scale Yes Anamika Meza MD   insulin glargine (BASAGLAR KWIKPEN) 100 UNIT/ML injection pen Inject 20 Units into the skin nightly Yes Anamika Meza MD   SPIRIVA HANDIHALER 18 MCG inhalation capsule INHALE THE CONTENTS OF 1 CAPSULE BY MOUTH EVERYD AY Yes Anamika Meza MD   predniSONE (DELTASONE) 10 MG tablet 60 mg x 3 days; 40 mg x 3 days; 20 mg x 3 days and then 10 mg x 3 days.   ALVARADO Velasquez   folic acid (FOLVITE) 1 MG tablet Take 1 tablet by mouth daily  ALVARADO Velasquez omeprazole (PRILOSEC) 20 MG delayed release capsule TAKE 1 CAPSULE BY MOUTH TWICE A DAY  Antony Kapoor MD   atorvastatin (LIPITOR) 20 MG tablet TAKE 1 TABLET BY MOUTH EVERYDAY AT BEDTIME  Antony Kapoor MD   levocetirizine (XYZAL) 5 MG tablet TAKE 1 TABLET BY MOUTH EVERY DAY  Antony Kapoor MD   predniSONE (DELTASONE) 10 MG tablet 60 mg x 3 days; 40 mg x 3 days; 20 mg x 3 days and then 10 mg x 3 days. ALVARADO Mcginnis   blood glucose monitor kit and supplies Test 4- 5  times a day & as needed for symptoms of irregular blood glucose. Antony Kapoor MD   [address] INHUB 250-50 MCG/DOSE AEPB 2 times daily  Historical Provider, MD   insulin glargine (LANTUS SOLOSTAR) 100 UNIT/ML injection 4 times daily SLIDING SCALE  Historical Provider, MD   traZODone (DESYREL) 50 MG tablet TAKE 1-2 TABLETS AT NIGHT AS DIRECTED  Antony Kapoor MD   metoprolol (LOPRESSOR) 100 MG tablet TAKE 1 TABLET BY MOUTH TWICE A DAY  Alice Rodríguez MD   Magnesium 400 MG CAPS Take 400 mg by mouth daily  Antony Kapoor MD   Nebulizers (COMPRESSOR/NEBULIZER) MISC Nebulizer with supplies  Antony Kapoor MD   OXYGEN pulse oximeter (not oxygen itself)  Antony Kapoor MD   nystatin (MYCOSTATIN) 933112 UNIT/ML suspension Take 500,000 Units by mouth 4 times daily  Historical Provider, MD   albuterol sulfate HFA (VENTOLIN HFA) 108 (90 Base) MCG/ACT inhaler INHALE TWO PUFFS EVERY FOUR HOURS AS NEEDED FOR THIRTY DAYS  Antony Kapoor MD   furosemide (LASIX) 20 MG tablet TAKE 1 TABLET BY MOUTH DAILY  Patient taking differently: Take 40 mg by mouth daily TAKE 1 TABLET BY MOUTH DAILY  Antony Kapoor MD   LYRICA 150 MG capsule TAKE ONE CAPSULE BY MOUTH TWICE A DAY. Antony Kapoor MD   HYDROcodone-acetaminophen [facility]) 7.5-325 MG per tablet Take 1 tablet by mouth.   Historical Provider, MD   OXYGEN Inhale into the lungs 3-4 L  Historical Provider, MD Mycophenolate Sodium 360 MG TBEC Take 360 mg by mouth 2 times daily With the 180mg  Historical Provider, MD   Mycophenolate Sodium (MYCOPHENOLIC ACID) 533 MG TBEC 180 mg 2 times daily With the 360mg  Historical Provider, MD   Blood Glucose Monitoring Suppl (ACCU-CHEK JOANNE PLUS) W/DEVICE KIT   Historical Provider, MD   ACCU-CHEK JOANNE PLUS strip   Historical Provider, MD   Accu-Chek Softclix Lancets MISC   Historical Provider, MD   albuterol (PROVENTIL) (2.5 MG/3ML) 0.083% nebulizer solution   Historical Provider, MD   amLODIPine (NORVASC) 5 MG tablet Take 5 mg by mouth daily   Historical Provider, MD   tacrolimus (PROGRAF) 1 MG capsule Take 1 mg by mouth 2 times daily Take one tablet in the am and 1/2 tablet in the afternoon  Historical Provider, MD   Cyanocobalamin (VITAMIN B 12) 250 MCG LOZG Take  by mouth. Historical Provider, MD       Social History     Tobacco Use    Smoking status: Former Smoker     Packs/day: 3.00     Years: 15.00     Pack years: 45.00     Types: Cigarettes     Start date: 1972     Quit date: 1/10/2002     Years since quittin.9    Smokeless tobacco: Never Used    Tobacco comment: Pt used to smoke quit 9.5 years ago   Substance Use Topics    Alcohol use: No    Drug use: Not Currently     Frequency: 2.0 times per week     Types: Marijuana     Comment: 19 pt stated he stopped 2019        Allergies   Allergen Reactions    Avelox [Moxifloxacin Hydrochloride] Other (See Comments)     Pt states this medicine will make him sweat, turn red, itchy, and pass out.     Clindamycin/Lincomycin Diarrhea     Told by [facility] not to take    Moxifloxacin Rash     Sweating    ,   Past Medical History:   Diagnosis Date    [facility]ch[facility] obstructive [facility] disease) [facility]     Emphysema of lung ([facility]     Hypertension     Pneumonia     in left lung    Skin cancer     Wears glasses    ,   Past Surgical History:   Procedure Laterality Date    APPENDECTOMY  CHOLECYSTECTOMY  14    COLONOSCOPY  2008    Dr. Kenneth Marcelino: [facility]    LUNG REMOVAL, PARTIAL      right upper    LUNG TRANSPLANT, SINGLE  2006    left    SKIN CANCER DESTRUCTION      UPPER GASTROINTESTINAL ENDOSCOPY  2008     Dr. Kenneth Marcelino   ,   Social History     Tobacco Use    Smoking status: Former Smoker     Packs/day: 3.00     Years: 15.00     Pack years: 45.00     Types: Cigarettes     Start date: 1972     Quit date: 1/10/2002     Years since quittin.9    Smokeless tobacco: Never Used    Tobacco comment: Pt used to smoke quit 9.5 years ago   Substance Use Topics    Alcohol use: No    Drug use: Not Currently     Frequency: 2.0 times per week     Types: Marijuana     Comment: 19 pt stated he stopped 2019   ,   Family History   Problem Relation Age of Onset    Cancer Mother     Heart Disease Father     Colon Cancer Neg Hx     Colon Polyps Neg Hx     Esophageal Cancer Neg Hx     Liver Cancer Neg Hx     Rectal Cancer Neg Hx     Liver Disease Neg Hx     Stomach Cancer Neg Hx    ,   Immunization History   Administered Date(s) Administered    Influenza 11/15/2012    Influenza Vaccine, unspecified formulation 2017    Influenza Virus Vaccine 2015, 10/03/2016    Influenza, High Dose (Fluzone 65 yrs and older) 09/10/2018    Pneumococcal Conjugate 13-valent ([id number]) 09/10/2018    Pneumococcal Polysaccharide (Bacfimbzv44) 2018    Tdap (Boostrix, Adacel) 10/24/2013   ,   Health Maintenance   Topic Date Due    Diabetic foot exam  1968    Diabetic retinal exam  1968    HIV screen  1973    Shingles Vaccine (1 of 2) 2008    Colon cancer screen colonoscopy  11/15/2016    Lipid screen  2017    Potassium monitoring  2017    Creatinine monitoring  2017    Diabetic microalbuminuria test  2019    Flu vaccine (1) 2020    A1C test (Diabetic or Prediabetic)  2021 2. Status post lung transplantation ([facility]  Z94.2    3. Acquired immunocompromised state ([facility]  D84.9    4. Chronic respiratory failure with hypoxia, on home O2 therapy ([facility])  J96.11     Z99.81    5. Type 2 diabetes mellitus without complication, with long-term current use of insulin ([facility])  E11.9     Z79.4        Start treatment for mild exacerbation at this time. Patient to continue on current insulin based on his recent A1c being controlled, no changes indicated otherwise at this time. He remains very high risk based on immunocompromise from chronic therapy as well as lung transplant have issues. Orders Placed This Encounter   Medications    levoFLOXacin (LEVAQUIN) 750 MG tablet     Sig: Take 1 tablet by mouth daily for 5 days (YELLOW ZONE)     Dispense:  5 tablet     Refill:  0    lisinopril (PRINIVIL;ZESTRIL) 20 MG tablet     Sig: Take 1 tablet by mouth daily     Dispense:  30 tablet     Refill:  5    insulin lispro (HUMALOG) 100 UNIT/ML injection vial     Sig: Sliding scale     Dispense:  1 vial     Refill:  3    insulin glargine (BASAGLAR KWIKPEN) 100 UNIT/ML injection pen     Sig: Inject 20 Units into the skin nightly     Dispense:  5 pen     Refill:  1       No orders of the defined types were placed in this encounter. Return in about 6 weeks (around 1/18/2021) for , medicare AWV 2 time slots - start lisinopril 20, inc basaglar, d/c admelog. Taylor Barnhart is a 56 y.o. male being evaluated by a Virtual Visit (video visit) encounter to address concerns as mentioned above. A caregiver was present when appropriate. Due to this being a TeleHealth encounter (During ZUXVR-15 public health emergency), evaluation of the following organ systems was limited: Vitals/Constitutional/EENT/Resp/CV/GI//MS/Neuro/Skin/Heme-Lymph-Imm. Pursuant to the emergency declaration under the [address] [place] [address] and the [employer] Act, this Virtual Visit was conducted with patient's (and/or legal guardian's) consent, to reduce the patient's risk of exposure to COVID-19 and provide necessary medical care. The patient (and/or legal guardian) has also been advised to contact this office for worsening conditions or problems, and seek emergency medical treatment and/or call 911 if deemed necessary. Services were provided through a video synchronous discussion virtually to substitute for in-person clinic visit. Patient and provider were located at their individual homes or provider at secure site for business. It is possible that material may be posted from a notepad that is used for a Dragon dictation device for dictating notes outside the EMR and I am the original author of all of this content. --Terri Anthony MD on 12/15/2020 at 10:16 AM    An electronic signature was used to authenticate this note.

## 2020-12-09 ENCOUNTER — NURSE TRIAGE (OUTPATIENT)
Dept: CALL CENTER | Facility: HOSPITAL | Age: 60
End: 2020-12-09

## 2020-12-10 ENCOUNTER — TELEPHONE (OUTPATIENT)
Dept: PRIMARY CARE CLINIC | Age: 60
End: 2020-12-10

## 2020-12-10 NOTE — TELEPHONE ENCOUNTER
Patient is aware to take 1/2 tablet daily and goal BP numbers for sbp.  He is aware to call if he has any further issues

## 2020-12-10 NOTE — TELEPHONE ENCOUNTER
Patient called stating the lisinopril is causing him to be extremely tired, he has no energy, and his BP has been 93/55. Fadia Darby  He has stopped taking medication for 2 days and his BP read today 143/83 he feels fine and wants to know if he can take half a tablet or lower the dose

## 2020-12-10 NOTE — TELEPHONE ENCOUNTER
"Told pt  To call doctor in morning before taking meds/    Reason for Disposition  • [1] Systolic BP  AND [2] taking blood pressure medications AND [3] NOT dizzy, lightheaded or weak    Additional Information  • Negative: Started suddenly after an allergic medicine, an allergic food, or bee sting  • Negative: Shock suspected (e.g., cold/pale/clammy skin, too weak to stand, low BP, rapid pulse)  • Negative: Difficult to awaken or acting confused (e.g., disoriented, slurred speech)  • Negative: Fainted  • Negative: [1] Systolic BP < 90 AND [2] dizzy, lightheaded, or weak  • Negative: Chest pain  • Negative: Bleeding (e.g., vomiting blood, rectal bleeding or tarry stools, severe vaginal bleeding)(Exception: fainted from sight of small amount of blood; small cut or abrasion)  • Negative: Extra heart beats or heart is beating fast  (i.e., \"palpitations\")  • Negative: Sounds like a life-threatening emergency to the triager  • Negative: [1] Systolic BP < 80 AND [2] NOT dizzy, lightheaded or weak  • Negative: Abdominal pain  • Negative: Fever > 100.4 F (38.0 C)  • Negative: Major surgery in the past month  • Negative: [1] Drinking very little AND [2] dehydration suspected (e.g., no urine > 12 hours, very dry mouth, very lightheaded)  • Negative: [1] Fall in systolic BP > 20 mm Hg from normal AND [2] dizzy, lightheaded, or weak  • Negative: Patient sounds very sick or weak to the triager  • Negative: [1] Systolic BP < 90 AND [2] NOT dizzy, lightheaded or weak  • Negative: [1] Systolic BP  AND [2] taking blood pressure medications AND [3] dizzy, lightheaded or weak  • Negative: [1] Fall in systolic BP > 20 mm Hg from normal AND [2] NOT dizzy, lightheaded, or weak  • Negative: Fall in systolic BP > 20 mmHg after standing up  • Negative: Fall in systolic BP > 20 mmHg after eating a meal  • Negative: Diastolic BP < 50 mm Hg    Answer Assessment - Initial Assessment Questions  1. BLOOD PRESSURE: \"What is the blood " "pressure?\" \"Did you take at least two measurements 5 minutes apart?\"      107/47 tonight. Last night was 94/54  2. ONSET: \"When did you take your blood pressure?\"      Just now  3. HOW: \"How did you obtain the blood pressure?\" (e.g., visiting nurse, automatic home BP monitor)      Home monitor  4. HISTORY: \"Do you have a history of low blood pressure?\" \"What is your blood pressure normally?\"      No BP was elevated on Monday so Dr. Mckenna started him on lisinopril 20 mg qd  5. MEDICATIONS: \"Are you taking any medications for blood pressure?\" If yes: \"Have they been changed recently?\"     Lisinopril 20 mh QD takes in am and did not take this morning due to low bp last night. Was calling to see if heshould take it tomorrow morning.  6. PULSE RATE: \"Do you know what your pulse rate is?\"      n/a  7. OTHER SYMPTOMS: \"Have you been sick recently?\" \"Have you had a recent injury?\"      No lung transplant 15 yr ago  8. PREGNANCY: \"Is there any chance you are pregnant?\" \"When was your last menstrual period?\"      no    Protocols used: LOW BLOOD PRESSURE-ADULT-AH      "

## 2020-12-15 ASSESSMENT — ENCOUNTER SYMPTOMS
NAUSEA: 0
CONSTIPATION: 0
ABDOMINAL PAIN: 0
SORE THROAT: 1
COUGH: 1
VOMITING: 0
DIARRHEA: 0
WHEEZING: 0
RHINORRHEA: 1
SHORTNESS OF BREATH: 1
CHEST TIGHTNESS: 0

## 2021-01-01 ENCOUNTER — LAB (OUTPATIENT)
Dept: LAB | Facility: HOSPITAL | Age: 61
End: 2021-01-01

## 2021-01-01 ENCOUNTER — TELEPHONE (OUTPATIENT)
Dept: PULMONOLOGY | Facility: CLINIC | Age: 61
End: 2021-01-01

## 2021-01-01 ENCOUNTER — TRANSCRIBE ORDERS (OUTPATIENT)
Dept: ADMINISTRATIVE | Facility: HOSPITAL | Age: 61
End: 2021-01-01

## 2021-01-01 ENCOUNTER — OFFICE VISIT (OUTPATIENT)
Dept: PULMONOLOGY | Facility: CLINIC | Age: 61
End: 2021-01-01

## 2021-01-01 VITALS
DIASTOLIC BLOOD PRESSURE: 90 MMHG | SYSTOLIC BLOOD PRESSURE: 160 MMHG | HEART RATE: 91 BPM | WEIGHT: 159 LBS | BODY MASS INDEX: 23.55 KG/M2 | OXYGEN SATURATION: 98 % | HEIGHT: 69 IN

## 2021-01-01 DIAGNOSIS — Z87.09 HISTORY OF COPD: ICD-10-CM

## 2021-01-01 DIAGNOSIS — Z94.2 LUNG TRANSPLANT STATUS (HCC): Chronic | ICD-10-CM

## 2021-01-01 DIAGNOSIS — I10 HYPERTENSION, UNSPECIFIED TYPE: ICD-10-CM

## 2021-01-01 DIAGNOSIS — J96.11 CHRONIC RESPIRATORY FAILURE WITH HYPOXIA (HCC): Chronic | ICD-10-CM

## 2021-01-01 DIAGNOSIS — C44.90 SKIN CANCER: ICD-10-CM

## 2021-01-01 DIAGNOSIS — N18.9 CHRONIC KIDNEY DISEASE, UNSPECIFIED CKD STAGE: ICD-10-CM

## 2021-01-01 DIAGNOSIS — Z86.39 HISTORY OF DIABETES MELLITUS: ICD-10-CM

## 2021-01-01 DIAGNOSIS — J44.9 CHRONIC OBSTRUCTIVE PULMONARY DISEASE, UNSPECIFIED COPD TYPE (HCC): ICD-10-CM

## 2021-01-01 DIAGNOSIS — Z87.891 PERSONAL HISTORY OF NICOTINE DEPENDENCE: Chronic | ICD-10-CM

## 2021-01-01 DIAGNOSIS — I10 ESSENTIAL HYPERTENSION: Chronic | ICD-10-CM

## 2021-01-01 DIAGNOSIS — D84.9 ACQUIRED IMMUNOCOMPROMISED STATE (HCC): Chronic | ICD-10-CM

## 2021-01-01 DIAGNOSIS — Z94.2 LUNG TRANSPLANTED (HCC): ICD-10-CM

## 2021-01-01 DIAGNOSIS — J44.9 COPD, GROUP D, BY GOLD 2017 CLASSIFICATION (HCC): Primary | ICD-10-CM

## 2021-01-01 DIAGNOSIS — Z94.2 LUNG TRANSPLANT STATUS (HCC): Primary | ICD-10-CM

## 2021-01-01 DIAGNOSIS — J44.9 COPD, GROUP D, BY GOLD 2017 CLASSIFICATION (HCC): Primary | Chronic | ICD-10-CM

## 2021-01-01 DIAGNOSIS — Z94.2 LUNG TRANSPLANT STATUS (HCC): ICD-10-CM

## 2021-01-01 LAB
ANION GAP SERPL CALCULATED.3IONS-SCNC: 10.7 MMOL/L (ref 5–15)
BASOPHILS # BLD AUTO: 0.03 10*3/MM3 (ref 0–0.2)
BASOPHILS NFR BLD AUTO: 0.4 % (ref 0–1.5)
BUN SERPL-MCNC: 23 MG/DL (ref 8–23)
BUN/CREAT SERPL: 14.8 (ref 7–25)
CALCIUM SPEC-SCNC: 9.3 MG/DL (ref 8.6–10.5)
CHLORIDE SERPL-SCNC: 99 MMOL/L (ref 98–107)
CMV DNA SERPL NAA+PROBE-ACNC: NEGATIVE IU/ML
CMV DNA SERPL NAA+PROBE-LOG IU: NORMAL {LOG_IU}/ML
CO2 SERPL-SCNC: 30.3 MMOL/L (ref 22–29)
CREAT SERPL-MCNC: 1.55 MG/DL (ref 0.76–1.27)
DEPRECATED RDW RBC AUTO: 41 FL (ref 37–54)
EOSINOPHIL # BLD AUTO: 0.09 10*3/MM3 (ref 0–0.4)
EOSINOPHIL NFR BLD AUTO: 1.3 % (ref 0.3–6.2)
ERYTHROCYTE [DISTWIDTH] IN BLOOD BY AUTOMATED COUNT: 13.2 % (ref 12.3–15.4)
GFR SERPL CREATININE-BSD FRML MDRD: 46 ML/MIN/1.73
GLUCOSE SERPL-MCNC: 145 MG/DL (ref 65–99)
HCT VFR BLD AUTO: 38.7 % (ref 37.5–51)
HGB BLD-MCNC: 12.3 G/DL (ref 13–17.7)
IMM GRANULOCYTES # BLD AUTO: 0.04 10*3/MM3 (ref 0–0.05)
IMM GRANULOCYTES NFR BLD AUTO: 0.6 % (ref 0–0.5)
LYMPHOCYTES # BLD AUTO: 1.56 10*3/MM3 (ref 0.7–3.1)
LYMPHOCYTES NFR BLD AUTO: 23.1 % (ref 19.6–45.3)
MAGNESIUM SERPL-MCNC: 1.5 MG/DL (ref 1.6–2.4)
MCH RBC QN AUTO: 27.5 PG (ref 26.6–33)
MCHC RBC AUTO-ENTMCNC: 31.8 G/DL (ref 31.5–35.7)
MCV RBC AUTO: 86.6 FL (ref 79–97)
MONOCYTES # BLD AUTO: 0.69 10*3/MM3 (ref 0.1–0.9)
MONOCYTES NFR BLD AUTO: 10.2 % (ref 5–12)
NEUTROPHILS NFR BLD AUTO: 4.34 10*3/MM3 (ref 1.7–7)
NEUTROPHILS NFR BLD AUTO: 64.4 % (ref 42.7–76)
NRBC BLD AUTO-RTO: 0 /100 WBC (ref 0–0.2)
PLATELET # BLD AUTO: 195 10*3/MM3 (ref 140–450)
PMV BLD AUTO: 11.8 FL (ref 6–12)
POTASSIUM SERPL-SCNC: 4.4 MMOL/L (ref 3.5–5.2)
RBC # BLD AUTO: 4.47 10*6/MM3 (ref 4.14–5.8)
SODIUM SERPL-SCNC: 140 MMOL/L (ref 136–145)
WBC # BLD AUTO: 6.75 10*3/MM3 (ref 3.4–10.8)

## 2021-01-01 PROCEDURE — 80048 BASIC METABOLIC PNL TOTAL CA: CPT

## 2021-01-01 PROCEDURE — 36415 COLL VENOUS BLD VENIPUNCTURE: CPT

## 2021-01-01 PROCEDURE — 85025 COMPLETE CBC W/AUTO DIFF WBC: CPT

## 2021-01-01 PROCEDURE — 83735 ASSAY OF MAGNESIUM: CPT

## 2021-01-01 PROCEDURE — 99214 OFFICE O/P EST MOD 30 MIN: CPT | Performed by: NURSE PRACTITIONER

## 2021-01-01 RX ORDER — ALBUTEROL SULFATE 90 UG/1
2 AEROSOL, METERED RESPIRATORY (INHALATION) EVERY 4 HOURS PRN
Qty: 18 G | Refills: 11 | Status: SHIPPED | OUTPATIENT
Start: 2021-01-01

## 2021-01-01 RX ORDER — ERGOCALCIFEROL 1.25 MG/1
1 CAPSULE ORAL WEEKLY
COMMUNITY
Start: 2021-01-01

## 2021-01-05 PROBLEM — Z87.891 PERSONAL HISTORY OF NICOTINE DEPENDENCE: Chronic | Status: ACTIVE | Noted: 2018-12-04

## 2021-01-05 PROBLEM — G89.4 CHRONIC PAIN SYNDROME: Chronic | Status: ACTIVE | Noted: 2018-12-04

## 2021-01-05 PROBLEM — E11.65 TYPE 2 DIABETES MELLITUS WITH HYPERGLYCEMIA, WITHOUT LONG-TERM CURRENT USE OF INSULIN (HCC): Chronic | Status: ACTIVE | Noted: 2020-10-10

## 2021-01-05 PROBLEM — J96.11 CHRONIC RESPIRATORY FAILURE WITH HYPOXIA (HCC): Chronic | Status: ACTIVE | Noted: 2018-12-04

## 2021-01-05 PROBLEM — Z94.2 LUNG TRANSPLANT STATUS (HCC): Chronic | Status: ACTIVE | Noted: 2018-12-04

## 2021-01-05 PROBLEM — J44.9 COPD, GROUP D, BY GOLD 2017 CLASSIFICATION (HCC): Chronic | Status: ACTIVE | Noted: 2018-11-29

## 2021-01-05 PROBLEM — D84.9 ACQUIRED IMMUNOCOMPROMISED STATE (HCC): Chronic | Status: ACTIVE | Noted: 2021-01-05

## 2021-01-05 PROBLEM — D84.9 ACQUIRED IMMUNOCOMPROMISED STATE (HCC): Status: ACTIVE | Noted: 2021-01-05

## 2021-01-05 NOTE — PROGRESS NOTES
"Chief Complaint  Shortness of Breath    Subjective    History of Present Illness      Bayron Rodriguez presents to [facility] for:    Management of his COPD, chronic respiratory failure and lung transplant status.  He is on Prograf, Bactrim, mycophenolate and prednisone 10 mg for his transplant.  He continues to follow with the transplant service at [facility]. He is due to see them again in March. He is on Spiriva and full dose Symbicort for his COPD.  He has breathing treatments he can take when needed which he typically does 2-3 times per day.  Takes Mucinex full dose twice daily.  He is on 2 L of oxygen which helps however he only requires it with excessive exertion.  He has diabetes secondary to chronic steroid use.  His last A1c was 6.6.  Most recent imaging was October 2020.  PFTs have been on hold due to Covid.  Last FEV1 was 16 or 0.57 L. He has had increased left posterior chest discomfort with a deep breath which is pleuritic in nature. No sputum production.       Objective   Vital Signs:   /76   Pulse 80   Temp 97.8 °F (36.6 °C)   Resp 16   Ht 175.3 cm (69\")   Wt 70.3 kg (155 lb)   SpO2 96% Comment: RA  BMI 22.89 kg/m²     Physical Exam  Constitutional:       Interventions: Face mask in place.   Eyes:      Comments: glasses   Cardiovascular:      Rate and Rhythm: Normal rate and regular rhythm.      Heart sounds: No murmur.   Pulmonary:      Effort: Pulmonary effort is normal.      Breath sounds: Examination of the left-lower field reveals rales. Rales present.   Musculoskeletal:      Right lower leg: No edema.      Left lower leg: No edema.   Neurological:      Mental Status: He is alert and oriented to person, place, and time.        Result Review :{ Labs  Result Review  Imaging  Med Tab  Media     PFT Values        Some values may be hidden. Unless noted otherwise, only the newest values recorded on each date are displayed.    "      Old Values PFT Results 3/4/19 6/5/19 1/8/20   FVC 34% 38% 34%   FEV1 17% 20% 20%   FEV1/FVC 39.31% 41.93% 47.47      Pre Drug PFT Results 3/4/19 6/5/19 1/8/20   FVC   30.05   FEV1   16.97   FEF 25-75%   7.61   FEV1/FVC   45      Post Drug PFT Results 3/4/19 6/5/19 1/8/20   No data to display.      Other Tests PFT Results 3/4/19 6/5/19 1/8/20   No data to display.           My interpretation of the PFT: none for this visit    Results for orders placed in visit on 09/11/19   Pulmonary Function Test    Narrative Bautista Daley MA     1/9/2020  3:07 PM  Pulmonary Function Test  Performed by: Shruti Kenney APRN  Authorized by: Shruti Kenney APRN      Pre Drug    FVC: 30.05%   FEV1: 16.97%   FEF 25-75%: 7.61%   FEV1/FVC: 45%   Results for orders placed in visit on 06/05/19   Pulmonary Function Test   Results for orders placed in visit on 03/04/19   Pulmonary Function Test     My interpretation of imaging:  None for this visit    My interpretation of labs: None for this visit      Assessment and Plan      Problem List Items Addressed This Visit        Other    COPD, group D, by GOLD 2017 classification (CMS/[facility]) (Chronic)    Lung transplant status (CMS/HCC) - Primary (Chronic)    Chronic respiratory failure with hypoxia (CMS/HCC) (Chronic)    Personal history of nicotine dependence (Chronic)    Type 2 diabetes mellitus with hyperglycemia, without long-term current use of insulin (CMS/[facility]) (Chronic)    Acquired immunocompromised state (CMS/[facility]) (Chronic)          Patient's Body mass index is 22.89 kg/m². BMI is within normal parameters. No follow-up required..      Lung transplant status stable. He has follow-up with his transplant service in March. All of his transplant medications are dosed the same. He still has 2 L of oxygen when she is benefiting from and will continue for his chronic respiratory failure with hypoxia. He remains smoke-free. COPD fairly stable on Spiriva, Symbicort, full dose Mucinex and  breathing treatments 3 times a day. He is having some pleuritic type chest discomfort. Will increase his prednisone to 40 mg for 2 days followed by 20 mg for 2 days and return back to his 10 mg dose. His diabetes is remained under control with a recent A1c of 6.6. He will continue to monitor this. He is aware that the short course of prednisone may affect these numbers. His vaccines are up-to-date. We will see him back in 3 months. We will try to update his spirometry at that time. He is aware this is dependent upon the status of Covid in the community. If needed we can arrange it at the hospital. He will call if the steroids are not helpful and he is in need of an antibiotic for additional work-up.    Shruti Kenney, APRN  1/13/2021  09:36 CST    Follow Up   Return in about 3 months (around 4/13/2021) for FVL.    Patient was given instructions and counseling regarding his condition or for health maintenance advice. Please see specific information pulled into the AVS if appropriate.

## 2021-01-13 ENCOUNTER — OFFICE VISIT (OUTPATIENT)
Dept: PULMONOLOGY | Facility: CLINIC | Age: 61
End: 2021-01-13

## 2021-01-13 VITALS
WEIGHT: 155 LBS | RESPIRATION RATE: 16 BRPM | SYSTOLIC BLOOD PRESSURE: 122 MMHG | TEMPERATURE: 97.8 F | OXYGEN SATURATION: 96 % | DIASTOLIC BLOOD PRESSURE: 76 MMHG | HEART RATE: 80 BPM | HEIGHT: 69 IN | BODY MASS INDEX: 22.96 KG/M2

## 2021-01-13 DIAGNOSIS — D84.9 ACQUIRED IMMUNOCOMPROMISED STATE (HCC): ICD-10-CM

## 2021-01-13 DIAGNOSIS — J44.9 COPD, GROUP D, BY GOLD 2017 CLASSIFICATION (HCC): Chronic | ICD-10-CM

## 2021-01-13 DIAGNOSIS — E11.65 TYPE 2 DIABETES MELLITUS WITH HYPERGLYCEMIA, WITHOUT LONG-TERM CURRENT USE OF INSULIN (HCC): Chronic | ICD-10-CM

## 2021-01-13 DIAGNOSIS — J96.11 CHRONIC RESPIRATORY FAILURE WITH HYPOXIA (HCC): Chronic | ICD-10-CM

## 2021-01-13 DIAGNOSIS — Z94.2 LUNG TRANSPLANT STATUS (HCC): Primary | Chronic | ICD-10-CM

## 2021-01-13 DIAGNOSIS — Z87.891 PERSONAL HISTORY OF NICOTINE DEPENDENCE: Chronic | ICD-10-CM

## 2021-01-13 PROCEDURE — 99214 OFFICE O/P EST MOD 30 MIN: CPT | Performed by: NURSE PRACTITIONER

## 2021-01-15 ENCOUNTER — VIRTUAL VISIT (OUTPATIENT)
Dept: PRIMARY CARE CLINIC | Age: 61
End: 2021-01-15
Payer: MEDICAID

## 2021-01-15 ENCOUNTER — TELEPHONE (OUTPATIENT)
Dept: PRIMARY CARE CLINIC | Age: 61
End: 2021-01-15

## 2021-01-15 DIAGNOSIS — J44.1 CHRONIC OBSTRUCTIVE PULMONARY DISEASE WITH ACUTE EXACERBATION (HCC): Primary | ICD-10-CM

## 2021-01-15 DIAGNOSIS — Z94.2 STATUS POST LUNG TRANSPLANTATION (HCC): ICD-10-CM

## 2021-01-15 PROCEDURE — 99443 PR PHYS/QHP TELEPHONE EVALUATION 21-30 MIN: CPT | Performed by: NURSE PRACTITIONER

## 2021-01-15 RX ORDER — LEVOFLOXACIN 750 MG/1
750 TABLET ORAL DAILY
Qty: 7 TABLET | Refills: 0 | Status: SHIPPED | OUTPATIENT
Start: 2021-01-15 | End: 2021-01-22

## 2021-01-15 ASSESSMENT — ENCOUNTER SYMPTOMS
RHINORRHEA: 0
VOMITING: 0
COLOR CHANGE: 0
PHOTOPHOBIA: 0
VOICE CHANGE: 0
COUGH: 1
BACK PAIN: 0
NAUSEA: 0
SHORTNESS OF BREATH: 1

## 2021-01-15 NOTE — PROGRESS NOTES
[address] [address]            Phone:  [phone]  Fax:  [phone]    Stephany Arthur is a 56 y.o. male evaluated via telephone on 1/15/2021. Consent:    He and/or health care decision maker is aware that that he may receive a bill for this telephone service, depending on his insurance coverage, and has provided verbal consent to proceed: Yes      HPI  Chief Complaint   Patient presents with    COPD       Patient presents today for evaluation of cough, congestion, COPD. He states symptoms have been going on for 3 days. He has thick green mucous production he is coughing up. He has started taking prednisone without much relief. O2 sat was 89% this morning; he put on his 02 and is now in normal range. He denies fever. He does breathing treatments; hx COPD, lung transplant. Typically levaquin is needed he states. Review of Systems   Constitutional: Negative for chills and fever. HENT: Positive for congestion. Negative for ear pain, hearing loss, rhinorrhea and voice change. Eyes: Negative for photophobia and visual disturbance. Respiratory: Positive for cough and shortness of breath. Cardiovascular: Negative for chest pain and palpitations. Gastrointestinal: Negative for nausea and vomiting. Endocrine: Negative. Negative for cold intolerance and heat intolerance. Genitourinary: Negative for difficulty urinating and flank pain. Musculoskeletal: Negative for back pain and neck pain. Skin: Negative for color change and rash. Allergic/Immunologic: Negative for environmental allergies and food allergies. Neurological: Negative for dizziness, speech difficulty and headaches. Hematological: Does not bruise/bleed easily. Psychiatric/Behavioral: Negative for sleep disturbance and suicidal ideas.        Patient location: home    PLAN    Details of this discussion including any medical advice provided:

## 2021-01-15 NOTE — TELEPHONE ENCOUNTER
Pt calls today because he believes he has another ling infection. He is coughing up yellow mucous, he states he has been taking prednisone but is not helping. He was wandering if you would send in some levaquin for him? We don't have any opening today. I suggested urgent care but given his condition he doesn't want to go that route if he doesn't have to.  Please advise

## 2021-01-18 ENCOUNTER — OFFICE VISIT (OUTPATIENT)
Dept: PRIMARY CARE CLINIC | Age: 61
End: 2021-01-18
Payer: MEDICAID

## 2021-01-18 DIAGNOSIS — Z79.4 TYPE 2 DIABETES MELLITUS WITHOUT COMPLICATION, WITH LONG-TERM CURRENT USE OF INSULIN (HCC): ICD-10-CM

## 2021-01-18 DIAGNOSIS — Z00.00 ROUTINE GENERAL MEDICAL EXAMINATION AT A HEALTH CARE FACILITY: Primary | ICD-10-CM

## 2021-01-18 DIAGNOSIS — J44.1 CHRONIC OBSTRUCTIVE PULMONARY DISEASE WITH ACUTE EXACERBATION (HCC): ICD-10-CM

## 2021-01-18 DIAGNOSIS — E11.9 TYPE 2 DIABETES MELLITUS WITHOUT COMPLICATION, WITH LONG-TERM CURRENT USE OF INSULIN (HCC): ICD-10-CM

## 2021-01-18 DIAGNOSIS — Z94.2 STATUS POST LUNG TRANSPLANTATION (HCC): ICD-10-CM

## 2021-01-18 PROCEDURE — 99396 PREV VISIT EST AGE 40-64: CPT | Performed by: FAMILY MEDICINE

## 2021-01-18 PROCEDURE — 3046F HEMOGLOBIN A1C LEVEL >9.0%: CPT | Performed by: FAMILY MEDICINE

## 2021-01-18 PROCEDURE — 3017F COLORECTAL CA SCREEN DOC REV: CPT | Performed by: FAMILY MEDICINE

## 2021-01-18 PROCEDURE — 2022F DILAT RTA XM EVC RTNOPTHY: CPT | Performed by: FAMILY MEDICINE

## 2021-01-18 PROCEDURE — G8421 BMI NOT CALCULATED: HCPCS | Performed by: FAMILY MEDICINE

## 2021-01-18 PROCEDURE — 1036F TOBACCO NON-USER: CPT | Performed by: FAMILY MEDICINE

## 2021-01-18 PROCEDURE — G8484 FLU IMMUNIZE NO ADMIN: HCPCS | Performed by: FAMILY MEDICINE

## 2021-01-18 PROCEDURE — G8926 SPIRO NO PERF OR DOC: HCPCS | Performed by: FAMILY MEDICINE

## 2021-01-18 PROCEDURE — 99213 OFFICE O/P EST LOW 20 MIN: CPT | Performed by: FAMILY MEDICINE

## 2021-01-18 PROCEDURE — 3023F SPIROM DOC REV: CPT | Performed by: FAMILY MEDICINE

## 2021-01-18 PROCEDURE — G8428 CUR MEDS NOT DOCUMENT: HCPCS | Performed by: FAMILY MEDICINE

## 2021-01-18 ASSESSMENT — ENCOUNTER SYMPTOMS
VOMITING: 0
WHEEZING: 0
COUGH: 1
CONSTIPATION: 0
SHORTNESS OF BREATH: 1
ABDOMINAL PAIN: 0
CHEST TIGHTNESS: 0
NAUSEA: 0
DIARRHEA: 0

## 2021-01-18 NOTE — PATIENT INSTRUCTIONS
Personalized Preventive Plan for Mukesh Woodward - 1/18/2021  Medicare offers a range of preventive health benefits. Some of the tests and screenings are paid in full while other may be subject to a deductible, co-insurance, and/or copay. Some of these benefits include a comprehensive review of your medical history including lifestyle, illnesses that may run in your family, and various assessments and screenings as appropriate. After reviewing your medical record and screening and assessments performed today your provider may have ordered immunizations, labs, imaging, and/or referrals for you. A list of these orders (if applicable) as well as your Preventive Care list are included within your After Visit Summary for your review. Other Preventive Recommendations:    · A preventive eye exam performed by an eye specialist is recommended every 1-2 years to screen for glaucoma; cataracts, macular degeneration, and other eye disorders. · A preventive dental visit is recommended every 6 months. · Try to get at least 150 minutes of exercise per week or 10,000 steps per day on a pedometer . · Order or download the FREE \"Exercise & Physical Activity: Your Everyday Guide\" from [employer]. Call 8-248.191.9530 or search [employer] online. · You need 4096-0412 mg of calcium and 1614-0541 IU of vitamin D per day. It is possible to meet your calcium requirement with diet alone, but a vitamin D supplement is usually necessary to meet this goal.  · When exposed to the sun, use a sunscreen that protects against both UVA and UVB radiation with an SPF of 30 or greater. Reapply every 2 to 3 hours or after sweating, drying off with a towel, or swimming. · Always wear a seat belt when traveling in a car. Always wear a helmet when riding a bicycle or motorcycle.

## 2021-01-18 NOTE — PROGRESS NOTES
2021    TELEHEALTH EVALUATION -- Audio/Visual (During HUFWT-30 public health emergency)    HPI:    Hortensia Moise (:  1960) has requested an audio/video evaluation for the following concern(s):    Video visit    Patient is here for 2 separate visits: annual wellness visit as well as acute and chronic issues. The below review of systems and physical exam applies to both encounters. Annual HPI:  Patient is due for annual examination. He has been staying isolated secondary to Covid especially with his history of lung transplantation. He continues to follow with pulmonologist for chronic control of his lung disease. He recently had a COPD exacerbation and is currently on steroids for this. He has been on the fence about Covid vaccine. He notes otherwise he is trying to be as cautious as possible with his health. Acute/Chronic HPI:  Patient is also here for follow-up for his diabetes which is superimposed upon his risk of lung transplantation. Recently was started on prednisone and noting some hyperglycemia but prior to that his fasting glucose was about . He notes that he is doing well for the once daily insulin after I took him off of sliding scale insulin. He denies any major issues otherwise at this time he notes that his breathing is improving with recent treatment from his pulmonologist.      Review of Systems   Constitutional: Negative for chills and fever. Respiratory: Positive for cough and shortness of breath. Negative for chest tightness and wheezing. Cardiovascular: Negative for chest pain, palpitations and leg swelling. Gastrointestinal: Negative for abdominal pain, constipation, diarrhea, nausea and vomiting. Genitourinary: Negative for difficulty urinating, dysuria and frequency. Prior to Visit Medications    Medication Sig Taking?  Authorizing Provider   levoFLOXacin (LEVAQUIN) 750 MG tablet Take 1 tablet by mouth daily for 7 days  ALVARADO Fuentes lisinopril (PRINIVIL;ZESTRIL) 20 MG tablet Take 1 tablet by mouth daily  Glenn Mercado MD   insulin glargine [facility]) 100 UNIT/ML injection pen Inject 20 Units into the skin nightly  Glenn Mercado MD   predniSONE (DELTASONE) 10 MG tablet 60 mg x 3 days; 40 mg x 3 days; 20 mg x 3 days and then 10 mg x 3 days. ALVARADO Barragan   folic acid (FOLVITE) 1 MG tablet Take 1 tablet by mouth daily  ALVARADO Barragan   omeprazole (PRILOSEC) 20 MG delayed release capsule TAKE 1 CAPSULE BY MOUTH TWICE A DAY  Glenn Mercado MD   atorvastatin (LIPITOR) 20 MG tablet TAKE 1 TABLET BY MOUTH EVERYDAY AT BEDTIME  Glenn Mercado MD   levocetirizine (XYZAL) 5 MG tablet TAKE 1 TABLET BY MOUTH EVERY DAY  Glenn Mercado MD   predniSONE (DELTASONE) 10 MG tablet 60 mg x 3 days; 40 mg x 3 days; 20 mg x 3 days and then 10 mg x 3 days. ALVARADO Barragan   blood glucose monitor kit and supplies Test 4- 5  times a day & as needed for symptoms of irregular blood glucose.   MD Libra Melo Burdnaomi INHUB 250-50 MCG/DOSE AEPB 2 times daily  Historical Provider, MD   insulin glargine (LANTUS SOLOSTAR) 100 UNIT/ML injection 4 times daily SLIDING SCALE  Historical Provider, MD   traZODone (DESYREL) 50 MG tablet TAKE 1-2 TABLETS AT NIGHT AS DIRECTED  Glenn Mercado MD   metoprolol (LOPRESSOR) 100 MG tablet TAKE 1 TABLET BY MOUTH TWICE A DAY  Braulio Harris MD   Magnesium 400 MG CAPS Take 400 mg by mouth daily  Glenn Mercado MD   Nebulizers (COMPRESSOR/NEBULIZER) MISC Nebulizer with supplies  Glenn Mercado MD   OXYGEN pulse oximeter (not oxygen itself)  Glenn Mercado MD   nystatin (MYCOSTATIN) 345561 UNIT/ML suspension Take 500,000 Units by mouth 4 times daily  Historical Provider, MD   albuterol sulfate HFA (VENTOLIN HFA) 108 (90 Base) MCG/ACT inhaler INHALE TWO PUFFS EVERY FOUR HOURS AS NEEDED FOR THIRTY DAYS  Glenn Mercado MD   SPIRIVA HANDIHALER 18 MCG inhalation capsule INHALE THE CONTENTS OF 1 CAPSULE BY Juaquin Figueroa MD   furosemide (LASIX) 20 MG tablet TAKE 1 TABLET BY MOUTH DAILY  Patient taking differently: Take 40 mg by mouth daily TAKE 1 TABLET BY MOUTH DAILY  Alicja Browning MD   LYRICA 150 MG capsule TAKE ONE CAPSULE BY MOUTH TWICE A DAY. Alicja Browning MD   HYDROcodone-acetaminophen [facility]) 7.5-325 MG per tablet Take 1 tablet by mouth. Historical Provider, MD   OXYGEN Inhale into the lungs 3-4 L  Historical Provider, MD   Mycophenolate Sodium 360 MG TBEC Take 360 mg by mouth 2 times daily With the 180mg  Historical Provider, MD   Mycophenolate Sodium (MYCOPHENOLIC ACID) 049 MG TBEC 180 mg 2 times daily With the 360mg  Historical Provider, MD   Blood Glucose Monitoring Suppl (ACCU-CHEK JOANNE PLUS) W/DEVICE KIT   Historical Provider, MD   ACCU-CHEK JOANNE PLUS strip   Historical Provider, MD   Accu-Chek Softclix Lancets MISC   Historical Provider, MD   albuterol (PROVENTIL) (2.5 MG/3ML) 0.083% nebulizer solution   Historical Provider, MD   amLODIPine (NORVASC) 5 MG tablet Take 5 mg by mouth daily   Historical Provider, MD   tacrolimus (PROGRAF) 1 MG capsule Take 1 mg by mouth 2 times daily Take one tablet in the am and 1/2 tablet in the afternoon  Historical Provider, MD   Cyanocobalamin (VITAMIN B 12) 250 MCG LOZG Take  by mouth.     Historical Provider, MD       Social History     Tobacco Use    Smoking status: Former Smoker     Packs/day: 3.00     Years: 15.00     Pack years: 45.00     Types: Cigarettes     Start date: 1972     Quit date: 1/10/2002     Years since quittin.0    Smokeless tobacco: Never Used    Tobacco comment: Pt used to smoke quit 9.5 years ago   Substance Use Topics    Alcohol use: No    Drug use: Not Currently     Frequency: 2.0 times per week     Types: Marijuana     Comment: 19 pt stated he stopped 2019        Allergies   Allergen Reactions    Avelox [Moxifloxacin Hydrochloride] Other (See Comments)     Pt states this medicine will make him sweat, turn red, itchy, and pass out.     Clindamycin/Lincomycin Diarrhea     Told by Pulmonologist at [facility] not to take    Moxifloxacin Rash     Sweating    ,   Past Medical History:   Diagnosis Date    COPD (chronic obstructive pulmonary disease) ([facility] [place] [address]     Emphysema of lung ([facility] [place] [address]     Hypertension     Pneumonia     in left lung    Skin cancer     Wears glasses    ,   Past Surgical History:   Procedure Laterality Date    APPENDECTOMY      CHOLECYSTECTOMY  14    COLONOSCOPY  2008    Dr. Bettye Espinoza: AP    LUNG REMOVAL, PARTIAL      right upper    LUNG TRANSPLANT, SINGLE  2006    left    SKIN CANCER DESTRUCTION      UPPER GASTROINTESTINAL ENDOSCOPY  2008     Dr. Bettye Espinoza   ,   Social History     Tobacco Use    Smoking status: Former Smoker     Packs/day: 3.00     Years: 15.00     Pack years: 45.00     Types: Cigarettes     Start date: 1972     Quit date: 1/10/2002     Years since quittin.0    Smokeless tobacco: Never Used    Tobacco comment: Pt used to smoke quit 9.5 years ago   Substance Use Topics    Alcohol use: No    Drug use: Not Currently     Frequency: 2.0 times per week     Types: Marijuana     Comment: 19 pt stated he stopped 2019   ,   Family History   Problem Relation Age of Onset    Cancer Mother     Heart Disease Father     Colon Cancer Neg Hx     Colon Polyps Neg Hx     Esophageal Cancer Neg Hx     Liver Cancer Neg Hx     Rectal Cancer Neg Hx     Liver Disease Neg Hx     Stomach Cancer Neg Hx    ,   Immunization History   Administered Date(s) Administered    Influenza 11/15/2012    Influenza Vaccine, unspecified formulation 2017    Influenza Virus Vaccine 2015, 10/03/2016    Influenza, High Dose (Fluzone 65 yrs and older) 09/10/2018    Pneumococcal Conjugate 13-valent ([id number]) 09/10/2018    Pneumococcal Polysaccharide ([id number]) 2018    Tdap (Boostrix, Adacel) 10/24/2013   ,   Health Maintenance   Topic Date National Emergencies Act, [facility] waiver authority and the [employer] General Act, this Virtual Visit was conducted with patient's (and/or legal guardian's) consent, to reduce the patient's risk of exposure to COVID-19 and provide necessary medical care. The patient (and/or legal guardian) has also been advised to contact this office for worsening conditions or problems, and seek emergency medical treatment and/or call 911 if deemed necessary. Services were provided through a video synchronous discussion virtually to substitute for in-person clinic visit. Patient and provider were located at their individual homes or provider at secure site for business. It is possible that material may be posted from a notepad that is used for a Dragon dictation device for dictating notes outside the EMR and I am the original author of all of this content. --Ana Hernandez MD on 1/18/2021 at 1:02 PM    An electronic signature was used to authenticate this note.

## 2021-01-28 ENCOUNTER — VIRTUAL VISIT (OUTPATIENT)
Dept: PRIMARY CARE CLINIC | Age: 61
End: 2021-01-28
Payer: MEDICAID

## 2021-01-28 DIAGNOSIS — Z94.2 STATUS POST LUNG TRANSPLANTATION (HCC): ICD-10-CM

## 2021-01-28 DIAGNOSIS — J44.1 CHRONIC OBSTRUCTIVE PULMONARY DISEASE WITH ACUTE EXACERBATION (HCC): Primary | ICD-10-CM

## 2021-01-28 DIAGNOSIS — R05.9 COUGH: ICD-10-CM

## 2021-01-28 DIAGNOSIS — Z71.9 ENCOUNTER FOR COUNSELING: ICD-10-CM

## 2021-01-28 PROCEDURE — 99443 PR PHYS/QHP TELEPHONE EVALUATION 21-30 MIN: CPT | Performed by: NURSE PRACTITIONER

## 2021-01-28 RX ORDER — AZITHROMYCIN 250 MG/1
250 TABLET, FILM COATED ORAL SEE ADMIN INSTRUCTIONS
Qty: 6 TABLET | Refills: 0 | Status: SHIPPED | OUTPATIENT
Start: 2021-01-28 | End: 2021-02-02

## 2021-01-28 RX ORDER — PREDNISONE 10 MG/1
10 TABLET ORAL DAILY
Qty: 5 TABLET | Refills: 0 | Status: SHIPPED | OUTPATIENT
Start: 2021-01-28 | End: 2021-02-02

## 2021-01-28 ASSESSMENT — ENCOUNTER SYMPTOMS
GASTROINTESTINAL NEGATIVE: 1
EYES NEGATIVE: 1
COUGH: 1
WHEEZING: 1

## 2021-01-28 NOTE — PROGRESS NOTES
Eduarda Kelley is a 56 y.o. male evaluated via telephone on 1/28/2021. Consent:  He and/or health care decision maker is aware that that he may receive a bill for this telephone service, depending on his insurance coverage, and has provided verbal consent to proceed: Yes      Documentation:  I communicated with the patient and/or health care decision maker about cough, wheezing, COPD, not improving completely. Details of this discussion including any medical advice provided: see below    Cough  This is a chronic problem. The current episode started 1 to 4 weeks ago. The problem has been gradually improving. The problem occurs constantly. The cough is productive of purulent sputum. Associated symptoms include nasal congestion, postnasal drip, shortness of breath (on exertion) and wheezing. Pertinent negatives include no chest pain, chills, fever or hemoptysis. The symptoms are aggravated by lying down and exercise. He has tried a beta-agonist inhaler and body position changes for the symptoms. The treatment provided mild relief. His past medical history is significant for COPD. Wheezing   This is a chronic problem. The current episode started 1 to 4 weeks ago. The problem occurs 2 to 4 times per day. The problem has been gradually worsening. Associated symptoms include coughing (hurts more on right side) and shortness of breath (on exertion). Pertinent negatives include no chest pain, chills, fever or hemoptysis. The symptoms are aggravated by exercise and any activity. He has tried beta agonist inhalers and body position changes for the symptoms. The treatment provided mild relief. His past medical history is significant for COPD. Patient has take levaquin in the past couple of weeks without complete improvement. No change in PMH, family, social, or surgical history unless mentioned above. Review of Systems   Constitutional: Positive for fatigue. Negative for chills and fever. HENT: Positive for postnasal drip. Eyes: Negative. Respiratory: Positive for cough (hurts more on right side), shortness of breath (on exertion) and wheezing. Negative for hemoptysis. Cardiovascular: Negative. Negative for chest pain. Gastrointestinal: Negative. Genitourinary: Negative. Musculoskeletal: Positive for arthralgias. Neurological: Negative. Psychiatric/Behavioral: Negative. Past Medical History:   Diagnosis Date    [facility]) [facility] [address]     Emphysema of lung ([facility] [address]     Hypertension     Pneumonia     in left lung    Skin cancer     Wears glasses        Current Outpatient Medications   Medication Sig Dispense Refill    azithromycin (ZITHROMAX) 250 MG tablet Take 1 tablet by mouth See Admin Instructions for 5 days 500mg on day 1 followed by 250mg on days 2 - 5 6 tablet 0    predniSONE (DELTASONE) 10 MG tablet Take 1 tablet by mouth daily for 5 days 5 tablet 0    lisinopril (PRINIVIL;ZESTRIL) 20 MG tablet Take 1 tablet by mouth daily 30 tablet 5    insulin glargine (BASAGLAR KWIKPEN) 100 UNIT/ML injection pen Inject 20 Units into the skin nightly 5 pen 1    folic acid (FOLVITE) 1 MG tablet Take 1 tablet by mouth daily 90 tablet 3    omeprazole (PRILOSEC) 20 MG delayed release capsule TAKE 1 CAPSULE BY MOUTH TWICE A DAY 60 capsule 5    atorvastatin (LIPITOR) 20 MG tablet TAKE 1 TABLET BY MOUTH EVERYDAY AT BEDTIME 30 tablet 11    levocetirizine (XYZAL) 5 MG tablet TAKE 1 TABLET BY MOUTH EVERY DAY 90 tablet 1    blood glucose monitor kit and supplies Test 4- 5  times a day & as needed for symptoms of irregular blood glucose.  1 kit 0    WIXELA INHUB 250-50 MCG/DOSE AEPB 2 times daily      insulin glargine (LANTUS SOLOSTAR) 100 UNIT/ML injection 4 times daily SLIDING SCALE      traZODone (DESYREL) 50 MG tablet TAKE 1-2 TABLETS AT NIGHT AS DIRECTED 60 tablet 0  metoprolol (LOPRESSOR) 100 MG tablet TAKE 1 TABLET BY MOUTH TWICE A DAY 60 tablet 4    Magnesium 400 MG CAPS Take 400 mg by mouth daily 30 capsule 3    Nebulizers (COMPRESSOR/NEBULIZER) MISC Nebulizer with supplies 1 each 0    OXYGEN pulse oximeter (not oxygen itself) 1 kit 0    nystatin (MYCOSTATIN) 184976 UNIT/ML suspension Take 500,000 Units by mouth 4 times daily      albuterol sulfate HFA (VENTOLIN HFA) 108 (90 Base) MCG/ACT inhaler INHALE TWO PUFFS EVERY FOUR HOURS AS NEEDED FOR THIRTY DAYS 18 Inhaler 12    SPIRIVA HANDIHALER 18 MCG inhalation capsule INHALE THE CONTENTS OF 1 CAPSULE BY MOUTH EVERYD AY 30 capsule 5    furosemide (LASIX) 20 MG tablet TAKE 1 TABLET BY MOUTH DAILY (Patient taking differently: Take 40 mg by mouth daily TAKE 1 TABLET BY MOUTH DAILY) 30 tablet 10    LYRICA 150 MG capsule TAKE ONE CAPSULE BY MOUTH TWICE A DAY. 60 capsule 5    HYDROcodone-acetaminophen (NORCO) 7.5-325 MG per tablet Take 1 tablet by mouth.  OXYGEN Inhale into the lungs 3-4 L      Mycophenolate Sodium 360 MG TBEC Take 360 mg by mouth 2 times daily With the 180mg      Mycophenolate Sodium (MYCOPHENOLIC ACID) 179 MG TBEC 180 mg 2 times daily With the 360mg      Blood Glucose Monitoring Suppl (ACCU-CHEK JOANNE PLUS) W/DEVICE KIT   0    ACCU-CHEK JOANNE PLUS strip   3    Accu-Chek Softclix Lancets MISC   3    albuterol (PROVENTIL) (2.5 MG/3ML) 0.083% nebulizer solution       amLODIPine (NORVASC) 5 MG tablet Take 5 mg by mouth daily   11    tacrolimus (PROGRAF) 1 MG capsule Take 1 mg by mouth 2 times daily Take one tablet in the am and 1/2 tablet in the afternoon      Cyanocobalamin (VITAMIN B 12) 250 MCG LOZG Take  by mouth.          Current Facility-Administered Medications   Medication Dose Route Frequency Provider Last Rate Last Admin    magnesium sulfate injection 4 g  4 g Intravenous Once Blue Man MD           Allergies   Allergen Reactions  Avelox [Moxifloxacin Hydrochloride] Other (See Comments)     Pt states this medicine will make him sweat, turn red, itchy, and pass out.  Clindamycin/Lincomycin Diarrhea     Told by Pulmonologist at [facility] not to take    Moxifloxacin Rash     Sweating        Past Surgical History:   Procedure Laterality Date    APPENDECTOMY      CHOLECYSTECTOMY  14    COLONOSCOPY  2008    Dr. Meme Alvarado: AP    LUNG REMOVAL, PARTIAL      right upper    LUNG TRANSPLANT, SINGLE  2006    left    SKIN CANCER DESTRUCTION      UPPER GASTROINTESTINAL ENDOSCOPY  2008     Dr. Meme Alvarado       Social History     Tobacco Use    Smoking status: Former Smoker     Packs/day: 3.00     Years: 15.00     Pack years: 45.00     Types: Cigarettes     Start date: 1972     Quit date: 1/10/2002     Years since quittin.0    Smokeless tobacco: Never Used    Tobacco comment: Pt used to smoke quit 9.5 years ago   Substance Use Topics    Alcohol use: No    Drug use: Not Currently     Frequency: 2.0 times per week     Types: Marijuana     Comment: 19 pt stated he stopped 2019       Family History   Problem Relation Age of Onset    Cancer Mother     Heart Disease Father     Colon Cancer Neg Hx     Colon Polyps Neg Hx     Esophageal Cancer Neg Hx     Liver Cancer Neg Hx     Rectal Cancer Neg Hx     Liver Disease Neg Hx     Stomach Cancer Neg Hx        Assessment:    ICD-10-CM    1. Chronic obstructive pulmonary disease with acute exacerbation (HCC)  J44.1 azithromycin (ZITHROMAX) 250 MG tablet     predniSONE (DELTASONE) 10 MG tablet   2. Status post lung transplantation ([facility] [address]  Z94.2 azithromycin (ZITHROMAX) 250 MG tablet   3. Cough  R05    4. Encounter for counseling  Z71.9        Plan:   1.  Chronic obstructive pulmonary disease with acute exacerbation ([facility] [address] - azithromycin (ZITHROMAX) 250 MG tablet; Take 1 tablet by mouth See Admin Instructions for 5 days 500mg on day 1 followed by 250mg on days 2 - 5  Dispense: 6 tablet; Refill: 0  - predniSONE (DELTASONE) 10 MG tablet; Take 1 tablet by mouth daily for 5 days  Dispense: 5 tablet; Refill: 0    2. Status post lung transplantation ([facility] [address]  - azithromycin (ZITHROMAX) 250 MG tablet; Take 1 tablet by mouth See Admin Instructions for 5 days 500mg on day 1 followed by 250mg on days 2 - 5  Dispense: 6 tablet; Refill: 0    3. Cough    4. Encounter for counseling  - medications, red flags, and follow-up    No orders of the defined types were placed in this encounter. Orders Placed This Encounter   Medications    azithromycin (ZITHROMAX) 250 MG tablet     Sig: Take 1 tablet by mouth See Admin Instructions for 5 days 500mg on day 1 followed by 250mg on days 2 - 5     Dispense:  6 tablet     Refill:  0    predniSONE (DELTASONE) 10 MG tablet     Sig: Take 1 tablet by mouth daily for 5 days     Dispense:  5 tablet     Refill:  0     Medications Discontinued During This Encounter   Medication Reason    predniSONE (DELTASONE) 10 MG tablet Therapy completed    predniSONE (DELTASONE) 10 MG tablet Therapy completed     There are no Patient Instructions on file for this visit. Patient given educational handouts and has had all questions answered. Patient voices understanding and agrees to plans along with risks and benefits of plan. Patient isinstructed to continue prior meds, diet, and exercise plans unless instructed otherwise. Patient agrees to follow up as instructed and sooner if needed. Patient agrees to go to ER if condition becomes emergent. Notesmay be completed with dictation device and spelling errors may occur. Return in about 1 month (around 2/28/2021) for Chronic conditions. I affirm this is a Patient Initiated Episode with an Established Patient who has not had a related appointment within my department in the past 7 days or scheduled within the next 24 hours. Total Time: minutes: 21-30 minutes    Note: not billable if this call serves to triage the patient into an appointment for the relevant concern      [address] were provided through a telephone visit to substitute for in-person clinic visit. Patient and provider were located at their individual homes. Pursuant to the emergency declaration under the [facility] [place], [facility] waiver authority and the [employer] Act, this Virtual  Visit was conducted, with patient's consent, to reduce the patient's risk of exposure to COVID-19 and provide continuity of care for an established patient.

## 2021-01-29 ASSESSMENT — ENCOUNTER SYMPTOMS
HEMOPTYSIS: 0
SHORTNESS OF BREATH: 1

## 2021-02-03 RX ORDER — INSULIN GLARGINE 100 [IU]/ML
20 INJECTION, SOLUTION SUBCUTANEOUS NIGHTLY
Qty: 5 PEN | Refills: 1 | Status: SHIPPED | OUTPATIENT
Start: 2021-02-03 | End: 2021-06-22 | Stop reason: SDUPTHER

## 2021-03-03 ENCOUNTER — OFFICE VISIT (OUTPATIENT)
Dept: PRIMARY CARE CLINIC | Age: 61
End: 2021-03-03
Payer: MEDICAID

## 2021-03-03 VITALS
OXYGEN SATURATION: 98 % | WEIGHT: 152 LBS | HEIGHT: 68 IN | SYSTOLIC BLOOD PRESSURE: 130 MMHG | HEART RATE: 90 BPM | TEMPERATURE: 98.5 F | DIASTOLIC BLOOD PRESSURE: 62 MMHG | BODY MASS INDEX: 23.04 KG/M2

## 2021-03-03 DIAGNOSIS — J44.1 COPD EXACERBATION (HCC): ICD-10-CM

## 2021-03-03 DIAGNOSIS — Z94.2 STATUS POST LUNG TRANSPLANTATION (HCC): ICD-10-CM

## 2021-03-03 DIAGNOSIS — K62.5 RECTAL BLEEDING: Primary | ICD-10-CM

## 2021-03-03 DIAGNOSIS — Z12.11 COLON CANCER SCREENING: ICD-10-CM

## 2021-03-03 DIAGNOSIS — N40.0 BENIGN PROSTATIC HYPERPLASIA WITHOUT LOWER URINARY TRACT SYMPTOMS: ICD-10-CM

## 2021-03-03 DIAGNOSIS — K64.4 EXTERNAL HEMORRHOID: ICD-10-CM

## 2021-03-03 PROCEDURE — G8926 SPIRO NO PERF OR DOC: HCPCS | Performed by: FAMILY MEDICINE

## 2021-03-03 PROCEDURE — G8427 DOCREV CUR MEDS BY ELIG CLIN: HCPCS | Performed by: FAMILY MEDICINE

## 2021-03-03 PROCEDURE — G8420 CALC BMI NORM PARAMETERS: HCPCS | Performed by: FAMILY MEDICINE

## 2021-03-03 PROCEDURE — 1036F TOBACCO NON-USER: CPT | Performed by: FAMILY MEDICINE

## 2021-03-03 PROCEDURE — 3023F SPIROM DOC REV: CPT | Performed by: FAMILY MEDICINE

## 2021-03-03 PROCEDURE — G8484 FLU IMMUNIZE NO ADMIN: HCPCS | Performed by: FAMILY MEDICINE

## 2021-03-03 PROCEDURE — 3017F COLORECTAL CA SCREEN DOC REV: CPT | Performed by: FAMILY MEDICINE

## 2021-03-03 PROCEDURE — 99214 OFFICE O/P EST MOD 30 MIN: CPT | Performed by: FAMILY MEDICINE

## 2021-03-03 RX ORDER — DOCUSATE SODIUM 100 MG/1
100 CAPSULE, LIQUID FILLED ORAL 2 TIMES DAILY
Qty: 60 CAPSULE | Refills: 0 | Status: SHIPPED | OUTPATIENT
Start: 2021-03-03 | End: 2021-04-02

## 2021-03-03 RX ORDER — PREDNISONE 20 MG/1
40 TABLET ORAL DAILY
Qty: 10 TABLET | Refills: 0 | Status: SHIPPED | OUTPATIENT
Start: 2021-03-03 | End: 2021-03-08

## 2021-03-03 RX ORDER — LEVOFLOXACIN 750 MG/1
750 TABLET ORAL DAILY
Qty: 5 TABLET | Refills: 0 | Status: SHIPPED | OUTPATIENT
Start: 2021-03-03 | End: 2021-03-08

## 2021-03-03 ASSESSMENT — PATIENT HEALTH QUESTIONNAIRE - PHQ9
SUM OF ALL RESPONSES TO PHQ QUESTIONS 1-9: 0
SUM OF ALL RESPONSES TO PHQ QUESTIONS 1-9: 0

## 2021-03-15 ENCOUNTER — VIRTUAL VISIT (OUTPATIENT)
Dept: PRIMARY CARE CLINIC | Age: 61
End: 2021-03-15
Payer: MEDICAID

## 2021-03-15 DIAGNOSIS — Z79.4 TYPE 2 DIABETES MELLITUS WITHOUT COMPLICATION, WITH LONG-TERM CURRENT USE OF INSULIN (HCC): Primary | ICD-10-CM

## 2021-03-15 DIAGNOSIS — E11.9 TYPE 2 DIABETES MELLITUS WITHOUT COMPLICATION, WITH LONG-TERM CURRENT USE OF INSULIN (HCC): Primary | ICD-10-CM

## 2021-03-15 DIAGNOSIS — K64.4 HEMORRHOIDS, EXTERNAL: ICD-10-CM

## 2021-03-15 DIAGNOSIS — J44.1 CHRONIC OBSTRUCTIVE PULMONARY DISEASE WITH ACUTE EXACERBATION (HCC): ICD-10-CM

## 2021-03-15 PROCEDURE — G8420 CALC BMI NORM PARAMETERS: HCPCS | Performed by: FAMILY MEDICINE

## 2021-03-15 PROCEDURE — G8428 CUR MEDS NOT DOCUMENT: HCPCS | Performed by: FAMILY MEDICINE

## 2021-03-15 PROCEDURE — 3023F SPIROM DOC REV: CPT | Performed by: FAMILY MEDICINE

## 2021-03-15 PROCEDURE — 1036F TOBACCO NON-USER: CPT | Performed by: FAMILY MEDICINE

## 2021-03-15 PROCEDURE — 2022F DILAT RTA XM EVC RTNOPTHY: CPT | Performed by: FAMILY MEDICINE

## 2021-03-15 PROCEDURE — 3017F COLORECTAL CA SCREEN DOC REV: CPT | Performed by: FAMILY MEDICINE

## 2021-03-15 PROCEDURE — G8484 FLU IMMUNIZE NO ADMIN: HCPCS | Performed by: FAMILY MEDICINE

## 2021-03-15 PROCEDURE — 3046F HEMOGLOBIN A1C LEVEL >9.0%: CPT | Performed by: FAMILY MEDICINE

## 2021-03-15 PROCEDURE — 99214 OFFICE O/P EST MOD 30 MIN: CPT | Performed by: FAMILY MEDICINE

## 2021-03-15 PROCEDURE — G8926 SPIRO NO PERF OR DOC: HCPCS | Performed by: FAMILY MEDICINE

## 2021-03-15 RX ORDER — HYDROCORTISONE ACETATE 25 MG/1
25 SUPPOSITORY RECTAL 2 TIMES DAILY PRN
Qty: 24 SUPPOSITORY | Refills: 2 | Status: SHIPPED | OUTPATIENT
Start: 2021-03-15

## 2021-03-15 RX ORDER — DEXTROMETHORPHAN HYDROBROMIDE AND PROMETHAZINE HYDROCHLORIDE 15; 6.25 MG/5ML; MG/5ML
5 SYRUP ORAL 4 TIMES DAILY PRN
Qty: 240 ML | Refills: 0 | Status: SHIPPED | OUTPATIENT
Start: 2021-03-15 | End: 2021-09-21 | Stop reason: SDUPTHER

## 2021-03-15 RX ORDER — INSULIN LISPRO 100 [IU]/ML
INJECTION, SOLUTION INTRAVENOUS; SUBCUTANEOUS
Qty: 5 PEN | Refills: 1 | Status: SHIPPED | OUTPATIENT
Start: 2021-03-15 | End: 2021-04-09 | Stop reason: SINTOL

## 2021-03-15 ASSESSMENT — ENCOUNTER SYMPTOMS
DIARRHEA: 0
WHEEZING: 0
COUGH: 1
BLOOD IN STOOL: 0
SHORTNESS OF BREATH: 0
ABDOMINAL PAIN: 0
NAUSEA: 0
CONSTIPATION: 0
RECTAL PAIN: 1
CHEST TIGHTNESS: 0
VOMITING: 0

## 2021-03-15 NOTE — PROGRESS NOTES
3/15/2021    TELEHEALTH EVALUATION -- Audio/Visual (During XQVVY-36 public health emergency)    HPI:    Hammad Reynolds (:  1960) has requested an audio/video evaluation for the following concern(s):    Patient presents today via video visit for concern of hyperglycemia. Currently diabetic on transplant replacement prophylaxis. He has been having hyperglycemia between 2  at times. His last A1c was 6.6 in the fall. He continues to use sliding scale insulin as well as 20 units of long-acting insulin nightly. He does have some foot pain whenever his sugar does go very high. Also notes new condition which is hemorrhoid pain. He notes that he takes pain medicine but it does not seem to help his hemorrhoids overall. He does note that without pain medicine he has diarrhea chronically but with pain medicine he has a soft stool but no constipation. Review of Systems   Constitutional: Negative for chills and fever. Respiratory: Positive for cough. Negative for chest tightness, shortness of breath and wheezing. Cardiovascular: Negative for chest pain, palpitations and leg swelling. Gastrointestinal: Positive for rectal pain. Negative for abdominal pain, blood in stool, constipation, diarrhea, nausea and vomiting. Genitourinary: Negative for difficulty urinating, dysuria and frequency. Prior to Visit Medications    Medication Sig Taking?  Authorizing Provider   insulin lispro, 1 Unit Dial, (HUMALOG KWIKPEN) 100 UNIT/ML SOPN Take 5 U before meals + SS: 151-200 2, 201-250 4, 251-300 6, 301-350 8, 351-400 10 Yes Jaleesa Davis MD   promethazine-dextromethorphan (PROMETHAZINE-DM) 6.25-15 MG/5ML syrup Take 5 mLs by mouth 4 times daily as needed for Cough Yes Jaleesa Davis MD   hydrocortisone (ANUSOL-HC) 25 MG suppository Place 1 suppository rectally 2 times daily as needed for Hemorrhoids Yes Jaleesa Davis MD   docusate sodium (COLACE) 100 MG capsule Take 1 capsule by mouth 2 times daily Patricia Leiva MD   BASAGLAR KWIKPEN 100 UNIT/ML injection pen INJECT 20 UNITS INTO THE SKIN NIGHTLY  Patricia Leiva MD   lisinopril (PRINIVIL;ZESTRIL) 20 MG tablet Take 1 tablet by mouth daily  Patricia Leiva MD   folic acid (FOLVITE) 1 MG tablet Take 1 tablet by mouth daily  ALVARADO Mcelroy   omeprazole (PRILOSEC) 20 MG delayed release capsule TAKE 1 CAPSULE BY MOUTH TWICE A DAY  Patricia Leiva MD   atorvastatin (LIPITOR) 20 MG tablet TAKE 1 TABLET BY MOUTH EVERYDAY AT BEDTIME  Patient not taking: Reported on 3/3/2021  Patricia Leiva MD   levocetirizine (XYZAL) 5 MG tablet TAKE 1 TABLET BY MOUTH EVERY DAY  Patricia Leiva MD   blood glucose monitor kit and supplies Test 4- 5  times a day & as needed for symptoms of irregular blood glucose. MD Edilia Bañuelos Shear INHUB 250-50 MCG/DOSE AEPB 2 times daily  Historical Provider, MD   traZODone (DESYREL) 50 MG tablet TAKE 1-2 TABLETS AT NIGHT AS DIRECTED  Patient not taking: Reported on 3/3/2021  Patricia Leiva MD   metoprolol (LOPRESSOR) 100 MG tablet TAKE 1 TABLET BY MOUTH TWICE A DAY  Solange Alonso MD   Magnesium 400 MG CAPS Take 400 mg by mouth daily  Patricia Leiva MD   Nebulizers (COMPRESSOR/NEBULIZER) MISC Nebulizer with supplies  Particia Leiva MD   OXYGEN pulse oximeter (not oxygen itself)  Patricia Leiva MD   nystatin (MYCOSTATIN) 902360 UNIT/ML suspension Take 500,000 Units by mouth 4 times daily  Historical Provider, MD   albuterol sulfate HFA (VENTOLIN HFA) 108 (90 Base) MCG/ACT inhaler INHALE TWO PUFFS EVERY FOUR HOURS AS NEEDED FOR THIRTY DAYS  Patricia Leiva MD   SPIRIVA HANDIHALER 18 MCG inhalation capsule INHALE THE CONTENTS OF 1 CAPSULE BY MOUTH EVERYD Alena Betancourt MD   furosemide (LASIX) 20 MG tablet TAKE 1 TABLET BY MOUTH DAILY  Patient taking differently: Take 40 mg by mouth daily TAKE 1 TABLET BY MOUTH DAILY  Patricia Leiva MD   LYRICA 150 MG capsule TAKE ONE CAPSULE BY MOUTH TWICE A DAY.   Patricia Leiva MD HYDROcodone-acetaminophen (NORCO) 7.5-325 MG per tablet Take 1 tablet by mouth. Historical Provider, MD   OXYGEN Inhale into the lungs 3-4 L  Historical Provider, MD   Mycophenolate Sodium 360 MG TBEC Take 360 mg by mouth 2 times daily With the 180mg  Historical Provider, MD   Mycophenolate Sodium (MYCOPHENOLIC ACID) 029 MG TBEC 180 mg 2 times daily With the 360mg  Historical Provider, MD   Blood Glucose Monitoring Suppl (ACCU-CHEK JOANNE PLUS) W/DEVICE KIT   Historical Provider, MD   ACCU-CHEK JOANNE PLUS strip   Historical Provider, MD   Accu-Chek Softclix Lancets MISC   Historical Provider, MD   albuterol (PROVENTIL) (2.5 MG/3ML) 0.083% nebulizer solution   Historical Provider, MD   amLODIPine (NORVASC) 5 MG tablet Take 5 mg by mouth daily   Historical Provider, MD   tacrolimus (PROGRAF) 1 MG capsule Take 1 mg by mouth 2 times daily Take one tablet in the am and 1/2 tablet in the afternoon  Historical Provider, MD   Cyanocobalamin (VITAMIN B 12) 250 MCG LOZG Take  by mouth. Historical Provider, MD       Social History     Tobacco Use    Smoking status: Former Smoker     Packs/day: 3.00     Years: 15.00     Pack years: 45.00     Types: Cigarettes     Start date: 1972     Quit date: 1/10/2002     Years since quittin.1    Smokeless tobacco: Never Used    Tobacco comment: Pt used to smoke quit 9.5 years ago   Substance Use Topics    Alcohol use: No    Drug use: Not Currently     Frequency: 2.0 times per week     Types: Marijuana     Comment: 19 pt stated he stopped 2019        Allergies   Allergen Reactions    Avelox [Moxifloxacin Hydrochloride] Other (See Comments)     Pt states this medicine will make him sweat, turn red, itchy, and pass out.     Clindamycin/Lincomycin Diarrhea     Told by Pul[facility]ologist at [facility] not to take    Moxifloxacin Rash     Sweating    ,   Past Medical History:   Diagnosis Date    [facility]     Emphysema of lung ([facility]     Hypertension     Pneumonia     in left lung    Skin cancer     Wears glasses    ,   Past Surgical History:   Procedure Laterality Date    APPENDECTOMY      CHOLECYSTECTOMY  14    COLONOSCOPY  2008    Dr. Jamaica Lopez: AP    LUNG REMOVAL, PARTIAL      right upper    LUNG TRANSPLANT, SINGLE  2006    left    SKIN CANCER DESTRUCTION      UPPER GASTROINTESTINAL ENDOSCOPY  2008     Dr. Jamaica Lopez   ,   Social History     Tobacco Use    Smoking status: Former Smoker     Packs/day: 3.00     Years: 15.00     Pack years: 45.00     Types: Cigarettes     Start date: 1972     Quit date: 1/10/2002     Years since quittin.1    Smokeless tobacco: Never Used    Tobacco comment: Pt used to smoke quit 9.5 years ago   Substance Use Topics    Alcohol use: No    Drug use: Not Currently     Frequency: 2.0 times per week     Types: Marijuana     Comment: 19 pt stated he stopped 2019   ,   Family History   Problem Relation Age of Onset    Cancer Mother     Heart Disease Father     Colon Cancer Neg Hx     Colon Polyps Neg Hx     Esophageal Cancer Neg Hx     Liver Cancer Neg Hx     Rectal Cancer Neg Hx     Liver Disease Neg Hx     Stomach Cancer Neg Hx    ,   Immunization History   Administered Date(s) Administered    Influenza 11/15/2012    Influenza Vaccine, unspecified formulation 2017    Influenza Virus Vaccine 2015, 10/03/2016    Influenza, High Dose (Fluzone 65 yrs and older) 09/10/2018    Pneumococcal Conjugate 13-valent ([id number]) 09/10/2018    Pneumococcal Polysaccharide ([id number]) 2018    Tdap (Boostrix, Adacel) 10/24/2013   ,   Health Maintenance   Topic Date Due    Diabetic foot exam  Never done    Diabetic retinal exam  Never done    HIV screen  Never done    COVID-19 Vaccine (1) Never done    Shingles Vaccine (1 of 2) Never done    Colon cancer screen colonoscopy  11/15/2016    Lipid screen  2017    Potassium monitoring ICD-10-CM    1. Type 2 diabetes mellitus without complication, with long-term current use of insulin ([facility])  E11.9 insulin lispro, 1 Unit Dial, (HUMALOG KWIKPEN) 100 UNIT/ML SOPN    Z79.4    2. Chronic obstructive pulmonary disease with acute exacerbation ([facility])  J44.1 promethazine-dextromethorphan (PROMETHAZINE-DM) 6.25-15 MG/5ML syrup   3. Hemorrhoids, external  K64.4 hydrocortisone (ANUSOL-HC) 25 MG suppository       We will go ahead and add 5 premeal units in a bolus plus sliding scale manner. Patient to have treatment for his hemorrhoids at this time    Orders Placed This Encounter   Medications    insulin lispro, 1 Unit Dial, (HUMALOG KWIKPEN) 100 UNIT/ML SOPN     Sig: Take 5 U before meals + SS: 151-200 2, 201-250 4, 251-300 6, 301-350 8, 351-400 10     Dispense:  5 pen     Refill:  1    promethazine-dextromethorphan (PROMETHAZINE-DM) 6.25-15 MG/5ML syrup     Sig: Take 5 mLs by mouth 4 times daily as needed for Cough     Dispense:  240 mL     Refill:  0    hydrocortisone (ANUSOL-HC) 25 MG suppository     Sig: Place 1 suppository rectally 2 times daily as needed for Hemorrhoids     Dispense:  24 suppository     Refill:  2       No orders of the defined types were placed in this encounter. Return if symptoms worsen or fail to improve. Kassie Arango is a 56 y.o. male being evaluated by a Virtual Visit (video visit) encounter to address concerns as mentioned above. A caregiver was present when appropriate. Due to this being a TeleHealth encounter ([facility] emergency), evaluation of the following organ systems was limited: Vitals/Constitutional/EENT/Resp/CV/GI//MS/Neuro/Skin/Heme-Lymph-Imm.   Pursuant to the emergency declaration under the [facility], 1135 waiver authority and the [employer] Act, this Virtual Visit was conducted with patient's (and/or legal guardian's) consent, to reduce the patient's risk of exposure to COVID-19 and provide necessary medical care. The patient (and/or legal guardian) has also been advised to contact this office for worsening conditions or problems, and seek emergency medical treatment and/or call 911 if deemed necessary. Services were provided through a video synchronous discussion virtually to substitute for in-person clinic visit. Patient and provider were located at their individual homes or provider at secure site for business. It is possible that material may be posted from a notepad that is used for a Dragon dictation device for dictating notes outside the EMR and I am the original author of all of this content. --Dedrick Jones MD on 3/15/2021 at 4:33 PM    An electronic signature was used to authenticate this note.

## 2021-03-19 ASSESSMENT — ENCOUNTER SYMPTOMS
VOMITING: 0
COUGH: 0
NAUSEA: 0
CONSTIPATION: 0
SHORTNESS OF BREATH: 1
ABDOMINAL PAIN: 0
RECTAL PAIN: 1
WHEEZING: 1
RHINORRHEA: 0
CHEST TIGHTNESS: 0
SORE THROAT: 0
ANAL BLEEDING: 1
DIARRHEA: 0
FACIAL SWELLING: 0

## 2021-03-19 NOTE — PROGRESS NOTES
Kaci Goodwin is a 56 y.o. male who presents today for   Chief Complaint   Patient presents with    Rectal Pain     bumps on rectum, been present for a month, pain level 7    Chest Congestion     mucus       HPI  Patient is here for concern of ongoing issues with his rectum. He notes there has been some bumps on there and has had some pain is gotten to 7 out of 10 at times. He notes that he has had a history of some bleeding in the toilet as well from his rectum. He is due for colon cancer screening. He notes that he is also having issues with productive cough. It is changing for him and he is high risk with his history of lung transplant. No change in PMH, family, social, or surgical history unless mentioned above. Review of Systems   Constitutional: Positive for fatigue. Negative for chills and fever. HENT: Negative for facial swelling, rhinorrhea and sore throat. Respiratory: Positive for shortness of breath and wheezing. Negative for cough and chest tightness. Cardiovascular: Negative for chest pain, palpitations and leg swelling. Gastrointestinal: Positive for anal bleeding and rectal pain. Negative for abdominal pain, constipation, diarrhea, nausea and vomiting. Genitourinary: Negative for difficulty urinating, dysuria and frequency.        Past Medical History:   Diagnosis Date    [facility]) [facility]     Emphysema of lung ([facility]     Hypertension     Pneumonia     in left lung    Skin cancer     Wears glasses        Current Outpatient Medications   Medication Sig Dispense Refill    docusate sodium (COLACE) 100 MG capsule Take 1 capsule by mouth 2 times daily 60 capsule 0    BASAGLAR KWIKPEN 100 UNIT/ML injection pen INJECT 20 UNITS INTO THE SKIN NIGHTLY 5 pen 1    lisinopril (PRINIVIL;ZESTRIL) 20 MG tablet Take 1 tablet by mouth daily 30 tablet 5    folic acid (FOLVITE) 1 MG tablet Take 1 tablet by mouth daily 90 tablet 3    omeprazole (PRILOSEC) 20 MG delayed release capsule TAKE 1 CAPSULE BY MOUTH TWICE A DAY 60 capsule 5    levocetirizine (XYZAL) 5 MG tablet TAKE 1 TABLET BY MOUTH EVERY DAY 90 tablet 1    blood glucose monitor kit and supplies Test 4- 5  times a day & as needed for symptoms of irregular blood glucose. 1 kit 0    WIXELA INHUB 250-50 MCG/DOSE AEPB 2 times daily      metoprolol (LOPRESSOR) 100 MG tablet TAKE 1 TABLET BY MOUTH TWICE A DAY 60 tablet 4    Magnesium 400 MG CAPS Take 400 mg by mouth daily 30 capsule 3    Nebulizers (COMPRESSOR/NEBULIZER) MISC Nebulizer with supplies 1 each 0    albuterol sulfate HFA (VENTOLIN HFA) 108 (90 Base) MCG/ACT inhaler INHALE TWO PUFFS EVERY FOUR HOURS AS NEEDED FOR THIRTY DAYS 18 Inhaler 12    SPIRIVA HANDIHALER 18 MCG inhalation capsule INHALE THE CONTENTS OF 1 CAPSULE BY MOUTH EVERYD AY 30 capsule 5    furosemide (LASIX) 20 MG tablet TAKE 1 TABLET BY MOUTH DAILY (Patient taking differently: Take 40 mg by mouth daily TAKE 1 TABLET BY MOUTH DAILY) 30 tablet 10    LYRICA 150 MG capsule TAKE ONE CAPSULE BY MOUTH TWICE A DAY. 60 capsule 5    HYDROcodone-acetaminophen (NORCO) 7.5-325 MG per tablet Take 1 tablet by mouth.  OXYGEN Inhale into the lungs 3-4 L      Mycophenolate Sodium 360 MG TBEC Take 360 mg by mouth 2 times daily With the 180mg      Mycophenolate Sodium (MYCOPHENOLIC ACID) 773 MG TBEC 180 mg 2 times daily With the 360mg      Blood Glucose Monitoring Suppl (ACCU-CHEK JOANNE PLUS) W/DEVICE KIT   0    ACCU-CHEK JOANNE PLUS strip   3    Accu-Chek Softclix Lancets MISC   3    albuterol (PROVENTIL) (2.5 MG/3ML) 0.083% nebulizer solution       amLODIPine (NORVASC) 5 MG tablet Take 5 mg by mouth daily   11    tacrolimus (PROGRAF) 1 MG capsule Take 1 mg by mouth 2 times daily Take one tablet in the am and 1/2 tablet in the afternoon      Cyanocobalamin (VITAMIN B 12) 250 MCG LOZG Take  by mouth.         insulin lispro, 1 Unit Dial, (HUMALOG KWIKPEN) 100 UNIT/ML SOPN Take 5 U before meals + SS: 151-200 2, 201-250 4, 251-300 6, 301-350 8, 351-400 10 5 pen 1    promethazine-dextromethorphan (PROMETHAZINE-DM) 6.25-15 MG/5ML syrup Take 5 mLs by mouth 4 times daily as needed for Cough 240 mL 0    hydrocortisone (ANUSOL-HC) 25 MG suppository Place 1 suppository rectally 2 times daily as needed for Hemorrhoids 24 suppository 2    atorvastatin (LIPITOR) 20 MG tablet TAKE 1 TABLET BY MOUTH EVERYDAY AT BEDTIME (Patient not taking: Reported on 3/3/2021) 30 tablet 11    traZODone (DESYREL) 50 MG tablet TAKE 1-2 TABLETS AT NIGHT AS DIRECTED (Patient not taking: Reported on 3/3/2021) 60 tablet 0    OXYGEN pulse oximeter (not oxygen itself) 1 kit 0    nystatin (MYCOSTATIN) 963202 UNIT/ML suspension Take 500,000 Units by mouth 4 times daily       Current Facility-Administered Medications   Medication Dose Route Frequency Provider Last Rate Last Admin    magnesium sulfate injection 4 g  4 g Intravenous Once Pam Dandy, MD           Allergies   Allergen Reactions    Avelox [Moxifloxacin Hydrochloride] Other (See Comments)     Pt states this medicine will make him sweat, turn red, itchy, and pass out.     Clindamycin/Lincomycin Diarrhea     Told by Pul[facility]ologist at [facility] not to take    Moxifloxacin Rash     Sweating        Past Surgical History:   Procedure Laterality Date    APPENDECTOMY      CHOLECYSTECTOMY  14    COLONOSCOPY  2008    Dr. Radha Zabala: AP    LUNG REMOVAL, PARTIAL      right upper    LUNG TRANSPLANT, SINGLE  2006    left    SKIN CANCER DESTRUCTION      UPPER GASTROINTESTINAL ENDOSCOPY  2008     Dr. Radha Zabala       Social History     Tobacco Use    Smoking status: Former Smoker     Packs/day: 3.00     Years: 15.00     Pack years: 45.00     Types: Cigarettes     Start date: 1972     Quit date: 1/10/2002     Years since quittin.2    Smokeless tobacco: Never Used    Tobacco comment: Pt used to smoke quit 9.5 years ago   Substance Use Topics    Alcohol use: No    Drug use: Not Currently     Frequency: 2.0 times per week     Types: Marijuana     Comment: 5/14/19 pt stated he stopped Feb 2019       Family History   Problem Relation Age of Onset    Cancer Mother     Heart Disease Father     Colon Cancer Neg Hx     Colon Polyps Neg Hx     Esophageal Cancer Neg Hx     Liver Cancer Neg Hx     Rectal Cancer Neg Hx     Liver Disease Neg Hx     Stomach Cancer Neg Hx        /62 (Site: Left Upper Arm)   Pulse 90   Temp 98.5 °F (36.9 °C)   Ht 5' 8\" (1.727 m)   Wt 152 lb (68.9 kg)   SpO2 98%   BMI 23.11 kg/m²     Physical Exam  Constitutional:       General: He is not in acute distress. Appearance: He is ill-appearing. Neck:      Trachea: No tracheal deviation. Pulmonary:      Breath sounds: No stridor. Wheezing present. No rales. Genitourinary:     Rectum: Tenderness, external hemorrhoid and internal hemorrhoid present. No mass or anal fissure. Normal anal tone. Assessment:    ICD-10-CM    1. Rectal bleeding  K62.5    2. Colon cancer screening  Z12.11 Cologuard (For External Results Only)     Cologuard (For External Results Only)   3. External hemorrhoid  K64.4    4. Benign prostatic hyperplasia without lower urinary tract symptoms  N40.0    5. [facility]  Z94.2    6. COPD exacerbation ([facility]  J44.1        Plan:   Patient declines to have colonoscopy for which I have recommended that to him but he does have significant history of pulmonary disease and I do believe that is very justifiable to proceed with doing colon cancer testing by me no chemical testing. Patient will be treated for COPD exacerbation. Patient also have change in fiber in diet along with Colace  Orders Placed This Encounter   Procedures    Cologuard (For External Results Only)     This test is performed by an external laboratory and is used for result attachment only.   It is required that this order requisition be faxed to: Shazia Soto @ [phone]. See [email or web address] for further information. Standing Status:   Future     Number of Occurrences:   1     Standing Expiration Date:   3/1/2022     Orders Placed This Encounter   Medications    predniSONE (DELTASONE) 20 MG tablet     Sig: Take 2 tablets by mouth daily for 5 days     Dispense:  10 tablet     Refill:  0    levoFLOXacin (LEVAQUIN) 750 MG tablet     Sig: Take 1 tablet by mouth daily for 5 days (YELLOW ZONE)     Dispense:  5 tablet     Refill:  0    docusate sodium (COLACE) 100 MG capsule     Sig: Take 1 capsule by mouth 2 times daily     Dispense:  60 capsule     Refill:  0     There are no discontinued medications. There are no Patient Instructions on file for this visit. Patient given educational handouts and has had all questions answered. Patient voices understanding and agrees to plans along with risks and benefits of plan. Patient isinstructed to continue prior meds, diet, and exercise plans unless instructed otherwise. Patient agrees to follow up as instructed and sooner if needed. Patient agrees to go to ER if condition becomes emergent. Notesmay be completed with dictation device and spelling errors may occur. Materials may be copied and pasted from a notepad outside of EMR, all of which, I, Dr. Christiano Avery MD, take sole intellectual ownership of and have approved adding to my note. Return if symptoms worsen or fail to improve.

## 2021-03-27 ENCOUNTER — APPOINTMENT (OUTPATIENT)
Dept: GENERAL RADIOLOGY | Facility: HOSPITAL | Age: 61
End: 2021-03-27

## 2021-03-27 ENCOUNTER — HOSPITAL ENCOUNTER (EMERGENCY)
Facility: HOSPITAL | Age: 61
Discharge: ANOTHER HOSPITAL | End: 2021-03-27
Admitting: EMERGENCY MEDICINE

## 2021-03-27 VITALS
WEIGHT: 153 LBS | BODY MASS INDEX: 22.66 KG/M2 | OXYGEN SATURATION: 96 % | RESPIRATION RATE: 20 BRPM | HEART RATE: 115 BPM | HEIGHT: 69 IN | TEMPERATURE: 100.7 F | DIASTOLIC BLOOD PRESSURE: 80 MMHG | SYSTOLIC BLOOD PRESSURE: 126 MMHG

## 2021-03-27 DIAGNOSIS — Z94.2 S/P LUNG TRANSPLANT (HCC): ICD-10-CM

## 2021-03-27 DIAGNOSIS — J18.9 PNEUMONIA OF LEFT LUNG DUE TO INFECTIOUS ORGANISM, UNSPECIFIED PART OF LUNG: Primary | ICD-10-CM

## 2021-03-27 LAB
ALBUMIN SERPL-MCNC: 4.1 G/DL (ref 3.5–5.2)
ALBUMIN/GLOB SERPL: 1.5 G/DL
ALP SERPL-CCNC: 62 U/L (ref 39–117)
ALT SERPL W P-5'-P-CCNC: 12 U/L (ref 1–41)
AMPHET+METHAMPHET UR QL: NEGATIVE
AMPHETAMINES UR QL: NEGATIVE
ANION GAP SERPL CALCULATED.3IONS-SCNC: 13 MMOL/L (ref 5–15)
ARTERIAL PATENCY WRIST A: POSITIVE
AST SERPL-CCNC: 18 U/L (ref 1–40)
ATMOSPHERIC PRESS: 746 MMHG
BARBITURATES UR QL SCN: NEGATIVE
BASE EXCESS BLDA CALC-SCNC: 10.8 MMOL/L (ref 0–2)
BASOPHILS # BLD AUTO: 0.04 10*3/MM3 (ref 0–0.2)
BASOPHILS NFR BLD AUTO: 0.2 % (ref 0–1.5)
BDY SITE: ABNORMAL
BENZODIAZ UR QL SCN: NEGATIVE
BILIRUB SERPL-MCNC: 0.8 MG/DL (ref 0–1.2)
BODY TEMPERATURE: 37 C
BUN SERPL-MCNC: 32 MG/DL (ref 8–23)
BUN/CREAT SERPL: 20.9 (ref 7–25)
BUPRENORPHINE SERPL-MCNC: NEGATIVE NG/ML
CALCIUM SPEC-SCNC: 9.8 MG/DL (ref 8.6–10.5)
CANNABINOIDS SERPL QL: NEGATIVE
CHLORIDE SERPL-SCNC: 92 MMOL/L (ref 98–107)
CO2 SERPL-SCNC: 33 MMOL/L (ref 22–29)
COCAINE UR QL: NEGATIVE
CREAT SERPL-MCNC: 1.53 MG/DL (ref 0.76–1.27)
CRP SERPL-MCNC: 2.1 MG/DL (ref 0–0.5)
D DIMER PPP FEU-MCNC: 0.4 MG/L (FEU) (ref 0–0.5)
D-LACTATE SERPL-SCNC: 0.9 MMOL/L (ref 0.5–2)
DEPRECATED RDW RBC AUTO: 40.3 FL (ref 37–54)
EOSINOPHIL # BLD AUTO: 0.06 10*3/MM3 (ref 0–0.4)
EOSINOPHIL NFR BLD AUTO: 0.4 % (ref 0.3–6.2)
ERYTHROCYTE [DISTWIDTH] IN BLOOD BY AUTOMATED COUNT: 13.1 % (ref 12.3–15.4)
GAS FLOW AIRWAY: 3 LPM
GFR SERPL CREATININE-BSD FRML MDRD: 46 ML/MIN/1.73
GLOBULIN UR ELPH-MCNC: 2.7 GM/DL
GLUCOSE BLDC GLUCOMTR-MCNC: 103 MG/DL (ref 70–130)
GLUCOSE SERPL-MCNC: 104 MG/DL (ref 65–99)
HCO3 BLDA-SCNC: 35.9 MMOL/L (ref 20–26)
HCT VFR BLD AUTO: 36.9 % (ref 37.5–51)
HGB BLD-MCNC: 12.6 G/DL (ref 13–17.7)
IMM GRANULOCYTES # BLD AUTO: 0.08 10*3/MM3 (ref 0–0.05)
IMM GRANULOCYTES NFR BLD AUTO: 0.5 % (ref 0–0.5)
LYMPHOCYTES # BLD AUTO: 0.94 10*3/MM3 (ref 0.7–3.1)
LYMPHOCYTES NFR BLD AUTO: 5.8 % (ref 19.6–45.3)
Lab: ABNORMAL
MAGNESIUM SERPL-MCNC: 1.3 MG/DL (ref 1.6–2.4)
MCH RBC QN AUTO: 28.6 PG (ref 26.6–33)
MCHC RBC AUTO-ENTMCNC: 34.1 G/DL (ref 31.5–35.7)
MCV RBC AUTO: 83.9 FL (ref 79–97)
METHADONE UR QL SCN: NEGATIVE
MODALITY: ABNORMAL
MONOCYTES # BLD AUTO: 0.98 10*3/MM3 (ref 0.1–0.9)
MONOCYTES NFR BLD AUTO: 6.1 % (ref 5–12)
NEUTROPHILS NFR BLD AUTO: 14.06 10*3/MM3 (ref 1.7–7)
NEUTROPHILS NFR BLD AUTO: 87 % (ref 42.7–76)
NRBC BLD AUTO-RTO: 0 /100 WBC (ref 0–0.2)
OPIATES UR QL: POSITIVE
OXYCODONE UR QL SCN: NEGATIVE
PCO2 BLDA: 48.2 MM HG (ref 35–45)
PCO2 TEMP ADJ BLD: 48.2 MM HG (ref 35–45)
PCP UR QL SCN: NEGATIVE
PH BLDA: 7.48 PH UNITS (ref 7.35–7.45)
PH, TEMP CORRECTED: 7.48 PH UNITS (ref 7.35–7.45)
PLATELET # BLD AUTO: 193 10*3/MM3 (ref 140–450)
PMV BLD AUTO: 11.1 FL (ref 6–12)
PO2 BLDA: 98 MM HG (ref 83–108)
PO2 TEMP ADJ BLD: 98 MM HG (ref 83–108)
POTASSIUM SERPL-SCNC: 3.5 MMOL/L (ref 3.5–5.2)
PROCALCITONIN SERPL-MCNC: 0.12 NG/ML (ref 0–0.25)
PROPOXYPH UR QL: NEGATIVE
PROT SERPL-MCNC: 6.8 G/DL (ref 6–8.5)
RBC # BLD AUTO: 4.4 10*6/MM3 (ref 4.14–5.8)
SAO2 % BLDCOA: 96.4 % (ref 94–99)
SARS-COV-2 RNA PNL SPEC NAA+PROBE: NOT DETECTED
SODIUM SERPL-SCNC: 138 MMOL/L (ref 136–145)
TRICYCLICS UR QL SCN: NEGATIVE
VENTILATOR MODE: ABNORMAL
WBC # BLD AUTO: 16.16 10*3/MM3 (ref 3.4–10.8)

## 2021-03-27 PROCEDURE — 93005 ELECTROCARDIOGRAM TRACING: CPT | Performed by: PHYSICIAN ASSISTANT

## 2021-03-27 PROCEDURE — 86140 C-REACTIVE PROTEIN: CPT | Performed by: PHYSICIAN ASSISTANT

## 2021-03-27 PROCEDURE — 25010000002 MAGNESIUM SULFATE 2 GM/50ML SOLUTION: Performed by: PHYSICIAN ASSISTANT

## 2021-03-27 PROCEDURE — 87635 SARS-COV-2 COVID-19 AMP PRB: CPT | Performed by: PHYSICIAN ASSISTANT

## 2021-03-27 PROCEDURE — 80306 DRUG TEST PRSMV INSTRMNT: CPT | Performed by: PHYSICIAN ASSISTANT

## 2021-03-27 PROCEDURE — 85025 COMPLETE CBC W/AUTO DIFF WBC: CPT | Performed by: PHYSICIAN ASSISTANT

## 2021-03-27 PROCEDURE — 96367 TX/PROPH/DG ADDL SEQ IV INF: CPT

## 2021-03-27 PROCEDURE — 82962 GLUCOSE BLOOD TEST: CPT

## 2021-03-27 PROCEDURE — 85379 FIBRIN DEGRADATION QUANT: CPT | Performed by: PHYSICIAN ASSISTANT

## 2021-03-27 PROCEDURE — 25010000002 VANCOMYCIN 10 G RECONSTITUTED SOLUTION: Performed by: PHYSICIAN ASSISTANT

## 2021-03-27 PROCEDURE — 83735 ASSAY OF MAGNESIUM: CPT | Performed by: PHYSICIAN ASSISTANT

## 2021-03-27 PROCEDURE — 96376 TX/PRO/DX INJ SAME DRUG ADON: CPT

## 2021-03-27 PROCEDURE — 25010000002 MORPHINE PER 10 MG: Performed by: PHYSICIAN ASSISTANT

## 2021-03-27 PROCEDURE — 83605 ASSAY OF LACTIC ACID: CPT | Performed by: PHYSICIAN ASSISTANT

## 2021-03-27 PROCEDURE — 80053 COMPREHEN METABOLIC PANEL: CPT | Performed by: PHYSICIAN ASSISTANT

## 2021-03-27 PROCEDURE — 96375 TX/PRO/DX INJ NEW DRUG ADDON: CPT

## 2021-03-27 PROCEDURE — 82803 BLOOD GASES ANY COMBINATION: CPT

## 2021-03-27 PROCEDURE — 25010000002 CEFEPIME PER 500 MG: Performed by: PHYSICIAN ASSISTANT

## 2021-03-27 PROCEDURE — 99285 EMERGENCY DEPT VISIT HI MDM: CPT

## 2021-03-27 PROCEDURE — 36600 WITHDRAWAL OF ARTERIAL BLOOD: CPT

## 2021-03-27 PROCEDURE — 84145 PROCALCITONIN (PCT): CPT | Performed by: PHYSICIAN ASSISTANT

## 2021-03-27 PROCEDURE — 25010000002 AZITHROMYCIN PER 500 MG: Performed by: PHYSICIAN ASSISTANT

## 2021-03-27 PROCEDURE — 93010 ELECTROCARDIOGRAM REPORT: CPT | Performed by: INTERNAL MEDICINE

## 2021-03-27 PROCEDURE — 96361 HYDRATE IV INFUSION ADD-ON: CPT

## 2021-03-27 PROCEDURE — 25010000002 MORPHINE PER 10 MG: Performed by: EMERGENCY MEDICINE

## 2021-03-27 PROCEDURE — 71045 X-RAY EXAM CHEST 1 VIEW: CPT

## 2021-03-27 PROCEDURE — 96365 THER/PROPH/DIAG IV INF INIT: CPT

## 2021-03-27 PROCEDURE — 25010000002 CEFTRIAXONE PER 250 MG: Performed by: PHYSICIAN ASSISTANT

## 2021-03-27 RX ORDER — MAGNESIUM SULFATE HEPTAHYDRATE 40 MG/ML
2 INJECTION, SOLUTION INTRAVENOUS ONCE
Status: COMPLETED | OUTPATIENT
Start: 2021-03-27 | End: 2021-03-27

## 2021-03-27 RX ORDER — ACETAMINOPHEN 500 MG
1000 TABLET ORAL ONCE
Status: COMPLETED | OUTPATIENT
Start: 2021-03-27 | End: 2021-03-27

## 2021-03-27 RX ORDER — SODIUM CHLORIDE 0.9 % (FLUSH) 0.9 %
10 SYRINGE (ML) INJECTION AS NEEDED
Status: DISCONTINUED | OUTPATIENT
Start: 2021-03-27 | End: 2021-03-28 | Stop reason: HOSPADM

## 2021-03-27 RX ORDER — IPRATROPIUM BROMIDE AND ALBUTEROL SULFATE 2.5; .5 MG/3ML; MG/3ML
3 SOLUTION RESPIRATORY (INHALATION) ONCE
Status: DISCONTINUED | OUTPATIENT
Start: 2021-03-27 | End: 2021-03-28 | Stop reason: HOSPADM

## 2021-03-27 RX ADMIN — MORPHINE SULFATE 4 MG: 4 INJECTION, SOLUTION INTRAMUSCULAR; INTRAVENOUS at 22:16

## 2021-03-27 RX ADMIN — SODIUM CHLORIDE, POTASSIUM CHLORIDE, SODIUM LACTATE AND CALCIUM CHLORIDE 2082 ML: 600; 310; 30; 20 INJECTION, SOLUTION INTRAVENOUS at 18:47

## 2021-03-27 RX ADMIN — AZITHROMYCIN MONOHYDRATE 500 MG: 500 INJECTION, POWDER, LYOPHILIZED, FOR SOLUTION INTRAVENOUS at 19:05

## 2021-03-27 RX ADMIN — MORPHINE SULFATE 4 MG: 4 INJECTION, SOLUTION INTRAMUSCULAR; INTRAVENOUS at 19:21

## 2021-03-27 RX ADMIN — CEFEPIME HYDROCHLORIDE 2 G: 2 INJECTION, POWDER, FOR SOLUTION INTRAVENOUS at 22:00

## 2021-03-27 RX ADMIN — SODIUM CHLORIDE 1 G: 900 INJECTION INTRAVENOUS at 18:47

## 2021-03-27 RX ADMIN — ACETAMINOPHEN 1000 MG: 500 TABLET, FILM COATED ORAL at 17:53

## 2021-03-27 RX ADMIN — VANCOMYCIN HYDROCHLORIDE 1500 MG: 10 INJECTION, POWDER, LYOPHILIZED, FOR SOLUTION INTRAVENOUS at 22:00

## 2021-03-27 RX ADMIN — MAGNESIUM SULFATE HEPTAHYDRATE 2 G: 40 INJECTION, SOLUTION INTRAVENOUS at 18:54

## 2021-03-28 LAB
QT INTERVAL: 326 MS
QTC INTERVAL: 470 MS

## 2021-04-06 PROBLEM — R91.8 LUNG INFILTRATE: Status: ACTIVE | Noted: 2021-04-06

## 2021-04-08 ENCOUNTER — TELEPHONE (OUTPATIENT)
Dept: PRIMARY CARE CLINIC | Age: 61
End: 2021-04-08
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
## 2021-04-09 ENCOUNTER — OFFICE VISIT (OUTPATIENT)
Dept: PRIMARY CARE CLINIC | Age: 61
End: 2021-04-09
Payer: MEDICAID

## 2021-04-09 VITALS
SYSTOLIC BLOOD PRESSURE: 128 MMHG | BODY MASS INDEX: 23.11 KG/M2 | TEMPERATURE: 97.7 F | WEIGHT: 156 LBS | OXYGEN SATURATION: 98 % | DIASTOLIC BLOOD PRESSURE: 74 MMHG | HEART RATE: 77 BPM | HEIGHT: 69 IN | RESPIRATION RATE: 16 BRPM

## 2021-04-09 DIAGNOSIS — Z79.4 TYPE 2 DIABETES MELLITUS WITHOUT COMPLICATION, WITH LONG-TERM CURRENT USE OF INSULIN (HCC): Primary | ICD-10-CM

## 2021-04-09 DIAGNOSIS — I10 ESSENTIAL HYPERTENSION: ICD-10-CM

## 2021-04-09 DIAGNOSIS — Z09 HOSPITAL DISCHARGE FOLLOW-UP: ICD-10-CM

## 2021-04-09 DIAGNOSIS — E11.9 TYPE 2 DIABETES MELLITUS WITHOUT COMPLICATION, WITH LONG-TERM CURRENT USE OF INSULIN (HCC): Primary | ICD-10-CM

## 2021-04-09 DIAGNOSIS — J43.9 PULMONARY EMPHYSEMA, UNSPECIFIED EMPHYSEMA TYPE (HCC): ICD-10-CM

## 2021-04-09 DIAGNOSIS — J18.9 PNEUMONIA OF LEFT LUNG DUE TO INFECTIOUS ORGANISM, UNSPECIFIED PART OF LUNG: ICD-10-CM

## 2021-04-09 DIAGNOSIS — L25.8 CONTACT DERMATITIS DUE TO OTHER AGENT, UNSPECIFIED CONTACT DERMATITIS TYPE: ICD-10-CM

## 2021-04-09 LAB
CHP ED QC CHECK: NORMAL
GLUCOSE BLD-MCNC: 205 MG/DL

## 2021-04-09 PROCEDURE — 99214 OFFICE O/P EST MOD 30 MIN: CPT | Performed by: NURSE PRACTITIONER

## 2021-04-09 PROCEDURE — G8427 DOCREV CUR MEDS BY ELIG CLIN: HCPCS | Performed by: NURSE PRACTITIONER

## 2021-04-09 PROCEDURE — 1036F TOBACCO NON-USER: CPT | Performed by: NURSE PRACTITIONER

## 2021-04-09 PROCEDURE — G8420 CALC BMI NORM PARAMETERS: HCPCS | Performed by: NURSE PRACTITIONER

## 2021-04-09 PROCEDURE — 2022F DILAT RTA XM EVC RTNOPTHY: CPT | Performed by: NURSE PRACTITIONER

## 2021-04-09 PROCEDURE — 82962 GLUCOSE BLOOD TEST: CPT | Performed by: NURSE PRACTITIONER

## 2021-04-09 PROCEDURE — G8926 SPIRO NO PERF OR DOC: HCPCS | Performed by: NURSE PRACTITIONER

## 2021-04-09 PROCEDURE — 3017F COLORECTAL CA SCREEN DOC REV: CPT | Performed by: NURSE PRACTITIONER

## 2021-04-09 PROCEDURE — 3046F HEMOGLOBIN A1C LEVEL >9.0%: CPT | Performed by: NURSE PRACTITIONER

## 2021-04-09 PROCEDURE — 3023F SPIROM DOC REV: CPT | Performed by: NURSE PRACTITIONER

## 2021-04-09 RX ORDER — MAGNESIUM OXIDE 400 MG/1
TABLET ORAL
COMMUNITY
Start: 2021-03-12

## 2021-04-09 RX ORDER — VANCOMYCIN HYDROCHLORIDE 125 MG/1
CAPSULE ORAL
COMMUNITY
Start: 2021-04-06

## 2021-04-09 RX ORDER — TIZANIDINE 4 MG/1
TABLET ORAL
COMMUNITY
Start: 2021-04-06

## 2021-04-09 RX ORDER — FLUDROCORTISONE ACETATE 0.1 MG/1
TABLET ORAL
COMMUNITY
Start: 2021-04-06

## 2021-04-09 RX ORDER — INSULIN LISPRO 100 [IU]/ML
1 INJECTION, SOLUTION INTRAVENOUS; SUBCUTANEOUS 3 TIMES DAILY
Qty: 1 PEN | Refills: 0 | Status: SHIPPED | OUTPATIENT
Start: 2021-04-09 | End: 2021-06-22

## 2021-04-09 RX ORDER — PREDNISONE 1 MG/1
TABLET ORAL
COMMUNITY
Start: 2021-03-27

## 2021-04-09 RX ORDER — DOXYCYCLINE 100 MG/1
CAPSULE ORAL
COMMUNITY
Start: 2021-03-30 | End: 2022-05-26 | Stop reason: ALTCHOICE

## 2021-04-09 RX ORDER — HYDROCHLOROTHIAZIDE 50 MG/1
TABLET ORAL
COMMUNITY
Start: 2021-03-28 | End: 2022-05-26

## 2021-04-09 RX ORDER — PEN NEEDLE, DIABETIC 31 GX5/16"
NEEDLE, DISPOSABLE MISCELLANEOUS
COMMUNITY
Start: 2021-02-04 | End: 2021-09-22

## 2021-04-09 RX ORDER — IPRATROPIUM BROMIDE AND ALBUTEROL SULFATE 2.5; .5 MG/3ML; MG/3ML
SOLUTION RESPIRATORY (INHALATION)
COMMUNITY
Start: 2021-03-08

## 2021-04-09 RX ORDER — ERGOCALCIFEROL 1.25 MG/1
CAPSULE ORAL
COMMUNITY
Start: 2021-04-07

## 2021-04-09 RX ORDER — SULFAMETHOXAZOLE AND TRIMETHOPRIM 400; 80 MG/1; MG/1
TABLET ORAL
COMMUNITY
Start: 2021-03-24 | End: 2021-09-21 | Stop reason: ALTCHOICE

## 2021-04-09 RX ORDER — APIXABAN 5 MG/1
TABLET, FILM COATED ORAL
COMMUNITY
Start: 2021-03-31

## 2021-04-09 ASSESSMENT — ENCOUNTER SYMPTOMS
VOMITING: 0
CHEST TIGHTNESS: 0
SHORTNESS OF BREATH: 0
SORE THROAT: 0
ABDOMINAL PAIN: 0
NAUSEA: 0
COUGH: 0
COLOR CHANGE: 0
DIARRHEA: 0
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
## 2021-04-09 NOTE — PATIENT INSTRUCTIONS
1. Keep blood sugar log for 2 weeks. 2.Stop humalog at meals  3. Start admelog at meals per sliding scale. 4. Continue lantus at night  5. Follow up in 2 weeks or sooner if worse. We are committed to providing you with the best care possible. In order to help us achieve these goals please remember to bring all medications, herbal products, and over the counter supplements with you to each visit. If your provider has ordered testing for you, please be sure to follow up with our office if you have not received results within 7 days after the testing took place. *If you receive a survey after visiting one of our offices, please take time to share your experience concerning your physician office visit. These surveys are confidential and no health information about you is shared. We are eager to improve for you and we are counting on your feedback to help make that happen.

## 2021-04-12 ENCOUNTER — TRANSCRIBE ORDERS (OUTPATIENT)
Dept: ADMINISTRATIVE | Facility: HOSPITAL | Age: 61
End: 2021-04-12

## 2021-04-12 ENCOUNTER — LAB (OUTPATIENT)
Dept: LAB | Facility: HOSPITAL | Age: 61
End: 2021-04-12

## 2021-04-12 DIAGNOSIS — Z94.2 LUNG TRANSPLANTED (HCC): ICD-10-CM

## 2021-04-12 DIAGNOSIS — N18.9 CHRONIC KIDNEY DISEASE, UNSPECIFIED CKD STAGE: ICD-10-CM

## 2021-04-12 DIAGNOSIS — Z94.2 LUNG TRANSPLANTED (HCC): Primary | ICD-10-CM

## 2021-04-12 LAB
ANION GAP SERPL CALCULATED.3IONS-SCNC: 11 MMOL/L (ref 5–15)
BASOPHILS # BLD AUTO: 0.1 10*3/MM3 (ref 0–0.2)
BASOPHILS NFR BLD AUTO: 0.7 % (ref 0–1.5)
BUN SERPL-MCNC: 23 MG/DL (ref 8–23)
BUN/CREAT SERPL: 16.8 (ref 7–25)
CALCIUM SPEC-SCNC: 8.9 MG/DL (ref 8.6–10.5)
CHLORIDE SERPL-SCNC: 94 MMOL/L (ref 98–107)
CO2 SERPL-SCNC: 34 MMOL/L (ref 22–29)
CREAT SERPL-MCNC: 1.37 MG/DL (ref 0.76–1.27)
DEPRECATED RDW RBC AUTO: 42.7 FL (ref 37–54)
EOSINOPHIL # BLD AUTO: 0.33 10*3/MM3 (ref 0–0.4)
EOSINOPHIL NFR BLD AUTO: 2.3 % (ref 0.3–6.2)
ERYTHROCYTE [DISTWIDTH] IN BLOOD BY AUTOMATED COUNT: 13.6 % (ref 12.3–15.4)
GFR SERPL CREATININE-BSD FRML MDRD: 53 ML/MIN/1.73
GLUCOSE SERPL-MCNC: 292 MG/DL (ref 65–99)
HCT VFR BLD AUTO: 36.3 % (ref 37.5–51)
HGB BLD-MCNC: 11.7 G/DL (ref 13–17.7)
IMM GRANULOCYTES # BLD AUTO: 0.28 10*3/MM3 (ref 0–0.05)
IMM GRANULOCYTES NFR BLD AUTO: 1.9 % (ref 0–0.5)
LYMPHOCYTES # BLD AUTO: 1.28 10*3/MM3 (ref 0.7–3.1)
LYMPHOCYTES NFR BLD AUTO: 8.8 % (ref 19.6–45.3)
MAGNESIUM SERPL-MCNC: 1.3 MG/DL (ref 1.6–2.4)
MCH RBC QN AUTO: 28.2 PG (ref 26.6–33)
MCHC RBC AUTO-ENTMCNC: 32.2 G/DL (ref 31.5–35.7)
MCV RBC AUTO: 87.5 FL (ref 79–97)
MONOCYTES # BLD AUTO: 1.13 10*3/MM3 (ref 0.1–0.9)
MONOCYTES NFR BLD AUTO: 7.8 % (ref 5–12)
NEUTROPHILS NFR BLD AUTO: 11.38 10*3/MM3 (ref 1.7–7)
NEUTROPHILS NFR BLD AUTO: 78.5 % (ref 42.7–76)
NRBC BLD AUTO-RTO: 0 /100 WBC (ref 0–0.2)
PLATELET # BLD AUTO: 289 10*3/MM3 (ref 140–450)
PMV BLD AUTO: 10.4 FL (ref 6–12)
POTASSIUM SERPL-SCNC: 3.3 MMOL/L (ref 3.5–5.2)
RBC # BLD AUTO: 4.15 10*6/MM3 (ref 4.14–5.8)
SODIUM SERPL-SCNC: 139 MMOL/L (ref 136–145)
WBC # BLD AUTO: 14.5 10*3/MM3 (ref 3.4–10.8)

## 2021-04-12 PROCEDURE — 85025 COMPLETE CBC W/AUTO DIFF WBC: CPT

## 2021-04-12 PROCEDURE — 83735 ASSAY OF MAGNESIUM: CPT

## 2021-04-12 PROCEDURE — 80048 BASIC METABOLIC PNL TOTAL CA: CPT

## 2021-04-12 PROCEDURE — 36415 COLL VENOUS BLD VENIPUNCTURE: CPT

## 2021-04-12 PROCEDURE — 80197 ASSAY OF TACROLIMUS: CPT

## 2021-04-14 LAB
CMV DNA SERPL NAA+PROBE-ACNC: NORMAL IU/ML
CMV DNA SERPL NAA+PROBE-LOG IU: NORMAL {LOG_IU}/ML
TACROLIMUS BLD LC/MS/MS-MCNC: 2.6 NG/ML (ref 2–20)

## 2021-04-15 ENCOUNTER — TELEPHONE (OUTPATIENT)
Dept: PRIMARY CARE CLINIC | Age: 61
End: 2021-04-15

## 2021-04-15 NOTE — TELEPHONE ENCOUNTER
Edwin Duty from [facility] called stating patient needs a PA on ademog.  PA has been submitted Impression: Presbyopia: H52.4. Plan: New glasses Rx was given today.

## 2021-04-20 ENCOUNTER — TELEPHONE (OUTPATIENT)
Dept: PRIMARY CARE CLINIC | Age: 61
End: 2021-04-20

## 2021-04-29 ENCOUNTER — VIRTUAL VISIT (OUTPATIENT)
Dept: PRIMARY CARE CLINIC | Age: 61
End: 2021-04-29
Payer: MEDICAID

## 2021-04-29 DIAGNOSIS — Z71.9 ENCOUNTER FOR COUNSELING: ICD-10-CM

## 2021-04-29 DIAGNOSIS — K59.00 CONSTIPATION, UNSPECIFIED CONSTIPATION TYPE: ICD-10-CM

## 2021-04-29 DIAGNOSIS — K64.9 HEMORRHOIDS, UNSPECIFIED HEMORRHOID TYPE: Primary | ICD-10-CM

## 2021-04-29 PROCEDURE — 99443 PR PHYS/QHP TELEPHONE EVALUATION 21-30 MIN: CPT | Performed by: NURSE PRACTITIONER

## 2021-04-29 RX ORDER — ALUMINUM ZIRCONIUM OCTACHLOROHYDREX GLY 16 G/100G
GEL TOPICAL
Qty: 1 BOTTLE | Refills: 2 | Status: SHIPPED | OUTPATIENT
Start: 2021-04-29

## 2021-04-29 RX ORDER — DOCUSATE SODIUM 100 MG/1
100 CAPSULE, LIQUID FILLED ORAL 2 TIMES DAILY
Qty: 60 CAPSULE | Refills: 1 | Status: SHIPPED | OUTPATIENT
Start: 2021-04-29 | End: 2021-05-29

## 2021-04-29 RX ORDER — HYDROCORTISONE 25 MG/G
CREAM TOPICAL
Qty: 1 TUBE | Refills: 1 | Status: SHIPPED | OUTPATIENT
Start: 2021-04-29

## 2021-04-29 ASSESSMENT — PATIENT HEALTH QUESTIONNAIRE - PHQ9
SUM OF ALL RESPONSES TO PHQ QUESTIONS 1-9: 0
SUM OF ALL RESPONSES TO PHQ QUESTIONS 1-9: 0
1. LITTLE INTEREST OR PLEASURE IN DOING THINGS: 0
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
## 2021-04-30 ASSESSMENT — ENCOUNTER SYMPTOMS
RESPIRATORY NEGATIVE: 1
RECTAL PAIN: 1
BLOOD IN STOOL: 1
EYES NEGATIVE: 1

## 2021-05-31 RX ORDER — LISINOPRIL 20 MG/1
TABLET ORAL
Qty: 30 TABLET | Refills: 5 | Status: SHIPPED | OUTPATIENT
Start: 2021-05-31 | End: 2021-06-22 | Stop reason: SDUPTHER

## 2021-06-22 ENCOUNTER — OFFICE VISIT (OUTPATIENT)
Dept: PRIMARY CARE CLINIC | Age: 61
End: 2021-06-22
Payer: MEDICAID

## 2021-06-22 VITALS
OXYGEN SATURATION: 99 % | TEMPERATURE: 97.3 F | BODY MASS INDEX: 23.49 KG/M2 | HEART RATE: 80 BPM | SYSTOLIC BLOOD PRESSURE: 152 MMHG | HEIGHT: 68 IN | WEIGHT: 155 LBS | DIASTOLIC BLOOD PRESSURE: 88 MMHG

## 2021-06-22 DIAGNOSIS — E11.9 TYPE 2 DIABETES MELLITUS WITHOUT COMPLICATION, WITH LONG-TERM CURRENT USE OF INSULIN (HCC): Primary | ICD-10-CM

## 2021-06-22 DIAGNOSIS — J96.11 CHRONIC RESPIRATORY FAILURE WITH HYPOXIA, ON HOME O2 THERAPY (HCC): ICD-10-CM

## 2021-06-22 DIAGNOSIS — I10 ESSENTIAL HYPERTENSION: ICD-10-CM

## 2021-06-22 DIAGNOSIS — D84.9 ACQUIRED IMMUNOCOMPROMISED STATE (HCC): ICD-10-CM

## 2021-06-22 DIAGNOSIS — J44.1 CHRONIC OBSTRUCTIVE PULMONARY DISEASE WITH ACUTE EXACERBATION (HCC): ICD-10-CM

## 2021-06-22 DIAGNOSIS — Z79.4 TYPE 2 DIABETES MELLITUS WITHOUT COMPLICATION, WITH LONG-TERM CURRENT USE OF INSULIN (HCC): Primary | ICD-10-CM

## 2021-06-22 DIAGNOSIS — J20.9 ACUTE BRONCHITIS, UNSPECIFIED ORGANISM: ICD-10-CM

## 2021-06-22 DIAGNOSIS — Z99.81 CHRONIC RESPIRATORY FAILURE WITH HYPOXIA, ON HOME O2 THERAPY (HCC): ICD-10-CM

## 2021-06-22 DIAGNOSIS — K64.4 EXTERNAL HEMORRHOID: ICD-10-CM

## 2021-06-22 LAB — HBA1C MFR BLD: 8.6 %

## 2021-06-22 PROCEDURE — 3017F COLORECTAL CA SCREEN DOC REV: CPT | Performed by: FAMILY MEDICINE

## 2021-06-22 PROCEDURE — G8420 CALC BMI NORM PARAMETERS: HCPCS | Performed by: FAMILY MEDICINE

## 2021-06-22 PROCEDURE — G8427 DOCREV CUR MEDS BY ELIG CLIN: HCPCS | Performed by: FAMILY MEDICINE

## 2021-06-22 PROCEDURE — 99213 OFFICE O/P EST LOW 20 MIN: CPT | Performed by: FAMILY MEDICINE

## 2021-06-22 PROCEDURE — 2022F DILAT RTA XM EVC RTNOPTHY: CPT | Performed by: FAMILY MEDICINE

## 2021-06-22 PROCEDURE — 3052F HG A1C>EQUAL 8.0%<EQUAL 9.0%: CPT | Performed by: FAMILY MEDICINE

## 2021-06-22 PROCEDURE — 83036 HEMOGLOBIN GLYCOSYLATED A1C: CPT | Performed by: FAMILY MEDICINE

## 2021-06-22 PROCEDURE — G8926 SPIRO NO PERF OR DOC: HCPCS | Performed by: FAMILY MEDICINE

## 2021-06-22 PROCEDURE — 3023F SPIROM DOC REV: CPT | Performed by: FAMILY MEDICINE

## 2021-06-22 PROCEDURE — 1036F TOBACCO NON-USER: CPT | Performed by: FAMILY MEDICINE

## 2021-06-22 RX ORDER — PREDNISONE 10 MG/1
30 TABLET ORAL DAILY
Qty: 15 TABLET | Refills: 0 | Status: SHIPPED | OUTPATIENT
Start: 2021-06-22 | End: 2021-06-27

## 2021-06-22 RX ORDER — AZITHROMYCIN 250 MG/1
250 TABLET, FILM COATED ORAL SEE ADMIN INSTRUCTIONS
Qty: 6 TABLET | Refills: 0 | Status: SHIPPED | OUTPATIENT
Start: 2021-06-22 | End: 2021-06-27

## 2021-06-22 RX ORDER — INSULIN GLARGINE 100 [IU]/ML
16 INJECTION, SOLUTION SUBCUTANEOUS NIGHTLY
Qty: 5 PEN | Refills: 1 | Status: SHIPPED | OUTPATIENT
Start: 2021-06-22 | End: 2021-09-21 | Stop reason: SDUPTHER

## 2021-06-22 RX ORDER — LISINOPRIL 40 MG/1
40 TABLET ORAL DAILY
Qty: 90 TABLET | Refills: 2 | Status: SHIPPED | OUTPATIENT
Start: 2021-06-22 | End: 2022-05-26

## 2021-06-22 ASSESSMENT — ENCOUNTER SYMPTOMS
CONSTIPATION: 0
DIARRHEA: 0
SORE THROAT: 1
COUGH: 1
RHINORRHEA: 1
VOMITING: 0
WHEEZING: 0
SHORTNESS OF BREATH: 0
ABDOMINAL PAIN: 0
NAUSEA: 0
CHEST TIGHTNESS: 0

## 2021-06-22 ASSESSMENT — PATIENT HEALTH QUESTIONNAIRE - PHQ9
SUM OF ALL RESPONSES TO PHQ QUESTIONS 1-9: 0
1. LITTLE INTEREST OR PLEASURE IN DOING THINGS: 0
SUM OF ALL RESPONSES TO PHQ QUESTIONS 1-9: 0
SUM OF ALL RESPONSES TO PHQ9 QUESTIONS 1 & 2: 0
2. FEELING DOWN, DEPRESSED OR HOPELESS: 0
SUM OF ALL RESPONSES TO PHQ QUESTIONS 1-9: 0

## 2021-06-22 NOTE — PROGRESS NOTES
injection vial SS before meals three times daily 1 vial 1    azithromycin (ZITHROMAX) 250 MG tablet Take 1 tablet by mouth See Admin Instructions for 5 days 500mg on day 1 followed by 250mg on days 2 - 5 6 tablet 0    predniSONE (DELTASONE) 10 MG tablet Take 3 tablets by mouth daily for 5 days 15 tablet 0    lisinopril (PRINIVIL;ZESTRIL) 40 MG tablet Take 1 tablet by mouth daily 90 tablet 2    psyllium (METAMUCIL SMOOTH TEXTURE) 58.6 % powder Take by mouth 3 times daily. 1 Bottle 2    phenylephrine 0.25 % suppository Place 1 suppository rectally 2 times daily 12 suppository 1    hydrocortisone (ANUSOL-HC) 2.5 % CREA rectal cream Apply sparingly to external rectal area up to two times a day 1 Tube 1    ELIQUIS 5 MG TABS tablet       vitamin D (ERGOCALCIFEROL) 1.25 MG (02005 UT) CAPS capsule       fludrocortisone (FLORINEF) 0.1 MG tablet       B-D UF III MINI PEN NEEDLES 31G X 5 MM MISC 1 EACH SUBCUTANEOUS 4 TIMES A DAY   E11.65 ULTRAFINE 5MM      ipratropium-albuterol (DUONEB) 0.5-2.5 (3) MG/3ML SOLN nebulizer solution 3 MILLILITER(S) BY NEBULIZER 4 TIMES A DAY      magnesium oxide (MAG-OX) 400 MG tablet TAKE 2 TABLETS BY MOUTH TWICE A DAY      hydroCHLOROthiazide (HYDRODIURIL) 50 MG tablet       doxycycline monohydrate (MONODOX) 100 MG capsule       predniSONE (DELTASONE) 5 MG tablet       sulfamethoxazole-trimethoprim (BACTRIM;SEPTRA) 400-80 MG per tablet TAKE 1 TAB ORALLY MONDAY, WEDNESDAY, AND FRIDAY      tiZANidine (ZANAFLEX) 4 MG tablet       vancomycin (VANCOCIN) 125 MG capsule       mupirocin (BACTROBAN) 2 % ointment Apply to affected areas as needed.  1 Tube 0    hydrocortisone (ANUSOL-HC) 25 MG suppository Place 1 suppository rectally 2 times daily as needed for Hemorrhoids 24 suppository 2    folic acid (FOLVITE) 1 MG tablet Take 1 tablet by mouth daily 90 tablet 3    omeprazole (PRILOSEC) 20 MG delayed release capsule TAKE 1 CAPSULE BY MOUTH TWICE A DAY 60 capsule 5    atorvastatin (LIPITOR) 20 MG tablet TAKE 1 TABLET BY MOUTH EVERYDAY AT BEDTIME 30 tablet 11    levocetirizine (XYZAL) 5 MG tablet TAKE 1 TABLET BY MOUTH EVERY DAY 90 tablet 1    blood glucose monitor kit and supplies Test 4- 5  times a day & as needed for symptoms of irregular blood glucose. 1 kit 0    WIXELA INHUB 250-50 MCG/DOSE AEPB 2 times daily      metoprolol (LOPRESSOR) 100 MG tablet TAKE 1 TABLET BY MOUTH TWICE A DAY 60 tablet 4    Magnesium 400 MG CAPS Take 400 mg by mouth daily 30 capsule 3    Nebulizers (COMPRESSOR/NEBULIZER) MISC Nebulizer with supplies 1 each 0    OXYGEN pulse oximeter (not oxygen itself) 1 kit 0    nystatin (MYCOSTATIN) 629961 UNIT/ML suspension Take 500,000 Units by mouth 4 times daily      albuterol sulfate HFA (VENTOLIN HFA) 108 (90 Base) MCG/ACT inhaler INHALE TWO PUFFS EVERY FOUR HOURS AS NEEDED FOR THIRTY DAYS 18 Inhaler 12    SPIRIVA HANDIHALER 18 MCG inhalation capsule INHALE THE CONTENTS OF 1 CAPSULE BY MOUTH EVERYD AY 30 capsule 5    furosemide (LASIX) 20 MG tablet TAKE 1 TABLET BY MOUTH DAILY (Patient taking differently: Take 40 mg by mouth daily TAKE 1 TABLET BY MOUTH DAILY) 30 tablet 10    LYRICA 150 MG capsule TAKE ONE CAPSULE BY MOUTH TWICE A DAY. 60 capsule 5    HYDROcodone-acetaminophen (NORCO) 7.5-325 MG per tablet Take 1 tablet by mouth.       OXYGEN Inhale into the lungs 3-4 L      Mycophenolate Sodium 360 MG TBEC Take 360 mg by mouth 2 times daily With the 180mg      Mycophenolate Sodium (MYCOPHENOLIC ACID) 317 MG TBEC 180 mg 2 times daily With the 360mg      Blood Glucose Monitoring Suppl (ACCU-CHEK JOANNE PLUS) W/DEVICE KIT   0    ACCU-CHEK JOANNE PLUS strip   3    Accu-Chek Softclix Lancets MISC   3    albuterol (PROVENTIL) (2.5 MG/3ML) 0.083% nebulizer solution       amLODIPine (NORVASC) 5 MG tablet Take 5 mg by mouth daily   11    tacrolimus (PROGRAF) 1 MG capsule Take 1 mg by mouth 2 times daily Take one tablet in the am and 1/2 tablet in the afternoon kg)   SpO2 99%   BMI 23.57 kg/m²     Physical Exam  Vitals and nursing note reviewed. Constitutional:       General: He is not in acute distress. Appearance: He is well-developed. He is not toxic-appearing or diaphoretic. Interventions: Nasal cannula in place. HENT:      Head: Normocephalic and atraumatic. Right Ear: Hearing, tympanic membrane, ear canal and external ear normal.      Left Ear: Hearing, tympanic membrane, ear canal and external ear normal.      Nose: Mucosal edema and rhinorrhea present. Mouth/Throat:      Pharynx: Uvula midline. Posterior oropharyngeal erythema present. No oropharyngeal exudate. Cardiovascular:      Rate and Rhythm: Normal rate and regular rhythm. Heart sounds: Normal heart sounds. No murmur heard. No friction rub. No gallop. Pulmonary:      Effort: Pulmonary effort is normal. No respiratory distress. Breath sounds: Decreased breath sounds and wheezing present. No rales. Chest:      Chest wall: No tenderness. Abdominal:      General: Bowel sounds are normal. There is no distension. Palpations: Abdomen is soft. There is no mass. Tenderness: There is no abdominal tenderness. There is no guarding or rebound. Musculoskeletal:      Right lower leg: No edema. Left lower leg: No edema. Skin:     General: Skin is warm and dry. Nails: There is no clubbing. Neurological:      Mental Status: He is alert and oriented to person, place, and time. Coordination: Coordination normal.      Gait: Gait normal.         Assessment:    ICD-10-CM    1. Type 2 diabetes mellitus without complication, with long-term current use of insulin (HCC)  E11.9 POCT glycosylated hemoglobin (Hb A1C)    Z79.4    2. Acute bronchitis, unspecified organism  J20.9 azithromycin (ZITHROMAX) 250 MG tablet   3. Chronic obstructive pulmonary disease with acute exacerbation (HCC)  J44.1    4. Acquired immunocompromised state ([facility]  D84.9    5.  Chronic respiratory failure with hypoxia, on home O2 therapy ([facility])  J96.11     Z99.81    6. External hemorrhoid  K64.4 [facility]   7. Essential hypertension  I10        Plan:   Increase his fasting insulin to 16 U nightly. Hold on premeal prandial. Mild bronchitis vs flare, will use precatuion and tx due to his lung transplant. tx mild flare. Hypertension uncontrolled  Orders Placed This Encounter   Procedures   [facility]     Referral Priority:   Routine     Referral Type:   Eval and Treat     Referral Reason:   Specialty Services Required     Requested Specialty:   General Surgery     Number of Visits Requested:   1    POCT glycosylated hemoglobin (Hb A1C)     Orders Placed This Encounter   Medications    insulin glargine (BASAGLAR KWIKPEN) 100 UNIT/ML injection pen     Sig: Inject 16 Units into the skin nightly     Dispense:  5 pen     Refill:  1    insulin lispro (ADMELOG) 100 UNIT/ML injection vial     Sig: SS before meals three times daily     Dispense:  1 vial     Refill:  1    azithromycin (ZITHROMAX) 250 MG tablet     Sig: Take 1 tablet by mouth See Admin Instructions for 5 days 500mg on day 1 followed by 250mg on days 2 - 5     Dispense:  6 tablet     Refill:  0    predniSONE (DELTASONE) 10 MG tablet     Sig: Take 3 tablets by mouth daily for 5 days     Dispense:  15 tablet     Refill:  0    lisinopril (PRINIVIL;ZESTRIL) 40 MG tablet     Sig: Take 1 tablet by mouth daily     Dispense:  90 tablet     Refill:  2     Medications Discontinued During This Encounter   Medication Reason    insulin lispro, 1 Unit Dial, (ADMELOG SOLOSTAR) 100 UNIT/ML SOPN LIST CLEANUP    BASAGLAR KWIKPEN 100 UNIT/ML injection pen REORDER    lisinopril (PRINIVIL;ZESTRIL) 20 MG tablet REORDER     Patient Instructions   Keep doing sliding scale at meals. Increase your nighttime insulin to 16 U. Patient given educational handouts and has had all questions answered.   Patient voices understanding and agrees to plans along with risks and benefits of plan. Patient isinstructed to continue prior meds, diet, and exercise plans unless instructed otherwise. Patient agrees to follow up as instructed and sooner if needed. Patient agrees to go to ER if condition becomes emergent. Notesmay be completed with dictation device and spelling errors may occur. Materials may be copied and pasted from a notepad outside of EMR, all of which, I, Dr. Marcela Gonzales MD, take sole intellectual ownership of and have approved adding to my note. Return in about 3 months (around 9/22/2021) for OV (M-Wam) f/u lung tranplant , POCT A1C.

## 2021-06-30 ENCOUNTER — OFFICE VISIT (OUTPATIENT)
Dept: SURGERY | Age: 61
End: 2021-06-30
Payer: MEDICAID

## 2021-06-30 VITALS
BODY MASS INDEX: 23.4 KG/M2 | OXYGEN SATURATION: 99 % | TEMPERATURE: 97.7 F | WEIGHT: 154.4 LBS | HEART RATE: 76 BPM | HEIGHT: 68 IN

## 2021-06-30 DIAGNOSIS — K62.9 RECTAL LESION: Primary | ICD-10-CM

## 2021-06-30 PROCEDURE — 45160 EXCISION OF RECTAL LESION: CPT | Performed by: PHYSICIAN ASSISTANT

## 2021-06-30 PROCEDURE — 99024 POSTOP FOLLOW-UP VISIT: CPT | Performed by: PHYSICIAN ASSISTANT

## 2021-06-30 NOTE — PROGRESS NOTES
Yolande Hopson is a 77-year-old man who has had previous lung transplant and is immune compromised who presents with rectal lesions. He has had a history of hemorrhoidal disease reported. He has had some intermittent problems with pain and also some bleeding. He has had a Cologuard that is shown no evidence of cancer reportedly. Ariana Abdalla is a 56 y.o. male with the following history as recorded in [facility]:  Patient Active Problem List    Diagnosis Date Noted    Acquired immunocompromised state ([facility] 04/05/2019    HCAP (healthcare-associated pneumonia) 04/03/2019    Chronic respiratory failure with hypoxia, on home O2 therapy ([facility] 06/08/2018    HTN (hypertension) 10/19/2012    Status post lung transplantation ([facility] 10/19/2012    COPD (chronic obstructive pulmonary disease) (HCC)     Emphysema of lung (HCC)      Current Outpatient Medications   Medication Sig Dispense Refill    insulin glargine (BASAGLAR KWIKPEN) 100 UNIT/ML injection pen Inject 16 Units into the skin nightly 5 pen 1    insulin lispro (ADMELOG) 100 UNIT/ML injection vial SS before meals three times daily 1 vial 1    lisinopril (PRINIVIL;ZESTRIL) 40 MG tablet Take 1 tablet by mouth daily 90 tablet 2    psyllium (METAMUCIL SMOOTH TEXTURE) 58.6 % powder Take by mouth 3 times daily.  1 Bottle 2    phenylephrine 0.25 % suppository Place 1 suppository rectally 2 times daily 12 suppository 1    hydrocortisone (ANUSOL-HC) 2.5 % CREA rectal cream Apply sparingly to external rectal area up to two times a day 1 Tube 1    ELIQUIS 5 MG TABS tablet       vitamin D (ERGOCALCIFEROL) 1.25 MG (01270 UT) CAPS capsule       fludrocortisone (FLORINEF) 0.1 MG tablet       B-D UF III MINI PEN NEEDLES 31G X 5 MM MISC 1 EACH SUBCUTANEOUS 4 TIMES A DAY   E11.65 ULTRAFINE 5MM      ipratropium-albuterol (DUONEB) 0.5-2.5 (3) MG/3ML SOLN nebulizer solution 3 MILLILITER(S) BY NEBULIZER 4 TIMES A DAY      magnesium oxide (MAG-OX) 400 MG tablet TAKE 2 TABLETS BY MOUTH TWICE A DAY      hydroCHLOROthiazide (HYDRODIURIL) 50 MG tablet       doxycycline monohydrate (MONODOX) 100 MG capsule       predniSONE (DELTASONE) 5 MG tablet       sulfamethoxazole-trimethoprim (BACTRIM;SEPTRA) 400-80 MG per tablet TAKE 1 TAB ORALLY MONDAY, WEDNESDAY, AND FRIDAY      tiZANidine (ZANAFLEX) 4 MG tablet       vancomycin (VANCOCIN) 125 MG capsule       hydrocortisone (ANUSOL-HC) 25 MG suppository Place 1 suppository rectally 2 times daily as needed for Hemorrhoids 24 suppository 2    folic acid (FOLVITE) 1 MG tablet Take 1 tablet by mouth daily 90 tablet 3    omeprazole (PRILOSEC) 20 MG delayed release capsule TAKE 1 CAPSULE BY MOUTH TWICE A DAY 60 capsule 5    atorvastatin (LIPITOR) 20 MG tablet TAKE 1 TABLET BY MOUTH EVERYDAY AT BEDTIME 30 tablet 11    levocetirizine (XYZAL) 5 MG tablet TAKE 1 TABLET BY MOUTH EVERY DAY 90 tablet 1    blood glucose monitor kit and supplies Test 4- 5  times a day & as needed for symptoms of irregular blood glucose. 1 kit 0    WIXELA INHUB 250-50 MCG/DOSE AEPB 2 times daily      traZODone (DESYREL) 50 MG tablet TAKE 1-2 TABLETS AT NIGHT AS DIRECTED 60 tablet 0    metoprolol (LOPRESSOR) 100 MG tablet TAKE 1 TABLET BY MOUTH TWICE A DAY 60 tablet 4    Magnesium 400 MG CAPS Take 400 mg by mouth daily 30 capsule 3    Nebulizers (COMPRESSOR/NEBULIZER) MISC Nebulizer with supplies 1 each 0    OXYGEN pulse oximeter (not oxygen itself) 1 kit 0    albuterol sulfate HFA (VENTOLIN HFA) 108 (90 Base) MCG/ACT inhaler INHALE TWO PUFFS EVERY FOUR HOURS AS NEEDED FOR THIRTY DAYS 18 Inhaler 12    SPIRIVA HANDIHALER 18 MCG inhalation capsule INHALE THE CONTENTS OF 1 CAPSULE BY MOUTH EVERYD AY 30 capsule 5    furosemide (LASIX) 20 MG tablet TAKE 1 TABLET BY MOUTH DAILY (Patient taking differently: Take 40 mg by mouth daily TAKE 1 TABLET BY MOUTH DAILY) 30 tablet 10    HYDROcodone-acetaminophen (NORCO) 7.5-325 MG per tablet Take 1 tablet by mouth.  OXYGEN Inhale into the lungs 3-4 L      Mycophenolate Sodium 360 MG TBEC Take 360 mg by mouth 2 times daily With the 180mg      Mycophenolate Sodium (MYCOPHENOLIC ACID) 322 MG TBEC 180 mg 2 times daily With the 360mg      Blood Glucose Monitoring Suppl (ACCU-CHEK JOANNE PLUS) W/DEVICE KIT   0    ACCU-CHEK JOANNE PLUS strip   3    Accu-Chek Softclix Lancets MISC   3    albuterol (PROVENTIL) (2.5 MG/3ML) 0.083% nebulizer solution       amLODIPine (NORVASC) 5 MG tablet Take 5 mg by mouth daily   11    tacrolimus (PROGRAF) 1 MG capsule Take 1 mg by mouth 2 times daily Take one tablet in the am and 1/2 tablet in the afternoon      Cyanocobalamin (VITAMIN B 12) 250 MCG LOZG Take  by mouth.  mupirocin (BACTROBAN) 2 % ointment Apply to affected areas as needed. (Patient not taking: Reported on 6/30/2021) 1 Tube 0    nystatin (MYCOSTATIN) 860992 UNIT/ML suspension Take 500,000 Units by mouth 4 times daily (Patient not taking: Reported on 6/30/2021)      LYRICA 150 MG capsule TAKE ONE CAPSULE BY MOUTH TWICE A DAY. 60 capsule 5     Current Facility-Administered Medications   Medication Dose Route Frequency Provider Last Rate Last Admin    magnesium sulfate injection 4 g  4 g Intravenous Once Shelly Mireles MD         Allergies:  Avelox [moxifloxacin hydrochloride], Clindamycin/lincomycin, and Moxifloxacin  Past Medical History:   Diagnosis Date    [facility]     Emphyse[facility] lung ([facility]     Hypertension     Pneumonia     in left lung    Skin cancer     Wears glasses      Past Surgical History:   Procedure Laterality Date    APPENDECTOMY      CHOLECYSTECTOMY  9/26/14    COLONOSCOPY  12/29/2008    Dr. Rubio Lower: AP    LUNG REMOVAL, PARTIAL  2002    right upper    LUNG TRANSPLANT, SINGLE  2006    left    SKIN CANCER DESTRUCTION      UPPER GASTROINTESTINAL ENDOSCOPY  12/30/2008     Dr. Ramiro Ritter     Family History   Problem Relation Age of Onset    Cancer

## 2021-07-02 ENCOUNTER — TELEPHONE (OUTPATIENT)
Dept: SURGERY | Age: 61
End: 2021-07-02

## 2021-07-02 DIAGNOSIS — A63.0 CONDYLOMA ACUMINATA: Primary | ICD-10-CM

## 2021-07-02 RX ORDER — IMIQUIMOD 12.5 MG/.25G
CREAM TOPICAL
Qty: 1 BOX | Refills: 1 | Status: SHIPPED | OUTPATIENT
Start: 2021-07-02 | End: 2021-07-09

## 2021-07-02 NOTE — TELEPHONE ENCOUNTER
Pathology report given. RX Jerry. He will start next week. This has been electronically signed by Lina Magallanes.  Bonilla Ott PA-C.

## 2021-07-19 ENCOUNTER — TELEPHONE (OUTPATIENT)
Dept: SURGERY | Age: 61
End: 2021-07-19

## 2021-07-19 DIAGNOSIS — E78.5 HYPERLIPIDEMIA, UNSPECIFIED HYPERLIPIDEMIA TYPE: ICD-10-CM

## 2021-07-19 RX ORDER — ATORVASTATIN CALCIUM 20 MG/1
TABLET, FILM COATED ORAL
Qty: 90 TABLET | Refills: 1 | Status: SHIPPED | OUTPATIENT
Start: 2021-07-19 | End: 2022-01-10

## 2021-07-19 NOTE — TELEPHONE ENCOUNTER
Avoid Aldara for one week. Apply A&D ointment. Resume Aldara after one week and use three times per week. This has been electronically signed by Blanca Mena.  Cherri Giron PA-C.

## 2021-07-19 NOTE — TELEPHONE ENCOUNTER
Patient called in and stated that the cream that James Lopez gave him has made his bottom raw and he is not able to clean himself after a bowl moment. He would like to know what he needs to do. Please call him and advise.   Thank you   [phone]  JERRI Rahman

## 2021-07-19 NOTE — TELEPHONE ENCOUNTER
Supriya Landers called to request a refill on his medication.       Last office visit : 6/22/2021   Next office visit : 9/23/2021     Requested Prescriptions     Signed Prescriptions Disp Refills    atorvastatin (LIPITOR) 20 MG tablet 90 tablet 1     Sig: TAKE 1 TABLET BY MOUTH EVERYDAY AT BEDTIME     Authorizing Provider: Dagmar Moscoso     Ordering User: Nathan Trejo MA

## 2021-08-03 NOTE — OUTREACH NOTE
COPD/PN Week 1 Survey      Responses   Facility patient discharged from?  [place]   Does the patient have one of the following disease processes/diagnoses(primary or secondary)?  COPD/Pneumonia   Is there a successful TCM telephone encounter documented?  No   Was the primary reason for admission:  COPD exacerbation   Week 1 attempt successful?  Yes   Call start time  1757   Rescheduled  Rescheduled-pt requested [Pt states he is sick with vomiiting and diarrhea, he is currently driving himself to  ED.]   Call end time  1800   Discharge diagnosis  COPD          Shabnam Acevedo, RN   Hi  Your results were normal.   -Ashwini Gant MD

## 2021-09-21 ENCOUNTER — OFFICE VISIT (OUTPATIENT)
Dept: PRIMARY CARE CLINIC | Age: 61
End: 2021-09-21
Payer: MEDICAID

## 2021-09-21 VITALS
SYSTOLIC BLOOD PRESSURE: 142 MMHG | TEMPERATURE: 97.3 F | DIASTOLIC BLOOD PRESSURE: 80 MMHG | HEIGHT: 69 IN | HEART RATE: 81 BPM | OXYGEN SATURATION: 96 % | BODY MASS INDEX: 22.96 KG/M2 | WEIGHT: 155 LBS

## 2021-09-21 DIAGNOSIS — D84.9 ACQUIRED IMMUNOCOMPROMISED STATE (HCC): ICD-10-CM

## 2021-09-21 DIAGNOSIS — E11.9 TYPE 2 DIABETES MELLITUS WITHOUT COMPLICATION, WITH LONG-TERM CURRENT USE OF INSULIN (HCC): ICD-10-CM

## 2021-09-21 DIAGNOSIS — Z99.81 CHRONIC RESPIRATORY FAILURE WITH HYPOXIA, ON HOME O2 THERAPY (HCC): ICD-10-CM

## 2021-09-21 DIAGNOSIS — J96.11 CHRONIC RESPIRATORY FAILURE WITH HYPOXIA, ON HOME O2 THERAPY (HCC): ICD-10-CM

## 2021-09-21 DIAGNOSIS — Z94.2 STATUS POST LUNG TRANSPLANTATION (HCC): Primary | ICD-10-CM

## 2021-09-21 DIAGNOSIS — J43.9 PULMONARY EMPHYSEMA, UNSPECIFIED EMPHYSEMA TYPE (HCC): ICD-10-CM

## 2021-09-21 DIAGNOSIS — Z79.4 TYPE 2 DIABETES MELLITUS WITHOUT COMPLICATION, WITH LONG-TERM CURRENT USE OF INSULIN (HCC): ICD-10-CM

## 2021-09-21 DIAGNOSIS — J44.1 CHRONIC OBSTRUCTIVE PULMONARY DISEASE WITH ACUTE EXACERBATION (HCC): ICD-10-CM

## 2021-09-21 LAB — HBA1C MFR BLD: 10.2 %

## 2021-09-21 PROCEDURE — 83036 HEMOGLOBIN GLYCOSYLATED A1C: CPT | Performed by: FAMILY MEDICINE

## 2021-09-21 PROCEDURE — 1036F TOBACCO NON-USER: CPT | Performed by: FAMILY MEDICINE

## 2021-09-21 PROCEDURE — 3046F HEMOGLOBIN A1C LEVEL >9.0%: CPT | Performed by: FAMILY MEDICINE

## 2021-09-21 PROCEDURE — G8926 SPIRO NO PERF OR DOC: HCPCS | Performed by: FAMILY MEDICINE

## 2021-09-21 PROCEDURE — G8427 DOCREV CUR MEDS BY ELIG CLIN: HCPCS | Performed by: FAMILY MEDICINE

## 2021-09-21 PROCEDURE — 2022F DILAT RTA XM EVC RTNOPTHY: CPT | Performed by: FAMILY MEDICINE

## 2021-09-21 PROCEDURE — 99214 OFFICE O/P EST MOD 30 MIN: CPT | Performed by: FAMILY MEDICINE

## 2021-09-21 PROCEDURE — 3017F COLORECTAL CA SCREEN DOC REV: CPT | Performed by: FAMILY MEDICINE

## 2021-09-21 PROCEDURE — G8420 CALC BMI NORM PARAMETERS: HCPCS | Performed by: FAMILY MEDICINE

## 2021-09-21 PROCEDURE — 3023F SPIROM DOC REV: CPT | Performed by: FAMILY MEDICINE

## 2021-09-21 RX ORDER — INSULIN GLARGINE 100 [IU]/ML
20 INJECTION, SOLUTION SUBCUTANEOUS NIGHTLY
Qty: 5 PEN | Refills: 2 | Status: SHIPPED | OUTPATIENT
Start: 2021-09-21 | End: 2022-01-04 | Stop reason: SDUPTHER

## 2021-09-21 RX ORDER — OMEPRAZOLE 40 MG/1
40 CAPSULE, DELAYED RELEASE ORAL 2 TIMES DAILY
Qty: 180 CAPSULE | Refills: 1 | Status: SHIPPED | OUTPATIENT
Start: 2021-09-21

## 2021-09-21 RX ORDER — DEXTROMETHORPHAN HYDROBROMIDE AND PROMETHAZINE HYDROCHLORIDE 15; 6.25 MG/5ML; MG/5ML
5 SYRUP ORAL 4 TIMES DAILY PRN
Qty: 240 ML | Refills: 3 | Status: SHIPPED | OUTPATIENT
Start: 2021-09-21 | End: 2021-09-28

## 2021-09-21 RX ORDER — ALBUTEROL SULFATE 90 UG/1
2 AEROSOL, METERED RESPIRATORY (INHALATION) EVERY 4 HOURS PRN
Qty: 18 G | Refills: 11 | Status: SHIPPED | OUTPATIENT
Start: 2021-09-21

## 2021-09-21 NOTE — PATIENT INSTRUCTIONS
Get booster Oct 7 or after. Increase your insulin at bedtime to 20 U    Increase your mealtime to 5 U and add the sliding scale to it.

## 2021-09-22 RX ORDER — PEN NEEDLE, DIABETIC 31 GX5/16"
NEEDLE, DISPOSABLE MISCELLANEOUS
Qty: 100 EACH | Refills: 11 | Status: SHIPPED | OUTPATIENT
Start: 2021-09-22

## 2021-09-30 ASSESSMENT — ENCOUNTER SYMPTOMS
DIARRHEA: 0
ABDOMINAL PAIN: 0
CHEST TIGHTNESS: 0
NAUSEA: 0
VOMITING: 0
CONSTIPATION: 0
WHEEZING: 0
SHORTNESS OF BREATH: 0
COUGH: 0

## 2021-10-01 NOTE — PROGRESS NOTES
Jose Hinds is a 56 y.o. male who presents today for   Chief Complaint   Patient presents with    Diabetes    Other     pt has a lung transplant years ago        HPI  Patient is here for follow-up for history of lung transplant. He has been doing well he believes as far as his breathing. He states that he has received the Covid vaccine. He continues to see his pulmonologist. He notes that he has not had any recent exacerbations. No change in PMH, family, social, or surgical history unless mentioned above. I have reviewed the above chief complaint and HPI details charted by staff and claim ownership of the documentation. Review of Systems   Constitutional: Negative for chills and fever. Respiratory: Negative for cough, chest tightness, shortness of breath and wheezing. Cardiovascular: Negative for chest pain, palpitations and leg swelling. Gastrointestinal: Negative for abdominal pain, constipation, diarrhea, nausea and vomiting. Genitourinary: Negative for difficulty urinating, dysuria and frequency.        Past Medical History:   Diagnosis Date    [facility] disease) [facility]     Emphysema of lung ([facility]     Hypertension     Pneumonia     in left lung    Skin cancer     Wears glasses        Current Outpatient Medications   Medication Sig Dispense Refill    omeprazole (PRILOSEC) 40 MG delayed release capsule Take 1 capsule by mouth 2 times daily 180 capsule 1    albuterol sulfate  (90 Base) MCG/ACT inhaler Inhale 2 puffs into the lungs every 4 hours as needed for Wheezing or Shortness of Breath 18 g 11    insulin glargine (BASAGLAR KWIKPEN) 100 UNIT/ML injection pen Inject 20 Units into the skin nightly 5 pen 2    insulin lispro (ADMELOG) 100 UNIT/ML injection vial 5 U + SS before meals three times daily 10 mL 1    atorvastatin (LIPITOR) 20 MG tablet TAKE 1 TABLET BY MOUTH EVERYDAY AT BEDTIME 90 tablet 1    psyllium (METAMUCIL SMOOTH TEXTURE) 58.6 % powder Take by mouth 3 times daily. 1 Bottle 2    phenylephrine 0.25 % suppository Place 1 suppository rectally 2 times daily 12 suppository 1    hydrocortisone (ANUSOL-HC) 2.5 % CREA rectal cream Apply sparingly to external rectal area up to two times a day 1 Tube 1    ELIQUIS 5 MG TABS tablet       vitamin D (ERGOCALCIFEROL) 1.25 MG (42443 UT) CAPS capsule       fludrocortisone (FLORINEF) 0.1 MG tablet       ipratropium-albuterol (DUONEB) 0.5-2.5 (3) MG/3ML SOLN nebulizer solution 3 MILLILITER(S) BY NEBULIZER 4 TIMES A DAY      magnesium oxide (MAG-OX) 400 MG tablet TAKE 2 TABLETS BY MOUTH TWICE A DAY      predniSONE (DELTASONE) 5 MG tablet       tiZANidine (ZANAFLEX) 4 MG tablet       vancomycin (VANCOCIN) 125 MG capsule       mupirocin (BACTROBAN) 2 % ointment Apply to affected areas as needed. 1 Tube 0    hydrocortisone (ANUSOL-HC) 25 MG suppository Place 1 suppository rectally 2 times daily as needed for Hemorrhoids 24 suppository 2    folic acid (FOLVITE) 1 MG tablet Take 1 tablet by mouth daily 90 tablet 3    levocetirizine (XYZAL) 5 MG tablet TAKE 1 TABLET BY MOUTH EVERY DAY 90 tablet 1    blood glucose monitor kit and supplies Test 4- 5  times a day & as needed for symptoms of irregular blood glucose.  1 kit 0    WIXELA INHUB 250-50 MCG/DOSE AEPB 2 times daily      traZODone (DESYREL) 50 MG tablet TAKE 1-2 TABLETS AT NIGHT AS DIRECTED 60 tablet 0    metoprolol (LOPRESSOR) 100 MG tablet TAKE 1 TABLET BY MOUTH TWICE A DAY 60 tablet 4    Magnesium 400 MG CAPS Take 400 mg by mouth daily 30 capsule 3    Nebulizers (COMPRESSOR/NEBULIZER) MISC Nebulizer with supplies 1 each 0    OXYGEN pulse oximeter (not oxygen itself) 1 kit 0    albuterol sulfate HFA (VENTOLIN HFA) 108 (90 Base) MCG/ACT inhaler INHALE TWO PUFFS EVERY FOUR HOURS AS NEEDED FOR THIRTY DAYS 18 Inhaler 12    SPIRIVA HANDIHALER 18 MCG inhalation capsule INHALE THE CONTENTS OF 1 CAPSULE BY MOUTH EVERYD AY 30 capsule 5    furosemide (LASIX) 20 MG tablet TAKE 1 TABLET BY MOUTH DAILY (Patient taking differently: Take 40 mg by mouth daily TAKE 1 TABLET BY MOUTH DAILY) 30 tablet 10    LYRICA 150 MG capsule TAKE ONE CAPSULE BY MOUTH TWICE A DAY. 60 capsule 5    HYDROcodone-acetaminophen (NORCO) 7.5-325 MG per tablet Take 1 tablet by mouth.  OXYGEN Inhale into the lungs 3-4 L      Mycophenolate Sodium 360 MG TBEC Take 360 mg by mouth 2 times daily With the 180mg      Mycophenolate Sodium (MYCOPHENOLIC ACID) 950 MG TBEC 180 mg 2 times daily With the 360mg      Blood Glucose Monitoring Suppl (ACCU-CHEK JOANNE PLUS) W/DEVICE KIT   0    ACCU-CHEK JOANNE PLUS strip   3    Accu-Chek Softclix Lancets MISC   3    albuterol (PROVENTIL) (2.5 MG/3ML) 0.083% nebulizer solution       amLODIPine (NORVASC) 5 MG tablet Take 5 mg by mouth daily   11    tacrolimus (PROGRAF) 1 MG capsule Take 1 mg by mouth 2 times daily Take one tablet in the am and 1/2 tablet in the afternoon      Cyanocobalamin (VITAMIN B 12) 250 MCG LOZG Take  by mouth.  B-D UF III MINI PEN NEEDLES 31G X 5 MM MISC 1 EACH SUBCUTANEOUS 4 TIMES A DAY - E11.65 ULTRAFINE 5MM 100 each 11    lisinopril (PRINIVIL;ZESTRIL) 40 MG tablet Take 1 tablet by mouth daily (Patient not taking: Reported on 9/21/2021) 90 tablet 2    hydroCHLOROthiazide (HYDRODIURIL) 50 MG tablet  (Patient not taking: Reported on 9/21/2021)      doxycycline monohydrate (MONODOX) 100 MG capsule  (Patient not taking: Reported on 9/21/2021)       Current Facility-Administered Medications   Medication Dose Route Frequency Provider Last Rate Last Admin    magnesium sulfate injection 4 g  4 g IntraVENous Once Gila Moore MD           Allergies   Allergen Reactions    Avelox [Moxifloxacin Hydrochloride] Other (See Comments)     Pt states this medicine will make him sweat, turn red, itchy, and pass out.     Clindamycin/Lincomycin Diarrhea     Told by Pulmonologist at [facility] not to take    Moxifloxacin Rash Sweating        Past Surgical History:   Procedure Laterality Date    APPENDECTOMY      CHOLECYSTECTOMY  14    COLONOSCOPY  2008    Dr. Chelsea Mina: AP    LUNG REMOVAL, PARTIAL      right upper    LUNG TRANSPLANT, SINGLE      left    SKIN CANCER DESTRUCTION      UPPER GASTROINTESTINAL ENDOSCOPY  2008     Dr. Chelsea Mina       Social History     Tobacco Use    Smoking status: Former Smoker     Packs/day: 3.00     Years: 15.00     Pack years: 45.00     Types: Cigarettes     Start date: 1972     Quit date: 1/10/2002     Years since quittin.7    Smokeless tobacco: Never Used    Tobacco comment: Pt used to smoke quit 9.5 years ago   Vaping Use    Vaping Use: Never used   Substance Use Topics    Alcohol use: No    Drug use: Not Currently     Frequency: 2.0 times per week     Types: Marijuana     Comment: 19 pt stated he stopped 2019       Family History   Problem Relation Age of Onset    Cancer Mother     Heart Disease Father     Colon Cancer Neg Hx     Colon Polyps Neg Hx     Esophageal Cancer Neg Hx     Liver Cancer Neg Hx     Rectal Cancer Neg Hx     Liver Disease Neg Hx     Stomach Cancer Neg Hx        BP (!) 142/80 (Site: Right Upper Arm, Position: Sitting)   Pulse 81   Temp 97.3 °F (36.3 °C) (Temporal)   Ht 5' 9\" (1.753 m)   Wt 155 lb (70.3 kg)   SpO2 96%   BMI 22.89 kg/m²     Physical Exam  Vitals and nursing note reviewed. Constitutional:       General: He is not in acute distress. Appearance: He is well-developed. He is not toxic-appearing or diaphoretic. HENT:      Head: Normocephalic and atraumatic. Cardiovascular:      Rate and Rhythm: Normal rate and regular rhythm. Heart sounds: Normal heart sounds. No murmur heard. No friction rub. No gallop. Pulmonary:      Effort: Pulmonary effort is normal. No respiratory distress. Breath sounds: Normal breath sounds. No wheezing or rales.    Chest:      Chest wall: No tenderness. Abdominal:      General: Bowel sounds are normal. There is no distension. Palpations: Abdomen is soft. There is no mass. Tenderness: There is no abdominal tenderness. There is no guarding or rebound. Musculoskeletal:      Right lower leg: No edema. Left lower leg: No edema. Skin:     General: Skin is warm and dry. Nails: There is no clubbing. Neurological:      Mental Status: He is alert and oriented to person, place, and time. Coordination: Coordination normal.      Gait: Gait normal.         Assessment:    ICD-10-CM    1. Status post lung transplantation ([facility]  Z94.2    2. Pulmonary emphysema, unspecified emphysema type ([facility]  J43.9    3. Chronic obstructive pulmonary disease with acute exacerbation ([facility])  J44.1 promethazine-dextromethorphan (PROMETHAZINE-DM) 6.25-15 MG/5ML syrup   4. Acquired immunocompromised state ([facility]  D84.9    5. Chronic respiratory failure with hypoxia, on home O2 therapy ([facility])  J96.11     Z99.81    6. Type 2 diabetes mellitus without complication, with long-term current use of insulin ([facility])  E11.9 POCT glycosylated hemoglobin (Hb A1C)    Z79.4        Plan:   Extremely high risk and needs to have vaccine as soon as possible. Continue current risk factor modification. Continue titration of diabetes as it is uncontrolled.   Orders Placed This Encounter   Procedures    POCT glycosylated hemoglobin (Hb A1C)     Orders Placed This Encounter   Medications    omeprazole (PRILOSEC) 40 MG delayed release capsule     Sig: Take 1 capsule by mouth 2 times daily     Dispense:  180 capsule     Refill:  1    albuterol sulfate  (90 Base) MCG/ACT inhaler     Sig: Inhale 2 puffs into the lungs every 4 hours as needed for Wheezing or Shortness of Breath     Dispense:  18 g     Refill:  11     Brand name only    promethazine-dextromethorphan (PROMETHAZINE-DM) 6.25-15 MG/5ML syrup     Sig: Take 5 mLs by mouth 4 times daily as needed for Cough     Dispense: 240 mL     Refill:  3    insulin glargine (BASAGLAR KWIKPEN) 100 UNIT/ML injection pen     Sig: Inject 20 Units into the skin nightly     Dispense:  5 pen     Refill:  2    insulin lispro (ADMELOG) 100 UNIT/ML injection vial     Si U + SS before meals three times daily     Dispense:  10 mL     Refill:  1     Medications Discontinued During This Encounter   Medication Reason    sulfamethoxazole-trimethoprim (BACTRIM;SEPTRA) 400-80 MG per tablet Therapy completed    omeprazole (PRILOSEC) 20 MG delayed release capsule REORDER    nystatin (MYCOSTATIN) 975049 UNIT/ML suspension Therapy completed    promethazine-dextromethorphan (PROMETHAZINE-DM) 6.25-15 MG/5ML syrup REORDER    insulin glargine (BASAGLAR KWIKPEN) 100 UNIT/ML injection pen REORDER    insulin lispro (ADMELOG) 100 UNIT/ML injection vial REORDER     Patient Instructions   Get booster Oct 7 or after. Increase your insulin at bedtime to 20 U    Increase your mealtime to 5 U and add the sliding scale to it. Patient given educational handouts and has had all questions answered. Patient voices understanding and agrees to plans along with risks and benefits of plan. Patient isinstructed to continue prior meds, diet, and exercise plans unless instructed otherwise. Patient agrees to follow up as instructed and sooner if needed. Patient agrees to go to ER if condition becomes emergent. Notesmay be completed with dictation device and spelling errors may occur. Materials may be copied and pasted from a notepad outside of EMR, all of which, I, Dr. Bernie Vasquez MD, take sole intellectual ownership of and have approved adding to my note. Return in about 3 months (around 2021) for OV (M-Wa), medicare AWV 2 time slots.

## 2021-11-22 ENCOUNTER — TELEPHONE (OUTPATIENT)
Dept: SURGERY | Age: 61
End: 2021-11-22

## 2021-11-22 DIAGNOSIS — A63.0 CONDYLOMA ACUMINATA: Primary | ICD-10-CM

## 2021-11-22 RX ORDER — IMIQUIMOD 12.5 MG/.25G
CREAM TOPICAL
Qty: 1 BOX | Refills: 1 | Status: SHIPPED | OUTPATIENT
Start: 2021-11-22 | End: 2021-11-29

## 2021-11-22 NOTE — TELEPHONE ENCOUNTER
Patient called said [facility] needs a pa for ventolin inh I do not see a rx for ventolin inhailer   lov 1/13/21 next 12/20/2021

## 2021-11-22 NOTE — TELEPHONE ENCOUNTER
Rx Refill Note  Requested Prescriptions     Pending Prescriptions Disp Refills   • albuterol sulfate  (90 Base) MCG/ACT inhaler 18 g 11     Sig: Inhale 2 puffs Every 4 (Four) Hours As Needed for Wheezing.      Last office visit with prescribing clinician: 1/13/2021      Next office visit with prescribing clinician: 12/20/2021            Brando Hutson Rep  11/22/21, 10:13 CST

## 2021-11-27 RX ORDER — FOLIC ACID 1 MG/1
TABLET ORAL
Qty: 90 TABLET | Refills: 3 | Status: SHIPPED | OUTPATIENT
Start: 2021-11-27

## 2021-12-01 PROBLEM — R91.8 LUNG INFILTRATE: Status: RESOLVED | Noted: 2021-04-06 | Resolved: 2021-01-01

## 2021-12-01 NOTE — PROGRESS NOTES
Chief Complaint  Lung Transplant and chronic respiratory failure with hypoxia    Subjective    History of Present Illness {CC  Problem List  Visit Diagnosis   Encounters  Notes  Medications  Labs  Result Review Imaging  Media     Bayron Rodriguez presents to [facility] for:    Management of his COPD, chronic respiratory failure and lung transplant status.  He underwent a unilateral lung transplant on the left in 2006.  He continues to follow with the transplant service at [facility].  He is currently being considered for a bilateral lung transplant.  Most recent PFT May 2021 showing an FEV1 of 19.  He is a former smoker although he quit in 2003.  He is on Spiriva and full dose Advair for his COPD.  He takes full dose Mucinex twice daily.  He only benefits from Ventolin as a rescue inhaler.  He has tried multiple forms of the ProAir which do not seem to control his symptoms.  He would like a prior authorization for form for the Ventolin.  He has breathing treatments he can take when needed which he typically does 2-3 times per day.  He recently had acute symptoms beginning 3 weeks ago.  He discussed this with his transplant service.  They started him on Levaquin 750 for 14 days.  He has been on 60 mg of prednisone since then.  Tomorrow he was start 50 mg for 5 days followed by 40 mg for 5 days followed by 30 mg for 5 days followed by 20 mg for 5 days followed by 10 mg for 5 days then return to his baseline dose of 5 mg.  His blood sugar and blood pressure have been somewhat elevated on these medications however he is improving.  They have requested he go obtain a Covid test although this is not likely given his symptom onset of 3 weeks.  He wears 2 L of oxygen at rest.  He is on 3-4 L of oxygen depending upon his level of exertion.  He is compliant with this and benefiting from it.         Prior to Admission medications    Medication Sig Start  Date End Date Taking? Authorizing Provider   albuterol sulfate  (90 Base) MCG/ACT inhaler Inhale 2 puffs Every 4 (Four) Hours As Needed for Wheezing. 11/22/21   Shruti Kenney APRN   amLODIPine (NORVASC) 5 MG tablet Take 5 mg by mouth Every Morning.    Fidel Mendez MD   atorvastatin (LIPITOR) 20 MG tablet Take 20 mg by mouth Every Night.    Fidel Mendez MD   B-D UF III MINI PEN NEEDLES 31G X 5 MM misc USE 4 TIMES A DAY AS DIRECTED 5/29/19   Fidel Mendez MD   doxycycline (DORYX) 100 MG enteric coated tablet Take 1 tablet by mouth 2 (Two) Times a Day. 11/24/19   Lucio Mckeon MD   doxycycline (VIBRAMYCIN) 100 MG capsule Take 1 capsule by mouth 2 (Two) Times a Day. 4/14/20   Shruti Kenney APRN   fluconazole (DIFLUCAN) 100 MG tablet TAKE 1 TABLET BY MOUTH EVERY DAY FOR 2 WEEKS 4/3/19   Fidel Mendez MD   folic acid (FOLVITE) 1 MG tablet Take 1 mg by mouth Every Morning.    Fidel Mendez MD   furosemide (LASIX) 20 MG tablet Take 40 mg by mouth Daily.    Fidel Mendez MD   guaiFENesin (MUCINEX) 600 MG 12 hr tablet Take 2 tablets by mouth Every 12 (Twelve) Hours. 3/13/19   Kamar Joy MD   hydrochlorothiazide (HYDRODIURIL) 50 MG tablet Take 50 mg by mouth Every Morning.    Fidel Mendez MD   HYDROcodone-acetaminophen (NORCO) 7.5-325 MG per tablet Take 1 tablet by mouth Every 8 (Eight) Hours As Needed for Moderate Pain .    Fidel Mendez MD   Insulin Glargine (BASAGLAR KWIKPEN) 100 UNIT/ML injection pen INJECT 15 UNIT(S) SUBCUTANEOUS ONCE A DAY (IN THE EVENING) 8/21/19   Fidel Mendez MD   ipratropium-albuterol (DUO-NEB) 0.5-2.5 mg/3 ml nebulizer USE 3ML VIA NEBULIZER 4 TIMES DAILY 5/8/19   Fidel Mendez MD   levocetirizine (XYZAL) 5 MG tablet Take 5 mg by mouth Every Night.    Fidel Mendez MD   magnesium oxide (MAG-OX) 400 MG tablet Take 400 mg by mouth 2 (Two) Times a Day.    Provider, MD Fidel    MAGNESIUM PO Take 400 mg by mouth. 4/17/19   Fidel Mendez MD   metoprolol tartrate (LOPRESSOR) 100 MG tablet Take 50 mg by mouth 2 (Two) Times a Day.    Fidel Mendez MD   Mycophenolate Sodium (MYCOPHENOLIC ACID PO) Take 720 mg by mouth 2 (Two) Times a Day.    Fidel Mendez MD   Nebulizers (COMPRESSOR/NEBULIZER) misc Nebulizer with supplies 4/5/19   Fidel Mendez MD   nystatin (MYCOSTATIN) 312054 UNIT/ML suspension 5 MILLILITER(S) ORALLY 4 TIMES A DAY. SWISH AND SWALLOW 4/6/19   Fidel Mendez MD   O2 (OXYGEN) 2 L Every Night. 4/5/19   Fidel Mendez MD   omeprazole (priLOSEC) 20 MG capsule Take 20 mg by mouth 2 (Two) Times a Day.    Fidel Mendez MD   ONETOUCH VERIO test strip USE 4 TIMES A DAY (AFTER MEALS AND AT BEDTIME) E11.9 5/30/19   Fidel Mendez MD   predniSONE (DELTASONE) 10 MG tablet Take 5 mg by mouth Daily. 7/31/20   Fidel Mendez MD   pregabalin (LYRICA) 150 MG capsule Take 150 mg by mouth 3 (Three) Times a Day As Needed.    Fidel Mendez MD   promethazine (PHENERGAN) 25 MG tablet Take 25 mg by mouth As Needed. for nausea 3/27/19   Fidel Mendez MD   sulfamethoxazole-trimethoprim (BACTRIM,SEPTRA) 400-80 MG tablet Take 1 tablet by mouth See Admin Instructions. Take 1 tablet by mouth every Monday, Wednesday and Friday. 3/26/19   Fidel Mendez MD   tacrolimus (PROGRAF) 1 MG capsule Take 1 mg by mouth 2 (Two) Times a Day.    Fidel Mendez MD   tiotropium (SPIRIVA) 18 MCG per inhalation capsule Place 1 capsule into inhaler and inhale Every Morning. 1/20/20   Shruti Kenney APRN   tiZANidine (ZANAFLEX) 4 MG tablet Take 4 mg by mouth Every 8 (Eight) Hours As Needed for Muscle Spasms.    Fidel Mendez MD   traZODone (DESYREL) 50 MG tablet Take  mg by mouth Every Night.    Fidel Mendez MD   vitamin B-12 (CYANOCOBALAMIN) 500 MCG tablet Take 500 mcg by mouth Every Morning.    Provider,  "MD MICHAELLE ParraUB 250-50 MCG/DOSE DISKUS Inhale 1 puff 2 (Two) Times a Day. 19   Provider, MD Fidel       Social History     Socioeconomic History   • Marital status:    Tobacco Use   • Smoking status: Former Smoker     Packs/day: 3.00     Years: 28.00     Pack years: 84.00     Types: Cigarettes     Quit date:      Years since quittin.9   • Smokeless tobacco: Never Used   Vaping Use   • Vaping Use: Never used   Substance and Sexual Activity   • Alcohol use: No   • Drug use: No     Comment: when in severe pain    • Sexual activity: Defer       Objective   Vital Signs:   /90   Pulse 91   Ht 175.3 cm (69\")   Wt 72.1 kg (159 lb)   SpO2 98% Comment: 2lt pulse  BMI 23.48 kg/m²     Physical Exam  Constitutional:       Interventions: Nasal cannula and face mask in place.      Comments: Cushingoid   Cardiovascular:      Rate and Rhythm: Normal rate and regular rhythm.      Heart sounds: No murmur heard.      Pulmonary:      Effort: Pulmonary effort is normal. No tachypnea or prolonged expiration.      Breath sounds: Normal breath sounds.      Comments: Few rales on the left  Musculoskeletal:      Right lower leg: No edema.      Left lower leg: No edema.   Neurological:      Mental Status: He is alert and oriented to person, place, and time.        Result Review :{ Labs  Result Review  Imaging  Med Tab  Media :    PFT Values        Some values may be hidden. Unless noted otherwise, only the newest values recorded on each date are displayed.         Old Values PFT Results 20   FVC 34%   FEV1 20%   FEV1/FVC 47.47      Pre Drug PFT Results 20   FVC 30.05   FEV1 16.97   FEF 25-75% 7.61   FEV1/FVC 45      Post Drug PFT Results 20   No data to display.      Other Tests PFT Results 20   No data to display.           My interpretation of the PFT: none for this visit    Results for orders placed in visit on 19    Pulmonary Function Test    Narrative  Pulmonary " Function Test  Performed by: Shruti Kenney APRN  Authorized by: Shruti Kenney APRN    Pre Drug  FVC: 30.05%  FEV1: 16.97%  FEF 25-75%: 7.61%  FEV1/FVC: 45%      Results for orders placed in visit on 06/05/19    Pulmonary Function Test      Results for orders placed in visit on 03/04/19    Pulmonary Function Test      My interpretation of imaging:  None for this visit    My interpretation of labs: none for this visit      Assessment and Plan {CC Problem List  Visit Diagnosis  ROS  Review (Popup)  Health Maintenance  Quality  BestPractice  Medications  SmartSets  SnapShot Encounters  Media      Diagnoses and all orders for this visit:    1. COPD, group D, by GOLD 2017 classification ([facility]) (Primary)  Comments:  Continue Spiriva and Advair 500.  Will attempt prior authorization for Ventolin.  He does not benefit from ProAir for treatment of exacerbations    2. Lung transplant status ([facility])  Comments:  Being considered for bilateral lung transplant through .  Appointment with them January 13    3. Chronic respiratory failure with hypoxia ([facility])  Comments:  Continue 2 L at rest and 3 to 4 L with activity.  He does benefit from it in this fashion    4. Personal history of nicotine dependence  Comments:  Continue to avoid smoking    5. Acquired immunocompromised state ([facility])  Comments:  Adhere to antirejection medication regimen    6. Essential hypertension  Comments:  Education material about monitoring blood pressure and reporting to transplant service if it remains elevated.  Likely secondary to steroid      Seems to be recovering with current medications from respiratory illness.  Given direction on how to obtain Covid test locally free of charge with results today.  He will do this at the discretion of the transplant service to have asked for it.  Follow-up in 3 months.  He will call sooner if needed    Patient's Body mass index is 23.48 kg/m². indicating that he is within normal range (BMI  18.5-24.9). No BMI management plan needed..      Shruti Kenney, APRN  12/20/2021  09:31 CST    Follow Up {Instructions Charge Capture  Follow-up Communications   Return in about 3 months (around 3/20/2022).    Patient was given instructions and counseling regarding his condition or for health maintenance advice. Please see specific information pulled into the AVS if appropriate.

## 2021-12-20 PROBLEM — I10 ESSENTIAL HYPERTENSION: Chronic | Status: ACTIVE | Noted: 2018-12-04

## 2021-12-20 NOTE — PATIENT INSTRUCTIONS
Hypertension, Adult  Hypertension is another name for high blood pressure. High blood pressure forces your heart to work harder to pump blood. This can cause problems over time.  There are two numbers in a blood pressure reading. There is a top number (systolic) over a bottom number (diastolic). It is best to have a blood pressure that is below 120/80. Healthy choices can help lower your blood pressure, or you may need medicine to help lower it.  What are the causes?  The cause of this condition is not known. Some conditions may be related to high blood pressure.  What increases the risk?  · Smoking.  · Having type 2 diabetes mellitus, high cholesterol, or both.  · Not getting enough exercise or physical activity.  · Being overweight.  · Having too much fat, sugar, calories, or salt (sodium) in your diet.  · Drinking too much alcohol.  · Having long-term (chronic) kidney disease.  · Having a family history of high blood pressure.  · Age. Risk increases with age.  · Race. You may be at higher risk if you are .  · Gender. Men are at higher risk than women before age 45. After age 65, women are at higher risk than men.  · Having obstructive sleep apnea.  · Stress.  What are the signs or symptoms?  · High blood pressure may not cause symptoms. Very high blood pressure (hypertensive crisis) may cause:  ? Headache.  ? Feelings of worry or nervousness (anxiety).  ? Shortness of breath.  ? Nosebleed.  ? A feeling of being sick to your stomach (nausea).  ? Throwing up (vomiting).  ? Changes in how you see.  ? Very bad chest pain.  ? Seizures.  How is this treated?  · This condition is treated by making healthy lifestyle changes, such as:  ? Eating healthy foods.  ? Exercising more.  ? Drinking less alcohol.  · Your health care provider may prescribe medicine if lifestyle changes are not enough to get your blood pressure under control, and if:  ? Your top number is above 130.  ? Your bottom number is above  80.  · Your personal target blood pressure may vary.  Follow these instructions at home:  Eating and drinking    · If told, follow the DASH eating plan. To follow this plan:  ? Fill one half of your plate at each meal with fruits and vegetables.  ? Fill one fourth of your plate at each meal with whole grains. Whole grains include whole-wheat pasta, brown rice, and whole-grain bread.  ? Eat or drink low-fat dairy products, such as skim milk or low-fat yogurt.  ? Fill one fourth of your plate at each meal with low-fat (lean) proteins. Low-fat proteins include fish, chicken without skin, eggs, beans, and tofu.  ? Avoid fatty meat, cured and processed meat, or chicken with skin.  ? Avoid pre-made or processed food.  · Eat less than 1,500 mg of salt each day.  · Do not drink alcohol if:  ? Your doctor tells you not to drink.  ? You are pregnant, may be pregnant, or are planning to become pregnant.  · If you drink alcohol:  ? Limit how much you use to:  § 0-1 drink a day for women.  § 0-2 drinks a day for men.  ? Be aware of how much alcohol is in your drink. In the [place] one drink equals one 12 oz bottle of beer (355 mL), one 5 oz glass of wine (148 mL), or one 1½ oz glass of hard liquor (44 mL).    Lifestyle    · Work with your doctor to stay at a healthy weight or to lose weight. Ask your doctor what the best weight is for you.  · Get at least 30 minutes of exercise most days of the week. This may include walking, swimming, or biking.  · Get at least 30 minutes of exercise that strengthens your muscles (resistance exercise) at least 3 days a week. This may include lifting weights or doing Pilates.  · Do not use any products that contain nicotine or tobacco, such as cigarettes, e-cigarettes, and chewing tobacco. If you need help quitting, ask your doctor.  · Check your blood pressure at home as told by your doctor.  · Keep all follow-up visits as told by your doctor. This is important.    Medicines  · Take  over-the-counter and prescription medicines only as told by your doctor. Follow directions carefully.  · Do not skip doses of blood pressure medicine. The medicine does not work as well if you skip doses. Skipping doses also puts you at risk for problems.  · Ask your doctor about side effects or reactions to medicines that you should watch for.  Contact a doctor if you:  · Think you are having a reaction to the medicine you are taking.  · Have headaches that keep coming back (recurring).  · Feel dizzy.  · Have swelling in your ankles.  · Have trouble with your vision.  Get help right away if you:  · Get a very bad headache.  · Start to feel mixed up (confused).  · Feel weak or numb.  · Feel faint.  · Have very bad pain in your:  ? Chest.  ? Belly (abdomen).  · Throw up more than once.  · Have trouble breathing.  Summary  · Hypertension is another name for high blood pressure.  · High blood pressure forces your heart to work harder to pump blood.  · For most people, a normal blood pressure is less than 120/80.  · Making healthy choices can help lower blood pressure. If your blood pressure does not get lower with healthy choices, you may need to take medicine.  This information is not intended to replace advice given to you by your health care provider. Make sure you discuss any questions you have with your health care provider.  Document Revised: 08/28/2019 Document Reviewed: 08/28/2019  [employer] Patient Education © 2021 [employer]  How to Take Your Blood Pressure  Blood pressure measures how strongly your blood is pressing against the walls of your arteries. Arteries are blood vessels that carry blood from your heart throughout your body. You can take your blood pressure at home with a machine.  You may need to check your blood pressure at home:  · To check if you have high blood pressure (hypertension).  · To check your blood pressure over time.  · To make sure your blood pressure medicine is working.  Supplies  "needed:  · Blood pressure machine, or monitor.  · Dining room chair to sit in.  · Table or desk.  · Small notebook.  · Pencil or pen.  How to prepare  Avoid these things for 30 minutes before checking your blood pressure:  · Having drinks with caffeine in them, such as coffee or tea.  · Drinking alcohol.  · Eating.  · Smoking.  · Exercising.  Do these things five minutes before checking your blood pressure:  · Go to the bathroom and pee (urinate).  · Sit in a dining chair. Do not sit in a soft couch or an armchair.  · Be quiet. Do not talk.  How to take your blood pressure  Follow the instructions that came with your machine. If you have a digital blood pressure monitor, these may be the instructions:  1. Sit up straight.  2. Place your feet on the floor. Do not cross your ankles or legs.  3. Rest your left arm at the level of your heart. You may rest it on a table, desk, or chair.  4. Pull up your shirt sleeve.  5. Wrap the blood pressure cuff around the upper part of your left arm. The cuff should be 1 inch (2.5 cm) above your elbow. It is best to wrap the cuff around bare skin.  6. Fit the cuff snugly around your arm. You should be able to place only one finger between the cuff and your arm.  7. Place the cord so that it rests in the bend of your elbow.  8. Press the power button.  9. Sit quietly while the cuff fills with air and loses air.  10. Write down the numbers on the screen.  11. Wait 2-3 minutes and then repeat steps 1-10.  What do the numbers mean?  Two numbers make up your blood pressure. The first number is called systolic pressure. The second is called diastolic pressure. An example of a blood pressure reading is \"120 over 80\" (or 120/80).  If you are an adult and do not have a medical condition, use this guide to find out if your blood pressure is normal:  Normal  · First number: below 120.  · Second number: below 80.  Elevated  · First number: 120-129.  · Second number: below 80.  Hypertension " stage 1  · First number: 130-139.  · Second number: 80-89.  Hypertension stage 2  · First number: 140 or above.  · Second number: 90 or above.  Your blood pressure is above normal even if only the top or bottom number is above normal.  Follow these instructions at home:  · Check your blood pressure as often as your doctor tells you to.  · Check your blood pressure at the same time every day.  · Take your monitor to your next doctor's appointment. Your doctor will:  ? Make sure you are using it correctly.  ? Make sure it is working right.  · Make sure you understand what your blood pressure numbers should be.  · Tell your doctor if your medicine is causing side effects.  · Keep all follow-up visits as told by your doctor. This is important.  General tips:  · You will need a blood pressure machine, or monitor. Your doctor can suggest a monitor. You can buy one at a [employer] or online. When choosing one:  ? Choose one with an arm cuff.  ? Choose one that wraps around your upper arm. Only one finger should fit between your arm and the cuff.  ? Do not choose one that measures your blood pressure from your wrist or finger.  Where to find more information  American Heart Association: www.heart.org  Contact a doctor if:  · Your blood pressure keeps being high.  Get help right away if:  · Your first blood pressure number is higher than 180.  · Your second blood pressure number is higher than 120.  Summary  · Check your blood pressure at the same time every day.  · Avoid caffeine, alcohol, smoking, and exercise for 30 minutes before checking your blood pressure.  · Make sure you understand what your blood pressure numbers should be.  This information is not intended to replace advice given to you by your health care provider. Make sure you discuss any questions you have with your health care provider.  Document Revised: 12/11/2020 Document Reviewed: 12/11/2020  [employer] Patient Education © 2021 [employer]

## 2022-01-01 ENCOUNTER — LAB (OUTPATIENT)
Dept: LAB | Facility: HOSPITAL | Age: 62
End: 2022-01-01

## 2022-01-01 ENCOUNTER — APPOINTMENT (OUTPATIENT)
Dept: GENERAL RADIOLOGY | Facility: HOSPITAL | Age: 62
End: 2022-01-01

## 2022-01-01 ENCOUNTER — HOSPITAL ENCOUNTER (EMERGENCY)
Facility: HOSPITAL | Age: 62
Discharge: ANOTHER HOSPITAL | End: 2022-06-13
Attending: EMERGENCY MEDICINE | Admitting: EMERGENCY MEDICINE

## 2022-01-01 ENCOUNTER — TRANSCRIBE ORDERS (OUTPATIENT)
Dept: ADMINISTRATIVE | Facility: HOSPITAL | Age: 62
End: 2022-01-01

## 2022-01-01 ENCOUNTER — HOME HEALTH ADMISSION (OUTPATIENT)
Dept: HOME HEALTH SERVICES | Facility: HOME HEALTHCARE | Age: 62
End: 2022-01-01

## 2022-01-01 ENCOUNTER — DOCUMENTATION (OUTPATIENT)
Dept: CT IMAGING | Facility: HOSPITAL | Age: 62
End: 2022-01-01

## 2022-01-01 ENCOUNTER — TRANSCRIBE ORDERS (OUTPATIENT)
Dept: LAB | Facility: HOSPITAL | Age: 62
End: 2022-01-01

## 2022-01-01 ENCOUNTER — APPOINTMENT (OUTPATIENT)
Dept: CT IMAGING | Facility: HOSPITAL | Age: 62
End: 2022-01-01

## 2022-01-01 ENCOUNTER — HOSPITAL ENCOUNTER (EMERGENCY)
Facility: HOSPITAL | Age: 62
Discharge: ANOTHER HOSPITAL | End: 2022-04-25
Attending: EMERGENCY MEDICINE | Admitting: EMERGENCY MEDICINE

## 2022-01-01 ENCOUNTER — HOSPITAL ENCOUNTER (EMERGENCY)
Facility: HOSPITAL | Age: 62
Discharge: HOME | End: 2022-01-09
Admitting: EMERGENCY MEDICINE

## 2022-01-01 ENCOUNTER — HOSPITAL ENCOUNTER (EMERGENCY)
Facility: HOSPITAL | Age: 62
Discharge: ANOTHER HOSPITAL | End: 2022-05-31
Attending: EMERGENCY MEDICINE | Admitting: EMERGENCY MEDICINE

## 2022-01-01 ENCOUNTER — OFFICE VISIT (OUTPATIENT)
Dept: PULMONOLOGY | Facility: CLINIC | Age: 62
End: 2022-01-01

## 2022-01-01 ENCOUNTER — HOSPITAL ENCOUNTER (EMERGENCY)
Facility: HOSPITAL | Age: 62
Discharge: HOME | End: 2022-04-18
Attending: EMERGENCY MEDICINE | Admitting: EMERGENCY MEDICINE

## 2022-01-01 VITALS
WEIGHT: 150 LBS | HEART RATE: 97 BPM | BODY MASS INDEX: 22.73 KG/M2 | SYSTOLIC BLOOD PRESSURE: 147 MMHG | RESPIRATION RATE: 16 BRPM | OXYGEN SATURATION: 95 % | DIASTOLIC BLOOD PRESSURE: 72 MMHG | HEIGHT: 68 IN | TEMPERATURE: 98.1 F

## 2022-01-01 VITALS
RESPIRATION RATE: 22 BRPM | SYSTOLIC BLOOD PRESSURE: 151 MMHG | TEMPERATURE: 98.5 F | HEART RATE: 98 BPM | DIASTOLIC BLOOD PRESSURE: 80 MMHG | WEIGHT: 152 LBS | OXYGEN SATURATION: 99 % | HEIGHT: 69 IN | BODY MASS INDEX: 22.51 KG/M2

## 2022-01-01 VITALS
HEIGHT: 69 IN | RESPIRATION RATE: 18 BRPM | HEART RATE: 91 BPM | DIASTOLIC BLOOD PRESSURE: 71 MMHG | WEIGHT: 155 LBS | TEMPERATURE: 99 F | OXYGEN SATURATION: 96 % | SYSTOLIC BLOOD PRESSURE: 131 MMHG | BODY MASS INDEX: 22.96 KG/M2

## 2022-01-01 VITALS
HEIGHT: 69 IN | DIASTOLIC BLOOD PRESSURE: 69 MMHG | SYSTOLIC BLOOD PRESSURE: 154 MMHG | WEIGHT: 170 LBS | BODY MASS INDEX: 25.18 KG/M2 | RESPIRATION RATE: 18 BRPM | OXYGEN SATURATION: 97 % | TEMPERATURE: 98.7 F | HEART RATE: 96 BPM

## 2022-01-01 VITALS
HEIGHT: 68 IN | DIASTOLIC BLOOD PRESSURE: 80 MMHG | WEIGHT: 160.6 LBS | HEART RATE: 86 BPM | SYSTOLIC BLOOD PRESSURE: 148 MMHG | BODY MASS INDEX: 24.34 KG/M2 | OXYGEN SATURATION: 98 %

## 2022-01-01 VITALS
SYSTOLIC BLOOD PRESSURE: 113 MMHG | HEIGHT: 69 IN | DIASTOLIC BLOOD PRESSURE: 55 MMHG | HEART RATE: 77 BPM | WEIGHT: 145 LBS | OXYGEN SATURATION: 93 % | TEMPERATURE: 97.9 F | BODY MASS INDEX: 21.48 KG/M2 | RESPIRATION RATE: 18 BRPM

## 2022-01-01 DIAGNOSIS — Z94.2 LUNG TRANSPLANT STATUS: ICD-10-CM

## 2022-01-01 DIAGNOSIS — Z94.2 LUNG TRANSPLANT STATUS: Primary | ICD-10-CM

## 2022-01-01 DIAGNOSIS — G89.29 OTHER CHRONIC PAIN: ICD-10-CM

## 2022-01-01 DIAGNOSIS — G93.40 ENCEPHALOPATHY: ICD-10-CM

## 2022-01-01 DIAGNOSIS — N18.9 CHRONIC KIDNEY DISEASE, UNSPECIFIED CKD STAGE: ICD-10-CM

## 2022-01-01 DIAGNOSIS — R73.9 HYPERGLYCEMIA: ICD-10-CM

## 2022-01-01 DIAGNOSIS — Z94.2 LUNG TRANSPLANT STATUS: Primary | Chronic | ICD-10-CM

## 2022-01-01 DIAGNOSIS — J18.9 PNEUMONIA DUE TO INFECTIOUS ORGANISM, UNSPECIFIED LATERALITY, UNSPECIFIED PART OF LUNG: ICD-10-CM

## 2022-01-01 DIAGNOSIS — J96.11 CHRONIC RESPIRATORY FAILURE WITH HYPOXIA: Chronic | ICD-10-CM

## 2022-01-01 DIAGNOSIS — Z79.899 ENCOUNTER FOR LONG-TERM (CURRENT) USE OF OTHER MEDICATIONS: ICD-10-CM

## 2022-01-01 DIAGNOSIS — Z94.2 LUNG REPLACED BY TRANSPLANT: Primary | ICD-10-CM

## 2022-01-01 DIAGNOSIS — R73.9 HYPERGLYCEMIA: Primary | ICD-10-CM

## 2022-01-01 DIAGNOSIS — Z20.822 ENCOUNTER FOR PREOPERATIVE SCREENING LABORATORY TESTING FOR COVID-19 VIRUS: ICD-10-CM

## 2022-01-01 DIAGNOSIS — E11.65 TYPE 2 DIABETES MELLITUS WITH HYPERGLYCEMIA, WITHOUT LONG-TERM CURRENT USE OF INSULIN: Chronic | ICD-10-CM

## 2022-01-01 DIAGNOSIS — Z86.39 HISTORY OF DIABETES MELLITUS: ICD-10-CM

## 2022-01-01 DIAGNOSIS — J43.9 PULMONARY EMPHYSEMA, UNSPECIFIED EMPHYSEMA TYPE: Primary | ICD-10-CM

## 2022-01-01 DIAGNOSIS — J96.01 ACUTE RESPIRATORY FAILURE WITH HYPOXIA AND HYPERCAPNIA: Primary | ICD-10-CM

## 2022-01-01 DIAGNOSIS — I10 HYPERTENSION, UNSPECIFIED TYPE: ICD-10-CM

## 2022-01-01 DIAGNOSIS — Z01.818 PREOP TESTING: Primary | ICD-10-CM

## 2022-01-01 DIAGNOSIS — J96.02 ACUTE RESPIRATORY FAILURE WITH HYPOXIA AND HYPERCAPNIA: Primary | ICD-10-CM

## 2022-01-01 DIAGNOSIS — J44.1 COPD EXACERBATION: ICD-10-CM

## 2022-01-01 DIAGNOSIS — R91.1 PULMONARY NODULE: ICD-10-CM

## 2022-01-01 DIAGNOSIS — J44.9 CHRONIC OBSTRUCTIVE PULMONARY DISEASE, UNSPECIFIED COPD TYPE: ICD-10-CM

## 2022-01-01 DIAGNOSIS — J39.8 TRACHEOBRONCHOMALACIA: ICD-10-CM

## 2022-01-01 DIAGNOSIS — N18.9 CHRONIC RENAL IMPAIRMENT, UNSPECIFIED CKD STAGE: ICD-10-CM

## 2022-01-01 DIAGNOSIS — Z94.2 LUNG REPLACED BY TRANSPLANT: ICD-10-CM

## 2022-01-01 DIAGNOSIS — Z86.79 HISTORY OF CORONARY ARTERY DISEASE: ICD-10-CM

## 2022-01-01 DIAGNOSIS — I48.92 ATRIAL FLUTTER, UNSPECIFIED TYPE: ICD-10-CM

## 2022-01-01 DIAGNOSIS — Z87.09 HISTORY OF COPD: ICD-10-CM

## 2022-01-01 DIAGNOSIS — Z87.891 PERSONAL HISTORY OF NICOTINE DEPENDENCE: Chronic | ICD-10-CM

## 2022-01-01 DIAGNOSIS — Z94.2 STATUS POST LUNG TRANSPLANTATION: ICD-10-CM

## 2022-01-01 DIAGNOSIS — K29.70 GASTRITIS WITHOUT BLEEDING, UNSPECIFIED CHRONICITY, UNSPECIFIED GASTRITIS TYPE: Primary | ICD-10-CM

## 2022-01-01 DIAGNOSIS — J18.9 PNEUMONIA OF LEFT LUNG DUE TO INFECTIOUS ORGANISM, UNSPECIFIED PART OF LUNG: Primary | ICD-10-CM

## 2022-01-01 DIAGNOSIS — C44.90 SKIN CANCER: ICD-10-CM

## 2022-01-01 DIAGNOSIS — Z01.812 ENCOUNTER FOR PREOPERATIVE SCREENING LABORATORY TESTING FOR COVID-19 VIRUS: ICD-10-CM

## 2022-01-01 DIAGNOSIS — J44.9 COPD, GROUP D, BY GOLD 2017 CLASSIFICATION: Chronic | ICD-10-CM

## 2022-01-01 DIAGNOSIS — D84.9 ACQUIRED IMMUNOCOMPROMISED STATE: Chronic | ICD-10-CM

## 2022-01-01 LAB
A-A DO2: 20.4 MMHG
ACETONE BLD QL: NEGATIVE
ALBUMIN SERPL-MCNC: 3.7 G/DL (ref 3.5–5.2)
ALBUMIN SERPL-MCNC: 4 G/DL (ref 3.5–5.2)
ALBUMIN SERPL-MCNC: 4 G/DL (ref 3.5–5.2)
ALBUMIN SERPL-MCNC: 4.2 G/DL (ref 3.5–5.2)
ALBUMIN SERPL-MCNC: 4.2 G/DL (ref 3.5–5.2)
ALBUMIN/GLOB SERPL: 1.4 G/DL
ALBUMIN/GLOB SERPL: 1.8 G/DL
ALBUMIN/GLOB SERPL: 1.8 G/DL
ALBUMIN/GLOB SERPL: 1.9 G/DL
ALBUMIN/GLOB SERPL: 2.1 G/DL
ALP SERPL-CCNC: 103 U/L (ref 39–117)
ALP SERPL-CCNC: 67 U/L (ref 39–117)
ALP SERPL-CCNC: 70 U/L (ref 39–117)
ALP SERPL-CCNC: 86 U/L (ref 39–117)
ALP SERPL-CCNC: 92 U/L (ref 39–117)
ALT SERPL W P-5'-P-CCNC: 10 U/L (ref 1–41)
ALT SERPL W P-5'-P-CCNC: 10 U/L (ref 1–41)
ALT SERPL W P-5'-P-CCNC: 17 U/L (ref 1–41)
ALT SERPL W P-5'-P-CCNC: 23 U/L (ref 1–41)
ALT SERPL W P-5'-P-CCNC: 24 U/L (ref 1–41)
ANION GAP SERPL CALCULATED.3IONS-SCNC: 10 MMOL/L (ref 5–15)
ANION GAP SERPL CALCULATED.3IONS-SCNC: 11 MMOL/L (ref 5–15)
ANION GAP SERPL CALCULATED.3IONS-SCNC: 12 MMOL/L (ref 5–15)
ANION GAP SERPL CALCULATED.3IONS-SCNC: 13 MMOL/L (ref 5–15)
ANION GAP SERPL CALCULATED.3IONS-SCNC: 14 MMOL/L (ref 5–15)
ANION GAP SERPL CALCULATED.3IONS-SCNC: 16 MMOL/L (ref 5–15)
ANION GAP SERPL CALCULATED.3IONS-SCNC: 4 MMOL/L (ref 5–15)
ANION GAP SERPL CALCULATED.3IONS-SCNC: 8 MMOL/L (ref 5–15)
ANION GAP SERPL CALCULATED.3IONS-SCNC: 8 MMOL/L (ref 5–15)
ANION GAP SERPL CALCULATED.3IONS-SCNC: 9 MMOL/L (ref 5–15)
ANION GAP SERPL CALCULATED.3IONS-SCNC: 9 MMOL/L (ref 5–15)
ANISOCYTOSIS BLD QL: ABNORMAL
APTT PPP: 21.9 SECONDS (ref 24.1–35)
APTT PPP: 26.4 SECONDS (ref 24.1–35)
APTT PPP: 26.6 SECONDS (ref 24.1–35)
APTT PPP: 27.1 SECONDS (ref 24.1–35)
APTT PPP: 27.7 SECONDS (ref 24.1–35)
APTT PPP: 29.5 SECONDS (ref 24.1–35)
ARTERIAL PATENCY WRIST A: ABNORMAL
ARTERIAL PATENCY WRIST A: POSITIVE
AST SERPL-CCNC: 11 U/L (ref 1–40)
AST SERPL-CCNC: 11 U/L (ref 1–40)
AST SERPL-CCNC: 13 U/L (ref 1–40)
AST SERPL-CCNC: 15 U/L (ref 1–40)
AST SERPL-CCNC: 19 U/L (ref 1–40)
ATMOSPHERIC PRESS: 748 MMHG
ATMOSPHERIC PRESS: 748 MMHG
ATMOSPHERIC PRESS: 751 MMHG
ATMOSPHERIC PRESS: 753 MMHG
ATMOSPHERIC PRESS: 758 MMHG
BACTERIA BLD CULT: ABNORMAL
BACTERIA SPEC AEROBE CULT: ABNORMAL
BACTERIA SPEC AEROBE CULT: NORMAL
BACTERIA UR QL AUTO: ABNORMAL /HPF
BASE EXCESS BLDA CALC-SCNC: 10.6 MMOL/L (ref 0–2)
BASE EXCESS BLDA CALC-SCNC: 13.4 MMOL/L (ref 0–2)
BASE EXCESS BLDA CALC-SCNC: 17.5 MMOL/L (ref 0–2)
BASE EXCESS BLDA CALC-SCNC: 4 MMOL/L (ref 0–2)
BASE EXCESS BLDA CALC-SCNC: 9.2 MMOL/L (ref 0–2)
BASOPHILS # BLD AUTO: 0.01 10*3/MM3 (ref 0–0.2)
BASOPHILS # BLD AUTO: 0.01 10*3/MM3 (ref 0–0.2)
BASOPHILS # BLD AUTO: 0.02 10*3/MM3 (ref 0–0.2)
BASOPHILS # BLD AUTO: 0.02 10*3/MM3 (ref 0–0.2)
BASOPHILS # BLD AUTO: 0.03 10*3/MM3 (ref 0–0.2)
BASOPHILS # BLD AUTO: 0.03 10*3/MM3 (ref 0–0.2)
BASOPHILS # BLD AUTO: 0.04 10*3/MM3 (ref 0–0.2)
BASOPHILS # BLD AUTO: 0.05 10*3/MM3 (ref 0–0.2)
BASOPHILS # BLD AUTO: 0.06 10*3/MM3 (ref 0–0.2)
BASOPHILS # BLD AUTO: 0.1 10*3/MM3 (ref 0–0.2)
BASOPHILS # BLD MANUAL: 0.13 10*3/MM3 (ref 0–0.2)
BASOPHILS NFR BLD AUTO: 0.1 % (ref 0–1.5)
BASOPHILS NFR BLD AUTO: 0.1 % (ref 0–1.5)
BASOPHILS NFR BLD AUTO: 0.2 % (ref 0–1.5)
BASOPHILS NFR BLD AUTO: 0.2 % (ref 0–1.5)
BASOPHILS NFR BLD AUTO: 0.3 % (ref 0–1.5)
BASOPHILS NFR BLD AUTO: 0.3 % (ref 0–1.5)
BASOPHILS NFR BLD AUTO: 0.5 % (ref 0–1.5)
BASOPHILS NFR BLD AUTO: 0.5 % (ref 0–1.5)
BASOPHILS NFR BLD AUTO: 0.6 % (ref 0–1.5)
BASOPHILS NFR BLD AUTO: 0.6 % (ref 0–1.5)
BASOPHILS NFR BLD MANUAL: 0.8 % (ref 0–1.5)
BDY SITE: ABNORMAL
BILIRUB SERPL-MCNC: 0.3 MG/DL (ref 0–1.2)
BILIRUB SERPL-MCNC: 0.3 MG/DL (ref 0–1.2)
BILIRUB SERPL-MCNC: 0.5 MG/DL (ref 0–1.2)
BILIRUB SERPL-MCNC: 0.7 MG/DL (ref 0–1.2)
BILIRUB SERPL-MCNC: 0.8 MG/DL (ref 0–1.2)
BILIRUB UR QL STRIP: NEGATIVE
BODY TEMPERATURE: 37 C
BOTTLE TYPE: ABNORMAL
BUN SERPL-MCNC: 19 MG/DL (ref 8–23)
BUN SERPL-MCNC: 21 MG/DL (ref 8–23)
BUN SERPL-MCNC: 22 MG/DL (ref 8–23)
BUN SERPL-MCNC: 23 MG/DL (ref 8–23)
BUN SERPL-MCNC: 30 MG/DL (ref 8–23)
BUN SERPL-MCNC: 31 MG/DL (ref 8–23)
BUN SERPL-MCNC: 32 MG/DL (ref 8–23)
BUN SERPL-MCNC: 41 MG/DL (ref 8–23)
BUN SERPL-MCNC: 46 MG/DL (ref 8–23)
BUN SERPL-MCNC: 55 MG/DL (ref 8–23)
BUN SERPL-MCNC: 59 MG/DL (ref 8–23)
BUN/CREAT SERPL: 13.5 (ref 7–25)
BUN/CREAT SERPL: 14.2 (ref 7–25)
BUN/CREAT SERPL: 17.5 (ref 7–25)
BUN/CREAT SERPL: 17.9 (ref 7–25)
BUN/CREAT SERPL: 18.3 (ref 7–25)
BUN/CREAT SERPL: 18.5 (ref 7–25)
BUN/CREAT SERPL: 25 (ref 7–25)
BUN/CREAT SERPL: 25 (ref 7–25)
BUN/CREAT SERPL: 25.5 (ref 7–25)
BUN/CREAT SERPL: 26.9 (ref 7–25)
BUN/CREAT SERPL: 30.6 (ref 7–25)
CALCIUM SPEC-SCNC: 10.5 MG/DL (ref 8.6–10.5)
CALCIUM SPEC-SCNC: 9.1 MG/DL (ref 8.6–10.5)
CALCIUM SPEC-SCNC: 9.2 MG/DL (ref 8.6–10.5)
CALCIUM SPEC-SCNC: 9.4 MG/DL (ref 8.6–10.5)
CALCIUM SPEC-SCNC: 9.5 MG/DL (ref 8.6–10.5)
CALCIUM SPEC-SCNC: 9.6 MG/DL (ref 8.6–10.5)
CALCIUM SPEC-SCNC: 9.7 MG/DL (ref 8.6–10.5)
CALCIUM SPEC-SCNC: 9.8 MG/DL (ref 8.6–10.5)
CALCIUM SPEC-SCNC: 9.8 MG/DL (ref 8.6–10.5)
CHLORIDE SERPL-SCNC: 100 MMOL/L (ref 98–107)
CHLORIDE SERPL-SCNC: 101 MMOL/L (ref 98–107)
CHLORIDE SERPL-SCNC: 103 MMOL/L (ref 98–107)
CHLORIDE SERPL-SCNC: 79 MMOL/L (ref 98–107)
CHLORIDE SERPL-SCNC: 79 MMOL/L (ref 98–107)
CHLORIDE SERPL-SCNC: 87 MMOL/L (ref 98–107)
CHLORIDE SERPL-SCNC: 87 MMOL/L (ref 98–107)
CHLORIDE SERPL-SCNC: 91 MMOL/L (ref 98–107)
CHLORIDE SERPL-SCNC: 94 MMOL/L (ref 98–107)
CHLORIDE SERPL-SCNC: 98 MMOL/L (ref 98–107)
CHLORIDE SERPL-SCNC: 98 MMOL/L (ref 98–107)
CLARITY UR: CLEAR
CLUMPED PLATELETS: PRESENT
CMV DNA SERPL NAA+PROBE-ACNC: NEGATIVE IU/ML
CMV DNA SERPL NAA+PROBE-ACNC: NEGATIVE IU/ML
CMV DNA SERPL NAA+PROBE-ACNC: NORMAL IU/ML
CMV DNA SERPL NAA+PROBE-LOG IU: NORMAL {LOG_IU}/ML
CMV DNA SPEC QL NAA+PROBE: NEGATIVE
CO2 SERPL-SCNC: 30 MMOL/L (ref 22–29)
CO2 SERPL-SCNC: 31 MMOL/L (ref 22–29)
CO2 SERPL-SCNC: 32 MMOL/L (ref 22–29)
CO2 SERPL-SCNC: 32 MMOL/L (ref 22–29)
CO2 SERPL-SCNC: 34 MMOL/L (ref 22–29)
CO2 SERPL-SCNC: 36 MMOL/L (ref 22–29)
CO2 SERPL-SCNC: 37 MMOL/L (ref 22–29)
CO2 SERPL-SCNC: 39 MMOL/L (ref 22–29)
CO2 SERPL-SCNC: 42 MMOL/L (ref 22–29)
COHGB MFR BLD: 0.3 % (ref 0–5)
COLOR UR: YELLOW
CREAT SERPL-MCNC: 1.15 MG/DL (ref 0.76–1.27)
CREAT SERPL-MCNC: 1.19 MG/DL (ref 0.76–1.27)
CREAT SERPL-MCNC: 1.28 MG/DL (ref 0.76–1.27)
CREAT SERPL-MCNC: 1.34 MG/DL (ref 0.76–1.27)
CREAT SERPL-MCNC: 1.64 MG/DL (ref 0.76–1.27)
CREAT SERPL-MCNC: 1.71 MG/DL (ref 0.76–1.27)
CREAT SERPL-MCNC: 1.73 MG/DL (ref 0.76–1.27)
CREAT SERPL-MCNC: 1.93 MG/DL (ref 0.76–1.27)
CREAT SERPL-MCNC: 2.16 MG/DL (ref 0.76–1.27)
CRP SERPL-MCNC: 1.06 MG/DL (ref 0–0.5)
D DIMER PPP FEU-MCNC: 0.33 MG/L (FEU) (ref 0–0.5)
D DIMER PPP FEU-MCNC: <0.27 MG/L (FEU) (ref 0–0.5)
D-LACTATE SERPL-SCNC: 1.2 MMOL/L (ref 0.5–2)
D-LACTATE SERPL-SCNC: 1.5 MMOL/L (ref 0.5–2)
D-LACTATE SERPL-SCNC: 1.9 MMOL/L (ref 0.5–2)
DEPRECATED RDW RBC AUTO: 39.5 FL (ref 37–54)
DEPRECATED RDW RBC AUTO: 39.6 FL (ref 37–54)
DEPRECATED RDW RBC AUTO: 42.6 FL (ref 37–54)
DEPRECATED RDW RBC AUTO: 43 FL (ref 37–54)
DEPRECATED RDW RBC AUTO: 43.5 FL (ref 37–54)
DEPRECATED RDW RBC AUTO: 46 FL (ref 37–54)
DEPRECATED RDW RBC AUTO: 46.8 FL (ref 37–54)
DEPRECATED RDW RBC AUTO: 49.3 FL (ref 37–54)
DEPRECATED RDW RBC AUTO: 50.1 FL (ref 37–54)
DEPRECATED RDW RBC AUTO: 57.9 FL (ref 37–54)
DEPRECATED RDW RBC AUTO: 60.4 FL (ref 37–54)
EGFRCR SERPLBLD CKD-EPI 2021: 33.6 ML/MIN/1.73
EGFRCR SERPLBLD CKD-EPI 2021: 38.4 ML/MIN/1.73
EGFRCR SERPLBLD CKD-EPI 2021: 43.8 ML/MIN/1.73
EGFRCR SERPLBLD CKD-EPI 2021: 44.4 ML/MIN/1.73
EGFRCR SERPLBLD CKD-EPI 2021: 46.7 ML/MIN/1.73
EGFRCR SERPLBLD CKD-EPI 2021: 59.5 ML/MIN/1.73
EOSINOPHIL # BLD AUTO: 0 10*3/MM3 (ref 0–0.4)
EOSINOPHIL # BLD AUTO: 0.01 10*3/MM3 (ref 0–0.4)
EOSINOPHIL # BLD AUTO: 0.02 10*3/MM3 (ref 0–0.4)
EOSINOPHIL # BLD AUTO: 0.03 10*3/MM3 (ref 0–0.4)
EOSINOPHIL # BLD AUTO: 0.06 10*3/MM3 (ref 0–0.4)
EOSINOPHIL # BLD AUTO: 0.08 10*3/MM3 (ref 0–0.4)
EOSINOPHIL # BLD AUTO: 0.08 10*3/MM3 (ref 0–0.4)
EOSINOPHIL # BLD AUTO: 0.09 10*3/MM3 (ref 0–0.4)
EOSINOPHIL NFR BLD AUTO: 0 % (ref 0.3–6.2)
EOSINOPHIL NFR BLD AUTO: 0.1 % (ref 0.3–6.2)
EOSINOPHIL NFR BLD AUTO: 0.1 % (ref 0.3–6.2)
EOSINOPHIL NFR BLD AUTO: 0.2 % (ref 0.3–6.2)
EOSINOPHIL NFR BLD AUTO: 0.3 % (ref 0.3–6.2)
EOSINOPHIL NFR BLD AUTO: 0.4 % (ref 0.3–6.2)
EOSINOPHIL NFR BLD AUTO: 0.7 % (ref 0.3–6.2)
EOSINOPHIL NFR BLD AUTO: 1 % (ref 0.3–6.2)
EOSINOPHIL NFR BLD AUTO: 1.1 % (ref 0.3–6.2)
EOSINOPHIL NFR BLD AUTO: 1.3 % (ref 0.3–6.2)
ERYTHROCYTE [DISTWIDTH] IN BLOOD BY AUTOMATED COUNT: 12.8 % (ref 12.3–15.4)
ERYTHROCYTE [DISTWIDTH] IN BLOOD BY AUTOMATED COUNT: 12.9 % (ref 12.3–15.4)
ERYTHROCYTE [DISTWIDTH] IN BLOOD BY AUTOMATED COUNT: 13.3 % (ref 12.3–15.4)
ERYTHROCYTE [DISTWIDTH] IN BLOOD BY AUTOMATED COUNT: 13.5 % (ref 12.3–15.4)
ERYTHROCYTE [DISTWIDTH] IN BLOOD BY AUTOMATED COUNT: 13.8 % (ref 12.3–15.4)
ERYTHROCYTE [DISTWIDTH] IN BLOOD BY AUTOMATED COUNT: 14.2 % (ref 12.3–15.4)
ERYTHROCYTE [DISTWIDTH] IN BLOOD BY AUTOMATED COUNT: 14.3 % (ref 12.3–15.4)
ERYTHROCYTE [DISTWIDTH] IN BLOOD BY AUTOMATED COUNT: 15.5 % (ref 12.3–15.4)
ERYTHROCYTE [DISTWIDTH] IN BLOOD BY AUTOMATED COUNT: 15.7 % (ref 12.3–15.4)
ERYTHROCYTE [DISTWIDTH] IN BLOOD BY AUTOMATED COUNT: 17.2 % (ref 12.3–15.4)
ERYTHROCYTE [DISTWIDTH] IN BLOOD BY AUTOMATED COUNT: 17.2 % (ref 12.3–15.4)
ETHANOL UR QL: <0.01 %
FLUAV RNA RESP QL NAA+PROBE: NOT DETECTED
FLUBV RNA RESP QL NAA+PROBE: NOT DETECTED
GAS FLOW AIRWAY: 2 LPM
GAS FLOW AIRWAY: 30 LPM
GAS FLOW AIRWAY: 4 LPM
GAS FLOW AIRWAY: 4 LPM
GFR SERPL CREATININE-BSD FRML MDRD: 57 ML/MIN/1.73
GFR SERPL CREATININE-BSD FRML MDRD: 62 ML/MIN/1.73
GFR SERPL CREATININE-BSD FRML MDRD: 64 ML/MIN/1.73
GIANT PLATELETS: ABNORMAL
GLOBULIN UR ELPH-MCNC: 2 GM/DL
GLOBULIN UR ELPH-MCNC: 2.1 GM/DL
GLOBULIN UR ELPH-MCNC: 2.1 GM/DL
GLOBULIN UR ELPH-MCNC: 2.3 GM/DL
GLOBULIN UR ELPH-MCNC: 2.8 GM/DL
GLUCOSE BLDC GLUCOMTR-MCNC: 377 MG/DL (ref 70–130)
GLUCOSE BLDC GLUCOMTR-MCNC: 422 MG/DL (ref 70–130)
GLUCOSE BLDC GLUCOMTR-MCNC: 431 MG/DL (ref 70–130)
GLUCOSE BLDC GLUCOMTR-MCNC: 434 MG/DL (ref 70–130)
GLUCOSE BLDC GLUCOMTR-MCNC: 464 MG/DL (ref 70–130)
GLUCOSE BLDC GLUCOMTR-MCNC: 468 MG/DL (ref 70–130)
GLUCOSE BLDC GLUCOMTR-MCNC: 554 MG/DL (ref 70–130)
GLUCOSE SERPL-MCNC: 135 MG/DL (ref 65–99)
GLUCOSE SERPL-MCNC: 152 MG/DL (ref 65–99)
GLUCOSE SERPL-MCNC: 276 MG/DL (ref 65–99)
GLUCOSE SERPL-MCNC: 353 MG/DL (ref 65–99)
GLUCOSE SERPL-MCNC: 426 MG/DL (ref 65–99)
GLUCOSE SERPL-MCNC: 432 MG/DL (ref 65–99)
GLUCOSE SERPL-MCNC: 455 MG/DL (ref 65–99)
GLUCOSE SERPL-MCNC: 497 MG/DL (ref 65–99)
GLUCOSE SERPL-MCNC: 569 MG/DL (ref 65–99)
GLUCOSE SERPL-MCNC: 619 MG/DL (ref 65–99)
GLUCOSE SERPL-MCNC: 75 MG/DL (ref 65–99)
GLUCOSE SERPL-MCNC: 818 MG/DL (ref 65–99)
GLUCOSE SERPL-MCNC: 927 MG/DL (ref 65–99)
GLUCOSE UR STRIP-MCNC: ABNORMAL MG/DL
GLUCOSE UR STRIP-MCNC: NEGATIVE MG/DL
GRAM STN SPEC: ABNORMAL
HCO3 BLDA-SCNC: 29.8 MMOL/L (ref 20–26)
HCO3 BLDA-SCNC: 35.7 MMOL/L (ref 20–26)
HCO3 BLDA-SCNC: 36.9 MMOL/L (ref 20–26)
HCO3 BLDA-SCNC: 40 MMOL/L (ref 20–26)
HCO3 BLDA-SCNC: 45 MMOL/L (ref 20–26)
HCT VFR BLD AUTO: 29.8 % (ref 37.5–51)
HCT VFR BLD AUTO: 29.8 % (ref 37.5–51)
HCT VFR BLD AUTO: 31.6 % (ref 37.5–51)
HCT VFR BLD AUTO: 32.2 % (ref 37.5–51)
HCT VFR BLD AUTO: 36.3 % (ref 37.5–51)
HCT VFR BLD AUTO: 36.6 % (ref 37.5–51)
HCT VFR BLD AUTO: 36.8 % (ref 37.5–51)
HCT VFR BLD AUTO: 38.4 % (ref 37.5–51)
HCT VFR BLD AUTO: 41 % (ref 37.5–51)
HCT VFR BLD AUTO: 41.5 % (ref 37.5–51)
HCT VFR BLD AUTO: 42.2 % (ref 37.5–51)
HCT VFR BLD CALC: 39.4 % (ref 38–51)
HGB BLD-MCNC: 10 G/DL (ref 13–17.7)
HGB BLD-MCNC: 11.6 G/DL (ref 13–17.7)
HGB BLD-MCNC: 11.8 G/DL (ref 13–17.7)
HGB BLD-MCNC: 11.9 G/DL (ref 13–17.7)
HGB BLD-MCNC: 12.1 G/DL (ref 13–17.7)
HGB BLD-MCNC: 13.4 G/DL (ref 13–17.7)
HGB BLD-MCNC: 13.5 G/DL (ref 13–17.7)
HGB BLD-MCNC: 13.6 G/DL (ref 13–17.7)
HGB BLD-MCNC: 8.7 G/DL (ref 13–17.7)
HGB BLD-MCNC: 8.9 G/DL (ref 13–17.7)
HGB BLD-MCNC: 9.9 G/DL (ref 13–17.7)
HGB BLDA-MCNC: 12.8 G/DL (ref 14–18)
HGB UR QL STRIP.AUTO: NEGATIVE
HOLD SPECIMEN: NORMAL
HYALINE CASTS UR QL AUTO: ABNORMAL /LPF
IMM GRANULOCYTES # BLD AUTO: 0.06 10*3/MM3 (ref 0–0.05)
IMM GRANULOCYTES # BLD AUTO: 0.15 10*3/MM3 (ref 0–0.05)
IMM GRANULOCYTES # BLD AUTO: 0.18 10*3/MM3 (ref 0–0.05)
IMM GRANULOCYTES # BLD AUTO: 0.19 10*3/MM3 (ref 0–0.05)
IMM GRANULOCYTES # BLD AUTO: 0.19 10*3/MM3 (ref 0–0.05)
IMM GRANULOCYTES # BLD AUTO: 0.22 10*3/MM3 (ref 0–0.05)
IMM GRANULOCYTES # BLD AUTO: 0.34 10*3/MM3 (ref 0–0.05)
IMM GRANULOCYTES # BLD AUTO: 0.35 10*3/MM3 (ref 0–0.05)
IMM GRANULOCYTES # BLD AUTO: 0.41 10*3/MM3 (ref 0–0.05)
IMM GRANULOCYTES # BLD AUTO: 0.45 10*3/MM3 (ref 0–0.05)
IMM GRANULOCYTES NFR BLD AUTO: 0.9 % (ref 0–0.5)
IMM GRANULOCYTES NFR BLD AUTO: 1.4 % (ref 0–0.5)
IMM GRANULOCYTES NFR BLD AUTO: 1.5 % (ref 0–0.5)
IMM GRANULOCYTES NFR BLD AUTO: 2.1 % (ref 0–0.5)
IMM GRANULOCYTES NFR BLD AUTO: 2.1 % (ref 0–0.5)
IMM GRANULOCYTES NFR BLD AUTO: 2.4 % (ref 0–0.5)
IMM GRANULOCYTES NFR BLD AUTO: 2.5 % (ref 0–0.5)
IMM GRANULOCYTES NFR BLD AUTO: 2.8 % (ref 0–0.5)
IMM GRANULOCYTES NFR BLD AUTO: 3.8 % (ref 0–0.5)
IMM GRANULOCYTES NFR BLD AUTO: 4 % (ref 0–0.5)
INHALED O2 CONCENTRATION: 50 %
INR PPP: 0.87 (ref 0.91–1.09)
INR PPP: 0.88 (ref 0.91–1.09)
INR PPP: 0.89 (ref 0.91–1.09)
INR PPP: 1 (ref 0.91–1.09)
INR PPP: 1.04 (ref 0.91–1.09)
INR PPP: 1.05 (ref 0.91–1.09)
ISOLATED FROM: ABNORMAL
KETONES UR QL STRIP: ABNORMAL
KETONES UR QL STRIP: ABNORMAL
KETONES UR QL STRIP: NEGATIVE
KETONES UR QL STRIP: NEGATIVE
LEUKOCYTE ESTERASE UR QL STRIP.AUTO: NEGATIVE
LIPASE SERPL-CCNC: 21 U/L (ref 13–60)
LYMPHOCYTES # BLD AUTO: 0.55 10*3/MM3 (ref 0.7–3.1)
LYMPHOCYTES # BLD AUTO: 0.65 10*3/MM3 (ref 0.7–3.1)
LYMPHOCYTES # BLD AUTO: 0.74 10*3/MM3 (ref 0.7–3.1)
LYMPHOCYTES # BLD AUTO: 0.82 10*3/MM3 (ref 0.7–3.1)
LYMPHOCYTES # BLD AUTO: 0.86 10*3/MM3 (ref 0.7–3.1)
LYMPHOCYTES # BLD AUTO: 1.16 10*3/MM3 (ref 0.7–3.1)
LYMPHOCYTES # BLD AUTO: 1.28 10*3/MM3 (ref 0.7–3.1)
LYMPHOCYTES # BLD AUTO: 1.31 10*3/MM3 (ref 0.7–3.1)
LYMPHOCYTES # BLD AUTO: 1.42 10*3/MM3 (ref 0.7–3.1)
LYMPHOCYTES # BLD AUTO: 1.54 10*3/MM3 (ref 0.7–3.1)
LYMPHOCYTES # BLD MANUAL: 1.34 10*3/MM3 (ref 0.7–3.1)
LYMPHOCYTES NFR BLD AUTO: 11.3 % (ref 19.6–45.3)
LYMPHOCYTES NFR BLD AUTO: 11.4 % (ref 19.6–45.3)
LYMPHOCYTES NFR BLD AUTO: 14.4 % (ref 19.6–45.3)
LYMPHOCYTES NFR BLD AUTO: 15.4 % (ref 19.6–45.3)
LYMPHOCYTES NFR BLD AUTO: 16 % (ref 19.6–45.3)
LYMPHOCYTES NFR BLD AUTO: 24.1 % (ref 19.6–45.3)
LYMPHOCYTES NFR BLD AUTO: 4.1 % (ref 19.6–45.3)
LYMPHOCYTES NFR BLD AUTO: 4.4 % (ref 19.6–45.3)
LYMPHOCYTES NFR BLD AUTO: 4.5 % (ref 19.6–45.3)
LYMPHOCYTES NFR BLD AUTO: 7.1 % (ref 19.6–45.3)
Lab: ABNORMAL
MAGNESIUM SERPL-MCNC: 1.5 MG/DL (ref 1.6–2.4)
MAGNESIUM SERPL-MCNC: 1.6 MG/DL (ref 1.6–2.4)
MAGNESIUM SERPL-MCNC: 1.6 MG/DL (ref 1.6–2.4)
MAGNESIUM SERPL-MCNC: 1.7 MG/DL (ref 1.6–2.4)
MAGNESIUM SERPL-MCNC: 1.8 MG/DL (ref 1.6–2.4)
MAGNESIUM SERPL-MCNC: 2.1 MG/DL (ref 1.6–2.4)
MAGNESIUM SERPL-MCNC: 2.2 MG/DL (ref 1.6–2.4)
MAGNESIUM SERPL-MCNC: 2.2 MG/DL (ref 1.6–2.4)
MCH RBC QN AUTO: 27.4 PG (ref 26.6–33)
MCH RBC QN AUTO: 27.5 PG (ref 26.6–33)
MCH RBC QN AUTO: 27.6 PG (ref 26.6–33)
MCH RBC QN AUTO: 27.8 PG (ref 26.6–33)
MCH RBC QN AUTO: 27.9 PG (ref 26.6–33)
MCH RBC QN AUTO: 28.2 PG (ref 26.6–33)
MCH RBC QN AUTO: 28.3 PG (ref 26.6–33)
MCH RBC QN AUTO: 28.5 PG (ref 26.6–33)
MCH RBC QN AUTO: 28.9 PG (ref 26.6–33)
MCHC RBC AUTO-ENTMCNC: 29.2 G/DL (ref 31.5–35.7)
MCHC RBC AUTO-ENTMCNC: 29.9 G/DL (ref 31.5–35.7)
MCHC RBC AUTO-ENTMCNC: 30.7 G/DL (ref 31.5–35.7)
MCHC RBC AUTO-ENTMCNC: 31.5 G/DL (ref 31.5–35.7)
MCHC RBC AUTO-ENTMCNC: 31.6 G/DL (ref 31.5–35.7)
MCHC RBC AUTO-ENTMCNC: 32 G/DL (ref 31.5–35.7)
MCHC RBC AUTO-ENTMCNC: 32.1 G/DL (ref 31.5–35.7)
MCHC RBC AUTO-ENTMCNC: 32.2 G/DL (ref 31.5–35.7)
MCHC RBC AUTO-ENTMCNC: 32.3 G/DL (ref 31.5–35.7)
MCHC RBC AUTO-ENTMCNC: 32.5 G/DL (ref 31.5–35.7)
MCHC RBC AUTO-ENTMCNC: 32.9 G/DL (ref 31.5–35.7)
MCV RBC AUTO: 84.7 FL (ref 79–97)
MCV RBC AUTO: 85.1 FL (ref 79–97)
MCV RBC AUTO: 86.5 FL (ref 79–97)
MCV RBC AUTO: 87.1 FL (ref 79–97)
MCV RBC AUTO: 87.8 FL (ref 79–97)
MCV RBC AUTO: 88.1 FL (ref 79–97)
MCV RBC AUTO: 88.7 FL (ref 79–97)
MCV RBC AUTO: 89.7 FL (ref 79–97)
MCV RBC AUTO: 90.2 FL (ref 79–97)
MCV RBC AUTO: 93.1 FL (ref 79–97)
MCV RBC AUTO: 97.1 FL (ref 79–97)
METHGB BLD QL: 0.9 % (ref 0–3)
MODALITY: ABNORMAL
MONOCYTES # BLD AUTO: 0.52 10*3/MM3 (ref 0.1–0.9)
MONOCYTES # BLD AUTO: 0.57 10*3/MM3 (ref 0.1–0.9)
MONOCYTES # BLD AUTO: 0.62 10*3/MM3 (ref 0.1–0.9)
MONOCYTES # BLD AUTO: 0.65 10*3/MM3 (ref 0.1–0.9)
MONOCYTES # BLD AUTO: 0.67 10*3/MM3 (ref 0.1–0.9)
MONOCYTES # BLD AUTO: 0.72 10*3/MM3 (ref 0.1–0.9)
MONOCYTES # BLD AUTO: 0.75 10*3/MM3 (ref 0.1–0.9)
MONOCYTES # BLD AUTO: 0.76 10*3/MM3 (ref 0.1–0.9)
MONOCYTES # BLD AUTO: 1 10*3/MM3 (ref 0.1–0.9)
MONOCYTES # BLD AUTO: 1.55 10*3/MM3 (ref 0.1–0.9)
MONOCYTES NFR BLD AUTO: 11.3 % (ref 5–12)
MONOCYTES NFR BLD AUTO: 4.7 % (ref 5–12)
MONOCYTES NFR BLD AUTO: 4.9 % (ref 5–12)
MONOCYTES NFR BLD AUTO: 5.4 % (ref 5–12)
MONOCYTES NFR BLD AUTO: 6.9 % (ref 5–12)
MONOCYTES NFR BLD AUTO: 7.3 % (ref 5–12)
MONOCYTES NFR BLD AUTO: 8.5 % (ref 5–12)
MONOCYTES NFR BLD AUTO: 8.6 % (ref 5–12)
MONOCYTES NFR BLD AUTO: 8.9 % (ref 5–12)
MONOCYTES NFR BLD AUTO: 8.9 % (ref 5–12)
MYELOCYTES NFR BLD MANUAL: 2.5 % (ref 0–0)
NEUTROPHILS # BLD AUTO: 14.44 10*3/MM3 (ref 1.7–7)
NEUTROPHILS NFR BLD AUTO: 11.2 10*3/MM3 (ref 1.7–7)
NEUTROPHILS NFR BLD AUTO: 14.07 10*3/MM3 (ref 1.7–7)
NEUTROPHILS NFR BLD AUTO: 25.61 10*3/MM3 (ref 1.7–7)
NEUTROPHILS NFR BLD AUTO: 4.1 10*3/MM3 (ref 1.7–7)
NEUTROPHILS NFR BLD AUTO: 5.4 10*3/MM3 (ref 1.7–7)
NEUTROPHILS NFR BLD AUTO: 5.64 10*3/MM3 (ref 1.7–7)
NEUTROPHILS NFR BLD AUTO: 5.88 10*3/MM3 (ref 1.7–7)
NEUTROPHILS NFR BLD AUTO: 6.1 10*3/MM3 (ref 1.7–7)
NEUTROPHILS NFR BLD AUTO: 6.24 10*3/MM3 (ref 1.7–7)
NEUTROPHILS NFR BLD AUTO: 64.3 % (ref 42.7–76)
NEUTROPHILS NFR BLD AUTO: 68.9 % (ref 42.7–76)
NEUTROPHILS NFR BLD AUTO: 70.4 % (ref 42.7–76)
NEUTROPHILS NFR BLD AUTO: 71.6 % (ref 42.7–76)
NEUTROPHILS NFR BLD AUTO: 77.2 % (ref 42.7–76)
NEUTROPHILS NFR BLD AUTO: 78.7 % (ref 42.7–76)
NEUTROPHILS NFR BLD AUTO: 8.74 10*3/MM3 (ref 1.7–7)
NEUTROPHILS NFR BLD AUTO: 83.6 % (ref 42.7–76)
NEUTROPHILS NFR BLD AUTO: 87.8 % (ref 42.7–76)
NEUTROPHILS NFR BLD AUTO: 88.4 % (ref 42.7–76)
NEUTROPHILS NFR BLD AUTO: 88.9 % (ref 42.7–76)
NEUTROPHILS NFR BLD MANUAL: 86.1 % (ref 42.7–76)
NEUTS BAND NFR BLD MANUAL: 2.5 % (ref 0–5)
NITRITE UR QL STRIP: NEGATIVE
NOTE: ABNORMAL
NOTIFIED BY: ABNORMAL
NOTIFIED BY: ABNORMAL
NOTIFIED WHO: ABNORMAL
NOTIFIED WHO: ABNORMAL
NRBC BLD AUTO-RTO: 0 /100 WBC (ref 0–0.2)
NT-PROBNP SERPL-MCNC: 1490 PG/ML (ref 0–900)
NT-PROBNP SERPL-MCNC: 446.2 PG/ML (ref 0–900)
OXYHGB MFR BLDV: 92.1 % (ref 94–99)
PCO2 BLDA: 48.2 MM HG (ref 35–45)
PCO2 BLDA: 58.5 MM HG (ref 35–45)
PCO2 BLDA: 60.9 MM HG (ref 35–45)
PCO2 BLDA: 61.1 MM HG (ref 35–45)
PCO2 BLDA: 71.3 MM HG (ref 35–45)
PCO2 TEMP ADJ BLD: 48.2 MM HG (ref 35–45)
PCO2 TEMP ADJ BLD: 58.5 MM HG (ref 35–45)
PCO2 TEMP ADJ BLD: 60.9 MM HG (ref 35–45)
PCO2 TEMP ADJ BLD: 61.1 MM HG (ref 35–45)
PCO2 TEMP ADJ BLD: 71.3 MM HG (ref 35–45)
PH BLDA: 7.38 PH UNITS (ref 7.35–7.45)
PH BLDA: 7.39 PH UNITS (ref 7.35–7.45)
PH BLDA: 7.4 PH UNITS (ref 7.35–7.45)
PH BLDA: 7.41 PH UNITS (ref 7.35–7.45)
PH BLDA: 7.44 PH UNITS (ref 7.35–7.45)
PH UR STRIP.AUTO: 5.5 [PH] (ref 5–8)
PH UR STRIP.AUTO: 5.5 [PH] (ref 5–8)
PH UR STRIP.AUTO: 6 [PH] (ref 5–8)
PH UR STRIP.AUTO: <=5 [PH] (ref 5–8)
PH, TEMP CORRECTED: 7.38 PH UNITS (ref 7.35–7.45)
PH, TEMP CORRECTED: 7.39 PH UNITS (ref 7.35–7.45)
PH, TEMP CORRECTED: 7.4 PH UNITS (ref 7.35–7.45)
PH, TEMP CORRECTED: 7.41 PH UNITS (ref 7.35–7.45)
PH, TEMP CORRECTED: 7.44 PH UNITS (ref 7.35–7.45)
PLATELET # BLD AUTO: 196 10*3/MM3 (ref 140–450)
PLATELET # BLD AUTO: 198 10*3/MM3 (ref 140–450)
PLATELET # BLD AUTO: 208 10*3/MM3 (ref 140–450)
PLATELET # BLD AUTO: 208 10*3/MM3 (ref 140–450)
PLATELET # BLD AUTO: 218 10*3/MM3 (ref 140–450)
PLATELET # BLD AUTO: 219 10*3/MM3 (ref 140–450)
PLATELET # BLD AUTO: 230 10*3/MM3 (ref 140–450)
PLATELET # BLD AUTO: 231 10*3/MM3 (ref 140–450)
PLATELET # BLD AUTO: 263 10*3/MM3 (ref 140–450)
PLATELET # BLD AUTO: 292 10*3/MM3 (ref 140–450)
PLATELET # BLD AUTO: 354 10*3/MM3 (ref 140–450)
PMV BLD AUTO: 10.5 FL (ref 6–12)
PMV BLD AUTO: 10.5 FL (ref 6–12)
PMV BLD AUTO: 10.8 FL (ref 6–12)
PMV BLD AUTO: 11 FL (ref 6–12)
PMV BLD AUTO: 11.1 FL (ref 6–12)
PMV BLD AUTO: 11.7 FL (ref 6–12)
PMV BLD AUTO: 11.8 FL (ref 6–12)
PO2 BLDA: 120 MM HG (ref 83–108)
PO2 BLDA: 52.5 MM HG (ref 83–108)
PO2 BLDA: 61.2 MM HG (ref 83–108)
PO2 BLDA: 74.1 MM HG (ref 83–108)
PO2 BLDA: 74.6 MM HG (ref 83–108)
PO2 TEMP ADJ BLD: 120 MM HG (ref 83–108)
PO2 TEMP ADJ BLD: 52.5 MM HG (ref 83–108)
PO2 TEMP ADJ BLD: 61.2 MM HG (ref 83–108)
PO2 TEMP ADJ BLD: 74.1 MM HG (ref 83–108)
PO2 TEMP ADJ BLD: 74.6 MM HG (ref 83–108)
POTASSIUM BLDA-SCNC: 4.2 MMOL/L (ref 3.5–5.2)
POTASSIUM SERPL-SCNC: 3.4 MMOL/L (ref 3.5–5.2)
POTASSIUM SERPL-SCNC: 3.9 MMOL/L (ref 3.5–5.2)
POTASSIUM SERPL-SCNC: 4 MMOL/L (ref 3.5–5.2)
POTASSIUM SERPL-SCNC: 4 MMOL/L (ref 3.5–5.2)
POTASSIUM SERPL-SCNC: 4.1 MMOL/L (ref 3.5–5.2)
POTASSIUM SERPL-SCNC: 4.1 MMOL/L (ref 3.5–5.2)
POTASSIUM SERPL-SCNC: 4.2 MMOL/L (ref 3.5–5.2)
POTASSIUM SERPL-SCNC: 4.5 MMOL/L (ref 3.5–5.2)
POTASSIUM SERPL-SCNC: 4.7 MMOL/L (ref 3.5–5.2)
POTASSIUM SERPL-SCNC: 4.7 MMOL/L (ref 3.5–5.2)
POTASSIUM SERPL-SCNC: 4.8 MMOL/L (ref 3.5–5.2)
PROCALCITONIN SERPL-MCNC: 0.11 NG/ML (ref 0–0.25)
PROCALCITONIN SERPL-MCNC: 0.14 NG/ML (ref 0–0.25)
PROCALCITONIN SERPL-MCNC: 0.67 NG/ML (ref 0–0.25)
PROT SERPL-MCNC: 5.8 G/DL (ref 6–8.5)
PROT SERPL-MCNC: 6.1 G/DL (ref 6–8.5)
PROT SERPL-MCNC: 6.2 G/DL (ref 6–8.5)
PROT SERPL-MCNC: 6.5 G/DL (ref 6–8.5)
PROT SERPL-MCNC: 6.8 G/DL (ref 6–8.5)
PROT UR QL STRIP: ABNORMAL
PROT UR QL STRIP: NEGATIVE
PROTHROMBIN TIME: 11.5 SECONDS (ref 11.9–14.6)
PROTHROMBIN TIME: 11.6 SECONDS (ref 11.9–14.6)
PROTHROMBIN TIME: 11.7 SECONDS (ref 11.9–14.6)
PROTHROMBIN TIME: 12.8 SECONDS (ref 11.9–14.6)
PROTHROMBIN TIME: 13.2 SECONDS (ref 11.9–14.6)
PROTHROMBIN TIME: 13.3 SECONDS (ref 11.9–14.6)
QT INTERVAL: 266 MS
QT INTERVAL: 310 MS
QT INTERVAL: 334 MS
QT INTERVAL: 334 MS
QT INTERVAL: 340 MS
QT INTERVAL: 342 MS
QT INTERVAL: 352 MS
QT INTERVAL: 354 MS
QT INTERVAL: 372 MS
QT INTERVAL: 404 MS
QT INTERVAL: 412 MS
QTC INTERVAL: 430 MS
QTC INTERVAL: 447 MS
QTC INTERVAL: 447 MS
QTC INTERVAL: 450 MS
QTC INTERVAL: 452 MS
QTC INTERVAL: 454 MS
QTC INTERVAL: 457 MS
QTC INTERVAL: 472 MS
QTC INTERVAL: 476 MS
QTC INTERVAL: 483 MS
QTC INTERVAL: 498 MS
RBC # BLD AUTO: 3.07 10*6/MM3 (ref 4.14–5.8)
RBC # BLD AUTO: 3.2 10*6/MM3 (ref 4.14–5.8)
RBC # BLD AUTO: 3.59 10*6/MM3 (ref 4.14–5.8)
RBC # BLD AUTO: 3.63 10*6/MM3 (ref 4.14–5.8)
RBC # BLD AUTO: 4.08 10*6/MM3 (ref 4.14–5.8)
RBC # BLD AUTO: 4.12 10*6/MM3 (ref 4.14–5.8)
RBC # BLD AUTO: 4.17 10*6/MM3 (ref 4.14–5.8)
RBC # BLD AUTO: 4.33 10*6/MM3 (ref 4.14–5.8)
RBC # BLD AUTO: 4.8 10*6/MM3 (ref 4.14–5.8)
RBC # BLD AUTO: 4.84 10*6/MM3 (ref 4.14–5.8)
RBC # BLD AUTO: 4.96 10*6/MM3 (ref 4.14–5.8)
RBC # UR STRIP: ABNORMAL /HPF
REF LAB TEST METHOD: ABNORMAL
SAO2 % BLDCOA: 87.3 % (ref 94–99)
SAO2 % BLDCOA: 89.9 % (ref 94–99)
SAO2 % BLDCOA: 93.3 % (ref 94–99)
SAO2 % BLDCOA: 96.5 % (ref 94–99)
SAO2 % BLDCOA: 97.4 % (ref 94–99)
SARS-COV-2 ORF1AB RESP QL NAA+PROBE: NOT DETECTED
SARS-COV-2 ORF1AB RESP QL NAA+PROBE: NOT DETECTED
SARS-COV-2 RNA PNL SPEC NAA+PROBE: NOT DETECTED
SARS-COV-2 RNA PNL SPEC NAA+PROBE: NOT DETECTED
SARS-COV-2 RNA RESP QL NAA+PROBE: NOT DETECTED
SODIUM BLDA-SCNC: 132 MMOL/L (ref 136–145)
SODIUM SERPL-SCNC: 126 MMOL/L (ref 136–145)
SODIUM SERPL-SCNC: 132 MMOL/L (ref 136–145)
SODIUM SERPL-SCNC: 134 MMOL/L (ref 136–145)
SODIUM SERPL-SCNC: 136 MMOL/L (ref 136–145)
SODIUM SERPL-SCNC: 136 MMOL/L (ref 136–145)
SODIUM SERPL-SCNC: 138 MMOL/L (ref 136–145)
SODIUM SERPL-SCNC: 138 MMOL/L (ref 136–145)
SODIUM SERPL-SCNC: 140 MMOL/L (ref 136–145)
SODIUM SERPL-SCNC: 141 MMOL/L (ref 136–145)
SODIUM SERPL-SCNC: 142 MMOL/L (ref 136–145)
SODIUM SERPL-SCNC: 143 MMOL/L (ref 136–145)
SP GR UR STRIP: 1.02 (ref 1–1.03)
SP GR UR STRIP: >1.03 (ref 1–1.03)
SQUAMOUS #/AREA URNS HPF: ABNORMAL /HPF
TACROLIMUS BLD LC/MS/MS-MCNC: 11.9 NG/ML (ref 2–20)
TACROLIMUS BLD LC/MS/MS-MCNC: 2.6 NG/ML (ref 2–20)
TACROLIMUS BLD LC/MS/MS-MCNC: 5.5 NG/ML (ref 2–20)
TACROLIMUS BLD LC/MS/MS-MCNC: 9.1 NG/ML (ref 2–20)
TROPONIN T SERPL-MCNC: 0.01 NG/ML (ref 0–0.03)
TROPONIN T SERPL-MCNC: 0.02 NG/ML (ref 0–0.03)
TROPONIN T SERPL-MCNC: <0.01 NG/ML (ref 0–0.03)
UROBILINOGEN UR QL STRIP: ABNORMAL
VARIANT LYMPHS NFR BLD MANUAL: 8.2 % (ref 19.6–45.3)
VENTILATOR MODE: ABNORMAL
VORICONAZOLE SERPL-MCNC: <0.3 UG/ML
WBC # UR STRIP: ABNORMAL /HPF
WBC MORPH BLD: NORMAL
WBC NRBC COR # BLD: 10.45 10*3/MM3 (ref 3.4–10.8)
WBC NRBC COR # BLD: 12.59 10*3/MM3 (ref 3.4–10.8)
WBC NRBC COR # BLD: 16.02 10*3/MM3 (ref 3.4–10.8)
WBC NRBC COR # BLD: 16.31 10*3/MM3 (ref 3.4–10.8)
WBC NRBC COR # BLD: 28.96 10*3/MM3 (ref 3.4–10.8)
WBC NRBC COR # BLD: 6.38 10*3/MM3 (ref 3.4–10.8)
WBC NRBC COR # BLD: 7.17 10*3/MM3 (ref 3.4–10.8)
WBC NRBC COR # BLD: 7.54 10*3/MM3 (ref 3.4–10.8)
WBC NRBC COR # BLD: 7.61 10*3/MM3 (ref 3.4–10.8)
WBC NRBC COR # BLD: 8.86 10*3/MM3 (ref 3.4–10.8)
WBC NRBC COR # BLD: 8.86 10*3/MM3 (ref 3.4–10.8)
WHOLE BLOOD HOLD COAG: NORMAL
WHOLE BLOOD HOLD SPECIMEN: NORMAL

## 2022-01-01 PROCEDURE — 36415 COLL VENOUS BLD VENIPUNCTURE: CPT

## 2022-01-01 PROCEDURE — 96361 HYDRATE IV INFUSION ADD-ON: CPT

## 2022-01-01 PROCEDURE — 87496 CYTOMEG DNA AMP PROBE: CPT

## 2022-01-01 PROCEDURE — 80053 COMPREHEN METABOLIC PANEL: CPT | Performed by: EMERGENCY MEDICINE

## 2022-01-01 PROCEDURE — 99283 EMERGENCY DEPT VISIT LOW MDM: CPT

## 2022-01-01 PROCEDURE — 85025 COMPLETE CBC W/AUTO DIFF WBC: CPT

## 2022-01-01 PROCEDURE — 63710000001 INSULIN REGULAR HUMAN PER 5 UNITS: Performed by: EMERGENCY MEDICINE

## 2022-01-01 PROCEDURE — 36600 WITHDRAWAL OF ARTERIAL BLOOD: CPT

## 2022-01-01 PROCEDURE — 85025 COMPLETE CBC W/AUTO DIFF WBC: CPT | Performed by: EMERGENCY MEDICINE

## 2022-01-01 PROCEDURE — 71045 X-RAY EXAM CHEST 1 VIEW: CPT

## 2022-01-01 PROCEDURE — 82803 BLOOD GASES ANY COMBINATION: CPT

## 2022-01-01 PROCEDURE — 85025 COMPLETE CBC W/AUTO DIFF WBC: CPT | Performed by: INTERNAL MEDICINE

## 2022-01-01 PROCEDURE — 63710000001 INSULIN REGULAR HUMAN PER 5 UNITS: Performed by: NURSE PRACTITIONER

## 2022-01-01 PROCEDURE — 80197 ASSAY OF TACROLIMUS: CPT

## 2022-01-01 PROCEDURE — 85025 COMPLETE CBC W/AUTO DIFF WBC: CPT | Performed by: NURSE PRACTITIONER

## 2022-01-01 PROCEDURE — 94660 CPAP INITIATION&MGMT: CPT

## 2022-01-01 PROCEDURE — 83735 ASSAY OF MAGNESIUM: CPT

## 2022-01-01 PROCEDURE — 96375 TX/PRO/DX INJ NEW DRUG ADDON: CPT

## 2022-01-01 PROCEDURE — 85610 PROTHROMBIN TIME: CPT | Performed by: NURSE PRACTITIONER

## 2022-01-01 PROCEDURE — 85379 FIBRIN DEGRADATION QUANT: CPT | Performed by: EMERGENCY MEDICINE

## 2022-01-01 PROCEDURE — 82009 KETONE BODYS QUAL: CPT | Performed by: EMERGENCY MEDICINE

## 2022-01-01 PROCEDURE — 96365 THER/PROPH/DIAG IV INF INIT: CPT

## 2022-01-01 PROCEDURE — 82962 GLUCOSE BLOOD TEST: CPT

## 2022-01-01 PROCEDURE — 94640 AIRWAY INHALATION TREATMENT: CPT

## 2022-01-01 PROCEDURE — 84145 PROCALCITONIN (PCT): CPT | Performed by: EMERGENCY MEDICINE

## 2022-01-01 PROCEDURE — 83880 ASSAY OF NATRIURETIC PEPTIDE: CPT | Performed by: EMERGENCY MEDICINE

## 2022-01-01 PROCEDURE — U0004 COV-19 TEST NON-CDC HGH THRU: HCPCS | Performed by: OBSTETRICS & GYNECOLOGY

## 2022-01-01 PROCEDURE — 84484 ASSAY OF TROPONIN QUANT: CPT | Performed by: NURSE PRACTITIONER

## 2022-01-01 PROCEDURE — 94799 UNLISTED PULMONARY SVC/PX: CPT

## 2022-01-01 PROCEDURE — 25010000002 IOPAMIDOL 61 % SOLUTION: Performed by: EMERGENCY MEDICINE

## 2022-01-01 PROCEDURE — 25010000002 METHYLPREDNISOLONE PER 125 MG: Performed by: EMERGENCY MEDICINE

## 2022-01-01 PROCEDURE — 93005 ELECTROCARDIOGRAM TRACING: CPT | Performed by: EMERGENCY MEDICINE

## 2022-01-01 PROCEDURE — 87147 CULTURE TYPE IMMUNOLOGIC: CPT | Performed by: EMERGENCY MEDICINE

## 2022-01-01 PROCEDURE — 87150 DNA/RNA AMPLIFIED PROBE: CPT | Performed by: EMERGENCY MEDICINE

## 2022-01-01 PROCEDURE — 87635 SARS-COV-2 COVID-19 AMP PRB: CPT | Performed by: EMERGENCY MEDICINE

## 2022-01-01 PROCEDURE — 73610 X-RAY EXAM OF ANKLE: CPT

## 2022-01-01 PROCEDURE — 96374 THER/PROPH/DIAG INJ IV PUSH: CPT

## 2022-01-01 PROCEDURE — 81001 URINALYSIS AUTO W/SCOPE: CPT | Performed by: EMERGENCY MEDICINE

## 2022-01-01 PROCEDURE — 87040 BLOOD CULTURE FOR BACTERIA: CPT | Performed by: EMERGENCY MEDICINE

## 2022-01-01 PROCEDURE — 85610 PROTHROMBIN TIME: CPT | Performed by: EMERGENCY MEDICINE

## 2022-01-01 PROCEDURE — 96367 TX/PROPH/DG ADDL SEQ IV INF: CPT

## 2022-01-01 PROCEDURE — 93010 ELECTROCARDIOGRAM REPORT: CPT | Performed by: INTERNAL MEDICINE

## 2022-01-01 PROCEDURE — 80048 BASIC METABOLIC PNL TOTAL CA: CPT

## 2022-01-01 PROCEDURE — 85610 PROTHROMBIN TIME: CPT

## 2022-01-01 PROCEDURE — 94664 DEMO&/EVAL PT USE INHALER: CPT

## 2022-01-01 PROCEDURE — 82375 ASSAY CARBOXYHB QUANT: CPT

## 2022-01-01 PROCEDURE — 85730 THROMBOPLASTIN TIME PARTIAL: CPT | Performed by: EMERGENCY MEDICINE

## 2022-01-01 PROCEDURE — C9803 HOPD COVID-19 SPEC COLLECT: HCPCS | Performed by: OBSTETRICS & GYNECOLOGY

## 2022-01-01 PROCEDURE — 82077 ASSAY SPEC XCP UR&BREATH IA: CPT | Performed by: EMERGENCY MEDICINE

## 2022-01-01 PROCEDURE — U0004 COV-19 TEST NON-CDC HGH THRU: HCPCS | Performed by: INTERNAL MEDICINE

## 2022-01-01 PROCEDURE — 82805 BLOOD GASES W/O2 SATURATION: CPT

## 2022-01-01 PROCEDURE — 83050 HGB METHEMOGLOBIN QUAN: CPT

## 2022-01-01 PROCEDURE — 85730 THROMBOPLASTIN TIME PARTIAL: CPT

## 2022-01-01 PROCEDURE — 70450 CT HEAD/BRAIN W/O DYE: CPT

## 2022-01-01 PROCEDURE — 25010000002 ONDANSETRON PER 1 MG: Performed by: EMERGENCY MEDICINE

## 2022-01-01 PROCEDURE — 83735 ASSAY OF MAGNESIUM: CPT | Performed by: EMERGENCY MEDICINE

## 2022-01-01 PROCEDURE — 84484 ASSAY OF TROPONIN QUANT: CPT | Performed by: EMERGENCY MEDICINE

## 2022-01-01 PROCEDURE — 83605 ASSAY OF LACTIC ACID: CPT | Performed by: EMERGENCY MEDICINE

## 2022-01-01 PROCEDURE — C9803 HOPD COVID-19 SPEC COLLECT: HCPCS | Performed by: INTERNAL MEDICINE

## 2022-01-01 PROCEDURE — 71275 CT ANGIOGRAPHY CHEST: CPT

## 2022-01-01 PROCEDURE — 93005 ELECTROCARDIOGRAM TRACING: CPT | Performed by: NURSE PRACTITIONER

## 2022-01-01 PROCEDURE — 99214 OFFICE O/P EST MOD 30 MIN: CPT | Performed by: NURSE PRACTITIONER

## 2022-01-01 PROCEDURE — 25010000002 VANCOMYCIN 10 G RECONSTITUTED SOLUTION: Performed by: EMERGENCY MEDICINE

## 2022-01-01 PROCEDURE — 86140 C-REACTIVE PROTEIN: CPT | Performed by: EMERGENCY MEDICINE

## 2022-01-01 PROCEDURE — 81003 URINALYSIS AUTO W/O SCOPE: CPT | Performed by: EMERGENCY MEDICINE

## 2022-01-01 PROCEDURE — 25010000002 PIPERACILLIN SOD-TAZOBACTAM PER 1 G: Performed by: EMERGENCY MEDICINE

## 2022-01-01 PROCEDURE — 80053 COMPREHEN METABOLIC PANEL: CPT

## 2022-01-01 PROCEDURE — 99284 EMERGENCY DEPT VISIT MOD MDM: CPT

## 2022-01-01 PROCEDURE — 25010000002 ONDANSETRON PER 1 MG: Performed by: NURSE PRACTITIONER

## 2022-01-01 PROCEDURE — 83690 ASSAY OF LIPASE: CPT | Performed by: NURSE PRACTITIONER

## 2022-01-01 PROCEDURE — 25010000002 MICAFUNGIN SODIUM 100 MG RECONSTITUTED SOLUTION 1 EACH VIAL: Performed by: EMERGENCY MEDICINE

## 2022-01-01 PROCEDURE — 80197 ASSAY OF TACROLIMUS: CPT | Performed by: EMERGENCY MEDICINE

## 2022-01-01 PROCEDURE — 81001 URINALYSIS AUTO W/SCOPE: CPT | Performed by: NURSE PRACTITIONER

## 2022-01-01 PROCEDURE — 85007 BL SMEAR W/DIFF WBC COUNT: CPT | Performed by: EMERGENCY MEDICINE

## 2022-01-01 PROCEDURE — 87636 SARSCOV2 & INF A&B AMP PRB: CPT | Performed by: EMERGENCY MEDICINE

## 2022-01-01 PROCEDURE — 85730 THROMBOPLASTIN TIME PARTIAL: CPT | Performed by: NURSE PRACTITIONER

## 2022-01-01 PROCEDURE — 25010000002 MORPHINE SULFATE (PF) 2 MG/ML SOLUTION: Performed by: EMERGENCY MEDICINE

## 2022-01-01 PROCEDURE — 0 HYDROMORPHONE 1 MG/ML SOLUTION: Performed by: EMERGENCY MEDICINE

## 2022-01-01 PROCEDURE — 80285 DRUG ASSAY VORICONAZOLE: CPT

## 2022-01-01 PROCEDURE — 25010000002 IOPAMIDOL 61 % SOLUTION: Performed by: NURSE PRACTITIONER

## 2022-01-01 PROCEDURE — 80053 COMPREHEN METABOLIC PANEL: CPT | Performed by: NURSE PRACTITIONER

## 2022-01-01 PROCEDURE — 74177 CT ABD & PELVIS W/CONTRAST: CPT

## 2022-01-01 PROCEDURE — 82947 ASSAY GLUCOSE BLOOD QUANT: CPT | Performed by: EMERGENCY MEDICINE

## 2022-01-01 RX ORDER — FAMOTIDINE 10 MG/ML
20 INJECTION, SOLUTION INTRAVENOUS ONCE
Status: COMPLETED | OUTPATIENT
Start: 2022-01-01 | End: 2022-01-01

## 2022-01-01 RX ORDER — SUCRALFATE ORAL 1 G/10ML
1 SUSPENSION ORAL
Qty: 420 ML | Refills: 0 | Status: SHIPPED | OUTPATIENT
Start: 2022-01-01 | End: 2022-01-01

## 2022-01-01 RX ORDER — PANTOPRAZOLE SODIUM 40 MG/1
40 TABLET, DELAYED RELEASE ORAL DAILY
Qty: 30 TABLET | Refills: 0 | Status: SHIPPED | OUTPATIENT
Start: 2022-01-01

## 2022-01-01 RX ORDER — ACETAMINOPHEN 500 MG
1000 TABLET ORAL ONCE
Status: COMPLETED | OUTPATIENT
Start: 2022-01-01 | End: 2022-01-01

## 2022-01-01 RX ORDER — ALBUTEROL SULFATE 2.5 MG/3ML
10 SOLUTION RESPIRATORY (INHALATION)
Status: COMPLETED | OUTPATIENT
Start: 2022-01-01 | End: 2022-01-01

## 2022-01-01 RX ORDER — SODIUM CHLORIDE, SODIUM LACTATE, POTASSIUM CHLORIDE, CALCIUM CHLORIDE 600; 310; 30; 20 MG/100ML; MG/100ML; MG/100ML; MG/100ML
125 INJECTION, SOLUTION INTRAVENOUS CONTINUOUS
Status: DISCONTINUED | OUTPATIENT
Start: 2022-01-01 | End: 2022-01-01 | Stop reason: HOSPADM

## 2022-01-01 RX ORDER — METOPROLOL TARTRATE 50 MG/1
100 TABLET, FILM COATED ORAL ONCE
Status: COMPLETED | OUTPATIENT
Start: 2022-01-01 | End: 2022-01-01

## 2022-01-01 RX ORDER — SODIUM CHLORIDE 0.9 % (FLUSH) 0.9 %
10 SYRINGE (ML) INJECTION AS NEEDED
Status: DISCONTINUED | OUTPATIENT
Start: 2022-01-01 | End: 2022-01-01 | Stop reason: HOSPADM

## 2022-01-01 RX ORDER — MAGNESIUM SULFATE HEPTAHYDRATE 40 MG/ML
2 INJECTION, SOLUTION INTRAVENOUS ONCE
Status: DISCONTINUED | OUTPATIENT
Start: 2022-01-01 | End: 2022-01-01 | Stop reason: HOSPADM

## 2022-01-01 RX ORDER — ONDANSETRON 2 MG/ML
4 INJECTION INTRAMUSCULAR; INTRAVENOUS ONCE
Status: COMPLETED | OUTPATIENT
Start: 2022-01-01 | End: 2022-01-01

## 2022-01-01 RX ORDER — ONDANSETRON 4 MG/1
4 TABLET, ORALLY DISINTEGRATING ORAL EVERY 6 HOURS PRN
Qty: 12 TABLET | Refills: 0 | Status: SHIPPED | OUTPATIENT
Start: 2022-01-01

## 2022-01-01 RX ORDER — ALUMINA, MAGNESIA, AND SIMETHICONE 2400; 2400; 240 MG/30ML; MG/30ML; MG/30ML
15 SUSPENSION ORAL ONCE
Status: COMPLETED | OUTPATIENT
Start: 2022-01-01 | End: 2022-01-01

## 2022-01-01 RX ORDER — METHYLPREDNISOLONE SODIUM SUCCINATE 125 MG/2ML
125 INJECTION, POWDER, LYOPHILIZED, FOR SOLUTION INTRAMUSCULAR; INTRAVENOUS ONCE
Status: DISCONTINUED | OUTPATIENT
Start: 2022-01-01 | End: 2022-01-01 | Stop reason: HOSPADM

## 2022-01-01 RX ORDER — MORPHINE SULFATE 2 MG/ML
2 INJECTION, SOLUTION INTRAMUSCULAR; INTRAVENOUS ONCE
Status: COMPLETED | OUTPATIENT
Start: 2022-01-01 | End: 2022-01-01

## 2022-01-01 RX ORDER — LIDOCAINE HYDROCHLORIDE 20 MG/ML
15 SOLUTION OROPHARYNGEAL ONCE
Status: COMPLETED | OUTPATIENT
Start: 2022-01-01 | End: 2022-01-01

## 2022-01-01 RX ORDER — OXYCODONE AND ACETAMINOPHEN 7.5; 325 MG/1; MG/1
1 TABLET ORAL ONCE
Status: COMPLETED | OUTPATIENT
Start: 2022-01-01 | End: 2022-01-01

## 2022-01-01 RX ORDER — METHYLPREDNISOLONE SODIUM SUCCINATE 125 MG/2ML
125 INJECTION, POWDER, LYOPHILIZED, FOR SOLUTION INTRAMUSCULAR; INTRAVENOUS ONCE
Status: COMPLETED | OUTPATIENT
Start: 2022-01-01 | End: 2022-01-01

## 2022-01-01 RX ORDER — DILTIAZEM HYDROCHLORIDE 5 MG/ML
20 INJECTION INTRAVENOUS ONCE
Status: COMPLETED | OUTPATIENT
Start: 2022-01-01 | End: 2022-01-01

## 2022-01-01 RX ORDER — ALBUTEROL SULFATE 2.5 MG/3ML
2.5 SOLUTION RESPIRATORY (INHALATION) ONCE
Status: COMPLETED | OUTPATIENT
Start: 2022-01-01 | End: 2022-01-01

## 2022-01-01 RX ORDER — ASPIRIN 81 MG/1
324 TABLET, CHEWABLE ORAL ONCE
Status: DISCONTINUED | OUTPATIENT
Start: 2022-01-01 | End: 2022-01-01 | Stop reason: HOSPADM

## 2022-01-01 RX ORDER — IPRATROPIUM BROMIDE AND ALBUTEROL SULFATE 2.5; .5 MG/3ML; MG/3ML
3 SOLUTION RESPIRATORY (INHALATION) ONCE
Status: COMPLETED | OUTPATIENT
Start: 2022-01-01 | End: 2022-01-01

## 2022-01-01 RX ADMIN — Medication 1250 MG: at 12:50

## 2022-01-01 RX ADMIN — ONDANSETRON 4 MG: 2 INJECTION INTRAMUSCULAR; INTRAVENOUS at 09:05

## 2022-01-01 RX ADMIN — DILTIAZEM HYDROCHLORIDE 20 MG: 5 INJECTION INTRAVENOUS at 13:02

## 2022-01-01 RX ADMIN — ALUMINUM HYDROXIDE, MAGNESIUM HYDROXIDE, AND DIMETHICONE 15 ML: 400; 400; 40 SUSPENSION ORAL at 09:08

## 2022-01-01 RX ADMIN — IPRATROPIUM BROMIDE AND ALBUTEROL SULFATE 3 ML: .5; 3 SOLUTION RESPIRATORY (INHALATION) at 21:50

## 2022-01-01 RX ADMIN — ONDANSETRON 4 MG: 2 INJECTION INTRAMUSCULAR; INTRAVENOUS at 19:32

## 2022-01-01 RX ADMIN — HYDROMORPHONE HYDROCHLORIDE 1 MG: 1 INJECTION, SOLUTION INTRAMUSCULAR; INTRAVENOUS; SUBCUTANEOUS at 12:48

## 2022-01-01 RX ADMIN — INSULIN HUMAN 10 UNITS: 100 INJECTION, SOLUTION PARENTERAL at 13:04

## 2022-01-01 RX ADMIN — IOPAMIDOL 100 ML: 612 INJECTION, SOLUTION INTRAVENOUS at 12:14

## 2022-01-01 RX ADMIN — ALBUTEROL SULFATE 10 MG: 2.5 SOLUTION RESPIRATORY (INHALATION) at 10:57

## 2022-01-01 RX ADMIN — LIDOCAINE HYDROCHLORIDE 15 ML: 20 SOLUTION OROPHARYNGEAL at 09:08

## 2022-01-01 RX ADMIN — IOPAMIDOL 100 ML: 612 INJECTION, SOLUTION INTRAVENOUS at 10:52

## 2022-01-01 RX ADMIN — METHYLPREDNISOLONE SODIUM SUCCINATE 125 MG: 125 INJECTION, POWDER, FOR SOLUTION INTRAMUSCULAR; INTRAVENOUS at 08:21

## 2022-01-01 RX ADMIN — OXYCODONE HYDROCHLORIDE AND ACETAMINOPHEN 1 TABLET: 7.5; 325 TABLET ORAL at 22:59

## 2022-01-01 RX ADMIN — ALBUTEROL SULFATE 2.5 MG: 2.5 SOLUTION RESPIRATORY (INHALATION) at 08:25

## 2022-01-01 RX ADMIN — INSULIN HUMAN 8 UNITS: 100 INJECTION, SOLUTION PARENTERAL at 14:03

## 2022-01-01 RX ADMIN — ONDANSETRON 4 MG: 2 INJECTION INTRAMUSCULAR; INTRAVENOUS at 10:50

## 2022-01-01 RX ADMIN — MORPHINE SULFATE 2 MG: 2 INJECTION, SOLUTION INTRAMUSCULAR; INTRAVENOUS at 10:51

## 2022-01-01 RX ADMIN — ACETAMINOPHEN 1000 MG: 500 TABLET ORAL at 08:20

## 2022-01-01 RX ADMIN — FAMOTIDINE 20 MG: 10 INJECTION INTRAVENOUS at 09:05

## 2022-01-01 RX ADMIN — INSULIN HUMAN 8 UNITS: 100 INJECTION, SOLUTION PARENTERAL at 23:04

## 2022-01-01 RX ADMIN — SODIUM CHLORIDE, POTASSIUM CHLORIDE, SODIUM LACTATE AND CALCIUM CHLORIDE 125 ML/HR: 600; 310; 30; 20 INJECTION, SOLUTION INTRAVENOUS at 16:49

## 2022-01-01 RX ADMIN — INSULIN HUMAN 6 UNITS: 100 INJECTION, SOLUTION PARENTERAL at 18:32

## 2022-01-01 RX ADMIN — METOPROLOL TARTRATE 100 MG: 50 TABLET, FILM COATED ORAL at 12:57

## 2022-01-01 RX ADMIN — MICAFUNGIN SODIUM 150 MG: 100 INJECTION, POWDER, LYOPHILIZED, FOR SOLUTION INTRAVENOUS at 14:09

## 2022-01-01 RX ADMIN — SODIUM CHLORIDE 1000 ML: 9 INJECTION, SOLUTION INTRAVENOUS at 14:07

## 2022-01-01 RX ADMIN — TAZOBACTAM SODIUM AND PIPERACILLIN SODIUM 3.38 G: 375; 3 INJECTION, SOLUTION INTRAVENOUS at 08:13

## 2022-01-01 RX ADMIN — TAZOBACTAM SODIUM AND PIPERACILLIN SODIUM 4.5 G: 500; 4 INJECTION, SOLUTION INTRAVENOUS at 11:00

## 2022-01-01 RX ADMIN — SODIUM CHLORIDE, POTASSIUM CHLORIDE, SODIUM LACTATE AND CALCIUM CHLORIDE 500 ML: 600; 310; 30; 20 INJECTION, SOLUTION INTRAVENOUS at 14:03

## 2022-01-01 RX ADMIN — INSULIN HUMAN 8 UNITS: 100 INJECTION, SOLUTION PARENTERAL at 11:18

## 2022-01-01 RX ADMIN — IPRATROPIUM BROMIDE AND ALBUTEROL SULFATE 3 ML: 2.5; .5 SOLUTION RESPIRATORY (INHALATION) at 08:25

## 2022-01-01 RX ADMIN — IPRATROPIUM BROMIDE 0.5 MG: 0.5 SOLUTION RESPIRATORY (INHALATION) at 10:57

## 2022-01-01 RX ADMIN — METHYLPREDNISOLONE SODIUM SUCCINATE 125 MG: 125 INJECTION, POWDER, FOR SOLUTION INTRAMUSCULAR; INTRAVENOUS at 10:52

## 2022-01-04 ENCOUNTER — OFFICE VISIT (OUTPATIENT)
Dept: PRIMARY CARE CLINIC | Age: 62
End: 2022-01-04
Payer: MEDICAID

## 2022-01-04 ENCOUNTER — CARE COORDINATION (OUTPATIENT)
Dept: CARE COORDINATION | Age: 62
End: 2022-01-04

## 2022-01-04 VITALS
BODY MASS INDEX: 23.11 KG/M2 | SYSTOLIC BLOOD PRESSURE: 114 MMHG | TEMPERATURE: 96.7 F | HEIGHT: 69 IN | OXYGEN SATURATION: 98 % | DIASTOLIC BLOOD PRESSURE: 68 MMHG | HEART RATE: 85 BPM | WEIGHT: 156 LBS

## 2022-01-04 DIAGNOSIS — B37.0 THRUSH OF MOUTH AND ESOPHAGUS (HCC): ICD-10-CM

## 2022-01-04 DIAGNOSIS — D84.9 ACQUIRED IMMUNOCOMPROMISED STATE (HCC): ICD-10-CM

## 2022-01-04 DIAGNOSIS — Z00.00 ENCOUNTER FOR WELL ADULT EXAM WITHOUT ABNORMAL FINDINGS: Primary | ICD-10-CM

## 2022-01-04 DIAGNOSIS — Z55.0 ILLITERATE: ICD-10-CM

## 2022-01-04 DIAGNOSIS — J43.1 PANLOBULAR EMPHYSEMA (HCC): ICD-10-CM

## 2022-01-04 DIAGNOSIS — B37.81 THRUSH OF MOUTH AND ESOPHAGUS (HCC): ICD-10-CM

## 2022-01-04 DIAGNOSIS — Z94.2 STATUS POST LUNG TRANSPLANTATION (HCC): ICD-10-CM

## 2022-01-04 LAB — HBA1C MFR BLD: 10.9 %

## 2022-01-04 PROCEDURE — 99214 OFFICE O/P EST MOD 30 MIN: CPT | Performed by: FAMILY MEDICINE

## 2022-01-04 PROCEDURE — 3017F COLORECTAL CA SCREEN DOC REV: CPT | Performed by: FAMILY MEDICINE

## 2022-01-04 PROCEDURE — 99396 PREV VISIT EST AGE 40-64: CPT | Performed by: FAMILY MEDICINE

## 2022-01-04 PROCEDURE — G8420 CALC BMI NORM PARAMETERS: HCPCS | Performed by: FAMILY MEDICINE

## 2022-01-04 PROCEDURE — 83036 HEMOGLOBIN GLYCOSYLATED A1C: CPT | Performed by: FAMILY MEDICINE

## 2022-01-04 PROCEDURE — 1036F TOBACCO NON-USER: CPT | Performed by: FAMILY MEDICINE

## 2022-01-04 PROCEDURE — 3023F SPIROM DOC REV: CPT | Performed by: FAMILY MEDICINE

## 2022-01-04 PROCEDURE — 0004A COVID-19, PFIZER VACCINE 30MCG/0.3ML DOSE: CPT | Performed by: FAMILY MEDICINE

## 2022-01-04 PROCEDURE — 91300 COVID-19, PFIZER VACCINE 30MCG/0.3ML DOSE: CPT | Performed by: FAMILY MEDICINE

## 2022-01-04 PROCEDURE — G8427 DOCREV CUR MEDS BY ELIG CLIN: HCPCS | Performed by: FAMILY MEDICINE

## 2022-01-04 PROCEDURE — G8484 FLU IMMUNIZE NO ADMIN: HCPCS | Performed by: FAMILY MEDICINE

## 2022-01-04 RX ORDER — FLUCONAZOLE 100 MG/1
100 TABLET ORAL
Qty: 2 TABLET | Refills: 0 | Status: SHIPPED | OUTPATIENT
Start: 2022-01-04 | End: 2022-01-08

## 2022-01-04 RX ORDER — INSULIN GLARGINE 100 [IU]/ML
21 INJECTION, SOLUTION SUBCUTANEOUS NIGHTLY
Qty: 5 PEN | Refills: 2 | Status: SHIPPED | OUTPATIENT
Start: 2022-01-04

## 2022-01-04 SDOH — EDUCATIONAL SECURITY - EDUCATION ATTAINMENT: ILITERACY AND LOW LEVEL LITERACY: Z55.0

## 2022-01-04 ASSESSMENT — ENCOUNTER SYMPTOMS
NAUSEA: 0
SHORTNESS OF BREATH: 1
SORE THROAT: 0
RHINORRHEA: 0
EYE ITCHING: 0
COLOR CHANGE: 0
ABDOMINAL PAIN: 0
COUGH: 1

## 2022-01-04 NOTE — PROGRESS NOTES
Ela Mcintosh is a 59 y.o. male who presents today for   Chief Complaint   Patient presents with    COPD     3 month follow up    Dysphagia     pt reports he is unable to swallow, and his mouth is \"raw inside\"       HPI  Patient is here for ***    No change in PMH, family, social, or surgical history unless mentioned above. I have reviewed the above chief complaint and HPI details charted by staff and claim ownership of the documentation. Review of Systems    Past Medical History:   Diagnosis Date    [facility] [address]     Emphysema of lung ([facility] [address]     Hypertension     Pneumonia     in left lung    Skin cancer     Wears glasses        Current Outpatient Medications   Medication Sig Dispense Refill    nystatin (MYCOSTATIN) 461245 UNIT/ML suspension Take 5 mLs by mouth 4 times daily for 7 days 140 mL 0    fluconazole (DIFLUCAN) 100 MG tablet Take 1 tablet by mouth every 72 hours for 2 doses 2 tablet 0    Magic Mouthwash (MIRACLE MOUTHWASH) Swish and spit 5 mLs 4 times daily as needed for Irritation 988 mL 1    folic acid (FOLVITE) 1 MG tablet TAKE 1 TABLET BY MOUTH EVERY DAY 90 tablet 3    B-D UF III MINI PEN NEEDLES 31G X 5 MM MISC 1 EACH SUBCUTANEOUS 4 TIMES A DAY - E11.65 ULTRAFINE 5MM 100 each 11    omeprazole (PRILOSEC) 40 MG delayed release capsule Take 1 capsule by mouth 2 times daily 180 capsule 1    albuterol sulfate  (90 Base) MCG/ACT inhaler Inhale 2 puffs into the lungs every 4 hours as needed for Wheezing or Shortness of Breath 18 g 11    insulin glargine (BASAGLAR KWIKPEN) 100 UNIT/ML injection pen Inject 20 Units into the skin nightly 5 pen 2    insulin lispro (ADMELOG) 100 UNIT/ML injection vial 5 U + SS before meals three times daily 10 mL 1    atorvastatin (LIPITOR) 20 MG tablet TAKE 1 TABLET BY MOUTH EVERYDAY AT BEDTIME 90 tablet 1    psyllium (METAMUCIL SMOOTH TEXTURE) 58.6 % powder Take by mouth 3 times daily.  1 Bottle 2    phenylephrine 0.25 % suppository Place 1 suppository rectally 2 times daily 12 suppository 1    hydrocortisone (ANUSOL-HC) 2.5 % CREA rectal cream Apply sparingly to external rectal area up to two times a day 1 Tube 1    ELIQUIS 5 MG TABS tablet       vitamin D (ERGOCALCIFEROL) 1.25 MG (57175 UT) CAPS capsule       fludrocortisone (FLORINEF) 0.1 MG tablet       ipratropium-albuterol (DUONEB) 0.5-2.5 (3) MG/3ML SOLN nebulizer solution 3 MILLILITER(S) BY NEBULIZER 4 TIMES A DAY      magnesium oxide (MAG-OX) 400 MG tablet TAKE 2 TABLETS BY MOUTH TWICE A DAY      predniSONE (DELTASONE) 5 MG tablet       tiZANidine (ZANAFLEX) 4 MG tablet       vancomycin (VANCOCIN) 125 MG capsule       mupirocin (BACTROBAN) 2 % ointment Apply to affected areas as needed. 1 Tube 0    hydrocortisone (ANUSOL-HC) 25 MG suppository Place 1 suppository rectally 2 times daily as needed for Hemorrhoids 24 suppository 2    levocetirizine (XYZAL) 5 MG tablet TAKE 1 TABLET BY MOUTH EVERY DAY 90 tablet 1    blood glucose monitor kit and supplies Test 4- 5  times a day & as needed for symptoms of irregular blood glucose.  1 kit 0    WIXELA INHUB 250-50 MCG/DOSE AEPB 2 times daily      traZODone (DESYREL) 50 MG tablet TAKE 1-2 TABLETS AT NIGHT AS DIRECTED 60 tablet 0    metoprolol (LOPRESSOR) 100 MG tablet TAKE 1 TABLET BY MOUTH TWICE A DAY 60 tablet 4    Magnesium 400 MG CAPS Take 400 mg by mouth daily 30 capsule 3    Nebulizers (COMPRESSOR/NEBULIZER) MISC Nebulizer with supplies 1 each 0    OXYGEN pulse oximeter (not oxygen itself) 1 kit 0    albuterol sulfate HFA (VENTOLIN HFA) 108 (90 Base) MCG/ACT inhaler INHALE TWO PUFFS EVERY FOUR HOURS AS NEEDED FOR THIRTY DAYS 18 Inhaler 12    SPIRIVA HANDIHALER 18 MCG inhalation capsule INHALE THE CONTENTS OF 1 CAPSULE BY MOUTH EVERYD AY 30 capsule 5    furosemide (LASIX) 20 MG tablet TAKE 1 TABLET BY MOUTH DAILY (Patient taking differently: Take 40 mg by mouth daily TAKE 1 TABLET BY MOUTH DAILY) 30 tablet 10    LYRICA 150 MG capsule TAKE ONE CAPSULE BY MOUTH TWICE A DAY. 60 capsule 5    HYDROcodone-acetaminophen (NORCO) 7.5-325 MG per tablet Take 1 tablet by mouth.  OXYGEN Inhale into the lungs 3-4 L      Mycophenolate Sodium 360 MG TBEC Take 360 mg by mouth 2 times daily With the 180mg      Mycophenolate Sodium (MYCOPHENOLIC ACID) 332 MG TBEC 180 mg 2 times daily With the 360mg      Blood Glucose Monitoring Suppl (ACCU-CHEK JOANNE PLUS) W/DEVICE KIT   0    ACCU-CHEK JOANNE PLUS strip   3    Accu-Chek Softclix Lancets MISC   3    albuterol (PROVENTIL) (2.5 MG/3ML) 0.083% nebulizer solution       amLODIPine (NORVASC) 5 MG tablet Take 5 mg by mouth daily   11    tacrolimus (PROGRAF) 1 MG capsule Take 1 mg by mouth 2 times daily Take one tablet in the am and 1/2 tablet in the afternoon      Cyanocobalamin (VITAMIN B 12) 250 MCG LOZG Take  by mouth.  nystatin (MYCOSTATIN) 085003 UNIT/ML suspension       lisinopril (PRINIVIL;ZESTRIL) 40 MG tablet Take 1 tablet by mouth daily (Patient not taking: Reported on 9/21/2021) 90 tablet 2    hydroCHLOROthiazide (HYDRODIURIL) 50 MG tablet  (Patient not taking: Reported on 9/21/2021)      doxycycline monohydrate (MONODOX) 100 MG capsule  (Patient not taking: Reported on 9/21/2021)       Current Facility-Administered Medications   Medication Dose Route Frequency Provider Last Rate Last Admin    magnesium sulfate injection 4 g  4 g IntraVENous Once James Ragsdale MD           Allergies   Allergen Reactions    Avelox [Moxifloxacin Hydrochloride] Other (See Comments)     Pt states this medicine will make him sweat, turn red, itchy, and pass out.     Clindamycin/Lincomycin Diarrhea     Told by [facility]ologist at [facility] not to take    Moxifloxacin Rash     Sweating        Past Surgical History:   Procedure Laterality Date    APPENDECTOMY      CHOLECYSTECTOMY  9/26/14    COLONOSCOPY  12/29/2008    Dr. Chayito Boo: [facility]    LUNG REMOVAL, PARTIAL     right upper    LUNG TRANSPLANT, SINGLE  2006    left    SKIN CANCER DESTRUCTION      UPPER GASTROINTESTINAL ENDOSCOPY  2008     Dr. Gabrielle Brush       Social History     Tobacco Use    Smoking status: Former Smoker     Packs/day: 3.00     Years: 15.00     Pack years: 45.00     Types: Cigarettes     Start date: 1972     Quit date: 1/10/2002     Years since quittin.9    Smokeless tobacco: Never Used    Tobacco comment: Pt used to smoke quit 9.5 years ago   Vaping Use    Vaping Use: Never used   Substance Use Topics    Alcohol use: No    Drug use: Not Currently     Frequency: 2.0 times per week     Types: Marijuana Karina Coho)     Comment: 17 pt stated he stopped 2019       Family History   Problem Relation Age of Onset    Cancer Mother     Heart Disease Father     Colon Cancer Neg Hx     Colon Polyps Neg Hx     Esophageal Cancer Neg Hx     Liver Cancer Neg Hx     Rectal Cancer Neg Hx     Liver Disease Neg Hx     Stomach Cancer Neg Hx        /68 (Site: Left Upper Arm, Position: Sitting)   Pulse 85   Temp 96.7 °F (35.9 °C)   Ht 5' 9\" (1.753 m)   Wt 156 lb (70.8 kg)   SpO2 98%   BMI 23.04 kg/m²     Physical Exam    Assessment:    ICD-10-CM    1. Panlobular emphysema ([facility] [address]  J43.1    2. Status post lung transplantation ([facility] [address]  Z94.2    3. Acquired immunocompromised state ([facility] [address]  D84.9    4. Thrush of mouth and esophagus ([facility] [address]  B37.81 POCT glycosylated hemoglobin (Hb A1C)    B37.0    5.  Illiterate  Z55.0        Plan:   ***  Orders Placed This Encounter   Procedures    COVID-19, Pfizer Vaccine 30MCG/0.3mL Dose    POCT glycosylated hemoglobin (Hb A1C)     Orders Placed This Encounter   Medications    nystatin (MYCOSTATIN) 430051 UNIT/ML suspension     Sig: Take 5 mLs by mouth 4 times daily for 7 days     Dispense:  140 mL     Refill:  0    fluconazole (DIFLUCAN) 100 MG tablet     Sig: Take 1 tablet by mouth every 72 hours for 2 doses     Dispense:  2 tablet     Refill: 0    Magic Mouthwash (MIRACLE MOUTHWASH)     Sig: Swish and spit 5 mLs 4 times daily as needed for Irritation     Dispense:  480 mL     Refill:  1     Can use prior ratio on file     There are no discontinued medications. There are no Patient Instructions on file for this visit. Patient given educational handouts and has had all questions answered. Patient voices understanding and agrees to plans along with risks and benefits of plan. Patient isinstructed to continue prior meds, diet, and exercise plans unless instructed otherwise. Patient agrees to follow up as instructed and sooner if needed. Patient agrees to go to ER if condition becomes emergent. Notesmay be completed with dictation device and spelling errors may occur. Materials may be copied and pasted from a notepad outside of EMR, all of which, I, Dr. Ben Damian MD, take sole intellectual ownership of and have approved adding to my note. No follow-ups on file.

## 2022-01-04 NOTE — PROGRESS NOTES
Ronnell Neumann is a 59 y.o. male who presents today for   Chief Complaint   Patient presents with    COPD     3 month follow up    Dysphagia     pt reports he is unable to swallow, and his mouth is \"raw inside\"    Annual Exam       HPI  Patient is here for 2 separate visits: annual wellness visit as well as acute and chronic issues. The below review of systems and physical exam applies to both encounters. Annual HPI:  Patient is here for annual physical exam today. Patient denies any major changes in family medical history or recent screenings. Due for covid booster. Using precautions but living normall too    Acute/Chronic HPI:  Patient is here for f/u lung tplant status. Notes he is doing well but was sick recently w/ prednisone and antibiotic for copd exacerbation being needed. Talking w/ tpalan team oan new lung potentially. Had one for 16 yrs. Having mouth and swallowing pain. History of  Thrush. Been in the 600s glucose. No change in PMH, family, social, or surgical history unless mentioned above. I have reviewed the above chief complaint and HPI details charted by staff and claim ownership of the documentation. Review of Systems   Constitutional: Negative for chills and fever. HENT: Negative for rhinorrhea and sore throat. Eyes: Negative for itching and visual disturbance. Respiratory: Positive for cough (chronic, stable) and shortness of breath (chronic, stable). Cardiovascular: Negative for chest pain and palpitations. Gastrointestinal: Negative for abdominal pain and nausea. Endocrine: Negative for polydipsia and polyuria. Genitourinary: Negative for dysuria and frequency. Musculoskeletal: Negative for arthralgias and myalgias. Skin: Negative for color change and rash. Allergic/Immunologic: Negative for environmental allergies and food allergies. Neurological: Negative for dizziness and light-headedness. Hematological: Negative for adenopathy. Does not bruise/bleed easily. Psychiatric/Behavioral: Negative for dysphoric mood. The patient is not nervous/anxious. Past Medical History:   Diagnosis Date    [facility]     Emphysema of lung ([facility]     Hypertension     Pneumonia     in left lung    Skin cancer     Wears glasses        Current Outpatient Medications   Medication Sig Dispense Refill    nystatin (MYCOSTATIN) 721441 UNIT/ML suspension Take 5 mLs by mouth 4 times daily for 7 days 140 mL 0    fluconazole (DIFLUCAN) 100 MG tablet Take 1 tablet by mouth every 72 hours for 2 doses 2 tablet 0    Magic Mouthwash (MIRACLE MOUTHWASH) Swish and spit 5 mLs 4 times daily as needed for Irritation 480 mL 1    insulin glargine (BASAGLAR KWIKPEN) 100 UNIT/ML injection pen Inject 21 Units into the skin nightly 5 pen 2    insulin lispro (ADMELOG) 100 UNIT/ML injection vial 7 U + SS before meals three times daily 10 mL 1    folic acid (FOLVITE) 1 MG tablet TAKE 1 TABLET BY MOUTH EVERY DAY 90 tablet 3    B-D UF III MINI PEN NEEDLES 31G X 5 MM MISC 1 EACH SUBCUTANEOUS 4 TIMES A DAY - E11.65 ULTRAFINE 5MM 100 each 11    omeprazole (PRILOSEC) 40 MG delayed release capsule Take 1 capsule by mouth 2 times daily 180 capsule 1    albuterol sulfate  (90 Base) MCG/ACT inhaler Inhale 2 puffs into the lungs every 4 hours as needed for Wheezing or Shortness of Breath 18 g 11    atorvastatin (LIPITOR) 20 MG tablet TAKE 1 TABLET BY MOUTH EVERYDAY AT BEDTIME 90 tablet 1    psyllium (METAMUCIL SMOOTH TEXTURE) 58.6 % powder Take by mouth 3 times daily.  1 Bottle 2    phenylephrine 0.25 % suppository Place 1 suppository rectally 2 times daily 12 suppository 1    hydrocortisone (ANUSOL-HC) 2.5 % CREA rectal cream Apply sparingly to external rectal area up to two times a day 1 Tube 1    ELIQUIS 5 MG TABS tablet       vitamin D (ERGOCALCIFEROL) 1.25 MG (23200 UT) CAPS capsule       fludrocortisone (FLORINEF) 0.1 MG tablet       ipratropium-albuterol (DUONEB) 0.5-2.5 (3) MG/3ML SOLN nebulizer solution 3 MILLILITER(S) BY NEBULIZER 4 TIMES A DAY      magnesium oxide (MAG-OX) 400 MG tablet TAKE 2 TABLETS BY MOUTH TWICE A DAY      predniSONE (DELTASONE) 5 MG tablet       tiZANidine (ZANAFLEX) 4 MG tablet       vancomycin (VANCOCIN) 125 MG capsule       mupirocin (BACTROBAN) 2 % ointment Apply to affected areas as needed. 1 Tube 0    hydrocortisone (ANUSOL-HC) 25 MG suppository Place 1 suppository rectally 2 times daily as needed for Hemorrhoids 24 suppository 2    levocetirizine (XYZAL) 5 MG tablet TAKE 1 TABLET BY MOUTH EVERY DAY 90 tablet 1    blood glucose monitor kit and supplies Test 4- 5  times a day & as needed for symptoms of irregular blood glucose. 1 kit 0    WIXELA INHUB 250-50 MCG/DOSE AEPB 2 times daily      traZODone (DESYREL) 50 MG tablet TAKE 1-2 TABLETS AT NIGHT AS DIRECTED 60 tablet 0    metoprolol (LOPRESSOR) 100 MG tablet TAKE 1 TABLET BY MOUTH TWICE A DAY 60 tablet 4    Magnesium 400 MG CAPS Take 400 mg by mouth daily 30 capsule 3    Nebulizers (COMPRESSOR/NEBULIZER) MISC Nebulizer with supplies 1 each 0    OXYGEN pulse oximeter (not oxygen itself) 1 kit 0    albuterol sulfate HFA (VENTOLIN HFA) 108 (90 Base) MCG/ACT inhaler INHALE TWO PUFFS EVERY FOUR HOURS AS NEEDED FOR THIRTY DAYS 18 Inhaler 12    SPIRIVA HANDIHALER 18 MCG inhalation capsule INHALE THE CONTENTS OF 1 CAPSULE BY MOUTH EVERYD AY 30 capsule 5    furosemide (LASIX) 20 MG tablet TAKE 1 TABLET BY MOUTH DAILY (Patient taking differently: Take 40 mg by mouth daily TAKE 1 TABLET BY MOUTH DAILY) 30 tablet 10    LYRICA 150 MG capsule TAKE ONE CAPSULE BY MOUTH TWICE A DAY. 60 capsule 5    HYDROcodone-acetaminophen (NORCO) 7.5-325 MG per tablet Take 1 tablet by mouth.       OXYGEN Inhale into the lungs 3-4 L      Mycophenolate Sodium 360 MG TBEC Take 360 mg by mouth 2 times daily With the 180mg      Mycophenolate Sodium (MYCOPHENOLIC ACID) 180 MG TBEC 180 mg 2 times daily With the 360mg      Blood Glucose Monitoring Suppl (ACCU-CHEK JOANNE PLUS) W/DEVICE KIT   0    ACCU-CHEK JOANNE PLUS strip   3    Accu-Chek Softclix Lancets MISC   3    albuterol (PROVENTIL) (2.5 MG/3ML) 0.083% nebulizer solution       amLODIPine (NORVASC) 5 MG tablet Take 5 mg by mouth daily   11    tacrolimus (PROGRAF) 1 MG capsule Take 1 mg by mouth 2 times daily Take one tablet in the am and 1/2 tablet in the afternoon      Cyanocobalamin (VITAMIN B 12) 250 MCG LOZG Take  by mouth.  nystatin (MYCOSTATIN) 633107 UNIT/ML suspension       lisinopril (PRINIVIL;ZESTRIL) 40 MG tablet Take 1 tablet by mouth daily (Patient not taking: Reported on 9/21/2021) 90 tablet 2    hydroCHLOROthiazide (HYDRODIURIL) 50 MG tablet  (Patient not taking: Reported on 9/21/2021)      doxycycline monohydrate (MONODOX) 100 MG capsule  (Patient not taking: Reported on 9/21/2021)       Current Facility-Administered Medications   Medication Dose Route Frequency Provider Last Rate Last Admin    magnesium sulfate injection 4 g  4 g IntraVENous Once Kaycee Vicente MD           Allergies   Allergen Reactions    Avelox [Moxifloxacin Hydrochloride] Other (See Comments)     Pt states this medicine will make him sweat, turn red, itchy, and pass out.     Clindamycin/Lincomycin Diarrhea     Told by Pulmonologist at [facility] not to take    Moxifloxacin Rash     Sweating        Past Surgical History:   Procedure Laterality Date    APPENDECTOMY      CHOLECYSTECTOMY  9/26/14    COLONOSCOPY  12/29/2008    Dr. Angel Diehl: [facility]    LUNG REMOVAL, PARTIAL  2002    right upper    LUNG TRANSPLANT, SINGLE  2006    left    SKIN CANCER DESTRUCTION      UPPER GASTROINTESTINAL ENDOSCOPY  12/30/2008     Dr. Angel Diehl       Social History     Tobacco Use    Smoking status: Former Smoker     Packs/day: 3.00     Years: 15.00     Pack years: 45.00     Types: Cigarettes     Start date: 1/1/1972     Quit date: rhythm. No extrasystoles are present. Heart sounds: Normal heart sounds, S1 normal and S2 normal. No murmur heard. No friction rub. No gallop. Pulmonary:      Effort: Pulmonary effort is normal. No respiratory distress. Breath sounds: Decreased breath sounds present. No wheezing, rhonchi or rales. Chest:      Chest wall: No tenderness. Breasts:      Right: No supraclavicular adenopathy. Left: No supraclavicular adenopathy. Abdominal:      General: Bowel sounds are normal. There is no distension. Palpations: Abdomen is soft. There is no mass. Tenderness: There is no abdominal tenderness. There is no guarding or rebound. Musculoskeletal:         General: No tenderness. Normal range of motion. Cervical back: Normal range of motion and neck supple. No tenderness or bony tenderness. Thoracic back: Normal. No tenderness or bony tenderness. Lumbar back: Normal. No tenderness or bony tenderness. Right lower leg: No swelling. Edema (1+ b/l) present. Left lower leg: No swelling. Edema present. Lymphadenopathy:      Head:      Right side of head: No submental, submandibular or tonsillar adenopathy. Left side of head: No submental, submandibular or tonsillar adenopathy. Cervical: No cervical adenopathy. Upper Body:      Right upper body: No supraclavicular adenopathy. Left upper body: No supraclavicular adenopathy. Skin:     General: Skin is warm and dry. Coloration: Skin is not pale. Findings: No erythema (on head, neck, face, extremities) or rash (on extremities, head, neck, face). Nails: There is no clubbing. Neurological:      Mental Status: He is alert and oriented to person, place, and time. Motor: No tremor or seizure activity. Coordination: Coordination normal.      Gait: Gait normal.      Deep Tendon Reflexes: Reflexes are normal and symmetric.    Psychiatric:         Speech: Speech normal. Behavior: Behavior normal.         Thought Content: Thought content normal.         Judgment: Judgment normal.         Assessment:    ICD-10-CM    1. Encounter for well adult exam without abnormal findings  Z00.00    2. Panlobular emphysema ([facility]  J43.1    3. Status post lung transplantation ([facility]  Z94.2    4. Acquired immunocompromised state ([facility]  D84.9    5. Thrush of mouth and esophagus ([facility])  B37.81 nystatin (MYCOSTATIN) 926929 UNIT/ML suspension    B37.0 POCT glycosylated hemoglobin (Hb A1C)     nystatin (MYCOSTATIN) 647997 UNIT/ML suspension     fluconazole (DIFLUCAN) 100 MG tablet     Magic Mouthwash (MIRACLE MOUTHWASH)   6. Illiterate  Z55.0        Plan:   Plan #1: Annual exam  I have reviewed and assessed the patient's current health status, risk factors, and progress for available preventative care. Patient received age-related anticipatory guidance in regards to personal and public health as well as USPSTF evidence based guidelines for screening and prevention. This included the need for regular generalized activity and exercise as tolerated and a diet high in plant based fiber with a well balanced diet. Immunizations discussed along with continuing covid precautions. Plan #2: Acute and chronic conditions  Plan #1: Annual exam  I have reviewed and assessed the patient's current health status, risk factors, and progress for available preventative care. Plan #2: Acute and chronic conditions  Very high risk. Needs acc. Illiterate as well. Working w/ social work to keep eye on him. Will titrate insulin as a1c likely high given recurrent steroids. Needs to be 7-8 opitimally for lung transplant.    Orders Placed This Encounter   Procedures    COVID-19, Pfizer Vaccine 30MCG/0.3mL Dose    POCT glycosylated hemoglobin (Hb A1C)     Orders Placed This Encounter   Medications    nystatin (MYCOSTATIN) 452194 UNIT/ML suspension     Sig: Take 5 mLs by mouth 4 times daily for 7 days     Dispense:  140 mL     Refill:  0    fluconazole (DIFLUCAN) 100 MG tablet     Sig: Take 1 tablet by mouth every 72 hours for 2 doses     Dispense:  2 tablet     Refill:  0    Magic Mouthwash (MIRACLE MOUTHWASH)     Sig: Swish and spit 5 mLs 4 times daily as needed for Irritation     Dispense:  480 mL     Refill:  1     Can use prior ratio on file    insulin glargine (BASAGLAR KWIKPEN) 100 UNIT/ML injection pen     Sig: Inject 21 Units into the skin nightly     Dispense:  5 pen     Refill:  2    insulin lispro (ADMELOG) 100 UNIT/ML injection vial     Si U + SS before meals three times daily     Dispense:  10 mL     Refill:  1     Medications Discontinued During This Encounter   Medication Reason    insulin glargine (BASAGLAR KWIKPEN) 100 UNIT/ML injection pen REORDER    insulin lispro (ADMELOG) 100 UNIT/ML injection vial REORDER     Patient Instructions   Increase your nighttime insulin to 21 and your meals to 7 3 times per day. Patient given educational handouts and has had all questions answered. Patient voices understanding and agrees to plans along with risks and benefits of plan. Patient isinstructed to continue prior meds, diet, and exercise plans unless instructed otherwise. Patient agrees to follow up as instructed and sooner if needed. Patient agrees to go to ER if condition becomes emergent. Notesmay be completed with dictation device and spelling errors may occur. Materials may be copied and pasted from a notepad outside of EMR, all of which, I, Dr. Jojo Cooper MD, take sole intellectual ownership of and have approved adding to my note. Return in about 3 months (around 2022) for ov, POCT A1C.

## 2022-01-04 NOTE — CARE COORDINATION
Received a message from Dr Laure Farmer that patient needs case management assistance. Submitted by Jordana    Telephoned Ricci. Introduced myself and reason for calling. Asked him if I could call him periodically to see if he has any needs I could help address. He said that would be okay. He told me he sees his specialist at [facility] on 2022 to see if he able to get a second lung transplant. I started to tell him about Reinier Polk LCSW [facility] for [facility] when the Specialist's office called. I told [facility] I will call him tomorrow. He verbalized understanding. Submitted by Jordana    EMAIL sent to Reinier Polk LCSW CCM [facility] Medicaid:    PATIENT:  Scarlett Jo  :         1960  ADDRESS:  [address] [place], [place] [address]  CELL:         [phone]  [facility]:  [id number]  PCP:          MD Homer Darling [facility] Scottell Councilman - Mr Maninder Burr is going through screening for a lung transplant at [facility]. He has an appointment there on 2022. If he is a candidate, this will be his second transplant. Dr Laure Farmer is concerned that the patient needs Case Management. He is a nice man who is illiterate and needs CM support. I am happy to do what I can to assist but I am hoping you can add him to your patient panel? He is one of those patients that needs to stay on radar. I know [facility] Medicaid will be able to offer him more assistance than I can provide him. Please let me know if you can help me with this patient. Thank you. BOB Cisse, [facility], [facility] Management Team  [address] [place] [address], [address] [place], [place] [address]Ashli, [address] [place] [address]  Afua Zarate:  [phone]  C:  [phone]  F:  [phone]  Email:  [email or web address]    Visit [email or web address]    Submitted by Jordana

## 2022-01-06 SDOH — ECONOMIC STABILITY: INCOME INSECURITY: IN THE LAST 12 MONTHS, WAS THERE A TIME WHEN YOU WERE NOT ABLE TO PAY THE MORTGAGE OR RENT ON TIME?: NO

## 2022-01-06 SDOH — ECONOMIC STABILITY: HOUSING INSECURITY
IN THE LAST 12 MONTHS, WAS THERE A TIME WHEN YOU DID NOT HAVE A STEADY PLACE TO SLEEP OR SLEPT IN A SHELTER (INCLUDING NOW)?: YES

## 2022-01-06 SDOH — ECONOMIC STABILITY: TRANSPORTATION INSECURITY
IN THE PAST 12 MONTHS, HAS THE LACK OF TRANSPORTATION KEPT YOU FROM MEDICAL APPOINTMENTS OR FROM GETTING MEDICATIONS?: NO

## 2022-01-06 SDOH — ECONOMIC STABILITY: FOOD INSECURITY: WITHIN THE PAST 12 MONTHS, THE FOOD YOU BOUGHT JUST DIDN'T LAST AND YOU DIDN'T HAVE MONEY TO GET MORE.: SOMETIMES TRUE

## 2022-01-06 SDOH — ECONOMIC STABILITY: TRANSPORTATION INSECURITY
IN THE PAST 12 MONTHS, HAS LACK OF TRANSPORTATION KEPT YOU FROM MEETINGS, WORK, OR FROM GETTING THINGS NEEDED FOR DAILY LIVING?: NO

## 2022-01-06 SDOH — ECONOMIC STABILITY: FOOD INSECURITY: WITHIN THE PAST 12 MONTHS, YOU WORRIED THAT YOUR FOOD WOULD RUN OUT BEFORE YOU GOT MONEY TO BUY MORE.: SOMETIMES TRUE

## 2022-01-06 ASSESSMENT — SOCIAL DETERMINANTS OF HEALTH (SDOH): HOW HARD IS IT FOR YOU TO PAY FOR THE VERY BASICS LIKE FOOD, HOUSING, MEDICAL CARE, AND HEATING?: SOMEWHAT HARD

## 2022-01-06 NOTE — CARE COORDINATION
[phone]:  Patient currently living with family or friends while  from his wife. He stated he will be moving back to the marital trailer at the end of this month.   His wife is moving to another trailer. / Nisha Canela

## 2022-01-06 NOTE — CARE COORDINATION
Telephoned the patient. No answer. Left a voice message introducing self, reason for call, and provided number for patient to return call. Submitted by Zak/RAFAT    Patient returned my call. Collected and discussed the following:    - He stated he will need transportation assistance to medical appointments after he is healed from lung transplant surgery. Told patient I felt he is eligible for free transportation from the [facility] based on his current Medicaid coverage but I will contact DUSTY ([phone]) to confirm.   - Asked patient about status of vehicles in his name or registered to his address. Patient stated he gave his car to his son. It is in his son's name and registered at his son's address.  - Patient shared information about separation from wife and pending divorce. He stated he has support to address his needs as he prepares for lung transplant surgery. - He is currently living with family / friends. He will be moving to the marital home the end of this month. - Patient earns $243 per month from  [address] [facility] [address] [facility])  - Patient normally receives $50 per month in food stamps but he is receiving $250 per month since the pandemic started. - Discussed having a family member help him look at weekly ads at the grocery stores for savings, and he needs to check with [employer] to find out what day is their discounted meat day as patient said he enjoys having meat at meal time. - He stated he is concerned about paying bills. He has not done that on his own without his wife's income. He stated his wife took care of paying the bills. - I asked the patient if he felt comfortable doing it. He said he thinks he can. - Patient shared that he attended 9th Grade but feels his education is at a 3rd Grade level. He said he cannot read except for some words and he cannot spell.    - Discussed asking his bank to help him set-up automated payments for bills, and to help him

## 2022-01-06 NOTE — CARE COORDINATION
[phone]:  Patient stated he normally receives $50 monthly in food stamps but due to Pandemic, he is receiving $250 per month.   He is getting a divorce so his current food stamp allotment is affording him groceries for a month at a time. / Nisha Canela

## 2022-01-06 NOTE — CARE COORDINATION
[phone]:  Patient stated he does not drive. He stated he has family support for transportation but is concerned about future transportation for medical appointments after he has a lung transplant. Patient has [facility] Medicaid. I called DUSTY ([phone]).   Patient is coded to ride [employer] for medical appointments, testing, etc.  Patient notified and provided with [employer] phone number. / Kari Mooney

## 2022-01-06 NOTE — CARE COORDINATION
[phone]: Patient is  from his wife. They are getting a divorce. He is just now learning how he can pay for monthly bills with his income alone. He receives $840 from [employer] ([facility]).   He stated he will have know about expenses in the next month or two. / Mode Breaux

## 2022-01-09 NOTE — ED PROVIDER NOTES
"Subjective   Patient is a 61-year-old male presents the ER with complaints of upper abdominal pain x1 week.  He describes the pain as a significant burning in nature.  He states he is unable to eat due to the burning sensation.  He states by the time he eats a second bite the pain returns.  He has had nausea without any vomiting.  He takes Prilosec without any relief.  He describes the pain as if it is \"setting me on fire.\"  Patient denies any fevers or diarrhea.  He denies any alcohol intake and is not a smoker.  He is on oxygen throughout the exam.  He states he typically only requires oxygen at night or with activity however having to wear a mask in the emergency department he went ahead and brought his oxygen.  Patient has history significant for chronic back pain, COPD, emphysema, hypertension, reflux, lung transplant, type 2 diabetes, history of nicotine dependence.           Review of Systems   Constitutional: Negative for fever.   HENT: Negative.  Negative for congestion.    Eyes: Negative.    Respiratory: Positive for shortness of breath.         Reports chronic shortness of breath secondary to COPD, denies any worsening shortness of breath than his usual   Cardiovascular: Negative for chest pain.   Gastrointestinal: Positive for abdominal pain and nausea. Negative for blood in stool, constipation, diarrhea and vomiting.   Genitourinary: Negative.    Musculoskeletal: Negative.    Skin: Negative.    All other systems reviewed and are negative.      Past Medical History:   Diagnosis Date   • Acquired immunocompromised state (HCC) 1/5/2021   • Cancer ([facility])     Skin   • Chronic back pain    • Chronic pain syndrome 12/4/2018   • Chronic respiratory failure with hypoxia ([facility]) 12/4/2018   • COPD (chronic obstructive pulmonary disease) ([facility]ly [facility])    • COPD, group D, by GOLD 2017 classification ([facility]ly [facility]) 11/29/2018   • Diabetes mellitus ([facility])    • Emphysema lung ([facility])    • Essential hypertension 12/4/2018   • " Gastroesophageal reflux disease without esophagitis 2018   • Hypertension    • Lung infiltrate 2021    Islam, 2021, left midlung, 3.6 cm   • Lung transplant status (Formerly [facility]) 2018   • Lung transplanted ([facility])    • Personal history of nicotine dependence 2018   • Type 2 diabetes mellitus with hyperglycemia, without long-term current use of insulin ([facility]) 10/10/2020       Allergies   Allergen Reactions   • Clindamycin/Lincomycin Diarrhea   • Moxifloxacin Rash     Sweating        Past Surgical History:   Procedure Laterality Date   • APPENDECTOMY     • CHOLECYSTECTOMY     • COLONOSCOPY     • ENDOSCOPY     • LUNG REMOVAL, TOTAL      right upper lobe   • LUNG TRANSPLANT             Family History   Problem Relation Age of Onset   • Heart disease Father    • Heart attack Brother        Social History     Socioeconomic History   • Marital status:    Tobacco Use   • Smoking status: Former Smoker     Packs/day: 3.00     Years: 28.00     Pack years: 84.00     Types: Cigarettes     Quit date:      Years since quittin.0   • Smokeless tobacco: Never Used   Vaping Use   • Vaping Use: Never used   Substance and Sexual Activity   • Alcohol use: No   • Drug use: No     Comment: when in severe pain    • Sexual activity: Defer           Objective   Physical Exam  Vitals and nursing note reviewed.   Constitutional:       General: He is not in acute distress.     Appearance: He is well-developed. He is not diaphoretic.   HENT:      Head: Normocephalic and atraumatic.      Nose: Nose normal.      Mouth/Throat:      Mouth: Mucous membranes are moist.   Eyes:      General: No scleral icterus.     Conjunctiva/sclera: Conjunctivae normal.      Pupils: Pupils are equal, round, and reactive to light.   Neck:      Thyroid: No thyromegaly.      Vascular: No JVD.   Cardiovascular:      Rate and Rhythm: Normal rate and regular rhythm.      Heart sounds: Normal heart sounds. No murmur heard.      Pulmonary:       Effort: Pulmonary effort is normal. No respiratory distress.      Breath sounds: Rales present. No wheezing.   Chest:      Chest wall: No tenderness.   Abdominal:      General: Bowel sounds are normal. There is no distension.      Palpations: Abdomen is soft. There is no mass.      Tenderness: There is abdominal tenderness in the epigastric area. There is no guarding or rebound.   Musculoskeletal:         General: Normal range of motion.      Cervical back: Normal range of motion and neck supple.   Lymphadenopathy:      Cervical: No cervical adenopathy.   Skin:     General: Skin is warm and dry.      Capillary Refill: Capillary refill takes less than 2 seconds.      Coloration: Skin is not pale.      Findings: No erythema or rash.   Neurological:      General: No focal deficit present.      Mental Status: He is alert and oriented to person, place, and time.      Cranial Nerves: No cranial nerve deficit.      Coordination: Coordination normal.      Deep Tendon Reflexes: Reflexes are normal and symmetric.   Psychiatric:         Mood and Affect: Mood normal.         Behavior: Behavior normal.         Thought Content: Thought content normal.         Judgment: Judgment normal.         Procedures           ED Course  ED Course as of 01/09/22 1706   Sun Jan 09, 2022   1152 Patient reports feeling much better on reexam.  He states the GI cocktail did wonders for his abdominal burning.  Patient does not appear to be in DKA.  pH is 7.399, PCO2 48.2, PO2 74.6.  Patient has chronic COPD and is on 2 L prn.  These were performed on room air.  Patient's white blood count was 12.59, H&H is 13.4 and 41.5.  Lipase is 21.  Troponin is 0.012.  CMP patient's glucose was 455, BUN and creatinine is 32 and 1.28.  This appears to be around patient's baseline.  We did treat patient's glucose with regular insulin. [TW]   1213 IMPRESSION:  1.  No acute abdominopelvic abnormalities.  2.  Colonic diverticulosis, without evidence  diverticulitis.   [TW]      ED Course User Index  [TW] Janie Parker, APRN                                                 [facility]  Number of Diagnoses or Management Options  Gastritis without bleeding, unspecified chronicity, unspecified gastritis type: new and requires workup     Amount and/or Complexity of Data Reviewed  Clinical lab tests: ordered and reviewed  Tests in the radiology section of CPT®: ordered and reviewed  Decide to obtain previous medical records or to obtain history from someone other than the patient: yes  Discuss the patient with other providers: yes    Risk of Complications, Morbidity, and/or Mortality  Presenting problems: moderate  Diagnostic procedures: moderate  Management options: moderate    Patient Progress  Patient progress: improved      Final diagnoses:   Gastritis without bleeding, unspecified chronicity, unspecified gastritis type       ED Disposition  ED Disposition     ED Disposition Condition Comment    Discharge Good           Sam Nash MD  [address] [facility] [address]  [place] [id number] [phone]               Medication List      New Prescriptions    ondansetron ODT 4 MG disintegrating tablet  Commonly known as: ZOFRAN-ODT  Place 1 tablet on the tongue Every 6 (Six) Hours As Needed for Nausea.     pantoprazole 40 MG EC tablet  Commonly known as: PROTONIX  Take 1 tablet by mouth Daily.     sucralfate 1 GM/10ML suspension  Commonly known as: CARAFATE  Take 10 mL by mouth 4 (Four) Times a Day With Meals & at Bedtime.           Where to Get Your Medications      These medications were sent to [facility] #[id number] - [place] - [address] - [phone]  - [phone] FX  [address] [facility] [address]    Phone: [phone]   · ondansetron ODT 4 MG disintegrating tablet  · pantoprazole 40 MG EC tablet  · sucralfate 1 GM/10ML suspension          Janie Parker, APRN  01/09/22 [id number]

## 2022-01-10 ENCOUNTER — CARE COORDINATION (OUTPATIENT)
Dept: CARE COORDINATION | Age: 62
End: 2022-01-10

## 2022-01-10 DIAGNOSIS — E78.5 HYPERLIPIDEMIA, UNSPECIFIED HYPERLIPIDEMIA TYPE: ICD-10-CM

## 2022-01-10 RX ORDER — ATORVASTATIN CALCIUM 20 MG/1
TABLET, FILM COATED ORAL
Qty: 90 TABLET | Refills: 3 | Status: SHIPPED | OUTPATIENT
Start: 2022-01-10

## 2022-01-10 NOTE — TELEPHONE ENCOUNTER
Bettie Amanda called to request a refill on his medication.       Last office visit : 1/4/2022   Next office visit : 1/24/2022     Requested Prescriptions     Signed Prescriptions Disp Refills    atorvastatin (LIPITOR) 20 MG tablet 90 tablet 3     Sig: TAKE 1 TABLET BY MOUTH EVERYDAY AT BEDTIME     Authorizing Provider: Darron Watson     Ordering User: Nikki Akhtar MA

## 2022-01-10 NOTE — CARE COORDINATION
Received an email from Daniel Max, [address] [place] [facility] [employer]. She stated [facility] has a Transplant Case Management team.  She is going to speak with Justino Neumann RN CCM with [facility], about placing the patient with the Transplant CM Team.     Valerie Zaman by Zak/RAFAT     I replied to [facility] that I agreed, and thanked her for her efforts and follow through.     Submitted by Zak/RAFAT

## 2022-01-11 ENCOUNTER — VIRTUAL VISIT (OUTPATIENT)
Dept: PRIMARY CARE CLINIC | Age: 62
End: 2022-01-11
Payer: MEDICAID

## 2022-01-11 DIAGNOSIS — B37.0 ORAL THRUSH: Primary | ICD-10-CM

## 2022-01-11 PROCEDURE — 99422 OL DIG E/M SVC 11-20 MIN: CPT | Performed by: FAMILY MEDICINE

## 2022-01-11 RX ORDER — FLUCONAZOLE 100 MG/1
100 TABLET ORAL DAILY
Qty: 7 TABLET | Refills: 0 | Status: SHIPPED | OUTPATIENT
Start: 2022-01-11 | End: 2022-01-18

## 2022-01-11 NOTE — PROGRESS NOTES
[address] [facility]  [address] [place] [id number] [address] [address]  Dept: [phone]  Dept Fax: [phone]  Loc: [phone]      Subjective:     Chief Complaint   Patient presents with    Other     patient is having issues swallowing, he states that when he swallows it burns. This started 3 weeks ago. he also has burning in his chest when he swallows. HPI:  Cristofer Wallace is a 59 y.o. male presents via e-visit utilizing the [employer] me platform. He presents with burning sensation in his tongue whenever he swallows hot or warm or cold drinks. He recently was admitted at [facility] for  \"lung infection\" and was treated with 14 days of Levaquin. He felt better for a while but his infection did not clear up completelyly so he was given another 14 days of Levaquin which he states he took daily until all gone. Shortly after, he had difficulty swallowing. He developed sore throat and  the soreness went down to his mid chest. His chest hurts worse when he was coughing. His tongue became sore. He was told he had oral thrush and he was Rxed Carafate  (pt showed me the bottle of the medication he was taking)  He has not been able to eat much. He states that even eating mash potato hurts his tongue.     ROS:   Review of Systems   as noted in HPI  PMHx:  Past Medical History:   Diagnosis Date    COPD (chronic obstructive pulmonary disease) ([address]     Emphysema of lung ([address]     Hypertension     Pneumonia     in left lung    Skin cancer     Wears glasses      Patient Active Problem List   Diagnosis    COPD (chronic obstructive pulmonary disease) ([address]    Emphysema of lung ([address]    HTN (hypertension)    Status post lung transplantation ([address]    Chronic respiratory failure with hypoxia, on home O2 therapy ([address]    HCAP (healthcare-associated pneumonia)    Acquired immunocompromised state ([address]       PSHx:  Past Surgical History:   Procedure Laterality Date    APPENDECTOMY      CHOLECYSTECTOMY  14    COLONOSCOPY  2008    Dr. Clyda Schaumann: AP    LUNG REMOVAL, PARTIAL      right upper    LUNG TRANSPLANT, SINGLE  2006    left    SKIN CANCER DESTRUCTION      UPPER GASTROINTESTINAL ENDOSCOPY  2008     Dr. Clyda Schaumann       PFHx:  Family History   Problem Relation Age of Onset    Cancer Mother     Heart Disease Father     Colon Cancer Neg Hx     Colon Polyps Neg Hx     Esophageal Cancer Neg Hx     Liver Cancer Neg Hx     Rectal Cancer Neg Hx     Liver Disease Neg Hx     Stomach Cancer Neg Hx        SocialHx:  Social History     Tobacco Use    Smoking status: Former Smoker     Packs/day: 3.00     Years: 15.00     Pack years: 45.00     Types: Cigarettes     Start date: 1972     Quit date: 1/10/2002     Years since quittin.0    Smokeless tobacco: Never Used    Tobacco comment: Pt used to smoke quit 9.5 years ago   Substance Use Topics    Alcohol use: No       Allergies: Allergies   Allergen Reactions    Avelox [Moxifloxacin Hydrochloride] Other (See Comments)     Pt states this medicine will make him sweat, turn red, itchy, and pass out.     Clindamycin/Lincomycin Diarrhea     Told by Pulmonologist at [facility] not to take    Moxifloxacin Rash     Sweating        Medications:  Current Outpatient Medications   Medication Sig Dispense Refill    fluconazole (DIFLUCAN) 100 MG tablet Take 1 tablet by mouth daily for 7 days 7 tablet 0    Magic Mouthwash (MIRACLE MOUTHWASH) Swish and spit 5 mLs 4 times daily as needed for Irritation 100 mL 0    atorvastatin (LIPITOR) 20 MG tablet TAKE 1 TABLET BY MOUTH EVERYDAY AT BEDTIME 90 tablet 3    nystatin (MYCOSTATIN) 735314 UNIT/ML suspension Take 5 mLs by mouth 4 times daily for 7 days 140 mL 0    insulin glargine (BASAGLAR KWIKPEN) 100 UNIT/ML injection pen Inject 21 Units into the skin nightly 5 pen 2    insulin lispro (ADMELOG) 100 UNIT/ML injection vial 7 U + SS before meals three times daily 10 mL 1    folic acid (FOLVITE) 1 MG tablet TAKE 1 TABLET BY MOUTH EVERY DAY 90 tablet 3    B-D UF III MINI PEN NEEDLES 31G X 5 MM MISC 1 EACH SUBCUTANEOUS 4 TIMES A DAY - E11.65 ULTRAFINE 5MM 100 each 11    omeprazole (PRILOSEC) 40 MG delayed release capsule Take 1 capsule by mouth 2 times daily 180 capsule 1    albuterol sulfate  (90 Base) MCG/ACT inhaler Inhale 2 puffs into the lungs every 4 hours as needed for Wheezing or Shortness of Breath 18 g 11    psyllium (METAMUCIL SMOOTH TEXTURE) 58.6 % powder Take by mouth 3 times daily. 1 Bottle 2    phenylephrine 0.25 % suppository Place 1 suppository rectally 2 times daily 12 suppository 1    hydrocortisone (ANUSOL-HC) 2.5 % CREA rectal cream Apply sparingly to external rectal area up to two times a day 1 Tube 1    ELIQUIS 5 MG TABS tablet       vitamin D (ERGOCALCIFEROL) 1.25 MG (64601 UT) CAPS capsule       fludrocortisone (FLORINEF) 0.1 MG tablet       ipratropium-albuterol (DUONEB) 0.5-2.5 (3) MG/3ML SOLN nebulizer solution 3 MILLILITER(S) BY NEBULIZER 4 TIMES A DAY      magnesium oxide (MAG-OX) 400 MG tablet TAKE 2 TABLETS BY MOUTH TWICE A DAY      predniSONE (DELTASONE) 5 MG tablet       tiZANidine (ZANAFLEX) 4 MG tablet       vancomycin (VANCOCIN) 125 MG capsule       mupirocin (BACTROBAN) 2 % ointment Apply to affected areas as needed. 1 Tube 0    hydrocortisone (ANUSOL-HC) 25 MG suppository Place 1 suppository rectally 2 times daily as needed for Hemorrhoids 24 suppository 2    levocetirizine (XYZAL) 5 MG tablet TAKE 1 TABLET BY MOUTH EVERY DAY 90 tablet 1    blood glucose monitor kit and supplies Test 4- 5  times a day & as needed for symptoms of irregular blood glucose.  1 kit 0    WIXELA INHUB 250-50 MCG/DOSE AEPB 2 times daily      traZODone (DESYREL) 50 MG tablet TAKE 1-2 TABLETS AT NIGHT AS DIRECTED 60 tablet 0    metoprolol (LOPRESSOR) 100 MG tablet TAKE 1 TABLET BY MOUTH TWICE A DAY 60 tablet 4    Magnesium 400 MG CAPS Take 400 mg by mouth daily 30 capsule 3    Nebulizers (COMPRESSOR/NEBULIZER) MISC Nebulizer with supplies 1 each 0    OXYGEN pulse oximeter (not oxygen itself) 1 kit 0    albuterol sulfate HFA (VENTOLIN HFA) 108 (90 Base) MCG/ACT inhaler INHALE TWO PUFFS EVERY FOUR HOURS AS NEEDED FOR THIRTY DAYS 18 Inhaler 12    SPIRIVA HANDIHALER 18 MCG inhalation capsule INHALE THE CONTENTS OF 1 CAPSULE BY MOUTH EVERYD AY 30 capsule 5    furosemide (LASIX) 20 MG tablet TAKE 1 TABLET BY MOUTH DAILY (Patient taking differently: Take 40 mg by mouth daily TAKE 1 TABLET BY MOUTH DAILY) 30 tablet 10    HYDROcodone-acetaminophen (NORCO) 7.5-325 MG per tablet Take 1 tablet by mouth.  OXYGEN Inhale into the lungs 3-4 L      Mycophenolate Sodium 360 MG TBEC Take 360 mg by mouth 2 times daily With the 180mg      Mycophenolate Sodium (MYCOPHENOLIC ACID) 395 MG TBEC 180 mg 2 times daily With the 360mg      Blood Glucose Monitoring Suppl (ACCU-CHEK JOANNE PLUS) W/DEVICE KIT   0    ACCU-CHEK JOANNE PLUS strip   3    Accu-Chek Softclix Lancets MISC   3    albuterol (PROVENTIL) (2.5 MG/3ML) 0.083% nebulizer solution       amLODIPine (NORVASC) 5 MG tablet Take 5 mg by mouth daily   11    Cyanocobalamin (VITAMIN B 12) 250 MCG LOZG Take  by mouth.  lisinopril (PRINIVIL;ZESTRIL) 40 MG tablet Take 1 tablet by mouth daily (Patient not taking: Reported on 9/21/2021) 90 tablet 2    hydroCHLOROthiazide (HYDRODIURIL) 50 MG tablet  (Patient not taking: Reported on 9/21/2021)      doxycycline monohydrate (MONODOX) 100 MG capsule  (Patient not taking: Reported on 9/21/2021)      LYRICA 150 MG capsule TAKE ONE CAPSULE BY MOUTH TWICE A DAY. 60 capsule 5     Current Facility-Administered Medications   Medication Dose Route Frequency Provider Last Rate Last Admin    magnesium sulfate injection 4 g  4 g IntraVENous Once Pecola Landau, MD           Objective:   PE:  There were no vitals taken for this visit.   Physical Exam  HENT: Head: Normocephalic. Mouth/Throat:      Comments: Tongue appears very raw and glossy. Eyes:      Pupils: Pupils are equal, round, and reactive to light. Pulmonary:      Effort: Pulmonary effort is normal.      Comments: No obvious sign of respiratory distress  Musculoskeletal:      Cervical back: Neck supple. Neurological:      Mental Status: He is alert. Assessment & Plan   Yaneli Reed was seen today for other. Diagnoses and all orders for this visit:    Oral thrush  -     fluconazole (DIFLUCAN) 100 MG tablet; Take 1 tablet by mouth daily for 7 days  -     Magic Mouthwash (MIRACLE MOUTHWASH); Swish and spit 5 mLs 4 times daily as needed for Irritation        Return for routine follow-up with Dr Savana Loza 1 /24/22. All questions were answered. Medications, including possible adverse effects, and instructions were reviewed and  understanding was confirmed. Follow-up recommendations, including when to contact or return to office (ie; if symptoms worsen or fail to improve), were discussed and acknowledged.     Electronically signed by Julia Alfred MD on 1/11/22 at 1:09 PM CST

## 2022-01-19 NOTE — TELEPHONE ENCOUNTER
Per patient it was approved to 12/29/2022.  I have not received an approval letter but patient did.

## 2022-03-24 PROBLEM — K21.9 GASTROESOPHAGEAL REFLUX DISEASE WITHOUT ESOPHAGITIS: Chronic | Status: ACTIVE | Noted: 2018-12-04

## 2022-04-06 PROBLEM — Z86.79 HISTORY OF CORONARY ARTERY DISEASE: Status: ACTIVE | Noted: 2022-01-01

## 2022-04-29 ENCOUNTER — TELEPHONE (OUTPATIENT)
Dept: PRIMARY CARE CLINIC | Age: 62
End: 2022-04-29

## 2022-04-29 NOTE — TELEPHONE ENCOUNTER
Lexy Dear with [facility] stated patient will be getting discharged, and he will be leaving with a pic line and will need IV antibiotics. Lexy was calling to see if we knew any other home health agency that could assist with this or a infusion center. She said she reached out to [facility], [facility], and [facility]. I provided her with [facility] and the phone number for our infusion center.

## 2022-05-09 ENCOUNTER — HOSPITAL ENCOUNTER (OUTPATIENT)
Dept: INFUSION THERAPY | Age: 62
Setting detail: INFUSION SERIES
Discharge: HOME | End: 2022-05-09
Payer: MEDICAID

## 2022-05-09 VITALS
TEMPERATURE: 97.3 F | DIASTOLIC BLOOD PRESSURE: 72 MMHG | SYSTOLIC BLOOD PRESSURE: 153 MMHG | RESPIRATION RATE: 20 BRPM | HEART RATE: 64 BPM | OXYGEN SATURATION: 99 %

## 2022-05-09 DIAGNOSIS — Z94.2 STATUS POST LUNG TRANSPLANTATION (HCC): ICD-10-CM

## 2022-05-09 DIAGNOSIS — Z94.2 STATUS POST LUNG TRANSPLANTATION (HCC): Primary | ICD-10-CM

## 2022-05-09 DIAGNOSIS — I87.8 POOR VENOUS ACCESS: Primary | ICD-10-CM

## 2022-05-09 DIAGNOSIS — I87.8 POOR VENOUS ACCESS: ICD-10-CM

## 2022-05-09 LAB
ALBUMIN SERPL-MCNC: 3.9 G/DL (ref 3.5–5.2)
ALP BLD-CCNC: 71 U/L (ref 40–130)
ALT SERPL-CCNC: 31 U/L (ref 5–41)
ANION GAP SERPL CALCULATED.3IONS-SCNC: 13 MMOL/L (ref 7–19)
AST SERPL-CCNC: 14 U/L (ref 5–40)
BASOPHILS ABSOLUTE: 0 K/UL (ref 0–0.2)
BASOPHILS RELATIVE PERCENT: 0.1 % (ref 0–1)
BILIRUB SERPL-MCNC: 0.5 MG/DL (ref 0.2–1.2)
BUN BLDV-MCNC: 35 MG/DL (ref 8–23)
CALCIUM SERPL-MCNC: 8.9 MG/DL (ref 8.8–10.2)
CHLORIDE BLD-SCNC: 87 MMOL/L (ref 98–111)
CO2: 33 MMOL/L (ref 22–29)
CREAT SERPL-MCNC: 1.6 MG/DL (ref 0.5–1.2)
EOSINOPHILS ABSOLUTE: 0 K/UL (ref 0–0.6)
EOSINOPHILS RELATIVE PERCENT: 0 % (ref 0–5)
GFR AFRICAN AMERICAN: 53
GFR NON-AFRICAN AMERICAN: 44
GLUCOSE BLD-MCNC: 560 MG/DL (ref 74–109)
HCT VFR BLD CALC: 32.8 % (ref 42–52)
HEMOGLOBIN: 10.3 G/DL (ref 14–18)
IMMATURE GRANULOCYTES #: 0.4 K/UL
LYMPHOCYTES ABSOLUTE: 0.4 K/UL (ref 1.1–4.5)
LYMPHOCYTES RELATIVE PERCENT: 2.6 % (ref 20–40)
MCH RBC QN AUTO: 27.5 PG (ref 27–31)
MCHC RBC AUTO-ENTMCNC: 31.4 G/DL (ref 33–37)
MCV RBC AUTO: 87.7 FL (ref 80–94)
MONOCYTES ABSOLUTE: 0.3 K/UL (ref 0–0.9)
MONOCYTES RELATIVE PERCENT: 1.8 % (ref 0–10)
NEUTROPHILS ABSOLUTE: 15.2 K/UL (ref 1.5–7.5)
NEUTROPHILS RELATIVE PERCENT: 93.4 % (ref 50–65)
PDW BLD-RTO: 13.3 % (ref 11.5–14.5)
PLATELET # BLD: 242 K/UL (ref 130–400)
PMV BLD AUTO: 11.7 FL (ref 9.4–12.4)
POTASSIUM SERPL-SCNC: 4.4 MMOL/L (ref 3.5–5)
RBC # BLD: 3.74 M/UL (ref 4.7–6.1)
SODIUM BLD-SCNC: 133 MMOL/L (ref 136–145)
TOTAL PROTEIN: 5.8 G/DL (ref 6.6–8.7)
WBC # BLD: 16.3 K/UL (ref 4.8–10.8)

## 2022-05-09 PROCEDURE — 80197 ASSAY OF TACROLIMUS: CPT

## 2022-05-09 PROCEDURE — 83735 ASSAY OF MAGNESIUM: CPT

## 2022-05-09 PROCEDURE — 36592 COLLECT BLOOD FROM PICC: CPT

## 2022-05-09 PROCEDURE — 85025 COMPLETE CBC W/AUTO DIFF WBC: CPT

## 2022-05-09 PROCEDURE — 80053 COMPREHEN METABOLIC PANEL: CPT

## 2022-05-09 RX ORDER — SODIUM CHLORIDE 0.9 % (FLUSH) 0.9 %
5-40 SYRINGE (ML) INJECTION PRN
Status: CANCELLED | OUTPATIENT
Start: 2022-05-12

## 2022-05-09 RX ORDER — SODIUM CHLORIDE 0.9 % (FLUSH) 0.9 %
5-40 SYRINGE (ML) INJECTION PRN
Status: DISCONTINUED | OUTPATIENT
Start: 2022-05-09 | End: 2022-05-10 | Stop reason: HOSPADM

## 2022-05-10 LAB — MAGNESIUM: 1.6 MG/DL (ref 1.6–2.4)

## 2022-05-11 ENCOUNTER — TELEPHONE (OUTPATIENT)
Dept: INFUSION THERAPY | Age: 62
End: 2022-05-11

## 2022-05-12 ENCOUNTER — HOSPITAL ENCOUNTER (OUTPATIENT)
Dept: INFUSION THERAPY | Age: 62
Setting detail: INFUSION SERIES
Discharge: HOME | End: 2022-05-12
Payer: MEDICAID

## 2022-05-12 VITALS
HEART RATE: 95 BPM | TEMPERATURE: 98.1 F | RESPIRATION RATE: 20 BRPM | SYSTOLIC BLOOD PRESSURE: 133 MMHG | OXYGEN SATURATION: 98 % | DIASTOLIC BLOOD PRESSURE: 76 MMHG

## 2022-05-12 DIAGNOSIS — I87.8 POOR VENOUS ACCESS: Primary | ICD-10-CM

## 2022-05-12 DIAGNOSIS — Z94.2 STATUS POST LUNG TRANSPLANTATION (HCC): ICD-10-CM

## 2022-05-12 LAB
ALBUMIN SERPL-MCNC: 4.2 G/DL (ref 3.5–5.2)
ALP BLD-CCNC: 72 U/L (ref 40–130)
ALT SERPL-CCNC: 24 U/L (ref 5–41)
ANION GAP SERPL CALCULATED.3IONS-SCNC: 12 MMOL/L (ref 7–19)
AST SERPL-CCNC: 10 U/L (ref 5–40)
BASOPHILS ABSOLUTE: 0 K/UL (ref 0–0.2)
BASOPHILS RELATIVE PERCENT: 0.1 % (ref 0–1)
BILIRUB SERPL-MCNC: 0.4 MG/DL (ref 0.2–1.2)
BUN BLDV-MCNC: 36 MG/DL (ref 8–23)
CALCIUM SERPL-MCNC: 9.3 MG/DL (ref 8.8–10.2)
CHLORIDE BLD-SCNC: 88 MMOL/L (ref 98–111)
CO2: 35 MMOL/L (ref 22–29)
CREAT SERPL-MCNC: 1.7 MG/DL (ref 0.5–1.2)
EOSINOPHILS ABSOLUTE: 0 K/UL (ref 0–0.6)
EOSINOPHILS RELATIVE PERCENT: 0.1 % (ref 0–5)
GFR AFRICAN AMERICAN: 49
GFR NON-AFRICAN AMERICAN: 41
GLUCOSE BLD-MCNC: 397 MG/DL (ref 74–109)
HCT VFR BLD CALC: 33 % (ref 42–52)
HEMOGLOBIN: 10.5 G/DL (ref 14–18)
IMMATURE GRANULOCYTES #: 0.1 K/UL
LYMPHOCYTES ABSOLUTE: 0.3 K/UL (ref 1.1–4.5)
LYMPHOCYTES RELATIVE PERCENT: 2.3 % (ref 20–40)
MAGNESIUM: 1.6 MG/DL (ref 1.6–2.4)
MCH RBC QN AUTO: 27.9 PG (ref 27–31)
MCHC RBC AUTO-ENTMCNC: 31.8 G/DL (ref 33–37)
MCV RBC AUTO: 87.8 FL (ref 80–94)
MONOCYTES ABSOLUTE: 0.3 K/UL (ref 0–0.9)
MONOCYTES RELATIVE PERCENT: 2.2 % (ref 0–10)
NEUTROPHILS ABSOLUTE: 12.9 K/UL (ref 1.5–7.5)
NEUTROPHILS RELATIVE PERCENT: 94.4 % (ref 50–65)
PDW BLD-RTO: 14 % (ref 11.5–14.5)
PLATELET # BLD: 227 K/UL (ref 130–400)
PMV BLD AUTO: 11.8 FL (ref 9.4–12.4)
POTASSIUM SERPL-SCNC: 4 MMOL/L (ref 3.5–5)
RBC # BLD: 3.76 M/UL (ref 4.7–6.1)
SODIUM BLD-SCNC: 135 MMOL/L (ref 136–145)
TACROLIMUS BLOOD: 3 NG/ML
TOTAL PROTEIN: 6.2 G/DL (ref 6.6–8.7)
WBC # BLD: 13.6 K/UL (ref 4.8–10.8)

## 2022-05-12 PROCEDURE — 80197 ASSAY OF TACROLIMUS: CPT

## 2022-05-12 PROCEDURE — 85025 COMPLETE CBC W/AUTO DIFF WBC: CPT

## 2022-05-12 PROCEDURE — 36592 COLLECT BLOOD FROM PICC: CPT

## 2022-05-12 PROCEDURE — 83735 ASSAY OF MAGNESIUM: CPT

## 2022-05-12 PROCEDURE — 80053 COMPREHEN METABOLIC PANEL: CPT

## 2022-05-12 RX ORDER — SODIUM CHLORIDE 0.9 % (FLUSH) 0.9 %
5-40 SYRINGE (ML) INJECTION PRN
Status: CANCELLED | OUTPATIENT
Start: 2022-05-16

## 2022-05-12 RX ORDER — SODIUM CHLORIDE 0.9 % (FLUSH) 0.9 %
5-40 SYRINGE (ML) INJECTION PRN
Status: DISCONTINUED | OUTPATIENT
Start: 2022-05-12 | End: 2022-05-13 | Stop reason: HOSPADM

## 2022-05-12 NOTE — PROGRESS NOTES
IR Tunneled Central Venous Catheter Placement 5+ Years    Anatomical Region Laterality Modality   -- -- X-Ray Angiography       Impression  Performed by IMAGING  Successful placement of tunneled PICC (powerline) the internal jugular vein. Tip is at the cavoatrial junction Line is ready for use. CRITICAL RESULT:   No.     COMMUNICATION:   Per this written report. Dictated by Shannen Malin on 5/6/2022 9:47 AM   Signed by Shannen Malin on 5/6/2022 10:03 AM    Narrative  Performed by Nisha Dickens  Exam/Procedure: IR TUNNELED CENTRAL VENOUS CATHETER PLACEMENT 5+ YEARS ordered by Ewa Foreman, [id number]     CLINICAL INDICATION:   59 y.o.malepresenting with a past medical history of COPD s/p left lung transplant (07/31/06)who presented to [facility] on 04/26 with shortness of breath in the setting of pneumonia. Long-term IV access is needed. The patient had right upper extremity swelling after right arm PICC placement. TECHNIQUE:   : Shannen Malin M.D. Secondary : None. Nurse: Lashawn Lindsey   Technologist: See EMR   Fluoroscopy Time:  0.2 minutes   Medications: IV conscious sedation with continuous physiologic monitoring provided by a qualified healthcare professional using Versed 1 mg IV and Fentanyl 50 mcg IV. 1% Lidocaine SQ   Antibiotics: None indicated. VTE Prophylaxis: None indicated for this ambulatory procedure. Duration of Conscious Sedation: Time out: 839 close out: 848     Procedure:     After discussion of risks and benefits of the procedure, informed written consent was obtained from the patient. Appropriate time out was done to confirm patient identity and planned procedure. The right neck was prepped and draped in the usual sterile manner. Ultrasound guidance was used to evaluate potential access sites. The internal jugular vein  was identified as adequate in caliber and patency for vascular access under real time ultrasound visualization of needle entry into the vessel.  Local anesthetic was administered using 1% Lidocaine. Ultrasound guided access to the internal jugular vein  was obtained with a 21 gauge Micropuncture needle, followed by advancement of a 0.018 soft tipped wire into the SVC under fluoroscopic guidance. Ultrasound images were sent to PACS. The micropuncture needle exchanged for the micropuncture introducer. A few centimeters below the clavicle, 1% lidocaine introduced. A small transverse nick in the skin was made with a scalpel. 1% lidocaine instilled from this site to be internal jugular vein access site. After measuring appropriate length of PICC to be placed, the tip was tunneled underneath the skin to the internal jugular vein access site. The micropuncture introducer was exchanged over a wire for a peel-away sheath. The wire and inner dilator of the peel-away sheath were removed. Tunneled PICC was advanced through the peel-away sheath and the peel-away sheath removed. The tunneled PICC was positioned such the tip was at the cavoatrial junction. The tunneled PICC was secured to the skin with StatLock. Dermabond applied at the venotomy site. Sterile occlusive dressing applied. The ports were flushed with heparinized saline. The patient tolerated the procedure well. There were no immediate complications. Device: Double-lumen Powerline cut to 26 cm, tip at cavoatrial junction. COMPARISON:   The images are correlated with the prior CT chest dated 13 2022. FINDINGS:   Patent right internal jugular vein. COMPLICATION: No immediate. Procedure Note    Theron Hollingsworth MD - 05/06/2022   Formatting of this note might be different from the original.   Exam/Procedure: IR TUNNELED CENTRAL VENOUS CATHETER PLACEMENT 5+ YEARS ordered by Martin Polanco [id number]     CLINICAL INDICATION:   59 y.o.malepresenting with a past medical history of COPD s/p left lung transplant (07/31/06)who presented to [facility] on 04/26 with shortness of breath in the setting of pneumonia. Long-term IV access is needed. The patient had right upper extremity swelling after right arm PICC placement. TECHNIQUE:   : Ivonne López M.D. Secondary : None. Nurse: See EMR   Technologist: See EMR   Fluoroscopy Time:  0.2 minutes   Medications: IV conscious sedation with continuous physiologic monitoring provided by a qualified healthcare professional using Versed 1 mg IV and Fentanyl 50 mcg IV. 1% Lidocaine SQ   Antibiotics: None indicated. VTE Prophylaxis: None indicated for this ambulatory procedure. Duration of Conscious Sedation: Time out: 839 close out: 848     Procedure:     After discussion of risks and benefits of the procedure, informed written consent was obtained from the patient. Appropriate time out was done to confirm patient identity and planned procedure. The right neck was prepped and draped in the usual sterile manner. Ultrasound guidance was used to evaluate potential access sites. The internal jugular vein  was identified as adequate in caliber and patency for vascular access under real time ultrasound visualization of needle entry into the vessel. Local anesthetic was administered using 1% Lidocaine. Ultrasound guided access to the internal jugular vein  was obtained with a 21 gauge Micropuncture needle, followed by advancement of a 0.018 soft tipped wire into the SVC under fluoroscopic guidance. Ultrasound images were sent to PACS. The micropuncture needle exchanged for the micropuncture introducer. A few centimeters below the clavicle, 1% lidocaine introduced. A small transverse nick in the skin was made with a scalpel. 1% lidocaine instilled from this site to be internal jugular vein access site. After measuring appropriate length of PICC to be placed, the tip was tunneled underneath the skin to the internal jugular vein access site. The micropuncture introducer was exchanged over a wire for a peel-away sheath.  The wire and inner dilator of the peel-away sheath were removed. Tunneled PICC was advanced through the peel-away sheath and the peel-away sheath removed. The tunneled PICC was positioned such the tip was at the cavoatrial junction. The tunneled PICC was secured to the skin with StatLock. Dermabond applied at the venotomy site. Sterile occlusive dressing applied. The ports were flushed with heparinized saline. The patient tolerated the procedure well. There were no immediate complications. Device: Double-lumen Powerline cut to 26 cm, tip at cavoatrial junction. COMPARISON:   The images are correlated with the prior CT chest dated 13 2022. FINDINGS:   Patent right internal jugular vein. COMPLICATION: No immediate. IMPRESSION:   Successful placement of tunneled PICC (powerline) the internal jugular vein. Tip is at the cavoatrial junction Line is ready for use. CRITICAL RESULT:   No.     COMMUNICATION:   Per this written report.      Dictated by Steven Trujillo on 5/6/2022 9:47 AM   Signed by Steven Trujillo on 5/6/2022 10:03 AM  Exam End: 05/06/22  7:47 AM Last Resulted: 05/06/22  9:03 AM   Received From: [facility]  Result Received: 05/12/22  1:49 PM   View Encounter

## 2022-05-14 LAB
CMV DNA QNT PCR: ABNORMAL LOG CPY/ML
CMV DNA QUANTATATIVE INTERPRETATION: ABNORMAL LOG IU/ML
CMV DNA QUANTATATIVE INTERPRETATION: DETECTED
CMV DNA QUANTITATIVE: ABNORMAL IU/ML
CMVQ COPY/ML: ABNORMAL CPY/ML
TACROLIMUS BLOOD: 5.6 NG/ML

## 2022-05-16 ENCOUNTER — HOSPITAL ENCOUNTER (OUTPATIENT)
Dept: INFUSION THERAPY | Age: 62
Setting detail: INFUSION SERIES
Discharge: HOME | End: 2022-05-16
Payer: MEDICAID

## 2022-05-16 DIAGNOSIS — I87.8 POOR VENOUS ACCESS: Primary | ICD-10-CM

## 2022-05-16 DIAGNOSIS — Z94.2 STATUS POST LUNG TRANSPLANTATION (HCC): ICD-10-CM

## 2022-05-16 LAB
ALBUMIN SERPL-MCNC: 4.1 G/DL (ref 3.5–5.2)
ALP BLD-CCNC: 65 U/L (ref 40–130)
ALT SERPL-CCNC: 17 U/L (ref 5–41)
ANION GAP SERPL CALCULATED.3IONS-SCNC: 12 MMOL/L (ref 7–19)
AST SERPL-CCNC: 9 U/L (ref 5–40)
BASOPHILS ABSOLUTE: 0 K/UL (ref 0–0.2)
BASOPHILS RELATIVE PERCENT: 0.1 % (ref 0–1)
BILIRUB SERPL-MCNC: 0.3 MG/DL (ref 0.2–1.2)
BUN BLDV-MCNC: 54 MG/DL (ref 8–23)
CALCIUM SERPL-MCNC: 9.2 MG/DL (ref 8.8–10.2)
CHLORIDE BLD-SCNC: 94 MMOL/L (ref 98–111)
CO2: 35 MMOL/L (ref 22–29)
CREAT SERPL-MCNC: 1.8 MG/DL (ref 0.5–1.2)
EOSINOPHILS ABSOLUTE: 0 K/UL (ref 0–0.6)
EOSINOPHILS RELATIVE PERCENT: 0 % (ref 0–5)
GFR AFRICAN AMERICAN: 46
GFR NON-AFRICAN AMERICAN: 38
GLUCOSE BLD-MCNC: 252 MG/DL (ref 74–109)
HCT VFR BLD CALC: 31.9 % (ref 42–52)
HEMOGLOBIN: 9.8 G/DL (ref 14–18)
IMMATURE GRANULOCYTES #: 0.1 K/UL
LYMPHOCYTES ABSOLUTE: 0.3 K/UL (ref 1.1–4.5)
LYMPHOCYTES RELATIVE PERCENT: 3.1 % (ref 20–40)
MAGNESIUM: 1.8 MG/DL (ref 1.6–2.4)
MCH RBC QN AUTO: 27.2 PG (ref 27–31)
MCHC RBC AUTO-ENTMCNC: 30.7 G/DL (ref 33–37)
MCV RBC AUTO: 88.6 FL (ref 80–94)
MONOCYTES ABSOLUTE: 0.1 K/UL (ref 0–0.9)
MONOCYTES RELATIVE PERCENT: 1.4 % (ref 0–10)
NEUTROPHILS ABSOLUTE: 8.5 K/UL (ref 1.5–7.5)
NEUTROPHILS RELATIVE PERCENT: 94.7 % (ref 50–65)
PDW BLD-RTO: 14.6 % (ref 11.5–14.5)
PLATELET # BLD: 157 K/UL (ref 130–400)
PMV BLD AUTO: 11.5 FL (ref 9.4–12.4)
POTASSIUM SERPL-SCNC: 4.1 MMOL/L (ref 3.5–5)
RBC # BLD: 3.6 M/UL (ref 4.7–6.1)
SODIUM BLD-SCNC: 141 MMOL/L (ref 136–145)
TOTAL PROTEIN: 5.6 G/DL (ref 6.6–8.7)
WBC # BLD: 9 K/UL (ref 4.8–10.8)

## 2022-05-16 PROCEDURE — 36592 COLLECT BLOOD FROM PICC: CPT

## 2022-05-16 PROCEDURE — 85025 COMPLETE CBC W/AUTO DIFF WBC: CPT

## 2022-05-16 PROCEDURE — 80197 ASSAY OF TACROLIMUS: CPT

## 2022-05-16 PROCEDURE — 80053 COMPREHEN METABOLIC PANEL: CPT

## 2022-05-16 PROCEDURE — 83735 ASSAY OF MAGNESIUM: CPT

## 2022-05-16 RX ORDER — SODIUM CHLORIDE 0.9 % (FLUSH) 0.9 %
5-40 SYRINGE (ML) INJECTION PRN
Status: DISCONTINUED | OUTPATIENT
Start: 2022-05-16 | End: 2022-05-17 | Stop reason: HOSPADM

## 2022-05-16 RX ORDER — SODIUM CHLORIDE 0.9 % (FLUSH) 0.9 %
5-40 SYRINGE (ML) INJECTION PRN
Status: CANCELLED | OUTPATIENT
Start: 2022-05-19

## 2022-05-16 NOTE — PROGRESS NOTES
Patient here for lab draws today, specimen collected. Flushed after draw and patient discharged home.

## 2022-05-17 LAB
CMV DNA QNT PCR: <2.6 LOG CPY/ML
CMV DNA QUANTATATIVE INTERPRETATION: <2.4 LOG IU/ML
CMV DNA QUANTATATIVE INTERPRETATION: NOT DETECTED
CMV DNA QUANTITATIVE: <227 IU/ML
CMVQ COPY/ML: <390 CPY/ML

## 2022-05-18 LAB — TACROLIMUS BLOOD: 5.8 NG/ML

## 2022-05-19 ENCOUNTER — HOSPITAL ENCOUNTER (OUTPATIENT)
Dept: INFUSION THERAPY | Age: 62
Setting detail: INFUSION SERIES
Discharge: HOME | End: 2022-05-19
Payer: MEDICAID

## 2022-05-19 VITALS
SYSTOLIC BLOOD PRESSURE: 120 MMHG | RESPIRATION RATE: 20 BRPM | DIASTOLIC BLOOD PRESSURE: 64 MMHG | OXYGEN SATURATION: 99 % | HEART RATE: 96 BPM | TEMPERATURE: 97.6 F

## 2022-05-19 DIAGNOSIS — I87.8 POOR VENOUS ACCESS: Primary | ICD-10-CM

## 2022-05-19 DIAGNOSIS — Z94.2 STATUS POST LUNG TRANSPLANTATION (HCC): ICD-10-CM

## 2022-05-19 LAB
ALBUMIN SERPL-MCNC: 4 G/DL (ref 3.5–5.2)
ALP BLD-CCNC: 67 U/L (ref 40–130)
ALT SERPL-CCNC: 18 U/L (ref 5–41)
ANION GAP SERPL CALCULATED.3IONS-SCNC: 12 MMOL/L (ref 7–19)
AST SERPL-CCNC: 11 U/L (ref 5–40)
BASOPHILS ABSOLUTE: 0 K/UL (ref 0–0.2)
BASOPHILS RELATIVE PERCENT: 0.1 % (ref 0–1)
BILIRUB SERPL-MCNC: 0.3 MG/DL (ref 0.2–1.2)
BUN BLDV-MCNC: 50 MG/DL (ref 8–23)
CALCIUM SERPL-MCNC: 9.3 MG/DL (ref 8.8–10.2)
CHLORIDE BLD-SCNC: 91 MMOL/L (ref 98–111)
CO2: 37 MMOL/L (ref 22–29)
CREAT SERPL-MCNC: 1.6 MG/DL (ref 0.5–1.2)
EOSINOPHILS ABSOLUTE: 0 K/UL (ref 0–0.6)
EOSINOPHILS RELATIVE PERCENT: 0 % (ref 0–5)
GFR AFRICAN AMERICAN: 53
GFR NON-AFRICAN AMERICAN: 44
GLUCOSE BLD-MCNC: 418 MG/DL (ref 74–109)
HCT VFR BLD CALC: 31.9 % (ref 42–52)
HEMOGLOBIN: 9.7 G/DL (ref 14–18)
IMMATURE GRANULOCYTES #: 0.1 K/UL
LYMPHOCYTES ABSOLUTE: 0.3 K/UL (ref 1.1–4.5)
LYMPHOCYTES RELATIVE PERCENT: 3.3 % (ref 20–40)
MAGNESIUM: 1.7 MG/DL (ref 1.6–2.4)
MCH RBC QN AUTO: 27.6 PG (ref 27–31)
MCHC RBC AUTO-ENTMCNC: 30.4 G/DL (ref 33–37)
MCV RBC AUTO: 90.6 FL (ref 80–94)
MONOCYTES ABSOLUTE: 0.1 K/UL (ref 0–0.9)
MONOCYTES RELATIVE PERCENT: 0.8 % (ref 0–10)
NEUTROPHILS ABSOLUTE: 9.3 K/UL (ref 1.5–7.5)
NEUTROPHILS RELATIVE PERCENT: 95.2 % (ref 50–65)
PDW BLD-RTO: 15.2 % (ref 11.5–14.5)
PLATELET # BLD: 144 K/UL (ref 130–400)
PMV BLD AUTO: 11.4 FL (ref 9.4–12.4)
POTASSIUM SERPL-SCNC: 4 MMOL/L (ref 3.5–5)
RBC # BLD: 3.52 M/UL (ref 4.7–6.1)
SODIUM BLD-SCNC: 140 MMOL/L (ref 136–145)
TOTAL PROTEIN: 5.4 G/DL (ref 6.6–8.7)
WBC # BLD: 9.8 K/UL (ref 4.8–10.8)

## 2022-05-19 PROCEDURE — 80053 COMPREHEN METABOLIC PANEL: CPT

## 2022-05-19 PROCEDURE — 36592 COLLECT BLOOD FROM PICC: CPT

## 2022-05-19 PROCEDURE — 85025 COMPLETE CBC W/AUTO DIFF WBC: CPT

## 2022-05-19 PROCEDURE — 2580000003 HC RX 258: Performed by: INTERNAL MEDICINE

## 2022-05-19 PROCEDURE — 83735 ASSAY OF MAGNESIUM: CPT

## 2022-05-19 PROCEDURE — 80197 ASSAY OF TACROLIMUS: CPT

## 2022-05-19 RX ORDER — SODIUM CHLORIDE 0.9 % (FLUSH) 0.9 %
5-40 SYRINGE (ML) INJECTION PRN
Status: DISCONTINUED | OUTPATIENT
Start: 2022-05-19 | End: 2022-05-20 | Stop reason: HOSPADM

## 2022-05-19 RX ORDER — SODIUM CHLORIDE 0.9 % (FLUSH) 0.9 %
5-40 SYRINGE (ML) INJECTION PRN
Status: CANCELLED | OUTPATIENT
Start: 2022-05-23

## 2022-05-19 RX ADMIN — SODIUM CHLORIDE, PRESERVATIVE FREE 10 ML: 5 INJECTION INTRAVENOUS at 14:41

## 2022-05-19 NOTE — PROGRESS NOTES
LABS DRAWN FROM RIGHT CHEST PICC LINE AND SENT TO LAB. PICC FLUSHED AND DRESSING CHANGED. ETOH CAPS APPLIED.  DISCHARGE PAPERWORK WITH FUTURE APPOINTMENT GIVEN TO PT.

## 2022-05-21 LAB — TACROLIMUS BLOOD: 8.7 NG/ML

## 2022-05-24 LAB
CMV DNA QNT PCR: 2.7 LOG CPY/ML
CMV DNA QUANTATATIVE INTERPRETATION: 2.4 LOG IU/ML
CMV DNA QUANTATATIVE INTERPRETATION: DETECTED
CMV DNA QUANTITATIVE: 270 IU/ML
CMVQ COPY/ML: 465 CPY/ML

## 2022-05-25 NOTE — PROGRESS NOTES
I spoke with the patient regarding an incidental finding seen on a recent imaging exam.The patient verbalized understanding the Radiologist's recommendations for follow-up. Per the patient request, the report was routed to the PCP and  From

## 2022-05-26 ENCOUNTER — TELEMEDICINE (OUTPATIENT)
Dept: PRIMARY CARE CLINIC | Age: 62
End: 2022-05-26
Payer: MEDICAID

## 2022-05-26 DIAGNOSIS — J18.9 PNEUMONIA DUE TO INFECTIOUS ORGANISM, UNSPECIFIED LATERALITY, UNSPECIFIED PART OF LUNG: ICD-10-CM

## 2022-05-26 DIAGNOSIS — Z09 HOSPITAL DISCHARGE FOLLOW-UP: Primary | ICD-10-CM

## 2022-05-26 PROCEDURE — 99422 OL DIG E/M SVC 11-20 MIN: CPT | Performed by: FAMILY MEDICINE

## 2022-05-26 ASSESSMENT — ENCOUNTER SYMPTOMS
COLOR CHANGE: 0
EYE ITCHING: 0
ABDOMINAL PAIN: 0
COUGH: 1
SHORTNESS OF BREATH: 1
NAUSEA: 0

## 2022-05-26 ASSESSMENT — PATIENT HEALTH QUESTIONNAIRE - PHQ9
SUM OF ALL RESPONSES TO PHQ QUESTIONS 1-9: 0
SUM OF ALL RESPONSES TO PHQ QUESTIONS 1-9: 0
1. LITTLE INTEREST OR PLEASURE IN DOING THINGS: 0
SUM OF ALL RESPONSES TO PHQ QUESTIONS 1-9: 0
2. FEELING DOWN, DEPRESSED OR HOPELESS: 0
SUM OF ALL RESPONSES TO PHQ QUESTIONS 1-9: 0
SUM OF ALL RESPONSES TO PHQ9 QUESTIONS 1 & 2: 0

## 2022-05-26 NOTE — PROGRESS NOTES
[address] [facility]  [address] [facility] [address] [address]  Dept: [phone]  Dept Fax: [phone]  Loc: [phone]      Subjective:     Chief Complaint   Patient presents with    Follow-Up from [facility] [address] was admitted at [facility] for pneumonia and was admitted for about 3 weeks. Still taking Vancomycin. Patient states he is feeling better       HPI:  Jr Saeed is a 59 y.o. male presents today for follow-up of recent hospitalization for pneumonia. He states that he was in [facility] x 3 weeks. Pt states he is feeling well, able to move around the house. His appetite is good and he is sleeping well. No new problems voiced. ROS:   Review of Systems   Constitutional: Negative for appetite change and fever. HENT: Negative. Eyes: Negative for itching and visual disturbance. Respiratory: Positive for cough (chronic, stable) and shortness of breath (chronic, stable ). Cardiovascular: Negative for chest pain and palpitations. Gastrointestinal: Negative for abdominal pain and nausea. Endocrine: Negative for polydipsia and polyuria. Genitourinary: Negative for dysuria and frequency. Musculoskeletal: Negative for arthralgias and myalgias. Skin: Negative for color change and rash. Allergic/Immunologic: Negative for environmental allergies and food allergies. Neurological: Negative for dizziness and light-headedness. Hematological: Negative for adenopathy. Does not bruise/bleed easily. Psychiatric/Behavioral: Negative for dysphoric mood. The patient is not nervous/anxious.         PMHx:  Past Medical History:   Diagnosis Date    COPD (chronic obstructive pulmonary disease) ([address]     Emphysema of lung ([address]     Hypertension     Pneumonia     in left lung    Poor venous access 5/9/2022    Skin cancer     Wears glasses      Patient Active Problem List   Diagnosis    COPD (chronic obstructive pulmonary disease) ([address]    [facility]    HTN (hypertension)    Status post lung transplantation ([facility]    Chronic respiratory failure with hypoxia, on home O2 therapy ([facility]    HCAP (healthcare-associated pneumonia)    Acquired immunocompromised state ([facility]    Poor venous access       PSHx:  Past Surgical History:   Procedure Laterality Date    APPENDECTOMY      CHOLECYSTECTOMY  14    COLONOSCOPY  2008    Dr. Arun Asif: AP    LUNG REMOVAL, PARTIAL      right upper    LUNG TRANSPLANT, SINGLE      left    SKIN CANCER DESTRUCTION      UPPER GASTROINTESTINAL ENDOSCOPY  2008     Dr. Arun Asif       PFHx:  Family History   Problem Relation Age of Onset    Cancer Mother     Heart Disease Father     Colon Cancer Neg Hx     Colon Polyps Neg Hx     Esophageal Cancer Neg Hx     Liver Cancer Neg Hx     Rectal Cancer Neg Hx     Liver Disease Neg Hx     Stomach Cancer Neg Hx        SocialHx:  Social History     Tobacco Use    Smoking status: Former Smoker     Packs/day: 3.00     Years: 15.00     Pack years: 45.00     Types: Cigarettes     Start date: 1972     Quit date: 1/10/2002     Years since quittin.3    Smokeless tobacco: Never Used    Tobacco comment: Pt used to smoke quit 9.5 years ago   Substance Use Topics    Alcohol use: No       Allergies: Allergies   Allergen Reactions    Avelox [Moxifloxacin Hydrochloride] Other (See Comments)     Pt states this medicine will make him sweat, turn red, itchy, and pass out.     Clindamycin/Lincomycin Diarrhea     Told by Pulmonologist at [facility] not to take    Moxifloxacin Rash     Sweating        Medications:  Current Outpatient Medications   Medication Sig Dispense Refill    atorvastatin (LIPITOR) 20 MG tablet TAKE 1 TABLET BY MOUTH EVERYDAY AT BEDTIME 90 tablet 3    insulin glargine (BASAGLAR KWIKPEN) 100 UNIT/ML injection pen Inject 21 Units into the skin nightly 5 pen 2    insulin lispro (ADMELOG) 100 UNIT/ML injection vial 7 U + SS before meals three times daily 10 mL 1    folic acid (FOLVITE) 1 MG tablet TAKE 1 TABLET BY MOUTH EVERY DAY 90 tablet 3    B-D UF III MINI PEN NEEDLES 31G X 5 MM MISC 1 EACH SUBCUTANEOUS 4 TIMES A DAY - E11.65 ULTRAFINE 5MM 100 each 11    omeprazole (PRILOSEC) 40 MG delayed release capsule Take 1 capsule by mouth 2 times daily 180 capsule 1    albuterol sulfate  (90 Base) MCG/ACT inhaler Inhale 2 puffs into the lungs every 4 hours as needed for Wheezing or Shortness of Breath 18 g 11    psyllium (METAMUCIL SMOOTH TEXTURE) 58.6 % powder Take by mouth 3 times daily. 1 Bottle 2    phenylephrine 0.25 % suppository Place 1 suppository rectally 2 times daily 12 suppository 1    hydrocortisone (ANUSOL-HC) 2.5 % CREA rectal cream Apply sparingly to external rectal area up to two times a day 1 Tube 1    ELIQUIS 5 MG TABS tablet       vitamin D (ERGOCALCIFEROL) 1.25 MG (24065 UT) CAPS capsule       fludrocortisone (FLORINEF) 0.1 MG tablet       ipratropium-albuterol (DUONEB) 0.5-2.5 (3) MG/3ML SOLN nebulizer solution 3 MILLILITER(S) BY NEBULIZER 4 TIMES A DAY      magnesium oxide (MAG-OX) 400 MG tablet TAKE 2 TABLETS BY MOUTH TWICE A DAY      predniSONE (DELTASONE) 5 MG tablet       tiZANidine (ZANAFLEX) 4 MG tablet       vancomycin (VANCOCIN) 125 MG capsule       mupirocin (BACTROBAN) 2 % ointment Apply to affected areas as needed. 1 Tube 0    hydrocortisone (ANUSOL-HC) 25 MG suppository Place 1 suppository rectally 2 times daily as needed for Hemorrhoids 24 suppository 2    levocetirizine (XYZAL) 5 MG tablet TAKE 1 TABLET BY MOUTH EVERY DAY 90 tablet 1    blood glucose monitor kit and supplies Test 4- 5  times a day & as needed for symptoms of irregular blood glucose.  1 kit 0    WIXELA INHUB 250-50 MCG/DOSE AEPB 2 times daily      traZODone (DESYREL) 50 MG tablet TAKE 1-2 TABLETS AT NIGHT AS DIRECTED 60 tablet 0    metoprolol (LOPRESSOR) 100 MG tablet TAKE 1 TABLET BY MOUTH TWICE A DAY 60 tablet 4    Magnesium 400 MG CAPS Take 400 mg by mouth daily 30 capsule 3    Nebulizers (COMPRESSOR/NEBULIZER) MISC Nebulizer with supplies 1 each 0    OXYGEN pulse oximeter (not oxygen itself) 1 kit 0    albuterol sulfate HFA (VENTOLIN HFA) 108 (90 Base) MCG/ACT inhaler INHALE TWO PUFFS EVERY FOUR HOURS AS NEEDED FOR THIRTY DAYS 18 Inhaler 12    SPIRIVA HANDIHALER 18 MCG inhalation capsule INHALE THE CONTENTS OF 1 CAPSULE BY MOUTH EVERYD AY 30 capsule 5    furosemide (LASIX) 20 MG tablet TAKE 1 TABLET BY MOUTH DAILY (Patient taking differently: Take 40 mg by mouth daily TAKE 1 TABLET BY MOUTH DAILY) 30 tablet 10    HYDROcodone-acetaminophen (NORCO) 7.5-325 MG per tablet Take 1 tablet by mouth.  OXYGEN Inhale into the lungs 3-4 L      Mycophenolate Sodium 360 MG TBEC Take 360 mg by mouth 2 times daily With the 180mg      Mycophenolate Sodium (MYCOPHENOLIC ACID) 906 MG TBEC 180 mg 2 times daily With the 360mg      Blood Glucose Monitoring Suppl (ACCU-CHEK JOANNE PLUS) W/DEVICE KIT   0    ACCU-CHEK JOANNE PLUS strip   3    Accu-Chek Softclix Lancets MISC   3    albuterol (PROVENTIL) (2.5 MG/3ML) 0.083% nebulizer solution       amLODIPine (NORVASC) 5 MG tablet Take 5 mg by mouth daily   11    Cyanocobalamin (VITAMIN B 12) 250 MCG LOZG Take  by mouth.  LYRICA 150 MG capsule TAKE ONE CAPSULE BY MOUTH TWICE A DAY. 60 capsule 5     Current Facility-Administered Medications   Medication Dose Route Frequency Provider Last Rate Last Admin    magnesium sulfate injection 4 g  4 g IntraVENous Once Leandra Pugh MD           Objective:   PE:  There were no vitals taken for this visit.   Physical Exam     No PE - this is a telephone visit    [address] Bernice Lorenz was seen today for follow-up from hospital.    Diagnoses and all orders for this visit:    Hospital discharge follow-up    Pneumonia due to infectious organism, unspecified laterality, unspecified part of lung        Return in about 2 months (around 7/26/2022) for routine follow-up, chronic care management. All questions were answered. Medications, including possible adverse effects, and instructions were reviewed and  understanding was confirmed. Follow-up recommendations, including when to contact or return to office (ie; if symptoms worsen or fail to improve), were discussed and acknowledged.     Electronically signed by Francesca Mendez MD on 5/26/22 at 9:04 AM CDT

## 2022-06-01 NOTE — ED NOTES
[facility] had to decline flight. [facility] has now accepted the pt for transport with an ETA of 29 minutes.

## 2022-06-10 RX ORDER — LISINOPRIL 40 MG/1
TABLET ORAL
Qty: 90 TABLET | Refills: 2 | OUTPATIENT
Start: 2022-06-10

## 2022-06-13 NOTE — ED NOTES
"This nurse was removed from another's room- pt was pulling off bipap. Pamela RN and Almas RN at bedside trying to calm pt- pt appears anxious. This nurse approached and pt made eye contact. Asked what is wrong, what happened. Pt states \"I cant breath\" while trying to remove bipap again. Bipap removed, placed on 3L/NC- pt admitted to feeling better. Asked what happened. Pt states \"I just couldn't breath.\" encouraged prior breathing technique with NC- tolerated well. Noted HR 130s-140s- hx AFIB so EKG repeated. HOB 90 for comfort. Pt admitted to feeling better at this time. Family at bedside. Informed will notify MD> verbalized understanding. Will cont monitoring closely. Advised to call any needs. Call light in reach. Dr. Godinez informed after EKG completed.  "

## 2022-06-13 NOTE — ED PROVIDER NOTES
Subjective   Patient is a 61-year-old chronically sick gentleman with history of lung transplant initial complaints were ankle injury when he came in x-rays were obtained on my exam with the patient the patient is short of breath tachypneic tachycardic which is not more worse than his baseline but he appears sick.  On my asking whether he is hurting anywhere he states no except for his left ankle denies any fever denies any shortness of breath.  But is tachypneic low-grade temperature and short of breath clinically .  There is no history of any fall there is no history any head trauma patient is somewhat confused at this time.  Patient has got history of lung transplant and has been seen multiple times in our department for shortness of breath he always does appear encephalopathic and has a history of CO2 retention lab work is going perform an ABG will be done.      Ankle Injury  Location:  Ankle injury twisted ankle  Onset quality:  Sudden  Chronicity:  New  Associated symptoms: congestion, cough, nausea, shortness of breath and wheezing    Associated symptoms: no abdominal pain, no diarrhea, no ear pain, no headaches, no loss of consciousness, no rash, no rhinorrhea and no vomiting    Shortness of breath:     Severity:  Moderate    Onset quality:  Gradual    Timing:  Constant    Progression:  Waxing and waning      Review of Systems   Unable to perform ROS: Mental status change   Constitutional: Negative.    HENT: Positive for congestion. Negative for ear pain and rhinorrhea.    Respiratory: Positive for cough, shortness of breath and wheezing.    Gastrointestinal: Positive for nausea. Negative for abdominal distention, abdominal pain, diarrhea and vomiting.   Endocrine: Negative.    Genitourinary: Negative.    Musculoskeletal: Negative.  Negative for back pain and neck pain.   Skin: Negative for color change, pallor and rash.   Neurological: Negative.  Negative for loss of consciousness, syncope, light-headedness  and headaches.   Hematological: Negative.    All other systems reviewed and are negative.      Past Medical History:   Diagnosis Date   • Acquired immunocompromised state ([facility]) 2021   • Cancer ([facility])     Skin   • Chronic back pain    • Chronic pain syndrome 2018   • Chronic respiratory failure with hypoxia ([facility]) 2018   • COPD (chronic obstructive pulmonary disease) ([facility])    • COPD, group D, by GOLD 2017 classification ([facility]) 2018   • Diabetes mellitus ([facility])    • Emphysema lung ([facility])    • Essential hypertension 2018   • Gastroesophageal reflux disease without esophagitis 2018   • History of coronary artery disease 2022   • Hypertension    • Lung infiltrate 2021    Yazdanism, 2021, left midlung, 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   • Lung transplant status ([facility]) 2018   • Lung transplanted ([facility])    • Personal history of nicotine dependence 2018   • Type 2 diabetes mellitus with hyperglycemia, without long-term current use of insulin ([facility]) 10/10/2020       Allergies   Allergen Reactions   • Clindamycin/Lincomycin Diarrhea   • Moxifloxacin Rash     Sweating        Past Surgical History:   Procedure Laterality Date   • APPENDECTOMY     • CHOLECYSTECTOMY     • COLONOSCOPY     • ENDOSCOPY     • LUNG REMOVAL, TOTAL      right upper lobe   • LUNG TRANSPLANT      2006       Family History   Problem Relation Age of Onset   • Heart disease Father    • Heart attack Brother        Social History     Socioeconomic History   • Marital status:    Tobacco Use   • Smoking status: Former Smoker     Packs/day: 3.00     Years: 28.00     Pack years: 84.00     Types: Cigarettes     Quit date:      Years since quittin.4   • Smokeless tobacco: Never Used   Vaping Use   • Vaping Use: Never used   Substance and Sexual Activity   • Alcohol use: No   • Drug use: No     Comment: when in severe pain    • Sexual activity: Defer           Objective   Physical Exam  Vitals and nursing note reviewed. Exam conducted  with a chaperone present.   Constitutional:       General: He is not in acute distress.     Appearance: Normal appearance. He is well-developed. He is ill-appearing. He is not toxic-appearing.   HENT:      Head: Normocephalic and atraumatic.      Nose: Nose normal.      Mouth/Throat:      Mouth: Mucous membranes are moist.      Pharynx: Uvula midline.   Eyes:      General: Lids are normal. Lids are everted, no foreign bodies appreciated.      Conjunctiva/sclera: Conjunctivae normal.      Pupils: Pupils are equal, round, and reactive to light.   Neck:      Vascular: Normal carotid pulses. No carotid bruit or JVD.      Trachea: Trachea and phonation normal. No tracheal deviation.   Cardiovascular:      Rate and Rhythm: Regular rhythm. Tachycardia present.      Chest Wall: PMI is not displaced.      Pulses: Normal pulses.      Heart sounds: Normal heart sounds. No murmur heard.    No gallop.   Pulmonary:      Effort: Tachypnea and respiratory distress present. No accessory muscle usage.      Breath sounds: No stridor. Examination of the left-middle field reveals wheezing. Examination of the left-lower field reveals wheezing. Wheezing and rhonchi present. No decreased breath sounds or rales.   Abdominal:      General: Bowel sounds are normal. There is no distension.      Palpations: Abdomen is soft.      Tenderness: There is no abdominal tenderness.   Musculoskeletal:         General: No swelling. Normal range of motion.      Cervical back: Full passive range of motion without pain, normal range of motion and neck supple. No rigidity.      Right lower leg: Edema present.      Left lower leg: Edema present.      Comments:  There is no right or left calf tenderness .  There is no palpable venous cord.  No obvious difference in the size of the legs.  .  The dorsalis pedis and posterior tibial femoral and popliteal pulses are palpable and +2 bilaterally.  But patient has got diffuse edema and is difficult to feel his  pulses.  Homans sign is negative patient states that his legs hurt.   Skin:     General: Skin is warm and dry.      Capillary Refill: Capillary refill takes less than 2 seconds.      Coloration: Skin is not jaundiced or pale.      Nails: There is no clubbing.   Neurological:      General: No focal deficit present.      Mental Status: He is alert and oriented to person, place, and time.      GCS: GCS eye subscore is 4. GCS verbal subscore is 5. GCS motor subscore is 6.      Cranial Nerves: Cranial nerves are intact. No cranial nerve deficit.      Motor: Motor function is intact.      Gait: Gait normal.      Deep Tendon Reflexes: Reflexes are normal and symmetric. Reflexes normal.   Psychiatric:         Speech: Speech normal.         Behavior: Behavior normal.         Procedures           ED Course  ED Course as of 06/13/22 1723   Mon Jun 13, 2022   0829 Poor EKG baseline artifact a lot of shivering [TS]   0911 Sinus tach [TS]   1039 Patient was placed on BiPAP has been given IV antibiotics steroids and neb treatments is doing a little better he still pretty dry drowsy but wakes up.  Waiting oanswering service from  to comment [TS]   1111 Patient's ankle exam was negative he is got bilateral lower extremity edema the primary concern that we had with him was a shortness of breath and his encephalopathy lab work-up was initiated he was placed on Vapotherm because oxygen levels were low repeat ABG on the Vapotherm still reveals hypercarbia therefore he was placed on a BiPAP. [TS]   1112 Currently on the BiPAP he is maintaining his sats hemodynamically stable still drowsy but wakes up.  And follows some of the commands but his encephalopathy is still present. [TS]   1113 There is no family members surgery discussed this case with.   [TS]   1113 I have discussed this case with transplant service at   discussed this patient case with me and has accepted patient in transfer to their facility.  Patient was  given empiric antibiotics neb treatments and steroids [TS]   1115 Patient being transferred to  since his transplant service as the patient is immunocompromise status post lung transplant and needs close follow-up with his transplant service which cannot be provided here [TS]   1722 Patient became agitated and tore off his BiPAP mask is refusing to wear BiPAP or Vapotherm we will place him on his treated with oxygen and see how he does still waiting to be transferred [TS]      ED Course User Index  [TS] Jose Antonio Godinez MD                                                 MDM  Number of Diagnoses or Management Options  Diagnosis management comments: Differential Diagnosis:  I considered pulmonary etiology, asthma, chronic obstructive pulmonary disease, pneumonia, pulmonary embolism, adult respiratory distress syndrome, pneumothorax, pleural effusion, pulmonary fibrosis, cardiac etiology, congestive heart failure, myocardial infarction, metabolic etiology, diabetic ketoacidosis, uremia, acidosis, sepsis, anemia, drug related etiology, hyperventilation and CNS disease as a possible cause of dyspnea in this patient. This is a partial list of diagnoses considered.            Amount and/or Complexity of Data Reviewed  Clinical lab tests: ordered and reviewed  Tests in the radiology section of CPT®: ordered and reviewed  Tests in the medicine section of CPT®: reviewed and ordered  Decide to obtain previous medical records or to obtain history from someone other than the patient: yes    Risk of Complications, Morbidity, and/or Mortality  Presenting problems: moderate  Diagnostic procedures: moderate  Management options: moderate        Final diagnoses:   Acute respiratory failure with hypoxia and hypercapnia (HCC)   Pneumonia due to infectious organism, unspecified laterality, unspecified part of lung   Encephalopathy       ED Disposition  ED Disposition     ED Disposition   Transfer to Another Facility     Condition   --     Comment   --             No follow-up provider specified.       Medication List      No changes were made to your prescriptions during this visit.          Jose Antonio Godinez MD  06/13/22 1116       Jose Antonio Godinez MD  06/13/22 4331

## 2022-06-13 NOTE — ED NOTES
Pt in bed. No s/s distress noted. Alert to verbal stimuli. Denies any needs/pain/discomforts. Pt on Bipap 16/6 at 40%. POC dicussed. Will cont monitoring. Advised to call any needs. Call light in reach.

## 2022-06-13 NOTE — ED NOTES
Pt in bed. No s/s distress noted. Alert to verbal stimuli. Denies any needs/pain/discomforts. Admits to feeling better. HOB slightly lowered. Tolerated NC well. Family at bedside. POC dicussed. Will cont monitoring. Advised to call any needs. Call light in reach.

## 2022-06-13 NOTE — ED NOTES
Pt in bed. No s/s distress noted. Alert to verbal stimuli. Pt agrees to take tylenol at this time for fever. Denies any needs. Swallow without diff. Tolerated well. POC dicussed. Will cont monitoring. Advised to call any needs. Call light in reach.

## 2022-06-13 NOTE — ED NOTES
"Pt in bed. Pt appears fatigued, labored breathing, and repsonds to verbal stimuli but appears to go back to sleep quickly. Noted tremors. Asmt completed. Son at bedside. Noted O2 sat 88-87% 3L/NC (as pt wears at home). Increased to 4L/NC- sat 89-90%. Pt is a lung transplant and son states recently dcd from [place] for PNA. States \"he was doing good until yesterday.\" denies fever. States \"he just hurt his ankle last night.\" pt admits to \"feel bad\" and SOB. Only c/o ankle pain. Pt is hot to touch. Temp 100.6. Pt placed on continuous monitoring ST -110s. POC discussed- informed will notify MD. Sat increased to 92% on 5L/NC. Verbalized understanding. Will cont monitoring. Call light in reach. Advised to call any needs.   "

## 2022-06-13 NOTE — ED NOTES
Pt in bed. Eyes closed. resp even/unlabored. Family at bedside. No s/s pain/discomforts/distress noted. Appears to be sleeping. VSS. Will cont monitoring. Advised to call any needs.

## 2022-06-13 NOTE — ED NOTES
Pt in bed. No s/s distress/pain/discomforts noted. Appears to be sleeping. Tolerating bipap well. Will cont monitoring. Call light in reach.

## 2022-06-13 NOTE — ED NOTES
"Pt in bed. No s/s distress noted. Alert to verbal stimuli. Denies any needs/pain/discomforts. Bipap removed for prep for CT- pt admitted to \"feeling good\" but still sleepy. POC dicussed. Will cont monitoring. Advised to call any needs. Call light in reach.   "

## 2022-06-13 NOTE — ED NOTES
Pt in bed. No s/s distress noted. Alert to verbal stimuli. Denies any needs/pain/discomforts. POC dicussed- pending rm asmt. Will cont monitoring. Advised to call any needs. Call light in reach.

## 2022-06-13 NOTE — ED NOTES
"Pt in bed. No s/s distress noted. Alert to verbal stimuli. Denies any needs/pain/discomforts. \"feel good\" per pt. Family at bedside- informed pending admit room. POC dicussed. Will cont monitoring. Advised to call any needs. Call light in reach.   "

## 2022-06-13 NOTE — ED NOTES
Pt in bed. No s/s distress noted.Tolerating vapo therm well at 30L, 50% O2 @35. Pt appears less distress. Alert to verbal stimuli and admits to feeling better. denies pain/discomforts/needs. POC dicussed. Will cont monitoring. Advised to call any needs. Call light in reach.

## 2022-06-13 NOTE — ED NOTES
Pt in bed. No s/s distress noted. Alert to verbal stimuli. Denies any needs/pain/discomforts. Son at bedside. POC dicussed. Will cont monitoring. Advised to call any needs. Call light in reach.

## 2022-06-13 NOTE — ED NOTES
Pt in bed. No s/s distress noted. Alert to verbal stimuli. Denies any needs/pain/discomforts. Family at bedside. POC dicussed. Will cont monitoring. Advised to call any needs. Call light in reach.

## 2022-06-20 PROBLEM — I48.0 PAROXYSMAL ATRIAL FIBRILLATION (HCC): Status: ACTIVE | Noted: 2022-01-01

## 2022-06-20 PROBLEM — Z86.79 HISTORY OF CORONARY ARTERY DISEASE: Chronic | Status: ACTIVE | Noted: 2022-01-01

## 2022-07-14 ENCOUNTER — TELEPHONE (OUTPATIENT)
Dept: PRIMARY CARE CLINIC | Age: 62
End: 2022-07-14

## 2024-06-14 NOTE — TELEPHONE ENCOUNTER
Dermatology Rooming Note    Vimal Goldsmith's goals for this visit include:   Chief Complaint   Patient presents with    Derm Problem     Dermatitis recheck- he notes that it is stable      Aries DOMÍNGUEZ CMA      Forwarding to [facility]